# Patient Record
Sex: FEMALE | Race: WHITE | NOT HISPANIC OR LATINO | Employment: FULL TIME | URBAN - METROPOLITAN AREA
[De-identification: names, ages, dates, MRNs, and addresses within clinical notes are randomized per-mention and may not be internally consistent; named-entity substitution may affect disease eponyms.]

---

## 2018-01-18 NOTE — MISCELLANEOUS
Message  Return to work or school:   Edgard Chavez is under my professional care  She was seen in my office on 9/14/2016   She is able to return to work on  9/16/2016      Excuse work absence due to illness          Signatures   Electronically signed by : Edison Rodríguez MD; Sep 14 2016  8:34PM EST                       (Author)

## 2018-09-01 ENCOUNTER — OFFICE VISIT (OUTPATIENT)
Dept: URGENT CARE | Age: 33
End: 2018-09-01
Payer: COMMERCIAL

## 2018-09-01 VITALS
OXYGEN SATURATION: 98 % | SYSTOLIC BLOOD PRESSURE: 121 MMHG | HEART RATE: 87 BPM | BODY MASS INDEX: 46.26 KG/M2 | DIASTOLIC BLOOD PRESSURE: 71 MMHG | WEIGHT: 271 LBS | TEMPERATURE: 98.1 F | HEIGHT: 64 IN | RESPIRATION RATE: 20 BRPM

## 2018-09-01 DIAGNOSIS — R21 RASH: Primary | ICD-10-CM

## 2018-09-01 PROCEDURE — 99283 EMERGENCY DEPT VISIT LOW MDM: CPT | Performed by: FAMILY MEDICINE

## 2018-09-01 PROCEDURE — G0382 LEV 3 HOSP TYPE B ED VISIT: HCPCS | Performed by: FAMILY MEDICINE

## 2018-09-01 RX ORDER — METHYLPREDNISOLONE 4 MG/1
TABLET ORAL
Qty: 21 TABLET | Refills: 0 | Status: SHIPPED | OUTPATIENT
Start: 2018-09-01 | End: 2019-02-27

## 2018-09-01 RX ORDER — VALACYCLOVIR HYDROCHLORIDE 1 G/1
1000 TABLET, FILM COATED ORAL 3 TIMES DAILY
Qty: 21 TABLET | Refills: 0 | Status: SHIPPED | OUTPATIENT
Start: 2018-09-01 | End: 2019-02-27

## 2018-09-01 RX ORDER — LEVOTHYROXINE SODIUM 112 UG/1
TABLET ORAL
COMMUNITY
Start: 2018-08-01

## 2018-09-01 NOTE — PATIENT INSTRUCTIONS
Will start treatment for shingles due to presentation of rash  Start steroid taper to see if this will help with itching  Avoid scratching the area  You can continue benadryl and anti itch cream   If no improvement go to ER      Will treat for shingles as rash follows a dermatome and stops midline anteriorly and posteriorly

## 2018-09-01 NOTE — PROGRESS NOTES
[facility]        NAME: Mairxa Samaniego is a 26 y o  female  : 1985    MRN: [id number]  DATE: 2018  TIME: 4:03 PM    Assessment and Plan   Herpes zoster without complication [W09 4]  1  Herpes zoster without complication  methylprednisolone (MEDROL) 4 mg tablet    valACYclovir (VALTREX) 1,000 mg tablet         Patient Instructions     Patient Instructions   Will start treatment for shingles due to presentation of rash  Start steroid taper to see if this will help with itching  Avoid scratching the area  You can continue benadryl and anti itch cream   If no improvement go to ER      Chief Complaint     Chief Complaint   Patient presents with    Rash     rash under her breast ans rading to her back left side since wednesday  History of Present Illness   Marixa Samaniego presents to the clinic c/o    This is a 26year old female here today with rash under breast which radiates around to her back  She states she noticed rash and itchiness 3 days ago  She denies any pain or open areas  No pain or tingling before onset of rash  History of chicken pox as a child  She has used benadryl and other OTC antiitch creams with no relief  Review of Systems   Review of Systems   Constitutional: Negative  HENT: Negative  Respiratory: Negative  Cardiovascular: Negative  Skin: Positive for rash  Psychiatric/Behavioral: Negative            Current Medications     Long-Term Prescriptions   Medication Sig Dispense Refill    valACYclovir (VALTREX) 1,000 mg tablet Take 1 tablet (1,000 mg total) by mouth 3 (three) times a day for 7 days 21 tablet 0       Current Allergies     Allergies as of 2018 - Reviewed 2018   Allergen Reaction Noted    Diphenhydramine Shortness Of Breath 12/15/2015    Erythromycin Shortness Of Breath and Hives 2015            The following portions of the patient's history were reviewed and updated as appropriate: allergies, current medications, past family history, past medical history, past social history, past surgical history and problem list     Objective   /71   Pulse 87   Temp 98 1 °F (36 7 °C) (Temporal)   Resp 20   Ht 5' 4" (1 626 m)   Wt 123 kg (271 lb)   SpO2 98%   BMI 46 52 kg/m²        Physical Exam     Physical Exam   Constitutional: She appears well-developed and well-nourished  Neck: Normal range of motion  Neck supple  Cardiovascular: Normal rate, regular rhythm and normal heart sounds  Pulmonary/Chest: Effort normal and breath sounds normal    Skin:   erythemic excoriation extending under left breast and wraps around to back starting and stopping and midline and no extending beyond midline, following a dermatome  No vesicular lesions   Nursing note and vitals reviewed

## 2019-02-27 ENCOUNTER — APPOINTMENT (EMERGENCY)
Dept: CT IMAGING | Facility: HOSPITAL | Age: 34
End: 2019-02-27
Payer: COMMERCIAL

## 2019-02-27 ENCOUNTER — APPOINTMENT (EMERGENCY)
Dept: RADIOLOGY | Facility: HOSPITAL | Age: 34
End: 2019-02-27
Payer: COMMERCIAL

## 2019-02-27 ENCOUNTER — HOSPITAL ENCOUNTER (EMERGENCY)
Facility: HOSPITAL | Age: 34
Discharge: HOME | End: 2019-02-27
Attending: EMERGENCY MEDICINE | Admitting: EMERGENCY MEDICINE
Payer: COMMERCIAL

## 2019-02-27 VITALS
TEMPERATURE: 98.9 F | HEART RATE: 91 BPM | SYSTOLIC BLOOD PRESSURE: 161 MMHG | DIASTOLIC BLOOD PRESSURE: 70 MMHG | OXYGEN SATURATION: 97 % | RESPIRATION RATE: 18 BRPM | BODY MASS INDEX: 47.2 KG/M2 | WEIGHT: 275 LBS

## 2019-02-27 DIAGNOSIS — J02.9 VIRAL PHARYNGITIS: Primary | ICD-10-CM

## 2019-02-27 DIAGNOSIS — J02.9 SORE THROAT: ICD-10-CM

## 2019-02-27 LAB — S PYO AG THROAT QL: NEGATIVE

## 2019-02-27 PROCEDURE — 87430 STREP A AG IA: CPT | Performed by: EMERGENCY MEDICINE

## 2019-02-27 PROCEDURE — 70490 CT SOFT TISSUE NECK W/O DYE: CPT

## 2019-02-27 PROCEDURE — 99284 EMERGENCY DEPT VISIT MOD MDM: CPT

## 2019-02-27 PROCEDURE — 70360 X-RAY EXAM OF NECK: CPT

## 2019-02-27 RX ORDER — BUPRENORPHINE HYDROCHLORIDE AND NALOXONE HYDROCHLORIDE DIHYDRATE 8; 2 MG/1; MG/1
1 TABLET SUBLINGUAL DAILY
COMMUNITY

## 2019-02-28 NOTE — ED PROVIDER NOTES
History  Chief Complaint   Patient presents with    Sore Throat     Pt states she was seen by urgent care today for a sore throat  They informed her that she had ulcers in her throat  Pt was started on augmentin  Pt was told that if she had a fever she should come to the ER  Tmax 8      53-year-old female presents today complaining of sore throat which started yesterday  States she was seen evaluated by urgent care and was given Augmentin and Carafate  States she is unable to tolerate the liquid Augmentin and the Carafate does not help  Has not taken any anti-inflammatories for her symptoms  Denies fever  Presents to the emergency room tonight because she was out to the dinner and her throat her while she was eating, she became afraid that it was closing  She is in no respiratory distress at this time  She has not having any difficulty swallowing, she is handling her own secretions  History provided by:  Patient  Sore Throat   Location:  Generalized  Quality:  Sore  Severity:  Moderate  Onset quality:  Gradual  Duration:  2 days  Timing:  Constant  Progression:  Unchanged  Chronicity:  New  Relieved by:  Nothing  Worsened by:  Drinking, eating and swallowing  Ineffective treatments: Started Augmentin and Carafate today  Associated symptoms: postnasal drip    Associated symptoms: no abdominal pain, no chills, no drooling, no epistaxis, no fever, no headaches, no rash, no shortness of breath, no sinus congestion, no trouble swallowing and no voice change    Risk factors: no sick contacts        Prior to Admission Medications   Prescriptions Last Dose Informant Patient Reported? Taking?    buprenorphine-naloxone (SUBOXONE) 8-2 mg per SL tablet   Yes Yes   Sig: Place 1 tablet under the tongue daily   levothyroxine 112 mcg tablet   Yes Yes   Sig: TAKE 2 TABLETS BY MOUTH DAILY      Facility-Administered Medications: None       Past Medical History:   Diagnosis Date    Thyroid cancer ([facility] [address] ) 1999    with thyroidectomy and RTX       Past Surgical History:   Procedure Laterality Date    GANGLION CYST EXCISION Left     THYROIDECTOMY         History reviewed  No pertinent family history  I have reviewed and agree with the history as documented  Social History     Tobacco Use    Smoking status: Current Every Day Smoker     Packs/day: 0 25     Types: Cigarettes    Smokeless tobacco: Never Used   Substance Use Topics    Alcohol use: No    Drug use: No     Comment: 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        Review of Systems   Constitutional: Negative for chills and fever  HENT: Positive for postnasal drip and sore throat  Negative for congestion, drooling, nosebleeds, trouble swallowing and voice change  Respiratory: Negative for chest tightness and shortness of breath  Gastrointestinal: Negative for abdominal pain  Skin: Negative for rash  Allergic/Immunologic: Negative for immunocompromised state  Neurological: Negative for headaches  Physical Exam  Physical Exam   Constitutional: She is oriented to person, place, and time  She appears well-developed and well-nourished  HENT:   Head: Normocephalic and atraumatic  Mouth/Throat: Oral lesions ( vesicles present on the soft palate) present  Uvula swelling (Mild) present  Posterior oropharyngeal erythema ( mild) present  No oropharyngeal exudate or tonsillar abscesses  No tonsillar exudate  Eyes: Pupils are equal, round, and reactive to light  EOM are normal    Neck: Normal range of motion  Cardiovascular: Normal rate and regular rhythm  Pulmonary/Chest: Effort normal and breath sounds normal    Abdominal: Soft  Lymphadenopathy:     She has no cervical adenopathy  Neurological: She is alert and oriented to person, place, and time  Skin: Skin is warm and dry  Capillary refill takes less than 2 seconds  No rash noted  Psychiatric: She has a normal mood and affect   Her behavior is normal    Nursing note and vitals reviewed  Vital Signs  ED Triage Vitals [02/27/19 2128]   Temperature Pulse Respirations Blood Pressure SpO2   98 9 °F (37 2 °C) 91 18 161/70 97 %      Temp Source Heart Rate Source Patient Position - Orthostatic VS BP Location FiO2 (%)   Oral Monitor Sitting Right arm --      Pain Score       Worst Possible Pain           Vitals:    02/27/19 2128   BP: 161/70   Pulse: 91   Patient Position - Orthostatic VS: Sitting       Visual Acuity      ED Medications  Medications - No data to display    Diagnostic Studies  Results Reviewed     Procedure Component Value Units Date/Time    Rapid Strep A Screen Only, Adults [[id number]]  (Normal) Collected:  02/27/19 2224    Lab Status:  Final result Specimen:  Throat Updated:  02/27/19 2237     Rapid Strep A Screen Negative                 CT soft tissue neck wo contrast   Final Result by Alex Aguiar MD (02/27 2324)         1  No evidence of foreign body in the airway  2   Mild lingual tonsillar enlargement, likely reactive  No evidence of significant pharyngitis or abscess  Workstation performed: [id number]         XR neck soft tissue   Final Result by Ralph Fernandez MD (02/27 2247)      Surgical clips are noted in the cervical region at approximately the level of C6 and C7  Consider CT for further evaluation  I personally discussed this study with Marsha Harrison on 2/27/2019 at 10:46 PM                Workstation performed: [id number]                    Procedures  Procedures       Phone Contacts  ED Phone Contact    ED Course                               MDM  Number of Diagnoses or Management Options  Sore throat: new and requires workup  Viral pharyngitis: new and requires workup  Diagnosis management comments: 11:29 PM  CT soft tissue neck negative for acute pathology  Likely viral pharyngitis  Will recommend anti-inflammatories and lidocaine p r n  Sneha Yates Patient is stable for discharge  Return to ED instructions reviewed         Amount and/or Complexity of Data Reviewed  Clinical lab tests: ordered and reviewed  Tests in the radiology section of CPT®: ordered and reviewed  Independent visualization of images, tracings, or specimens: yes    Risk of Complications, Morbidity, and/or Mortality  Presenting problems: low  Diagnostic procedures: low  Management options: low    Patient Progress  Patient progress: stable      Disposition  Final diagnoses:   Viral pharyngitis   Sore throat     Time reflects when diagnosis was documented in both MDM as applicable and the Disposition within this note     Time User Action Codes Description Comment    2/27/2019 11:30 PM Lisa Villarreal Add [J02 9] Viral pharyngitis     2/27/2019 11:30 PM Lisa Villarreal Add [J02 9] Sore throat       ED Disposition     ED Disposition Condition Date/Time Comment    Discharge Stable Wed Feb 27, 2019 11:30 PM Marcos Guerrero Benjamin discharge to home/self care  Follow-up Information     Follow up With Specialties Details Why Contact Info Additional Information    Elvin Campbell MD Internal Medicine Schedule an appointment as soon as possible for a visit   [address] [place] [address]        [place] [address] Emergency Department Emergency Medicine  If symptoms worsen [address] [phone]  ED,  [address] [place] [address], [phone]          Patient's Medications   Discharge Prescriptions    LIDOCAINE VISCOUS (XYLOCAINE) 2 % MUCOSAL SOLUTION    Swish and spit 10 mL 4 (four) times a day as needed for mild pain       Start Date: 2/27/2019 End Date: --       Order Dose: 10 mL       Quantity: 100 mL    Refills: 0     No discharge procedures on file      ED Provider  Electronically Signed by           Gregorio Bingham DO  02/27/19 0442

## 2019-09-08 ENCOUNTER — APPOINTMENT (EMERGENCY)
Dept: RADIOLOGY | Facility: HOSPITAL | Age: 34
End: 2019-09-08
Payer: COMMERCIAL

## 2019-09-08 ENCOUNTER — HOSPITAL ENCOUNTER (EMERGENCY)
Facility: HOSPITAL | Age: 34
Discharge: HOME | End: 2019-09-08
Attending: EMERGENCY MEDICINE
Payer: COMMERCIAL

## 2019-09-08 VITALS
TEMPERATURE: 98.9 F | WEIGHT: 270.5 LBS | BODY MASS INDEX: 45.07 KG/M2 | RESPIRATION RATE: 20 BRPM | SYSTOLIC BLOOD PRESSURE: 113 MMHG | OXYGEN SATURATION: 98 % | HEIGHT: 65 IN | HEART RATE: 92 BPM | DIASTOLIC BLOOD PRESSURE: 56 MMHG

## 2019-09-08 DIAGNOSIS — B34.9 VIRAL ILLNESS: ICD-10-CM

## 2019-09-08 DIAGNOSIS — R05.9 COUGH: Primary | ICD-10-CM

## 2019-09-08 LAB
ATRIAL RATE: 91 BPM
P AXIS: 67 DEGREES
PR INTERVAL: 134 MS
QRS AXIS: 11 DEGREES
QRSD INTERVAL: 90 MS
QT INTERVAL: 328 MS
QTC INTERVAL: 403 MS
T WAVE AXIS: 44 DEGREES
VENTRICULAR RATE: 91 BPM

## 2019-09-08 PROCEDURE — 99285 EMERGENCY DEPT VISIT HI MDM: CPT

## 2019-09-08 PROCEDURE — 93010 ELECTROCARDIOGRAM REPORT: CPT | Performed by: INTERNAL MEDICINE

## 2019-09-08 PROCEDURE — 71046 X-RAY EXAM CHEST 2 VIEWS: CPT

## 2019-09-08 PROCEDURE — 93005 ELECTROCARDIOGRAM TRACING: CPT

## 2019-09-08 PROCEDURE — 99284 EMERGENCY DEPT VISIT MOD MDM: CPT | Performed by: EMERGENCY MEDICINE

## 2019-09-08 PROCEDURE — 94640 AIRWAY INHALATION TREATMENT: CPT

## 2019-09-08 RX ORDER — PREDNISONE 20 MG/1
60 TABLET ORAL ONCE
Status: COMPLETED | OUTPATIENT
Start: 2019-09-08 | End: 2019-09-08

## 2019-09-08 RX ORDER — GUAIFENESIN 600 MG
1200 TABLET, EXTENDED RELEASE 12 HR ORAL ONCE
Status: COMPLETED | OUTPATIENT
Start: 2019-09-08 | End: 2019-09-08

## 2019-09-08 RX ORDER — PREDNISONE 20 MG/1
40 TABLET ORAL DAILY
Qty: 8 TABLET | Refills: 0 | Status: SHIPPED | OUTPATIENT
Start: 2019-09-08 | End: 2019-09-12

## 2019-09-08 RX ADMIN — PREDNISONE 60 MG: 20 TABLET ORAL at 16:51

## 2019-09-08 RX ADMIN — IPRATROPIUM BROMIDE 0.5 MG: 0.5 SOLUTION RESPIRATORY (INHALATION) at 16:51

## 2019-09-08 RX ADMIN — ALBUTEROL SULFATE 5 MG: 2.5 SOLUTION RESPIRATORY (INHALATION) at 16:51

## 2019-09-08 RX ADMIN — GUAIFENESIN 1200 MG: 600 TABLET, EXTENDED RELEASE ORAL at 17:37

## 2019-09-08 NOTE — ED PROVIDER NOTES
History  Chief Complaint   Patient presents with    Chest Pain     Pt  presents to the ED with complaints of chest pain as well as coughing, congestion, weakness, fever, and vomiting  History provided by:  Patient   used: No    Chest Pain   Associated symptoms: cough, fever and shortness of breath    Associated symptoms: no abdominal pain, no back pain, no dizziness, no nausea, not vomiting and no weakness     61-year-old female presented for evaluation of few days of generalized malaise, painful cough, sore throat, mild dyspnea  States that her chest only hurts when she coughs  Having some subjective fevers and sweats  Daughter was sick before her and has since improved spontaneously  She was seen in urgent care 1 or 2 days ago, prescribed Augmentin and also given Phenergan/codeine for bronchitis  Patient has a history of prior heroin abuse, has been clean for about 5 years  Currently on Suboxone  Advised not to continue using the cough syrup  She reports having some improvement after using her daughter's inhaler  On exam here vitals are normal   Breathing comfortably on room air  Breath sounds are normal   Heart sounds normal   Oropharynx mildly erythematous  Plan EKG, chest x-ray, neb treatment, prednisone +/- inhaler for home  Prior to Admission Medications   Prescriptions Last Dose Informant Patient Reported? Taking? buprenorphine-naloxone (SUBOXONE) 8-2 mg per SL tablet   Yes No   Sig: Place 1 tablet under the tongue daily   levothyroxine 112 mcg tablet   Yes No   Sig: TAKE 2 TABLETS BY MOUTH DAILY      Facility-Administered Medications: None       Past Medical History:   Diagnosis Date    [facility]     clean since 2015    [facility] 1999    with thyroidectomy and RTX       Past Surgical History:   Procedure Laterality Date    GANGLION CYST EXCISION Left     THYROIDECTOMY         History reviewed  No pertinent family history    I have reviewed and agree with the history as documented  Social History     Tobacco Use    Smoking status: Current Every Day Smoker     Packs/day: 0 25     Types: Cigarettes    Smokeless tobacco: Never Used   Substance Use Topics    Alcohol use: No    Drug use: No     Comment: 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        Review of Systems   Constitutional: Positive for fever  Negative for activity change and appetite change  Respiratory: Positive for cough and shortness of breath  Negative for wheezing  Cardiovascular: Positive for chest pain  Gastrointestinal: Negative for abdominal pain, nausea and vomiting  Musculoskeletal: Negative for back pain and neck pain  Skin: Negative for color change and rash  Neurological: Negative for dizziness and weakness  All other systems reviewed and are negative  Physical Exam  Physical Exam   Constitutional: She is oriented to person, place, and time  She appears well-developed and well-nourished  HENT:   Head: Normocephalic  Mild posterior pharyngeal erythema  No tonsillar edema or exudates  Neck: Normal range of motion  Cardiovascular: Normal rate and regular rhythm  No murmur heard  Pulmonary/Chest: Effort normal and breath sounds normal    Musculoskeletal:        Right lower leg: She exhibits no edema  Left lower leg: She exhibits no edema  Neurological: She is alert and oriented to person, place, and time  Psychiatric: She has a normal mood and affect  Her behavior is normal    Nursing note and vitals reviewed        Vital Signs  ED Triage Vitals [09/08/19 1622]   Temperature Pulse Respirations Blood Pressure SpO2   98 9 °F (37 2 °C) 93 20 113/56 98 %      Temp Source Heart Rate Source Patient Position - Orthostatic VS BP Location FiO2 (%)   Oral Monitor Lying Right arm --      Pain Score       No Pain           Vitals:    09/08/19 1622 09/08/19 1630   BP: 113/56 113/56   Pulse: 93 92   Patient Position - Orthostatic VS: Lying Visual Acuity      ED Medications  Medications   predniSONE tablet 60 mg (60 mg Oral Given 9/8/19 1651)   albuterol inhalation solution 5 mg (5 mg Nebulization Given 9/8/19 1651)   ipratropium (ATROVENT) 0 02 % inhalation solution 0 5 mg (0 5 mg Nebulization Given 9/8/19 1651)   guaiFENesin (MUCINEX) 12 hr tablet 1,200 mg (1,200 mg Oral Given 9/8/19 2644)       Diagnostic Studies  Results Reviewed     None                 XR chest 2 views   ED Interpretation by Kya Caro MD (09/08 1651)   Normal                  Procedures  ECG 12 Lead Documentation Only  Date/Time: 9/8/2019 4:37 PM  Performed by: Kya Caro MD  Authorized by: Kya Caro MD     Indications / Diagnosis:  Chest pain  Previous ECG:     Previous ECG:  Compared to current    Comparison ECG info:  6/1/16    Similarity:  No change  Rate:     ECG rate:  91  Rhythm:     Rhythm: sinus rhythm    Ectopy:     Ectopy: none    QRS:     QRS axis:  Normal  Conduction:     Conduction: normal    ST segments:     ST segments:  Normal  T waves:     T waves: normal             ED Course                               MDM  Number of Diagnoses or Management Options  Cough: new and requires workup  Viral illness: new and requires workup  Diagnosis management comments: 70-year-old female presented for evaluation of a few days of painful cough, some shortness of breath, sore throat and generalized malaise  EKG, chest x-ray unremarkable  No improvement after neb treatment  Previously diagnosed with bronchitis, started on Augmentin and given cough suppressant  Advised oral steroid, Mucinex and discontinue cough suppressant +/-antibiotic  Patient's vitals normal   Stable for discharge home         Amount and/or Complexity of Data Reviewed  Tests in the radiology section of CPT®: ordered and reviewed  Independent visualization of images, tracings, or specimens: yes    Patient Progress  Patient progress: stable      Disposition  Final diagnoses: Cough   Viral illness     Time reflects when diagnosis was documented in both MDM as applicable and the Disposition within this note     Time User Action Codes Description Comment    9/8/2019  5:24 PM Richardsonjacinta Urrutialy Add [R05] Cough     9/8/2019  5:24 PM Richardson Cline Add [B34 9] Viral illness       ED Disposition     ED Disposition Condition Date/Time Comment    Discharge Stable Sun Sep 8, 2019  5:24  [address] discharge to home/self care  Follow-up Information     Follow up With Specialties Details Why Contact Info Additional Information    Kermit Gonzalez MD Internal Medicine   [address] [address]  [phone]       [facility] Emergency [facility]  If symptoms worsen [address] [facility] [address] [address] [phone] AN ED, [address], [place], [place], [address]          Patient's Medications   Discharge Prescriptions    PREDNISONE 20 MG TABLET    Take 2 tablets (40 mg total) by mouth daily       Start Date: 9/8/2019  End Date: --       Order Dose: 40 mg       Quantity: 8 tablet    Refills: 0     No discharge procedures on file      ED Provider  Electronically Signed by           Carmel Art MD  09/08/19 4510

## 2019-09-12 ENCOUNTER — HOSPITAL ENCOUNTER (EMERGENCY)
Facility: HOSPITAL | Age: 34
Discharge: HOME | End: 2019-09-12
Attending: EMERGENCY MEDICINE | Admitting: EMERGENCY MEDICINE
Payer: COMMERCIAL

## 2019-09-12 ENCOUNTER — APPOINTMENT (EMERGENCY)
Dept: CT IMAGING | Facility: HOSPITAL | Age: 34
End: 2019-09-12
Payer: COMMERCIAL

## 2019-09-12 VITALS
WEIGHT: 272.27 LBS | BODY MASS INDEX: 45.31 KG/M2 | OXYGEN SATURATION: 99 % | TEMPERATURE: 98.9 F | SYSTOLIC BLOOD PRESSURE: 130 MMHG | DIASTOLIC BLOOD PRESSURE: 70 MMHG | HEART RATE: 80 BPM | RESPIRATION RATE: 18 BRPM

## 2019-09-12 DIAGNOSIS — S09.90XA HEAD INJURY: ICD-10-CM

## 2019-09-12 DIAGNOSIS — S16.1XXA CERVICAL STRAIN: Primary | ICD-10-CM

## 2019-09-12 PROCEDURE — 99284 EMERGENCY DEPT VISIT MOD MDM: CPT

## 2019-09-12 PROCEDURE — 70450 CT HEAD/BRAIN W/O DYE: CPT

## 2019-09-12 PROCEDURE — 72125 CT NECK SPINE W/O DYE: CPT

## 2019-09-12 PROCEDURE — 99284 EMERGENCY DEPT VISIT MOD MDM: CPT | Performed by: PHYSICIAN ASSISTANT

## 2019-09-12 RX ORDER — IBUPROFEN 400 MG/1
400 TABLET ORAL ONCE
Status: COMPLETED | OUTPATIENT
Start: 2019-09-12 | End: 2019-09-12

## 2019-09-12 RX ADMIN — IBUPROFEN 400 MG: 400 TABLET ORAL at 21:00
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
## 2020-01-16 ENCOUNTER — TRANSCRIBE ORDERS (OUTPATIENT)
Dept: ADMINISTRATIVE | Facility: HOSPITAL | Age: 35
End: 2020-01-16

## 2020-01-16 DIAGNOSIS — N63.0 LUMP OR MASS IN BREAST: Primary | ICD-10-CM

## 2020-01-22 ENCOUNTER — HOSPITAL ENCOUNTER (OUTPATIENT)
Dept: ULTRASOUND IMAGING | Facility: CLINIC | Age: 35
Discharge: HOME | End: 2020-01-22
Payer: COMMERCIAL

## 2020-01-22 ENCOUNTER — HOSPITAL ENCOUNTER (OUTPATIENT)
Dept: MAMMOGRAPHY | Facility: CLINIC | Age: 35
Discharge: HOME | End: 2020-01-22
Payer: COMMERCIAL

## 2020-01-22 VITALS — HEIGHT: 65 IN | BODY MASS INDEX: 45.32 KG/M2 | WEIGHT: 272 LBS

## 2020-01-22 DIAGNOSIS — N63.0 LUMP OR MASS IN BREAST: ICD-10-CM

## 2020-01-22 PROCEDURE — G0279 TOMOSYNTHESIS, MAMMO: HCPCS

## 2020-01-22 PROCEDURE — 77066 DX MAMMO INCL CAD BI: CPT

## 2020-01-22 PROCEDURE — 76642 ULTRASOUND BREAST LIMITED: CPT

## 2020-08-19 ENCOUNTER — HOSPITAL ENCOUNTER (OUTPATIENT)
Facility: HOSPITAL | Age: 35
Setting detail: OBSERVATION
Discharge: HOME | End: 2020-08-20
Attending: EMERGENCY MEDICINE | Admitting: INTERNAL MEDICINE
Payer: COMMERCIAL

## 2020-08-19 ENCOUNTER — APPOINTMENT (EMERGENCY)
Dept: RADIOLOGY | Facility: HOSPITAL | Age: 35
End: 2020-08-19
Payer: COMMERCIAL

## 2020-08-19 DIAGNOSIS — K27.9 PEPTIC ULCER DISEASE: ICD-10-CM

## 2020-08-19 DIAGNOSIS — R07.9 CHEST PAIN: Primary | ICD-10-CM

## 2020-08-19 DIAGNOSIS — K29.70 GASTRITIS: ICD-10-CM

## 2020-08-19 PROCEDURE — 99285 EMERGENCY DEPT VISIT HI MDM: CPT | Performed by: EMERGENCY MEDICINE

## 2020-08-19 PROCEDURE — 71045 X-RAY EXAM CHEST 1 VIEW: CPT

## 2020-08-19 PROCEDURE — 93005 ELECTROCARDIOGRAM TRACING: CPT

## 2020-08-19 PROCEDURE — 99285 EMERGENCY DEPT VISIT HI MDM: CPT

## 2020-08-19 RX ORDER — ASPIRIN 81 MG/1
162 TABLET, CHEWABLE ORAL ONCE
Status: COMPLETED | OUTPATIENT
Start: 2020-08-19 | End: 2020-08-19

## 2020-08-19 RX ADMIN — ASPIRIN 81 MG 162 MG: 81 TABLET ORAL at 22:55

## 2020-08-20 ENCOUNTER — APPOINTMENT (OUTPATIENT)
Dept: ULTRASOUND IMAGING | Facility: HOSPITAL | Age: 35
End: 2020-08-20
Payer: COMMERCIAL

## 2020-08-20 VITALS
TEMPERATURE: 97.8 F | BODY MASS INDEX: 43.89 KG/M2 | SYSTOLIC BLOOD PRESSURE: 116 MMHG | HEART RATE: 58 BPM | WEIGHT: 263.4 LBS | OXYGEN SATURATION: 98 % | RESPIRATION RATE: 18 BRPM | HEIGHT: 65 IN | DIASTOLIC BLOOD PRESSURE: 58 MMHG

## 2020-08-20 PROBLEM — R07.9 CHEST PAIN: Status: ACTIVE | Noted: 2020-08-20

## 2020-08-20 PROBLEM — R79.89 ELEVATED D-DIMER: Status: ACTIVE | Noted: 2020-08-20

## 2020-08-20 PROBLEM — K29.70 GASTRITIS: Status: ACTIVE | Noted: 2020-08-20

## 2020-08-20 LAB
ALBUMIN SERPL BCP-MCNC: 3.3 G/DL (ref 3.5–5)
ALP SERPL-CCNC: 84 U/L (ref 46–116)
ALT SERPL W P-5'-P-CCNC: 22 U/L (ref 12–78)
ANION GAP SERPL CALCULATED.3IONS-SCNC: 8 MMOL/L (ref 4–13)
APTT PPP: 34 SECONDS (ref 23–37)
AST SERPL W P-5'-P-CCNC: 19 U/L (ref 5–45)
ATRIAL RATE: 74 BPM
BASOPHILS # BLD AUTO: 0.04 THOUSANDS/ΜL (ref 0–0.1)
BASOPHILS NFR BLD AUTO: 0 % (ref 0–1)
BILIRUB SERPL-MCNC: 0.23 MG/DL (ref 0.2–1)
BUN SERPL-MCNC: 15 MG/DL (ref 5–25)
CALCIUM SERPL-MCNC: 6.6 MG/DL (ref 8.3–10.1)
CHLORIDE SERPL-SCNC: 105 MMOL/L (ref 100–108)
CK MB SERPL-MCNC: 0.3 NG/ML (ref 0–5)
CK MB SERPL-MCNC: <1 % (ref 0–2.5)
CK SERPL-CCNC: 153 U/L (ref 26–192)
CO2 SERPL-SCNC: 30 MMOL/L (ref 21–32)
CREAT SERPL-MCNC: 1.1 MG/DL (ref 0.6–1.3)
D DIMER PPP FEU-MCNC: 0.82 UG/ML FEU
EOSINOPHIL # BLD AUTO: 0.33 THOUSAND/ΜL (ref 0–0.61)
EOSINOPHIL NFR BLD AUTO: 3 % (ref 0–6)
ERYTHROCYTE [DISTWIDTH] IN BLOOD BY AUTOMATED COUNT: 14.3 % (ref 11.6–15.1)
GFR SERPL CREATININE-BSD FRML MDRD: 64 ML/MIN/1.73SQ M
GLUCOSE SERPL-MCNC: 105 MG/DL (ref 65–140)
HCT VFR BLD AUTO: 42.3 % (ref 34.8–46.1)
HGB BLD-MCNC: 12.9 G/DL (ref 11.5–15.4)
IMM GRANULOCYTES # BLD AUTO: 0.03 THOUSAND/UL (ref 0–0.2)
IMM GRANULOCYTES NFR BLD AUTO: 0 % (ref 0–2)
INR PPP: 1.01 (ref 0.84–1.19)
LIPASE SERPL-CCNC: 109 U/L (ref 73–393)
LYMPHOCYTES # BLD AUTO: 3.78 THOUSANDS/ΜL (ref 0.6–4.47)
LYMPHOCYTES NFR BLD AUTO: 35 % (ref 14–44)
MCH RBC QN AUTO: 25.2 PG (ref 26.8–34.3)
MCHC RBC AUTO-ENTMCNC: 30.5 G/DL (ref 31.4–37.4)
MCV RBC AUTO: 83 FL (ref 82–98)
MONOCYTES # BLD AUTO: 0.7 THOUSAND/ΜL (ref 0.17–1.22)
MONOCYTES NFR BLD AUTO: 6 % (ref 4–12)
NEUTROPHILS # BLD AUTO: 6.07 THOUSANDS/ΜL (ref 1.85–7.62)
NEUTS SEG NFR BLD AUTO: 56 % (ref 43–75)
NRBC BLD AUTO-RTO: 0 /100 WBCS
P AXIS: 56 DEGREES
PLATELET # BLD AUTO: 216 THOUSANDS/UL (ref 149–390)
PMV BLD AUTO: 11.9 FL (ref 8.9–12.7)
POTASSIUM SERPL-SCNC: 3.8 MMOL/L (ref 3.5–5.3)
PR INTERVAL: 148 MS
PROT SERPL-MCNC: 7.4 G/DL (ref 6.4–8.2)
PROTHROMBIN TIME: 13.4 SECONDS (ref 11.6–14.5)
QRS AXIS: 9 DEGREES
QRSD INTERVAL: 96 MS
QT INTERVAL: 438 MS
QTC INTERVAL: 486 MS
RBC # BLD AUTO: 5.11 MILLION/UL (ref 3.81–5.12)
SODIUM SERPL-SCNC: 143 MMOL/L (ref 136–145)
T WAVE AXIS: 67 DEGREES
TROPONIN I SERPL-MCNC: <0.02 NG/ML
TROPONIN I SERPL-MCNC: <0.02 NG/ML
VENTRICULAR RATE: 74 BPM
WBC # BLD AUTO: 10.95 THOUSAND/UL (ref 4.31–10.16)

## 2020-08-20 PROCEDURE — 93010 ELECTROCARDIOGRAM REPORT: CPT | Performed by: INTERNAL MEDICINE

## 2020-08-20 PROCEDURE — 85025 COMPLETE CBC W/AUTO DIFF WBC: CPT | Performed by: EMERGENCY MEDICINE

## 2020-08-20 PROCEDURE — 93970 EXTREMITY STUDY: CPT | Performed by: SURGERY

## 2020-08-20 PROCEDURE — 82553 CREATINE MB FRACTION: CPT | Performed by: EMERGENCY MEDICINE

## 2020-08-20 PROCEDURE — 83690 ASSAY OF LIPASE: CPT | Performed by: EMERGENCY MEDICINE

## 2020-08-20 PROCEDURE — 85730 THROMBOPLASTIN TIME PARTIAL: CPT | Performed by: EMERGENCY MEDICINE

## 2020-08-20 PROCEDURE — 99236 HOSP IP/OBS SAME DATE HI 85: CPT | Performed by: INTERNAL MEDICINE

## 2020-08-20 PROCEDURE — 80053 COMPREHEN METABOLIC PANEL: CPT | Performed by: EMERGENCY MEDICINE

## 2020-08-20 PROCEDURE — 93970 EXTREMITY STUDY: CPT

## 2020-08-20 PROCEDURE — 85610 PROTHROMBIN TIME: CPT | Performed by: EMERGENCY MEDICINE

## 2020-08-20 PROCEDURE — 85379 FIBRIN DEGRADATION QUANT: CPT | Performed by: EMERGENCY MEDICINE

## 2020-08-20 PROCEDURE — 84484 ASSAY OF TROPONIN QUANT: CPT | Performed by: EMERGENCY MEDICINE

## 2020-08-20 PROCEDURE — 82550 ASSAY OF CK (CPK): CPT | Performed by: EMERGENCY MEDICINE

## 2020-08-20 PROCEDURE — 96374 THER/PROPH/DIAG INJ IV PUSH: CPT

## 2020-08-20 PROCEDURE — 36415 COLL VENOUS BLD VENIPUNCTURE: CPT | Performed by: EMERGENCY MEDICINE

## 2020-08-20 PROCEDURE — 84484 ASSAY OF TROPONIN QUANT: CPT | Performed by: NURSE PRACTITIONER

## 2020-08-20 RX ORDER — LEVOTHYROXINE SODIUM 112 UG/1
224 TABLET ORAL
Status: DISCONTINUED | OUTPATIENT
Start: 2020-08-20 | End: 2020-08-20 | Stop reason: HOSPADM

## 2020-08-20 RX ORDER — SUCRALFATE ORAL 1 G/10ML
1000 SUSPENSION ORAL EVERY 6 HOURS SCHEDULED
Qty: 1200 ML | Refills: 0 | Status: SHIPPED | OUTPATIENT
Start: 2020-08-20 | End: 2020-09-19

## 2020-08-20 RX ORDER — PANTOPRAZOLE SODIUM 40 MG/1
40 TABLET, DELAYED RELEASE ORAL
Status: DISCONTINUED | OUTPATIENT
Start: 2020-08-20 | End: 2020-08-20 | Stop reason: HOSPADM

## 2020-08-20 RX ORDER — SUCRALFATE ORAL 1 G/10ML
1000 SUSPENSION ORAL EVERY 6 HOURS SCHEDULED
Status: DISCONTINUED | OUTPATIENT
Start: 2020-08-20 | End: 2020-08-20 | Stop reason: HOSPADM

## 2020-08-20 RX ORDER — ZOLPIDEM TARTRATE 5 MG/1
10 TABLET ORAL
Status: CANCELLED | OUTPATIENT
Start: 2020-08-20

## 2020-08-20 RX ORDER — ZOLPIDEM TARTRATE 10 MG/1
10 TABLET ORAL
COMMUNITY

## 2020-08-20 RX ORDER — NICOTINE 21 MG/24HR
1 PATCH, TRANSDERMAL 24 HOURS TRANSDERMAL DAILY
Status: DISCONTINUED | OUTPATIENT
Start: 2020-08-20 | End: 2020-08-20 | Stop reason: HOSPADM

## 2020-08-20 RX ORDER — BUPRENORPHINE AND NALOXONE 8; 2 MG/1; MG/1
1 FILM, SOLUBLE BUCCAL; SUBLINGUAL DAILY
Status: DISCONTINUED | OUTPATIENT
Start: 2020-08-20 | End: 2020-08-20 | Stop reason: HOSPADM

## 2020-08-20 RX ORDER — ACETAMINOPHEN 325 MG/1
650 TABLET ORAL EVERY 4 HOURS PRN
Status: DISCONTINUED | OUTPATIENT
Start: 2020-08-20 | End: 2020-08-20 | Stop reason: HOSPADM

## 2020-08-20 RX ORDER — PANTOPRAZOLE SODIUM 40 MG/1
40 TABLET, DELAYED RELEASE ORAL
Status: DISCONTINUED | OUTPATIENT
Start: 2020-08-20 | End: 2020-08-20

## 2020-08-20 RX ORDER — PANTOPRAZOLE SODIUM 40 MG/1
40 TABLET, DELAYED RELEASE ORAL
Qty: 30 TABLET | Refills: 0 | Status: SHIPPED | OUTPATIENT
Start: 2020-08-21 | End: 2020-09-20

## 2020-08-20 RX ADMIN — BUPRENORPHINE HYDROCHLORIDE, NALOXONE HYDROCHLORIDE 1 FILM: 8; 2 FILM, SOLUBLE BUCCAL; SUBLINGUAL at 09:28

## 2020-08-20 RX ADMIN — SUCRALFATE 1000 MG: 1 SUSPENSION ORAL at 13:23

## 2020-08-20 RX ADMIN — LEVOTHYROXINE SODIUM 224 MCG: 112 TABLET ORAL at 06:10

## 2020-08-20 RX ADMIN — SUCRALFATE 1000 MG: 1 SUSPENSION ORAL at 04:05

## 2020-08-20 RX ADMIN — PANTOPRAZOLE SODIUM 40 MG: 40 TABLET, DELAYED RELEASE ORAL at 04:02

## 2020-08-20 RX ADMIN — NITROGLYCERIN 0.5 INCH: 20 OINTMENT TOPICAL at 00:15

## 2020-08-20 RX ADMIN — FAMOTIDINE 20 MG: 10 INJECTION, SOLUTION INTRAVENOUS at 00:17

## 2020-08-20 RX ADMIN — NICOTINE 1 PATCH: 14 PATCH TRANSDERMAL at 09:28
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
## 2020-08-20 NOTE — ASSESSMENT & PLAN NOTE
 Patient Presentation: intermittent chest pain for the past couple of days  States pain takes her breath away  Worse with sitting   Likely etiology: unclear etiology  Low suspicion for cardiac  Possible PE with elevated d dimer, however patient allergic to contrast dye   She does have tenderness to palpation in the epigastric region and states that she eats a lot of pizza and red sauces  Her mother also has a hx of MALT gastric CA    Differential diagnosis includes gastritis versus peptic ulcer disease versus esophagitis versus GERD versus PE versus ACS versus pancreatitis versus back pain versus costochondritis   Try carafate and mylanta    AMAN: 0   Initial Troponin: neg   o Check one more torponin as very low suspicion on ACS    Initial EKG: non ischemic with RBBB   o Monitor on telemetry   Check fasting lipid panel and A1c   Admit under Observation Status

## 2020-08-20 NOTE — ASSESSMENT & PLAN NOTE
· Patient with elevated D-dimer at 0 82 present on admission  · She was unable to undergo CTA secondary to contrast allergy  · Lower extremity Dopplers to search for possible DVT  · Hold on anticoagulation for now as patient did have a neg PERC score and her symptoms give me a low suspicion for PE as she has reproducible pain to palpation of the epigastrium and pain is non pleuritic

## 2020-08-20 NOTE — PLAN OF CARE
Problem: PAIN - ADULT  Goal: Verbalizes/displays adequate comfort level or baseline comfort level  Description: Interventions:  - Encourage patient to monitor pain and request assistance  - Assess pain using appropriate pain scale  - Administer analgesics based on type and severity of pain and evaluate response  - Implement non-pharmacological measures as appropriate and evaluate response  - Consider cultural and social influences on pain and pain management  - Notify physician/advanced practitioner if interventions unsuccessful or patient reports new pain  Outcome: Completed     Problem: INFECTION - ADULT  Goal: Absence or prevention of progression during hospitalization  Description: INTERVENTIONS:  - Assess and monitor for signs and symptoms of infection  - Monitor lab/diagnostic results  - Monitor all insertion sites, i e  indwelling lines, tubes, and drains  - Monitor endotracheal if appropriate and nasal secretions for changes in amount and color  - [place] appropriate cooling/warming therapies per order  - Administer medications as ordered  - Instruct and encourage patient and family to use good hand hygiene technique  - Identify and instruct in appropriate isolation precautions for identified infection/condition  Outcome: Completed  Goal: Absence of fever/infection during neutropenic period  Description: INTERVENTIONS:  - Monitor WBC    Outcome: Completed     Problem: SAFETY ADULT  Goal: Patient will remain free of falls  Description: INTERVENTIONS:  - Assess patient frequently for physical needs  -  Identify cognitive and physical deficits and behaviors that affect risk of falls    -  [place] fall precautions as indicated by assessment   - Educate patient/family on patient safety including physical limitations  - Instruct patient to call for assistance with activity based on assessment  - Modify environment to reduce risk of injury  - Consider OT/PT consult to assist with strengthening/mobility  Outcome: Completed  Goal: Maintain or return to baseline ADL function  Description: INTERVENTIONS:  -  Assess patient's ability to carry out ADLs; assess patient's baseline for ADL function and identify physical deficits which impact ability to perform ADLs (bathing, care of mouth/teeth, toileting, grooming, dressing, etc )  - Assess/evaluate cause of self-care deficits   - Assess range of motion  - Assess patient's mobility; develop plan if impaired  - Assess patient's need for assistive devices and provide as appropriate  - Encourage maximum independence but intervene and supervise when necessary  - Involve family in performance of ADLs  - Assess for home care needs following discharge   - Consider OT consult to assist with ADL evaluation and planning for discharge  - Provide patient education as appropriate  Outcome: Completed  Goal: Maintain or return mobility status to optimal level  Description: INTERVENTIONS:  - Assess patient's baseline mobility status (ambulation, transfers, stairs, etc )    - Identify cognitive and physical deficits and behaviors that affect mobility  - Identify mobility aids required to assist with transfers and/or ambulation (gait belt, sit-to-stand, lift, walker, cane, etc )  - [place] fall precautions as indicated by assessment  - Record patient progress and toleration of activity level on Mobility SBAR; progress patient to next Phase/Stage  - Instruct patient to call for assistance with activity based on assessment  - Consider rehabilitation consult to assist with strengthening/weightbearing, etc   Outcome: Completed     Problem: DISCHARGE PLANNING  Goal: Discharge to home or other facility with appropriate resources  Description: INTERVENTIONS:  - Identify barriers to discharge w/patient and caregiver  - Arrange for needed discharge resources and transportation as appropriate  - Identify discharge learning needs (meds, wound care, etc )  - Arrange for interpretive services to assist at discharge as needed  - Refer to Case Management Department for coordinating discharge planning if the patient needs post-hospital services based on physician/advanced practitioner order or complex needs related to functional status, cognitive ability, or social support system  Outcome: Completed     Problem: Knowledge Deficit  Goal: Patient/family/caregiver demonstrates understanding of disease process, treatment plan, medications, and discharge instructions  Description: Complete learning assessment and assess knowledge base  Interventions:  - Provide teaching at level of understanding  - Provide teaching via preferred learning methods  Outcome: Completed     Problem: Nutrition/Hydration-ADULT  Goal: Nutrient/Hydration intake appropriate for improving, restoring or maintaining nutritional needs  Description: Monitor and assess patient's nutrition/hydration status for malnutrition  Collaborate with interdisciplinary team and initiate plan and interventions as ordered  Monitor patient's weight and dietary intake as ordered or per policy  Utilize nutrition screening tool and intervene as necessary  Determine patient's food preferences and provide high-protein, high-caloric foods as appropriate       INTERVENTIONS:  - Monitor oral intake, urinary output, labs, and treatment plans  - Assess nutrition and hydration status and recommend course of action  - Evaluate amount of meals eaten  - Assist patient with eating if necessary   - Allow adequate time for meals  - Recommend/ encourage appropriate diets, oral nutritional supplements, and vitamin/mineral supplements  - Order, calculate, and assess calorie counts as needed  - Recommend, monitor, and adjust tube feedings and TPN/PPN based on assessed needs  - Assess need for intravenous fluids  - Provide specific nutrition/hydration education as appropriate  - Include patient/family/caregiver in decisions related to nutrition  Outcome: Completed

## 2020-08-20 NOTE — ASSESSMENT & PLAN NOTE
 Patient Presentation: intermittent chest pain for the past couple of days  States pain takes her breath away  Worse with sitting   Likely etiology: unclear etiology  Low suspicion for cardiac  Possible PE with elevated d dimer, however patient allergic to contrast dye   She does have tenderness to palpation in the epigastric region and states that she eats a lot of pizza and red sauces  Her mother also has a hx of MALT gastric CA    Differential diagnosis includes gastritis versus peptic ulcer disease versus esophagitis versus GERD versus PE versus ACS versus pancreatitis versus back pain versus costochondritis   Try carafate and mylanta    AMAN: 0   Initial Troponin: neg   o Check one more torponin as very low suspicion on ACS    Initial EKG: non ischemic with RBBB   o Monitor on telemetry   Check fasting lipid panel and A1c      8/20: Patient without chest pain, sob or palpitations, D-dimer 0 86 with CXR unremarkable, no respiratory symptoms or tachycardia , no history of blood dyscrasias or  prolonged rest, VAS lower extremities neg for DVT or thrombophlebitis, patient with low probability of PE  currently pain particularly with deep palpation in epigastrium with positive therapy response to sulcralfate and protonix  Patient hemodinamically and respiratory stable from medicine standpoint for discharge      Plan  Continue sulcralfate and protonix  Follow up with Gastroenterology in outpatient setting

## 2020-08-20 NOTE — DISCHARGE SUMMARY
Discharge- Ileen Hamman 1985, 38 y o  female MRN: [id number]    Unit/Bed#: S -01 Encounter: [id number]    Primary Care Provider: Jeny Ramos MD   Date and time admitted to hospital: 8/19/2020 10:25 PM        * Chest pain  Assessment & Plan   Patient Presentation: intermittent chest pain for the past couple of days  States pain takes her breath away  Worse with sitting   Likely etiology: unclear etiology  Low suspicion for cardiac  Possible PE with elevated d dimer, however patient allergic to contrast dye   She does have tenderness to palpation in the epigastric region and states that she eats a lot of pizza and red sauces  Her mother also has a hx of MALT gastric CA    Differential diagnosis includes gastritis versus peptic ulcer disease versus esophagitis versus GERD versus PE versus ACS versus pancreatitis versus back pain versus costochondritis   Try carafate and mylanta    AMAN: 0   Initial Troponin: neg   o Check one more torponin as very low suspicion on ACS    Initial EKG: non ischemic with RBBB   o Monitor on telemetry   Check fasting lipid panel and A1c      8/20: Patient without chest pain, sob or palpitations, D-dimer 0 86 with CXR unremarkable, no respiratory symptoms or tachycardia , no history of blood dyscrasias or  prolonged rest, VAS lower extremities neg for DVT or thrombophlebitis, patient with low probability of PE  currently pain particularly with deep palpation in epigastrium with positive therapy response to sulcralfate and protonix  Patient hemodinamically and respiratory stable from medicine standpoint for discharge      Plan  Continue sulcralfate and protonix  Follow up with Gastroenterology in outpatient setting      Gastritis  Assessment & Plan  Pain in epigastrium reproducible with deep palpation and meals  Significant improvement in intensity and quality of the pain after initiation of sucralfate and Protonix    Plan  Continue follow up with PCP  Continue follow up with Gastroenterology in the outpatient setting  Hypothyroidism  Assessment & Plan  · Continue synthroid     History of heroin abuse [facility])  Assessment & Plan  · Patient with history of heroin abuse she has been clean for multiple years  · Currently on Suboxone  · PDMP checked will continue his Suboxone for now    Morbid obesity ([address] )  Assessment & Plan  · Diet and lifestyle modifications  · counseled on weight loss     Elevated d-dimer  Assessment & Plan  · Patient with elevated D-dimer at 0 82 present on admission  · She was unable to undergo CTA secondary to contrast allergy  · Lower extremity Dopplers to search for possible DVT  · Hold on anticoagulation for now as patient did have a neg PERC score and her symptoms give me a low suspicion for PE as she has reproducible pain to palpation of the epigastrium and pain is non pleuritic  Discharging Resident Physician: Ziggy Hernandez MD  Attending: Niya Yates MD  PCP: Rylee Aguayo MD  Admission Date: 8/19/2020  Discharge Date: 08/20/20    Disposition:     Home    Reason for Admission: Chest and abdominal pain    Consultations During Hospital Stay:  · None    Procedures Performed:     · None    Significant Findings / Test Results:     · None    Incidental Findings:   · None     Test Results Pending at Discharge (will require follow up): · None     Outpatient Tests Requested:  · Consider Endoscopy after evaluation by Gastroenterology    Complications:  None  Hospital Course:     Baron Merrill is a 38 y o  female patient who originally presented to the hospital on 8/19/2020 due to chest and abdominal pain  Patient's past medical history for heroin abuse for which he has been clean since 2015 and is currently on Suboxone, obesity  She states that yesterday she started developing substernal/epigastric chest pain  She would rate her pain at about a 6/10  She denies any worsening of this pain with food    She denies any radiation of her symptoms  She states that her pain is better with standing up and moving, and worse with sitting down  Patient states that she has been very stressed recently in her life given covert 23, and going back to work recently  She denies any recent NSAID use  She did have an elevated D-dimer in the emergency department, however was unable to receive a CTA secondary to contrast allergy  She denies any familial blood dyscrasias, any long periods of inactivity or sitting, or any vessel injury  Patient did have a negative perc score  She does state that she eats pizza frequently and has not been diagnosed formally with GERD  She does state that her mother has a history of malt lymphoma of the stomach  She denies any out right shortness of breath  She denies any calf pain or tenderness  Patient was initiated on sucralfate and Protonix with positive improvement in quality and intensity of the pain  Patient without chest pain, sob or palpitations, D-dimer 0 86 with cxr unremarkable, no respiratory symptoms or tachycardia, no history of blood dyscrasias or prolonged rest, VAS lower extremities neg for DVT or thrombophlebitis, patient with low probability of PE, stable from medicine standpoint for discharge  Condition at Discharge: good     Discharge Day Visit / Exam:     Subjective:  Patient says she feels significantly better, adequate eating pattern, no active pain at the moment of my encounter  Vitals: Blood Pressure: 116/58 (08/20/20 0700)  Pulse: 58 (08/20/20 0700)  Temperature: 97 8 °F (36 6 °C) (08/20/20 0700)  Temp Source: Oral (08/20/20 0700)  Respirations: 18 (08/20/20 0700)  Height: 5' 5" (165 1 cm) (08/20/20 0156)  Weight - Scale: 119 kg (263 lb 6 4 oz) (08/20/20 0156)  SpO2: 98 % (08/20/20 0700)  Exam:   Physical Exam  Vitals signs and nursing note reviewed  Constitutional:       General: She is not in acute distress  Appearance: Normal appearance  She is obese   She is not ill-appearing, toxic-appearing or diaphoretic  HENT:      Head: Normocephalic and atraumatic  Right Ear: Tympanic membrane normal       Left Ear: Tympanic membrane normal       Nose: Nose normal  No congestion or rhinorrhea  Mouth/Throat:      Mouth: Mucous membranes are dry  Pharynx: Oropharynx is clear  Neck:      Musculoskeletal: Neck supple  No neck rigidity or muscular tenderness  Cardiovascular:      Rate and Rhythm: Normal rate and regular rhythm  Pulses: Normal pulses  Heart sounds: Normal heart sounds  No murmur  Pulmonary:      Effort: Pulmonary effort is normal  No respiratory distress  Breath sounds: Normal breath sounds  Chest:      Chest wall: No tenderness  Abdominal:      Palpations: Abdomen is soft  Tenderness: There is abdominal tenderness (deep palpation in epigastrium , no signs of peritoneal irritation)  Musculoskeletal: Normal range of motion  General: No swelling or tenderness  Skin:     General: Skin is warm  Capillary Refill: Capillary refill takes 2 to 3 seconds  Neurological:      Mental Status: She is alert and oriented to person, place, and time  Psychiatric:         Mood and Affect: Mood normal          Behavior: Behavior normal          Thought Content: Thought content normal          Judgment: Judgment normal        Discussion with Family: Patients mother was bedside at the moment of our morning rounds, all questions were answered    Discharge instructions/Information to patient and family:   See after visit summary for information provided to patient and family  Provisions for Follow-Up Care:  See after visit summary for information related to follow-up care and any pertinent home health orders  Planned Readmission: no     Discharge Medications:  See after visit summary for reconciled discharge medications provided to patient and family        ** Please Note: This note has been constructed using a voice recognition system **

## 2020-08-20 NOTE — ED NOTES
2 attempts made for IV access  During procedure pt states "It hurts take it out " Pt requesting to wait for another attempt        Phyllis Medina RN  08/19/20 [id number]

## 2020-08-20 NOTE — ASSESSMENT & PLAN NOTE
· Patient with history of heroin abuse she has been clean for multiple years  · Currently on Suboxone    · PDMP checked will continue his Suboxone for now

## 2020-08-20 NOTE — DISCHARGE INSTR - AVS FIRST PAGE
Dear Ricardo Case,     It was our pleasure to care for you here at [facility]  It is our hope that we were always able to exceed the expected standards for your care during your stay  You were hospitalized due to chest and abdominal pain  You were cared for on the third floor by Mika Pablo MD under the service of Anupam Downs MD with the [facility] who covers for your primary care physician (PCP), Johan Munroe MD, while you were hospitalized  If you have any questions or concerns related to this hospitalization, you may contact us at [phone]  For follow up as well as any medication refills, we recommend that you follow up with your primary care physician  A registered nurse will reach out to you by phone within a few days after your discharge to answer any additional questions that you may have after going home  However, at this time we provide for you here, the most important instructions / recommendations at discharge:     · Notable Medication Adjustments -   · We added medications to help you to control gastric pain  · Testing Required after Discharge -   · None  · Important follow up information -   · As below  · Other Instructions -   · Please continue follow up with your primary care physician  · Please follow up with gastroenterology for further treatment and need of endoscopy  · Please continue adherence to current medications, remember to take your medications an hour after you take your thyroid meds  · Please review this entire after visit summary as additional general instructions including medication list, appointments, activity, diet, any pertinent wound care, and other additional recommendations from your care team that may be provided for you        Sincerely,     Mika Pablo MD

## 2020-08-20 NOTE — ASSESSMENT & PLAN NOTE
· Patient with elevated D-dimer at 0 82 present on admission  · She was unable to undergo CTA secondary to contrast allergy  · Will check V/Q scan tomorrow  · Lower extremity Dopplers to search for possible DVT  · Hold on anticoagulation for now as patient did have a neg PERC score and her symptoms give me a low suspicion for PE as she has reproducible pain to palpation of the epigastrium and pain is non pleuritic

## 2020-08-20 NOTE — ED NOTES
Pt ambulated independently and safely with a steady gait to the bathroom        Michell Frausto RN  08/20/20 [id number]

## 2020-08-20 NOTE — ASSESSMENT & PLAN NOTE
Pain in epigastrium reproducible with deep palpation and meals  Significant improvement in intensity and quality of the pain after initiation of sucralfate and Protonix    Plan  Continue follow up with PCP  Continue follow up with Gastroenterology in the outpatient setting

## 2020-08-20 NOTE — PLAN OF CARE
Problem: PAIN - ADULT  Goal: Verbalizes/displays adequate comfort level or baseline comfort level  Description: Interventions:  - Encourage patient to monitor pain and request assistance  - Assess pain using appropriate pain scale  - Administer analgesics based on type and severity of pain and evaluate response  - Implement non-pharmacological measures as appropriate and evaluate response  - Consider cultural and social influences on pain and pain management  - Notify physician/advanced practitioner if interventions unsuccessful or patient reports new pain  Outcome: Progressing     Problem: INFECTION - ADULT  Goal: Absence or prevention of progression during hospitalization  Description: INTERVENTIONS:  - Assess and monitor for signs and symptoms of infection  - Monitor lab/diagnostic results  - Monitor all insertion sites, i e  indwelling lines, tubes, and drains  - Monitor endotracheal if appropriate and nasal secretions for changes in amount and color  - [place] appropriate cooling/warming therapies per order  - Administer medications as ordered  - Instruct and encourage patient and family to use good hand hygiene technique  - Identify and instruct in appropriate isolation precautions for identified infection/condition  Outcome: Progressing  Goal: Absence of fever/infection during neutropenic period  Description: INTERVENTIONS:  - Monitor WBC    Outcome: Progressing     Problem: SAFETY ADULT  Goal: Patient will remain free of falls  Description: INTERVENTIONS:  - Assess patient frequently for physical needs  -  Identify cognitive and physical deficits and behaviors that affect risk of falls    -  [place] fall precautions as indicated by assessment   - Educate patient/family on patient safety including physical limitations  - Instruct patient to call for assistance with activity based on assessment  - Modify environment to reduce risk of injury  - Consider OT/PT consult to assist with strengthening/mobility  Outcome: Progressing  Goal: Maintain or return to baseline ADL function  Description: INTERVENTIONS:  -  Assess patient's ability to carry out ADLs; assess patient's baseline for ADL function and identify physical deficits which impact ability to perform ADLs (bathing, care of mouth/teeth, toileting, grooming, dressing, etc )  - Assess/evaluate cause of self-care deficits   - Assess range of motion  - Assess patient's mobility; develop plan if impaired  - Assess patient's need for assistive devices and provide as appropriate  - Encourage maximum independence but intervene and supervise when necessary  - Involve family in performance of ADLs  - Assess for home care needs following discharge   - Consider OT consult to assist with ADL evaluation and planning for discharge  - Provide patient education as appropriate  Outcome: Progressing  Goal: Maintain or return mobility status to optimal level  Description: INTERVENTIONS:  - Assess patient's baseline mobility status (ambulation, transfers, stairs, etc )    - Identify cognitive and physical deficits and behaviors that affect mobility  - Identify mobility aids required to assist with transfers and/or ambulation (gait belt, sit-to-stand, lift, walker, cane, etc )  - [place] fall precautions as indicated by assessment  - Record patient progress and toleration of activity level on Mobility SBAR; progress patient to next Phase/Stage  - Instruct patient to call for assistance with activity based on assessment  - Consider rehabilitation consult to assist with strengthening/weightbearing, etc   Outcome: Progressing     Problem: DISCHARGE PLANNING  Goal: Discharge to home or other facility with appropriate resources  Description: INTERVENTIONS:  - Identify barriers to discharge w/patient and caregiver  - Arrange for needed discharge resources and transportation as appropriate  - Identify discharge learning needs (meds, wound care, etc )  - Arrange for interpretive services to assist at discharge as needed  - Refer to Case Management Department for coordinating discharge planning if the patient needs post-hospital services based on physician/advanced practitioner order or complex needs related to functional status, cognitive ability, or social support system  Outcome: Progressing     Problem: Knowledge Deficit  Goal: Patient/family/caregiver demonstrates understanding of disease process, treatment plan, medications, and discharge instructions  Description: Complete learning assessment and assess knowledge base    Interventions:  - Provide teaching at level of understanding  - Provide teaching via preferred learning methods  Outcome: Progressing

## 2020-08-20 NOTE — H&P
H&P- Jules Silva 1985, 38 y o  female MRN: [id number]  Unit/Bed#: S -01 Encounter: [id number]  Primary Care Provider: Ayana Singh MD   Date and time admitted to hospital: 8/19/2020 10:25 PM    * Chest pain  Assessment & Plan   Patient Presentation: intermittent chest pain for the past couple of days  States pain takes her breath away  Worse with sitting   Likely etiology: unclear etiology  Low suspicion for cardiac  Possible PE with elevated d dimer, however patient allergic to contrast dye   She does have tenderness to palpation in the epigastric region and states that she eats a lot of pizza and red sauces  Her mother also has a hx of MALT gastric CA    Differential diagnosis includes gastritis versus peptic ulcer disease versus esophagitis versus GERD versus PE versus ACS versus pancreatitis versus back pain versus costochondritis   Try carafate and mylanta    AMAN: 0   Initial Troponin: neg   o Check one more torponin as very low suspicion on ACS    Initial EKG: non ischemic with RBBB   o Monitor on telemetry   Check fasting lipid panel and A1c   Admit under Observation Status  Elevated d-dimer  Assessment & Plan  · Patient with elevated D-dimer at 0 82 present on admission  · She was unable to undergo CTA secondary to contrast allergy  · Will check V/Q scan tomorrow  · Lower extremity Dopplers to search for possible DVT  · Hold on anticoagulation for now as patient did have a neg PERC score and her symptoms give me a low suspicion for PE as she has reproducible pain to palpation of the epigastrium and pain is non pleuritic  History of heroin abuse [facility])  Assessment & Plan  · Patient with history of heroin abuse she has been clean for multiple years  · Currently on Suboxone    · PDMP checked will continue his Suboxone for now    Hypothyroidism  Assessment & Plan  · Continue synthroid     Morbid obesity ([address] )  Assessment & Plan  · Diet and lifestyle modifications  · counseled on weight loss         VTE Prophylaxis: Low risk ambulate  / sequential compression device   Code Status:  Full code  POLST: There is no POLST form on file for this patient (pre-hospital)  Discussion with family:  Patient    Anticipated Length of Stay:  Patient will be admitted on an Observation basis with an anticipated length of stay of  < 2 midnights  Justification for Hospital Stay:  Chest pain    Total Time for Visit, including Counseling / Coordination of Care: 1 hour  Greater than 50% of this total time spent on direct patient counseling and coordination of care  Chief Complaint:   Chest pain    History of Present Illness:    Clarisa Pulido is a 38 y o  female who presents with chest pain  Patient's past medical history for heroin abuse for which he has been clean since 2015 and is currently on Suboxone, obesity  She presents the emergency department for chest pain which began yesterday  Patient states that she was moving multiple heavy tables and chairs on Monday and developed back pain  She states that yesterday she started developing substernal/epigastric chest pain  She would rate her pain at about a 6/10  She denies any worsening of this pain with food  She denies any radiation of her symptoms  She states that her pain is better with standing up and moving, and worse with sitting down  Patient states that she has been very stressed recently in her life given covert 23, and going back to work recently  She denies any recent NSAID use  She did have an elevated D-dimer in the emergency department, however was unable to receive a CTA secondary to contrast allergy  She denies any familial blood dyscrasias, any long periods of inactivity or sitting, or any vessel injury  Patient did have a negative perc score  She does state that she eats pizza frequently and has not been diagnosed formally with GERD    She does state that her mother has a history of malt lymphoma of the stomach  She denies any out right shortness of breath  She denies any calf pain or tenderness  Review of Systems:    Review of Systems   Constitutional: Negative for chills, fatigue, fever and unexpected weight change  Respiratory: Negative for cough, chest tightness and shortness of breath  Cardiovascular: Positive for chest pain  Negative for palpitations  Gastrointestinal: Positive for abdominal pain  Negative for diarrhea, nausea and vomiting  Genitourinary: Negative for decreased urine volume, dysuria, frequency and urgency  Musculoskeletal: Negative for arthralgias  Neurological: Negative for dizziness, syncope, light-headedness and headaches  All other systems reviewed and are negative  Past Medical and Surgical History:     Past Medical History:   Diagnosis Date    [facility]in [facility] ([facility] )     clean since 2015    [facility]roid cancer ([facility] ) 1999    with thyroidectomy and RTX       Past Surgical History:   Procedure Laterality Date    GANGLION CYST EXCISION Left     THYROIDECTOMY         Meds/Allergies:    Prior to Admission medications    Medication Sig Start Date End Date Taking? Authorizing Provider   buprenorphine-naloxone (SUBOXONE) 8-2 mg per SL tablet Place 1 tablet under the tongue daily   Yes Historical Provider, MD   levothyroxine 112 mcg tablet TAKE 2 TABLETS BY MOUTH DAILY 8/1/18  Yes Historical Provider, MD   zolpidem (Ambien) 10 mg tablet Take 10 mg by mouth daily at bedtime as needed for sleep   Yes Historical Provider, MD     I have reviewed home medications with patient personally  Allergies: Allergies   Allergen Reactions    Diphenhydramine Shortness Of Breath     Other reaction(s): DIPHENHYDRAMINE CITRATE (BENADRYL)    Erythromycin Shortness Of Breath and Hives     Category:  Allergy;     Iv Dye [Iodinated Diagnostic Agents]        Social History:     Marital Status: Single   Occupation: works for [employer]   Patient [address] [facility] Situation: lives at home   Patient Pre-hospital Level of Mobility: ambulates   Patient Pre-hospital Diet Restrictions: none   Substance Use History:   Social History     Substance and Sexual Activity   Alcohol Use No     Social History     Tobacco Use   Smoking Status Current Every Day Smoker    Packs/day: 0 25    Types: Cigarettes   Smokeless Tobacco Never Used     Social History     Substance and Sexual Activity   Drug Use No    Comment: 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       Family History:    Family History   Problem Relation Age of Onset    Stomach cancer Mother         maltoma lyphoma    Lung cancer Maternal Aunt     Breast cancer Maternal Aunt 46    Lymphoma Paternal Uncle         non hodgkins       Physical Exam:     Vitals:   Blood Pressure: 127/69 (08/20/20 0156)  Pulse: 75 (08/20/20 0156)  Temperature: 98 °F (36 7 °C) (08/20/20 0156)  Temp Source: Oral (08/20/20 0156)  Respirations: 18 (08/20/20 0156)  Height: 5' 5" (165 1 cm) (08/20/20 0156)  Weight - Scale: 119 kg (263 lb 6 4 oz) (08/20/20 0156)  SpO2: 93 % (08/20/20 0156)    Physical Exam  Constitutional:       General: She is not in acute distress  HENT:      Head: Normocephalic and atraumatic  Mouth/Throat:      Mouth: Mucous membranes are moist       Pharynx: Oropharynx is clear  No oropharyngeal exudate  Eyes:      General:         Right eye: No discharge  Left eye: No discharge  Conjunctiva/sclera: Conjunctivae normal       Pupils: Pupils are equal, round, and reactive to light  Neck:      Musculoskeletal: Neck supple  No muscular tenderness  Cardiovascular:      Rate and Rhythm: Normal rate and regular rhythm  Pulses: Normal pulses  Heart sounds: Normal heart sounds  No murmur  Pulmonary:      Effort: Pulmonary effort is normal  No respiratory distress  Breath sounds: Normal breath sounds  No wheezing or rales  Abdominal:      General: Abdomen is flat  There is no distension  Palpations: Abdomen is soft  Tenderness: There is abdominal tenderness  Comments: Reproducible pain to palpation of the epigastrium on deep palpation  Musculoskeletal: Normal range of motion  Right lower leg: No edema  Left lower leg: No edema  Comments: Patient has no lower extremity edema and Homans sign is negative bilaterally  Skin:     General: Skin is warm and dry  Capillary Refill: Capillary refill takes less than 2 seconds  Coloration: Skin is not jaundiced  Comments: No rashes or vesicular lesions concerning for shingles   Neurological:      General: No focal deficit present  Mental Status: She is alert and oriented to person, place, and time  Cranial Nerves: No cranial nerve deficit  Psychiatric:         Mood and Affect: Mood is anxious  Additional Data:     Lab Results: I have personally reviewed pertinent reports  Results from last 7 days   Lab Units 08/20/20  0005   WBC Thousand/uL 10 95*   HEMOGLOBIN g/dL 12 9   HEMATOCRIT % 42 3   PLATELETS Thousands/uL 216   NEUTROS PCT % 56   LYMPHS PCT % 35   MONOS PCT % 6   EOS PCT % 3     Results from last 7 days   Lab Units 08/20/20  0005   SODIUM mmol/L 143   POTASSIUM mmol/L 3 8   CHLORIDE mmol/L 105   CO2 mmol/L 30   BUN mg/dL 15   CREATININE mg/dL 1 10   ANION GAP mmol/L 8   CALCIUM mg/dL 6 6*   ALBUMIN g/dL 3 3*   TOTAL BILIRUBIN mg/dL 0 23   ALK PHOS U/L 84   ALT U/L 22   AST U/L 19   GLUCOSE RANDOM mg/dL 105     Results from last 7 days   Lab Units 08/20/20  0005   INR  1 01                   Imaging: I have personally reviewed pertinent reports  XR chest 1 view portable   ED Interpretation by Huyen Deluna MD (08/19 0315)   No acute disease read by me  Final Result by Siobhan Adams DO (08/19 3522)      No acute cardiopulmonary disease              Workstation performed: [id number]         VAS lower limb venous duplex study, complete bilateral    (Results Pending)       EKG, Pathology, and Other Studies Reviewed on Admission:   · EKG:  Nonischemic normal sinus rhythm  · Chest x-ray:  No acute cardiopulmonary disease on my read    Allscripts / Epic Records Reviewed: Yes     ** Please Note: This note has been constructed using a voice recognition system   **

## 2020-08-20 NOTE — UTILIZATION REVIEW
Initial Clinical Review    Admission: Date/Time/Statement:   Admission Orders (From admission, onward)     Ordered        08/20/20 0051  Place in Observation  Once                   Orders Placed This Encounter   Procedures    Place in Observation     Telemetry     Standing Status:   Standing     Number of Occurrences:   1     Order Specific Question:   Admitting Physician     Answer:   Maxwell Sierra, [address]     Order Specific Question:   Level of Care     Answer:   Med Surg [16]     Order Specific Question:   Bed request comments     Answer:   telemetry     ED Arrival Information     Expected Arrival [address] of Arrival Escorted By Service Admission Type    - 8/19/2020 22:12 Urgent Walk-In Self Hospitalist Urgent    Arrival Complaint    chest pain/SOB        Chief Complaint   Patient presents with    Chest Pain     Pt reports mid CP starting approx 3 days ago that takes her breath away  States it doesn't feel painful, more like a tightness  States the more she sits it gets worse, but when she moves around it improves  -SOB -fevers -cough      Assessment/Plan: 38year old female, presented to the ED @ [facility], from home via walk in  Admitted as Observation due to chest pain  C/o chest pain which began yesterday  Was moving heavy tables and chairs and started with back pain  + for chest pain and abd pain  AMAN 0  Initial trop negative, trends trops  Initial ECG:  Nonischemic with RBBB, monitor on telemetry  Elevated D-Dimer:  V/Q scan; Pending  Lower extremety dopplers: pending  Hold on anticoagulation for now as patient did have a neg PERC score and her symptoms give me a low suspicion for PE as she has reproducible pain to palpation of the epigastrium and pain is non pleuritic        ED Triage Vitals [08/19/20 2224]   Temperature Pulse Respirations Blood Pressure SpO2   98 6 °F (37 °C) 65 18 154/71 97 %      Temp Source Heart Rate Source Patient Position - Orthostatic VS BP Location FiO2 (%) Oral Monitor Sitting Left arm --      Pain Score       7          Wt Readings from Last 1 Encounters:   20 119 kg (263 lb 6 4 oz)     Additional Vital Signs:  Date/Time   Temp   Pulse   Resp   BP   SpO2   O2 Device   Patient Position - Orthostatic VS    20 0700   97 8 °F (36 6 °C)   58   18   116/58   98 %   None (Room air)   Lying    20 0156   98 °F (36 7 °C)   75   18   127/69   93 %   None (Room air)   Sitting    20 0109      88   18   125/74   97 %   None (Room air)   Standing    20 0014      77   20   118/56   95 %   None (Room air)   Lying      Date and Time  Eye Opening  Best Verbal Response  Best Motor Response  Jewell Coma Scale Score    20 0145  4  5  6  15    20 2256  4  5  6  15      2020 @ 2347  Chest X:  No acute cardiopulmonary disease       2020 @ 2234  EC, NSR, incomplete RBBB, Poor R wave progression    Pertinent Labs/Diagnostic Test Results:     Results from last 7 days   Lab Units 20  0005   WBC Thousand/uL 10 95*   HEMOGLOBIN g/dL 12 9   HEMATOCRIT % 42 3   PLATELETS Thousands/uL 216   NEUTROS ABS Thousands/µL 6 07     Results from last 7 days   Lab Units 20  0005   SODIUM mmol/L 143   POTASSIUM mmol/L 3 8   CHLORIDE mmol/L 105   CO2 mmol/L 30   ANION GAP mmol/L 8   BUN mg/dL 15   CREATININE mg/dL 1 10   EGFR ml/min/1 73sq m 64   CALCIUM mg/dL 6 6*     Results from last 7 days   Lab Units 20  0005   AST U/L 19   ALT U/L 22   ALK PHOS U/L 84   TOTAL PROTEIN g/dL 7 4   ALBUMIN g/dL 3 3*   TOTAL BILIRUBIN mg/dL 0 23     Results from last 7 days   Lab Units 20  0005   GLUCOSE RANDOM mg/dL 105     Results from last 7 days   Lab Units 20  0005   CK TOTAL U/L 153   CK MB INDEX % <1 0   CK MB ng/mL 0 3     Results from last 7 days   Lab Units 20  0411 20  0005   TROPONIN I ng/mL <0 02 <0 02     Results from last 7 days   Lab Units 20  0005   D-DIMER QUANTITATIVE ug/ml FEU 0 82*     Results from last 7 days   Lab Units 08/20/20  0005   PROTIME seconds 13 4   INR  1 01   PTT seconds 34     Results from last 7 days   Lab Units 08/20/20  0005   LIPASE u/L 109     ED Treatment:   Medication Administration from 08/19/2020 2212 to 08/20/2020 0135       Date/Time Order Dose Route Action     08/20/2020 0015 nitroglycerin (NITRO-BID) 2 % TD ointment 0 5 inch 0 5 inch Topical Given     08/19/2020 2255 aspirin chewable tablet 162 mg 162 mg Oral Given     08/20/2020 0017 famotidine (PEPCID) injection 20 mg 20 mg Intravenous Given        Past Medical History:   Diagnosis Date    Heroin abuse ([facility] )     clean since 2015    Thyroid cancer ([facility] ) 1999    with thyroidectomy and RTX     Present on Admission:   Hypothyroidism   Morbid obesity ([facility] )   History of heroin abuse ([place] [address] )    Admitting Diagnosis: Chest pain [R07 9]  Age/Sex: 38 y o  female  Admission Orders:  Scheduled Medications:  buprenorphine-naloxone, 1 Film, Sublingual, Daily  levothyroxine, 224 mcg, Oral, Early Morning  nicotine, 1 patch, Transdermal, Daily  pantoprazole, 40 mg, Oral, Early Morning  sucralfate, 1,000 mg, Oral, Q6H [facility]    Continuous IV Infusions:     PRN Meds:  acetaminophen, 650 mg, Oral, Q4H PRN    Telemetry  [facility] SCDs        Network Utilization Review Department  [email or web address]  ATTENTION: Please call with any questions or concerns to [phone] and carefully listen to the prompts so that you are directed to the right person  All voicemails are confidential   Mila Myers all requests for admission clinical reviews, approved or denied determinations and any other requests to dedicated fax number below belonging to the campus where the patient is receiving treatment   List of dedicated fax numbers for the Facilities:  FACILITY NAME UR FAX NUMBER   ADMISSION DENIALS (Administrative/Medical Necessity) [phone]   [address] (Maternity/NICU/Pediatrics) [place] [address] [phone]   Aleksander Rain [phone]   [facility] [place] [address] [facility] [phone]   [facility]  [phone]   [address] [facility]  [address] [phone]

## 2020-08-20 NOTE — DISCHARGE INSTRUCTIONS
Gastritis   WHAT YOU NEED TO KNOW:   Gastritis is inflammation or irritation of the lining of your stomach  DISCHARGE INSTRUCTIONS:   Call 911 for any of the following:   · You develop chest pain or shortness of breath  Seek care immediately if:   · You vomit blood  · You have black or bloody bowel movements  · You have severe stomach or back pain  Contact your healthcare provider if:   · You have a fever  · You have new or worsening symptoms, even after treatment  · You have questions or concerns about your condition or care  Medicines:   · Medicines  may be given to help treat a bacterial infection or decrease stomach acid  · Take your medicine as directed  Contact your healthcare provider if you think your medicine is not helping or if you have side effects  Tell him or her if you are allergic to any medicine  Keep a list of the medicines, vitamins, and herbs you take  Include the amounts, and when and why you take them  Bring the list or the pill bottles to follow-up visits  Carry your medicine list with you in case of an emergency  Manage or prevent gastritis:   · Do not smoke  Nicotine and other chemicals in cigarettes and cigars can make your symptoms worse and cause lung damage  Ask your healthcare provider for information if you currently smoke and need help to quit  E-cigarettes or smokeless tobacco still contain nicotine  Talk to your healthcare provider before you use these products  · Do not drink alcohol  Alcohol can prevent healing and make your gastritis worse  Talk to your healthcare provider if you need help to stop drinking  · Do not take NSAIDs or aspirin unless directed  These and similar medicines can cause irritation  If your healthcare provider says it is okay to take NSAIDs, take them with food  · Do not eat foods that cause irritation  Foods such as oranges and salsa can cause burning or pain  Eat a variety of healthy foods   Examples include fruits (not citrus), vegetables, low-fat dairy products, beans, whole-grain breads, and lean meats and fish  Try to eat small meals, and drink water with your meals  Do not eat for at least 3 hours before you go to bed  · Find ways to relax and decrease stress  Stress can increase stomach acid and make gastritis worse  Activities such as yoga, meditation, or listening to music can help you relax  Spend time with friends, or do things you enjoy  Follow up with your healthcare provider as directed: You may need ongoing tests or treatment, or referral to a gastroenterologist  Write down your questions so you remember to ask them during your visits  © 2017 [address] Information is for End User's use only and may not be sold, redistributed or otherwise used for commercial purposes  All illustrations and images included in CareNotes® are the copyrighted property of [employer]  or Ernesto Patel  The above information is an  only  It is not intended as medical advice for individual conditions or treatments  Talk to your doctor, nurse or pharmacist before following any medical regimen to see if it is safe and effective for you

## 2023-11-14 ENCOUNTER — TELEPHONE (OUTPATIENT)
Dept: SURGERY | Facility: CLINIC | Age: 38
End: 2023-11-14

## 2024-12-29 ENCOUNTER — APPOINTMENT (EMERGENCY)
Dept: CT IMAGING | Facility: HOSPITAL | Age: 39
DRG: 392 | End: 2024-12-29
Payer: COMMERCIAL

## 2024-12-29 ENCOUNTER — APPOINTMENT (EMERGENCY)
Dept: RADIOLOGY | Facility: HOSPITAL | Age: 39
DRG: 392 | End: 2024-12-29
Payer: COMMERCIAL

## 2024-12-29 ENCOUNTER — HOSPITAL ENCOUNTER (INPATIENT)
Facility: HOSPITAL | Age: 39
LOS: 2 days | Discharge: HOME | DRG: 392 | End: 2024-12-31
Attending: EMERGENCY MEDICINE | Admitting: INTERNAL MEDICINE
Payer: COMMERCIAL

## 2024-12-29 ENCOUNTER — APPOINTMENT (EMERGENCY)
Dept: ULTRASOUND IMAGING | Facility: HOSPITAL | Age: 39
DRG: 392 | End: 2024-12-29
Payer: COMMERCIAL

## 2024-12-29 DIAGNOSIS — R94.31 PROLONGED Q-T INTERVAL ON ECG: Primary | ICD-10-CM

## 2024-12-29 DIAGNOSIS — K31.89 GASTRIC MASS: ICD-10-CM

## 2024-12-29 DIAGNOSIS — F41.9 ANXIETY: ICD-10-CM

## 2024-12-29 DIAGNOSIS — E83.51 HYPOCALCEMIA: ICD-10-CM

## 2024-12-29 DIAGNOSIS — R10.9 ABDOMINAL PAIN: ICD-10-CM

## 2024-12-29 DIAGNOSIS — E66.01 MORBID OBESITY (HCC): ICD-10-CM

## 2024-12-29 DIAGNOSIS — N83.8 OVARIAN MASS: ICD-10-CM

## 2024-12-29 LAB
ALBUMIN SERPL BCG-MCNC: 4.2 G/DL (ref 3.5–5)
ALP SERPL-CCNC: 65 U/L (ref 34–104)
ALT SERPL W P-5'-P-CCNC: 10 U/L (ref 7–52)
ANION GAP SERPL CALCULATED.3IONS-SCNC: 7 MMOL/L (ref 4–13)
AST SERPL W P-5'-P-CCNC: 14 U/L (ref 13–39)
BASOPHILS # BLD AUTO: 0.03 THOUSANDS/ΜL (ref 0–0.1)
BASOPHILS NFR BLD AUTO: 0 % (ref 0–1)
BILIRUB SERPL-MCNC: 0.29 MG/DL (ref 0.2–1)
BUN SERPL-MCNC: 17 MG/DL (ref 5–25)
CALCIUM SERPL-MCNC: 6.7 MG/DL (ref 8.4–10.2)
CARDIAC TROPONIN I PNL SERPL HS: <2 NG/L (ref ?–50)
CARDIAC TROPONIN I PNL SERPL HS: <2 NG/L (ref ?–50)
CHLORIDE SERPL-SCNC: 104 MMOL/L (ref 96–108)
CO2 SERPL-SCNC: 31 MMOL/L (ref 21–32)
CREAT SERPL-MCNC: 1.13 MG/DL (ref 0.6–1.3)
EOSINOPHIL # BLD AUTO: 0.27 THOUSAND/ΜL (ref 0–0.61)
EOSINOPHIL NFR BLD AUTO: 3 % (ref 0–6)
ERYTHROCYTE [DISTWIDTH] IN BLOOD BY AUTOMATED COUNT: 14.1 % (ref 11.6–15.1)
GFR SERPL CREATININE-BSD FRML MDRD: 60 ML/MIN/1.73SQ M
GLUCOSE SERPL-MCNC: 98 MG/DL (ref 65–140)
HCT VFR BLD AUTO: 43.1 % (ref 34.8–46.1)
HGB BLD-MCNC: 13.5 G/DL (ref 11.5–15.4)
IMM GRANULOCYTES # BLD AUTO: 0.03 THOUSAND/UL (ref 0–0.2)
IMM GRANULOCYTES NFR BLD AUTO: 0 % (ref 0–2)
LYMPHOCYTES # BLD AUTO: 2.85 THOUSANDS/ΜL (ref 0.6–4.47)
LYMPHOCYTES NFR BLD AUTO: 31 % (ref 14–44)
MCH RBC QN AUTO: 26 PG (ref 26.8–34.3)
MCHC RBC AUTO-ENTMCNC: 31.3 G/DL (ref 31.4–37.4)
MCV RBC AUTO: 83 FL (ref 82–98)
MONOCYTES # BLD AUTO: 0.45 THOUSAND/ΜL (ref 0.17–1.22)
MONOCYTES NFR BLD AUTO: 5 % (ref 4–12)
NEUTROPHILS # BLD AUTO: 5.57 THOUSANDS/ΜL (ref 1.85–7.62)
NEUTS SEG NFR BLD AUTO: 61 % (ref 43–75)
NRBC BLD AUTO-RTO: 0 /100 WBCS
PLATELET # BLD AUTO: 232 THOUSANDS/UL (ref 149–390)
PMV BLD AUTO: 11.3 FL (ref 8.9–12.7)
POTASSIUM SERPL-SCNC: 3.9 MMOL/L (ref 3.5–5.3)
PROT SERPL-MCNC: 7.2 G/DL (ref 6.4–8.4)
RBC # BLD AUTO: 5.19 MILLION/UL (ref 3.81–5.12)
SODIUM SERPL-SCNC: 142 MMOL/L (ref 135–147)
WBC # BLD AUTO: 9.2 THOUSAND/UL (ref 4.31–10.16)

## 2024-12-29 PROCEDURE — 99223 1ST HOSP IP/OBS HIGH 75: CPT | Performed by: HOSPITALIST

## 2024-12-29 PROCEDURE — 93005 ELECTROCARDIOGRAM TRACING: CPT

## 2024-12-29 PROCEDURE — 76856 US EXAM PELVIC COMPLETE: CPT

## 2024-12-29 PROCEDURE — 85025 COMPLETE CBC W/AUTO DIFF WBC: CPT | Performed by: EMERGENCY MEDICINE

## 2024-12-29 PROCEDURE — 36415 COLL VENOUS BLD VENIPUNCTURE: CPT

## 2024-12-29 PROCEDURE — 80053 COMPREHEN METABOLIC PANEL: CPT | Performed by: EMERGENCY MEDICINE

## 2024-12-29 PROCEDURE — 84484 ASSAY OF TROPONIN QUANT: CPT | Performed by: EMERGENCY MEDICINE

## 2024-12-29 PROCEDURE — 99285 EMERGENCY DEPT VISIT HI MDM: CPT | Performed by: EMERGENCY MEDICINE

## 2024-12-29 PROCEDURE — 76830 TRANSVAGINAL US NON-OB: CPT

## 2024-12-29 PROCEDURE — 71045 X-RAY EXAM CHEST 1 VIEW: CPT

## 2024-12-29 PROCEDURE — 74176 CT ABD & PELVIS W/O CONTRAST: CPT

## 2024-12-29 PROCEDURE — 99285 EMERGENCY DEPT VISIT HI MDM: CPT

## 2024-12-29 RX ORDER — OXYCODONE HYDROCHLORIDE 5 MG/1
5 TABLET ORAL EVERY 6 HOURS PRN
Refills: 0 | Status: DISCONTINUED | OUTPATIENT
Start: 2024-12-29 | End: 2024-12-31 | Stop reason: HOSPADM

## 2024-12-29 RX ORDER — BUPRENORPHINE AND NALOXONE 8; 2 MG/1; MG/1
8 FILM, SOLUBLE BUCCAL; SUBLINGUAL DAILY
Status: DISCONTINUED | OUTPATIENT
Start: 2024-12-30 | End: 2024-12-29

## 2024-12-29 RX ORDER — CALCIUM GLUCONATE 20 MG/ML
1 INJECTION, SOLUTION INTRAVENOUS ONCE
Status: COMPLETED | OUTPATIENT
Start: 2024-12-29 | End: 2024-12-29

## 2024-12-29 RX ORDER — FAMOTIDINE 20 MG/1
20 TABLET, FILM COATED ORAL ONCE
Status: COMPLETED | OUTPATIENT
Start: 2024-12-29 | End: 2024-12-29

## 2024-12-29 RX ORDER — SUCRALFATE ORAL 1 G/10ML
1000 SUSPENSION ORAL EVERY 6 HOURS SCHEDULED
Status: DISCONTINUED | OUTPATIENT
Start: 2024-12-29 | End: 2024-12-29

## 2024-12-29 RX ORDER — LORAZEPAM 0.5 MG/1
0.5 TABLET ORAL EVERY 8 HOURS PRN
Status: DISCONTINUED | OUTPATIENT
Start: 2024-12-29 | End: 2024-12-31 | Stop reason: HOSPADM

## 2024-12-29 RX ORDER — POLYETHYLENE GLYCOL 3350 17 G/17G
17 POWDER, FOR SOLUTION ORAL DAILY
Status: DISCONTINUED | OUTPATIENT
Start: 2024-12-29 | End: 2024-12-31 | Stop reason: HOSPADM

## 2024-12-29 RX ORDER — PANTOPRAZOLE SODIUM 40 MG/1
40 TABLET, DELAYED RELEASE ORAL
Status: DISCONTINUED | OUTPATIENT
Start: 2024-12-30 | End: 2024-12-30

## 2024-12-29 RX ORDER — SUCRALFATE 1 G/1
1 TABLET ORAL EVERY 6 HOURS
Status: DISCONTINUED | OUTPATIENT
Start: 2024-12-29 | End: 2024-12-31 | Stop reason: HOSPADM

## 2024-12-29 RX ORDER — LEVOTHYROXINE SODIUM 112 UG/1
224 TABLET ORAL DAILY
Status: DISCONTINUED | OUTPATIENT
Start: 2024-12-30 | End: 2024-12-31 | Stop reason: HOSPADM

## 2024-12-29 RX ORDER — HYDROXYZINE HYDROCHLORIDE 25 MG/1
25 TABLET, FILM COATED ORAL EVERY 6 HOURS PRN
Status: DISCONTINUED | OUTPATIENT
Start: 2024-12-29 | End: 2024-12-29

## 2024-12-29 RX ORDER — BUPRENORPHINE AND NALOXONE 2; .5 MG/1; MG/1
3 FILM, SOLUBLE BUCCAL; SUBLINGUAL DAILY
Status: DISCONTINUED | OUTPATIENT
Start: 2024-12-30 | End: 2024-12-30

## 2024-12-29 RX ADMIN — CALCIUM GLUCONATE 1 G: 20 INJECTION, SOLUTION INTRAVENOUS at 20:25

## 2024-12-29 RX ADMIN — SUCRALFATE 1 G: 1 TABLET ORAL at 20:27

## 2024-12-29 RX ADMIN — IOHEXOL 50 ML: 240 INJECTION, SOLUTION INTRATHECAL; INTRAVASCULAR; INTRAVENOUS; ORAL at 15:00

## 2024-12-29 RX ADMIN — LORAZEPAM 0.5 MG: 0.5 TABLET ORAL at 20:27

## 2024-12-29 RX ADMIN — FAMOTIDINE 20 MG: 20 TABLET, FILM COATED ORAL at 22:25

## 2024-12-29 NOTE — ED PROVIDER NOTES
Time reflects when diagnosis was documented in both MDM as applicable and the Disposition within this note       Time User Action Codes Description Comment    12/29/2024  3:57 PM Lenora Scanlon [R94.31] Prolonged Q-T interval on ECG     12/29/2024  6:43 PM Lenora Scanlon [N83.8] Ovarian mass     12/29/2024  7:35 PM Lenora Scanlon [K31.89] Gastric mass           ED Disposition       ED Disposition   Admit    Condition   Stable    Date/Time   Sun Dec 29, 2024  7:34 PM    Comment   Case was discussed with [facility] and the patient's admission status was agreed to be Admission Status: inpatient status to the service of Dr. Carrion.               Assessment & Plan       Medical Decision Making  Please see ED course below for additional details of the Medical Decision Making.       Amount and/or Complexity of Data Reviewed  Labs: ordered.  Radiology: ordered and independent interpretation performed.  ECG/medicine tests: ordered and independent interpretation performed. Decision-making details documented in ED Course.    Risk  Prescription drug management.  Decision regarding hospitalization.        ED Course as of 12/29/24 1936   [place] Dec 29, 2024   1354 Chest x-ray with no acute cardiopulmonary abnormalities   1356 I personally interpreted the patient's EKG which reveals normal rate, normal sinus rhythm, prolonged QTc to 537 ms, no ST segment deviation, T wave inversion in the inferior leads and in leads V3 through V6 new from 2020.  Repeat EKG 1 hour after initial EKG on arrival is unchanged with the exception of improvement in QTc interval to 507 ms   1501 Patient's pain worsens after eating, is not pleuritic, takes her breath away when the pain is at its most severe but she denies shortness of breath at baseline.  I am not concerned for pulmonary embolism as the underlying cause of her symptoms.  I am concerned that the potential exophytic mass seen on noncontrasted CT of her abdomen 5 days ago could be the  underlying cause.  Will scan with oral contrast as patient has a history of hives and difficulty breathing with IV contrast and also reports a history of hives with administration of Benadryl.   1548 Chest x-ray with no acute cardiopulmonary abnormalities   1555 Repeat troponin is undetectable.  Doubt ACS or cardiogenic cause of her symptoms but will refer to cardiology given nonspecific T wave changes on EKG with prolonged QTc interval.  Prolonged QTc possibly in the setting of Suboxone use with Zofran use prior to arrival to the emergency department.  Will avoid further QT prolonging agents.  Patient denies any lightheadedness or dizziness.   1703 Patient is hypocalcemic to 6.7, at baseline on review of prior records.    1830 Patient CT scan shows a lobulated mass within the posterior gastric wall of the proximal stomach, possibly concerning for GIST.  She also has pelvic masses potentially concerning for ovarian masses versus hemorrhagic cyst for which transvaginal ultrasound will be ordered.  Patient has no pelvic pain to suggest ovarian torsion.  Message sent to Dr. Ordoñez, on-call physician with gastroenterology to discuss disposition.   1840 Case discussed with Dr. Ordoñez with gastroenterology. Recommends admission for likely EGD tomorrow and possible EUS. Consult order placed and patient will be admitted NPO after midnight. Order placed for TVUS, study and results pending, SLIM aware.        Medications   iohexol (OMNIPAQUE) 240 MG/ML solution 50 mL (50 mL Oral Given 12/29/24 1500)       ED Risk Strat Scores                                              History of Present Illness       39-year-old female with remote history of substance abuse, clean for 10 years and maintained on Suboxone, history of thyroid cancer status post thyroidectomy.  Patient presents for evaluation of epigastric abdominal discomfort that is described as severe, last occurred this morning after eating.  She was evaluated at [facility]  [facility] for similar symptoms on Lianna Allison.  At that time she had a CT scan of her abdomen without contrast due to a contrast allergy that showed a possible 4.4 x 3.5 cm exophytic mass involving the posterior gastric cardia/fundus wall which could be related to gastric diverticulum, however gastric wall mass such as GIST tumor could not be excluded.  The patient states that she initially felt better after that emergency department visit and was started on Protonix and Carafate.  The pain recurred again this morning, however, prompting her to come in for evaluation.  She tried to make an appointment to be seen by gastroenterology but was unable to get in until February.    Patient reports nausea earlier in the day without vomiting.  No blood in her stool or black tarry stool.  No dysuria or frequency.  She denies fevers or chills.    Patient is further concerned because her mother has a history of MALT gastric cancer.    Despite this being listed as her chief complaint, patient's pain does not radiate up into the chest and is present in the epigastrium.  She states that when the pain is at its most severe \"it takes my breath away\" but denies shortness of breath at baseline.  Pain is not pleuritic.  She has no history of DVT or PE.  Denies calf swelling or tenderness or recent travel.  She does not take any estrogen medications.        Chief Complaint   Patient presents with    Chest Pain     Pt reports on going CP since 12/25. Dx with tumor with CT, unable to see surgeon since recent dx. Reports pain is \"unbearable\" and \"can't breathe\" pt states \"I think this might just be my anxiety\"       Past Medical History:   Diagnosis Date    Heroin abuse (HCC)     clean since 2015    Thyroid cancer (HCC) 1999    with thyroidectomy and RTX      Past Surgical History:   Procedure Laterality Date    GANGLION CYST EXCISION Left     THYROIDECTOMY        Family History   Problem Relation Age of Onset    Stomach cancer " Mother         maltoma lyphoma    Lung cancer Maternal Aunt     Breast cancer Maternal Aunt 52    Lymphoma Paternal Uncle         non hodgkins      Social History     Tobacco Use    Smoking status: Every Day     Current packs/day: 0.25     Types: Cigarettes    Smokeless tobacco: Never   Substance Use Topics    Alcohol use: No    Drug use: No     Comment: 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      E-Cigarette/Vaping      E-Cigarette/Vaping Substances      I have reviewed and agree with the history as documented.     HPI    Review of Systems   Constitutional:  Negative for chills and fever.   HENT:  Negative for congestion.    Eyes:  Negative for visual disturbance.   Respiratory:  Negative for cough and shortness of breath.    Cardiovascular:  Negative for chest pain (epigastric pain), palpitations and leg swelling.   Gastrointestinal:  Positive for abdominal pain and nausea (not present currently). Negative for diarrhea and vomiting.   Genitourinary:  Negative for dysuria and frequency.   Musculoskeletal:  Negative for arthralgias, back pain, neck pain and neck stiffness.   Skin:  Negative for rash.   Neurological:  Negative for dizziness, weakness, light-headedness, numbness and headaches.   Psychiatric/Behavioral:  Negative for agitation, behavioral problems and confusion.    All other systems reviewed and are negative.          Objective       ED Triage Vitals   Temperature Pulse Blood Pressure Respirations SpO2 Patient Position - Orthostatic VS   12/29/24 1151 12/29/24 1151 12/29/24 1151 12/29/24 1252 12/29/24 1151 12/29/24 1151   97.8 °F (36.6 °C) 64 (!) 196/87 18 98 % Sitting      Temp Source Heart Rate Source BP Location FiO2 (%) Pain Score    12/29/24 1151 12/29/24 1151 12/29/24 1151 -- 12/29/24 1252    Oral Monitor Right arm  8      Vitals      Date and Time Temp Pulse SpO2 Resp BP Pain Score FACES Pain Rating User   12/29/24 1252 98 °F (36.7 °C) 62 96 % 18 141/70 8 -- BS   12/29/24 1151 97.8 °F (36.6 °C)  64 98 % -- 196/87 -- -- TP            Physical Exam  Vitals and nursing note reviewed.   Constitutional:       General: She is not in acute distress.     Appearance: She is well-developed. She is not ill-appearing, toxic-appearing or diaphoretic.   HENT:      Head: Normocephalic and atraumatic.      Right Ear: External ear normal.      Left Ear: External ear normal.      Nose: Nose normal.   Eyes:      Conjunctiva/sclera: Conjunctivae normal.   Cardiovascular:      Rate and Rhythm: Normal rate and regular rhythm.      Pulses: Normal pulses.      Heart sounds: Normal heart sounds. No murmur heard.     No friction rub. No gallop.   Pulmonary:      Effort: Pulmonary effort is normal. No respiratory distress.      Breath sounds: Normal breath sounds. No wheezing or rales.   Abdominal:      General: Bowel sounds are normal. There is no distension.      Palpations: Abdomen is soft.      Tenderness: There is abdominal tenderness (mild tenderness to deep palpation in the epigastrium only). There is no guarding.   Musculoskeletal:         General: No deformity. Normal range of motion.      Cervical back: Normal range of motion and neck supple.      Right lower leg: No edema.      Left lower leg: No edema.      Comments: No calf swelling or tenderness   Skin:     General: Skin is warm and dry.   Neurological:      Mental Status: She is alert and oriented to person, place, and time.      Motor: No abnormal muscle tone.   Psychiatric:         Mood and Affect: Mood normal.         Results Reviewed       Procedure Component Value Units Date/Time    HS Troponin I 2hr [[id number]] Collected: 12/29/24 1515    Lab Status: Final result Specimen: Blood from Arm, Right Updated: 12/29/24 1550     hs TnI 2hr <2 ng/L      Delta 2hr hsTnI --    HS Troponin 0hr (reflex protocol) [[id number]]  (Normal) Collected: 12/29/24 1306    Lab Status: Final result Specimen: Blood from Arm, Right Updated: 12/29/24 1336     hs TnI 0hr <2 ng/L      Comprehensive metabolic panel [[id number]]  (Abnormal) Collected: 12/29/24 1306    Lab Status: Final result Specimen: Blood from Arm, Right Updated: 12/29/24 1330     Sodium 142 mmol/L      Potassium 3.9 mmol/L      Chloride 104 mmol/L      CO2 31 mmol/L      ANION GAP 7 mmol/L      BUN 17 mg/dL      Creatinine 1.13 mg/dL      Glucose 98 mg/dL      Calcium 6.7 mg/dL      AST 14 U/L      ALT 10 U/L      Alkaline Phosphatase 65 U/L      Total Protein 7.2 g/dL      Albumin 4.2 g/dL      Total Bilirubin 0.29 mg/dL      eGFR 60 ml/min/1.73sq m     Narrative:      National Kidney Disease Foundation guidelines for Chronic Kidney Disease (CKD):     Stage 1 with normal or high GFR (GFR > 90 mL/min/1.73 square meters)    Stage 2 Mild CKD (GFR = 60-89 mL/min/1.73 square meters)    Stage 3A Moderate CKD (GFR = 45-59 mL/min/1.73 square meters)    Stage 3B Moderate CKD (GFR = 30-44 mL/min/1.73 square meters)    Stage 4 Severe CKD (GFR = 15-29 mL/min/1.73 square meters)    Stage 5 End Stage CKD (GFR <15 mL/min/1.73 square meters)  Note: GFR calculation is accurate only with a steady state creatinine    CBC and differential [[id number]]  (Abnormal) Collected: 12/29/24 1306    Lab Status: Final result Specimen: Blood from Arm, Right Updated: 12/29/24 1318     WBC 9.20 Thousand/uL      RBC 5.19 Million/uL      Hemoglobin 13.5 g/dL      Hematocrit 43.1 %      MCV 83 fL      MCH 26.0 pg      MCHC 31.3 g/dL      RDW 14.1 %      MPV 11.3 fL      Platelets 232 Thousands/uL      nRBC 0 /100 WBCs      Segmented % 61 %      Immature Grans % 0 %      Lymphocytes % 31 %      Monocytes % 5 %      Eosinophils Relative 3 %      Basophils Relative 0 %      Absolute Neutrophils 5.57 Thousands/µL      Absolute Immature Grans 0.03 Thousand/uL      Absolute Lymphocytes 2.85 Thousands/µL      Absolute Monocytes 0.45 Thousand/µL      Eosinophils Absolute 0.27 Thousand/µL      Basophils Absolute 0.03 Thousands/µL             CT abdomen pelvis wo contrast    Final Interpretation by Cortez Mims MD (12/29 1813)      Lobulated mass centered within the posterior gastric wall of the proximal stomach. A tumor such as GIST must be excluded.      Pelvic masses suspicious for ovarian masses or cysts as described above. These can be further evaluated with pelvic ultrasound.            Workstation performed: [id number]         XR chest 1 view portable   ED Interpretation by Lenora Scanlon MD (12/29 4608)   No acute cardiopulmonary abnormalities      Final Interpretation by Eric Ann MD (12/29 2727)      No acute cardiopulmonary disease.            Workstation performed: [id number]         US pelvis complete w transvaginal    (Results Pending)       Procedures    ED Medication and Procedure Management   Prior to Admission Medications   Prescriptions Last Dose Informant Patient Reported? Taking?   buprenorphine-naloxone (SUBOXONE) 8-2 mg per SL tablet   Yes Yes   Sig: Place 1 tablet under the tongue daily   levothyroxine 112 mcg tablet 12/29/2024  Yes Yes   Sig: TAKE 2 TABLETS BY MOUTH DAILY   pantoprazole (PROTONIX) 40 mg tablet   No Yes   Sig: Take 1 tablet (40 mg total) by mouth daily in the early morning   sucralfate (CARAFATE) 1 g/10 mL suspension   No Yes   Sig: Take 10 mL (1,000 mg total) by mouth every 6 (six) hours   zolpidem (Ambien) 10 mg tablet   Yes No   Sig: Take 10 mg by mouth daily at bedtime as needed for sleep      Facility-Administered Medications: None     Patient's Medications   Discharge Prescriptions    No medications on file       ED SEPSIS DOCUMENTATION   Time reflects when diagnosis was documented in both MDM as applicable and the Disposition within this note       Time User Action Codes Description Comment    12/29/2024  3:57 PM Lenora Scanlon [R94.31] Prolonged Q-T interval on ECG     12/29/2024  6:43 PM Lenora Scanlon [N83.8] Ovarian mass     12/29/2024  7:35 PM Lenora Scanlon [K31.89] Gastric mass                   Lenora Scanlon MD  12/29/24 1936

## 2024-12-29 NOTE — ED NOTES
Unable to obtain lab work at this time. Pt requesting to wait until in room due to vasovagal response to lab work.      Beverly Torres RN  12/29/24 7936

## 2024-12-30 ENCOUNTER — PREP FOR PROCEDURE (OUTPATIENT)
Dept: GASTROENTEROLOGY | Facility: CLINIC | Age: 39
End: 2024-12-30

## 2024-12-30 ENCOUNTER — ANESTHESIA (INPATIENT)
Dept: GASTROENTEROLOGY | Facility: HOSPITAL | Age: 39
DRG: 392 | End: 2024-12-30
Payer: COMMERCIAL

## 2024-12-30 ENCOUNTER — TELEPHONE (OUTPATIENT)
Dept: GYNECOLOGIC ONCOLOGY | Facility: CLINIC | Age: 39
End: 2024-12-30

## 2024-12-30 ENCOUNTER — APPOINTMENT (INPATIENT)
Dept: GASTROENTEROLOGY | Facility: HOSPITAL | Age: 39
DRG: 392 | End: 2024-12-30
Payer: COMMERCIAL

## 2024-12-30 ENCOUNTER — ANESTHESIA EVENT (INPATIENT)
Dept: GASTROENTEROLOGY | Facility: HOSPITAL | Age: 39
DRG: 392 | End: 2024-12-30
Payer: COMMERCIAL

## 2024-12-30 DIAGNOSIS — D49.0: Primary | ICD-10-CM

## 2024-12-30 PROBLEM — R19.00 PELVIC MASS: Status: ACTIVE | Noted: 2024-12-30

## 2024-12-30 PROBLEM — N83.8 OVARIAN MASS: Status: ACTIVE | Noted: 2024-12-30

## 2024-12-30 PROBLEM — K31.9 GASTRIC LESION: Status: ACTIVE | Noted: 2024-12-30

## 2024-12-30 LAB
ANION GAP SERPL CALCULATED.3IONS-SCNC: 10 MMOL/L (ref 4–13)
ATRIAL RATE: 65 BPM
ATRIAL RATE: 69 BPM
BUN SERPL-MCNC: 13 MG/DL (ref 5–25)
CALCIUM SERPL-MCNC: 6.6 MG/DL (ref 8.4–10.2)
CHLORIDE SERPL-SCNC: 105 MMOL/L (ref 96–108)
CO2 SERPL-SCNC: 27 MMOL/L (ref 21–32)
CREAT SERPL-MCNC: 1.05 MG/DL (ref 0.6–1.3)
ERYTHROCYTE [DISTWIDTH] IN BLOOD BY AUTOMATED COUNT: 13.9 % (ref 11.6–15.1)
GFR SERPL CREATININE-BSD FRML MDRD: 66 ML/MIN/1.73SQ M
GLUCOSE SERPL-MCNC: 84 MG/DL (ref 65–140)
HCT VFR BLD AUTO: 42.9 % (ref 34.8–46.1)
HGB BLD-MCNC: 13.6 G/DL (ref 11.5–15.4)
MAGNESIUM SERPL-MCNC: 2 MG/DL (ref 1.9–2.7)
MCH RBC QN AUTO: 26.2 PG (ref 26.8–34.3)
MCHC RBC AUTO-ENTMCNC: 31.7 G/DL (ref 31.4–37.4)
MCV RBC AUTO: 83 FL (ref 82–98)
P AXIS: 55 DEGREES
P AXIS: 59 DEGREES
PLATELET # BLD AUTO: 205 THOUSANDS/UL (ref 149–390)
PMV BLD AUTO: 11.7 FL (ref 8.9–12.7)
POTASSIUM SERPL-SCNC: 3.6 MMOL/L (ref 3.5–5.3)
PR INTERVAL: 154 MS
PR INTERVAL: 156 MS
QRS AXIS: -6 DEGREES
QRS AXIS: -8 DEGREES
QRSD INTERVAL: 92 MS
QRSD INTERVAL: 96 MS
QT INTERVAL: 488 MS
QT INTERVAL: 502 MS
QTC INTERVAL: 507 MS
QTC INTERVAL: 537 MS
RBC # BLD AUTO: 5.2 MILLION/UL (ref 3.81–5.12)
SODIUM SERPL-SCNC: 142 MMOL/L (ref 135–147)
T WAVE AXIS: -25 DEGREES
T WAVE AXIS: 9 DEGREES
VENTRICULAR RATE: 65 BPM
VENTRICULAR RATE: 69 BPM
WBC # BLD AUTO: 9.44 THOUSAND/UL (ref 4.31–10.16)

## 2024-12-30 PROCEDURE — NC001 PR NO CHARGE: Performed by: OBSTETRICS & GYNECOLOGY

## 2024-12-30 PROCEDURE — 93010 ELECTROCARDIOGRAM REPORT: CPT | Performed by: INTERNAL MEDICINE

## 2024-12-30 PROCEDURE — 83735 ASSAY OF MAGNESIUM: CPT

## 2024-12-30 PROCEDURE — 99222 1ST HOSP IP/OBS MODERATE 55: CPT | Performed by: STUDENT IN AN ORGANIZED HEALTH CARE EDUCATION/TRAINING PROGRAM

## 2024-12-30 PROCEDURE — 43235 EGD DIAGNOSTIC BRUSH WASH: CPT | Performed by: STUDENT IN AN ORGANIZED HEALTH CARE EDUCATION/TRAINING PROGRAM

## 2024-12-30 PROCEDURE — 0DJ08ZZ INSPECTION OF UPPER INTESTINAL TRACT, VIA NATURAL OR ARTIFICIAL OPENING ENDOSCOPIC: ICD-10-PCS | Performed by: STUDENT IN AN ORGANIZED HEALTH CARE EDUCATION/TRAINING PROGRAM

## 2024-12-30 PROCEDURE — 99232 SBSQ HOSP IP/OBS MODERATE 35: CPT | Performed by: INTERNAL MEDICINE

## 2024-12-30 PROCEDURE — 80048 BASIC METABOLIC PNL TOTAL CA: CPT

## 2024-12-30 PROCEDURE — 85027 COMPLETE CBC AUTOMATED: CPT

## 2024-12-30 RX ORDER — POTASSIUM CHLORIDE 14.9 MG/ML
20 INJECTION INTRAVENOUS
Status: DISCONTINUED | OUTPATIENT
Start: 2024-12-30 | End: 2024-12-30

## 2024-12-30 RX ORDER — CALCIUM GLUCONATE 20 MG/ML
2 INJECTION, SOLUTION INTRAVENOUS ONCE
Status: COMPLETED | OUTPATIENT
Start: 2024-12-30 | End: 2024-12-30

## 2024-12-30 RX ORDER — SODIUM CHLORIDE, SODIUM LACTATE, POTASSIUM CHLORIDE, CALCIUM CHLORIDE 600; 310; 30; 20 MG/100ML; MG/100ML; MG/100ML; MG/100ML
125 INJECTION, SOLUTION INTRAVENOUS CONTINUOUS
Status: CANCELLED | OUTPATIENT
Start: 2024-12-30

## 2024-12-30 RX ORDER — BUPRENORPHINE AND NALOXONE 8; 2 MG/1; MG/1
8 FILM, SOLUBLE BUCCAL; SUBLINGUAL DAILY
Status: DISCONTINUED | OUTPATIENT
Start: 2024-12-31 | End: 2024-12-31 | Stop reason: HOSPADM

## 2024-12-30 RX ORDER — ENOXAPARIN SODIUM 100 MG/ML
40 INJECTION SUBCUTANEOUS EVERY 12 HOURS SCHEDULED
Status: DISCONTINUED | OUTPATIENT
Start: 2024-12-30 | End: 2024-12-31 | Stop reason: HOSPADM

## 2024-12-30 RX ORDER — SODIUM CHLORIDE, SODIUM LACTATE, POTASSIUM CHLORIDE, CALCIUM CHLORIDE 600; 310; 30; 20 MG/100ML; MG/100ML; MG/100ML; MG/100ML
INJECTION, SOLUTION INTRAVENOUS CONTINUOUS PRN
Status: DISCONTINUED | OUTPATIENT
Start: 2024-12-30 | End: 2024-12-30

## 2024-12-30 RX ORDER — MIDAZOLAM HYDROCHLORIDE 2 MG/2ML
INJECTION, SOLUTION INTRAMUSCULAR; INTRAVENOUS AS NEEDED
Status: DISCONTINUED | OUTPATIENT
Start: 2024-12-30 | End: 2024-12-30

## 2024-12-30 RX ORDER — PANTOPRAZOLE SODIUM 40 MG/10ML
40 INJECTION, POWDER, LYOPHILIZED, FOR SOLUTION INTRAVENOUS
Status: DISCONTINUED | OUTPATIENT
Start: 2024-12-31 | End: 2024-12-31 | Stop reason: HOSPADM

## 2024-12-30 RX ORDER — LIDOCAINE HYDROCHLORIDE 10 MG/ML
INJECTION, SOLUTION EPIDURAL; INFILTRATION; INTRACAUDAL; PERINEURAL AS NEEDED
Status: DISCONTINUED | OUTPATIENT
Start: 2024-12-30 | End: 2024-12-30

## 2024-12-30 RX ORDER — PROPOFOL 10 MG/ML
INJECTION, EMULSION INTRAVENOUS AS NEEDED
Status: DISCONTINUED | OUTPATIENT
Start: 2024-12-30 | End: 2024-12-30

## 2024-12-30 RX ORDER — PROPOFOL 10 MG/ML
INJECTION, EMULSION INTRAVENOUS CONTINUOUS PRN
Status: DISCONTINUED | OUTPATIENT
Start: 2024-12-30 | End: 2024-12-30

## 2024-12-30 RX ADMIN — POTASSIUM CHLORIDE 20 MEQ: 14.9 INJECTION, SOLUTION INTRAVENOUS at 10:34

## 2024-12-30 RX ADMIN — PANTOPRAZOLE SODIUM 40 MG: 40 TABLET, DELAYED RELEASE ORAL at 05:04

## 2024-12-30 RX ADMIN — CALCIUM GLUCONATE 2 G: 20 INJECTION, SOLUTION INTRAVENOUS at 13:13

## 2024-12-30 RX ADMIN — SUCRALFATE 1 G: 1 TABLET ORAL at 15:08

## 2024-12-30 RX ADMIN — LORAZEPAM 0.5 MG: 0.5 TABLET ORAL at 20:32

## 2024-12-30 RX ADMIN — SODIUM CHLORIDE, SODIUM LACTATE, POTASSIUM CHLORIDE, AND CALCIUM CHLORIDE: .6; .31; .03; .02 INJECTION, SOLUTION INTRAVENOUS at 13:22

## 2024-12-30 RX ADMIN — LIDOCAINE HYDROCHLORIDE 50 MG: 10 INJECTION, SOLUTION EPIDURAL; INFILTRATION; INTRACAUDAL at 13:27

## 2024-12-30 RX ADMIN — MIDAZOLAM 2 MG: 1 INJECTION INTRAMUSCULAR; INTRAVENOUS at 13:22

## 2024-12-30 RX ADMIN — BUPRENORPHINE AND NALOXONE 8 MG: 8; 2 FILM BUCCAL; SUBLINGUAL at 11:18

## 2024-12-30 RX ADMIN — PROPOFOL 150 MG: 10 INJECTION, EMULSION INTRAVENOUS at 13:27

## 2024-12-30 RX ADMIN — SUCRALFATE 1 G: 1 TABLET ORAL at 20:32

## 2024-12-30 RX ADMIN — SUCRALFATE 1 G: 1 TABLET ORAL at 01:44

## 2024-12-30 RX ADMIN — PROPOFOL 100 MCG/KG/MIN: 10 INJECTION, EMULSION INTRAVENOUS at 13:27

## 2024-12-30 RX ADMIN — SIMETHICONE 30 MG: 20 SUSPENSION/ DROPS ORAL at 13:29

## 2024-12-30 RX ADMIN — CALCIUM GLUCONATE 2 G: 20 INJECTION, SOLUTION INTRAVENOUS at 09:20

## 2024-12-30 RX ADMIN — LEVOTHYROXINE SODIUM 224 MCG: 0.11 TABLET ORAL at 09:07

## 2024-12-30 RX ADMIN — SUCRALFATE 1 G: 1 TABLET ORAL at 09:08

## 2024-12-30 RX ADMIN — LORAZEPAM 0.5 MG: 0.5 TABLET ORAL at 04:46

## 2024-12-30 NOTE — ASSESSMENT & PLAN NOTE
A 7.4 cm left ovarian bilocular cystic lesion with solid papillary projections without internal vascularity   A 6.4 cm right ovarian unilocular cystic lesion with irregular septations without internal vascularity  GYN- Onc recommendation appreciated

## 2024-12-30 NOTE — DISCHARGE INSTR - AVS FIRST PAGE
Dear Tanya Rosenthal,     It was our pleasure to care for you here at [facility].  It is our hope that we were always able to exceed the expected standards for your care during your stay.  You were hospitalized due to Abdominal pain.  You were cared for on the first floor by Savannah Nieto MD under the service of Adelina Milligan MD with the [facility] who covers for your primary care physician (PCP), Feliciano Ledezma MD, while you were hospitalized.  If you have any questions or concerns related to this hospitalization, you may contact us at .  For follow up as well as any medication refills, we recommend that you follow up with your primary care physician.  A registered nurse will reach out to you by phone within a few days after your discharge to answer any additional questions that you may have after going home.  However, at this time we provide for you here, the most important instructions / recommendations at discharge:     Notable Medication Adjustments -   Calcium carbonate 600 mg tablets has been added.  Starting tomorrow, please take 1 tablet by mouth daily.  Hydroxyzine 10 mg tablet has been added.  Please take 1 tablet by mouth every 6 hours as needed for anxiety  Carafate 1 g tablet has been added.  Please take 1 tablet by mouth every 6 hours as needed.  Protonix 40 mg tablet has been added.  Please continue to take Protonix 40 mg daily.  Please continue to take all medication as prescribed  Testing Required after Discharge -   EUS with FNA  ** Please contact your PCP to request testing orders for any of the testing recommended here **  Important follow up information -   Please follow-up with your primary doctor in 1 week  Please follow-up with gastroenterology outpatient.  Ambulatory referral attached  Ambulatory referral for sleep study has been attached.  Please call to make an appointment  Ambulatory referral for endocrinology  has been attached please call to make an appointment  Ambulatory referral for Gyn oncology attached.Please call to make an appointment  Other Instructions -   Please return to the emergency department if you are having worsening abdominal pain, blood in stool, black or tarry stool, nausea and vomiting.  Please review this entire after visit summary as additional general instructions including medication list, appointments, activity, diet, any pertinent wound care, and other additional recommendations from your care team that may be provided for you.      Sincerely,     Savannah Nieto MD

## 2024-12-30 NOTE — ASSESSMENT & PLAN NOTE
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  She reports previously taking calcium however it made her constipated in the past.  Has been noncompliant      Plan:  Calicum carbonate 600 mg daily  Follow-up with the endocrinology  Follow-up with PCP

## 2024-12-30 NOTE — PLAN OF CARE
Problem: GASTROINTESTINAL - ADULT  Goal: Minimal or absence of nausea and/or vomiting  Description: INTERVENTIONS:  - Administer IV fluids if ordered to ensure adequate hydration  - Maintain NPO status until nausea and vomiting are resolved  - Nasogastric tube if ordered  - Administer ordered antiemetic medications as needed  - Provide nonpharmacologic comfort measures as appropriate  - Advance diet as tolerated, if ordered  - Consider nutrition services referral to assist patient with adequate nutrition and appropriate food choices  Outcome: Progressing  Goal: Maintains adequate nutritional intake  Description: INTERVENTIONS:  - Monitor percentage of each meal consumed  - Identify factors contributing to decreased intake, treat as appropriate  - Assist with meals as needed  - Monitor I&O, weight, and lab values if indicated  - Obtain nutrition services referral as needed  Outcome: Progressing     Problem: PAIN - ADULT  Goal: Verbalizes/displays adequate comfort level or baseline comfort level  Description: Interventions:  - Encourage patient to monitor pain and request assistance  - Assess pain using appropriate pain scale  - Administer analgesics based on type and severity of pain and evaluate response  - Implement non-pharmacological measures as appropriate and evaluate response  - Consider cultural and social influences on pain and pain management  - Notify physician/advanced practitioner if interventions unsuccessful or patient reports new pain  Outcome: Progressing     Problem: INFECTION - ADULT  Goal: Absence or prevention of progression during hospitalization  Description: INTERVENTIONS:  - Assess and monitor for signs and symptoms of infection  - Monitor lab/diagnostic results  - Monitor all insertion sites, i.e. indwelling lines, tubes, and drains  - Monitor endotracheal if appropriate and nasal secretions for changes in amount and color  - [place] appropriate cooling/warming therapies per order  -  Administer medications as ordered  - Instruct and encourage patient and family to use good hand hygiene technique  - Identify and instruct in appropriate isolation precautions for identified infection/condition  Outcome: Progressing     Problem: SAFETY ADULT  Goal: Patient will remain free of falls  Description: INTERVENTIONS:  - Educate patient/family on patient safety including physical limitations  - Instruct patient to call for assistance with activity   - Consult OT/PT to assist with strengthening/mobility   - Keep Call bell within reach  - Keep bed low and locked with side rails adjusted as appropriate  - Keep care items and personal belongings within reach  - Initiate and maintain comfort rounds  - Make Fall Risk Sign visible to staff  - Offer Toileting every  Hours, in advance of need  - Initiate/Maintain alarm  - Obtain necessary fall risk management equipment:   - Apply yellow socks and bracelet for high fall risk patients  - Consider moving patient to room near nurses station  Outcome: Progressing  Goal: Maintain or return to baseline ADL function  Description: INTERVENTIONS:  -  Assess patient's ability to carry out ADLs; assess patient's baseline for ADL function and identify physical deficits which impact ability to perform ADLs (bathing, care of mouth/teeth, toileting, grooming, dressing, etc.)  - Assess/evaluate cause of self-care deficits   - Assess range of motion  - Assess patient's mobility; develop plan if impaired  - Assess patient's need for assistive devices and provide as appropriate  - Encourage maximum independence but intervene and supervise when necessary  - Involve family in performance of ADLs  - Assess for home care needs following discharge   - Consider OT consult to assist with ADL evaluation and planning for discharge  - Provide patient education as appropriate  Outcome: Progressing  Goal: Maintains/Returns to pre admission functional level  Description: INTERVENTIONS:  - Perform  AM-PAC 6 Click Basic Mobility/ Daily Activity assessment daily.  - Set and communicate daily mobility goal to care team and patient/family/caregiver.   - Collaborate with rehabilitation services on mobility goals if consulted  - Perform Range of Motion  times a day.  - Reposition patient every hours.  - Dangle patient  times a day  - Stand patient  times a day  - Ambulate patient  times a day  - Out of bed to chair  times a day   - Out of bed for meals times a day  - Out of bed for toileting  - Record patient progress and toleration of activity level   Outcome: Progressing     Problem: DISCHARGE PLANNING  Goal: Discharge to home or other facility with appropriate resources  Description: INTERVENTIONS:  - Identify barriers to discharge w/patient and caregiver  - Arrange for needed discharge resources and transportation as appropriate  - Identify discharge learning needs (meds, wound care, etc.)  - Arrange for interpretive services to assist at discharge as needed  - Refer to Case Management Department for coordinating discharge planning if the patient needs post-hospital services based on physician/advanced practitioner order or complex needs related to functional status, cognitive ability, or social support system  Outcome: Progressing     Problem: Knowledge Deficit  Goal: Patient/family/caregiver demonstrates understanding of disease process, treatment plan, medications, and discharge instructions  Description: Complete learning assessment and assess knowledge base.  Interventions:  - Provide teaching at level of understanding  - Provide teaching via preferred learning methods  Outcome: Progressing

## 2024-12-30 NOTE — ASSESSMENT & PLAN NOTE
x10 years since last use, has been maintained on Suboxone.    Plan:  Continue home buprenorphine-naloxone 8-2 mg film  Will need to reach out to [place] [phone] to confirm suboxone in the morning

## 2024-12-30 NOTE — ASSESSMENT & PLAN NOTE
Epigastric, likely secondary to 6cm posterior gastric mass  EGD planned today  Pain control per primary team

## 2024-12-30 NOTE — ASSESSMENT & PLAN NOTE
"Incidentally identified on CT scan and further characterized on pelvic US as follows: \"A 7.4 cm left ovarian bilocular cystic lesion with solid papillary projections without internal vascularity. O-RADS 4 = Intermediate risk...A 6.4 cm right ovarian unilocular cystic lesion with irregular septations without internal vascularity. O-RADS 3 = Low risk\"  Statistically and based on imaging, most likely benign but differential also includes ovarian borderline tumor or primary ovarian tumor.   Will workup with tumor markers for epithelial, germ cell, and granulosa cell tumors including CA-125, LDH, AFP, inhibins as well as CA 19-9 and CEA for possible non-gyn primary with metastatic deposit.   Of note, CA-125 is non-specific and can be elevated in absence of primary gyn malignancy.   Would NOT biopsy ovarian mass due to possibility of peritoneal seeding.   If a non-gyn primary malignancy is identified, this can be followed up at a later time for possible surgical intervention.     Will follow up on tumor markers and continue to follow peripherally. Recommend outpatient follow up with Gyn Oncology upon discharge, message sent to staff to set up appointment.  "

## 2024-12-30 NOTE — PLAN OF CARE
Problem: GASTROINTESTINAL - ADULT  Goal: Minimal or absence of nausea and/or vomiting  Description: INTERVENTIONS:  - Administer IV fluids if ordered to ensure adequate hydration  - Maintain NPO status until nausea and vomiting are resolved  - Nasogastric tube if ordered  - Administer ordered antiemetic medications as needed  - Provide nonpharmacologic comfort measures as appropriate  - Advance diet as tolerated, if ordered  - Consider nutrition services referral to assist patient with adequate nutrition and appropriate food choices  Outcome: Progressing  Goal: Maintains adequate nutritional intake  Description: INTERVENTIONS:  - Monitor percentage of each meal consumed  - Identify factors contributing to decreased intake, treat as appropriate  - Assist with meals as needed  - Monitor I&O, weight, and lab values if indicated  - Obtain nutrition services referral as needed  Outcome: Progressing     Problem: PAIN - ADULT  Goal: Verbalizes/displays adequate comfort level or baseline comfort level  Description: Interventions:  - Encourage patient to monitor pain and request assistance  - Assess pain using appropriate pain scale  - Administer analgesics based on type and severity of pain and evaluate response  - Implement non-pharmacological measures as appropriate and evaluate response  - Consider cultural and social influences on pain and pain management  - Notify physician/advanced practitioner if interventions unsuccessful or patient reports new pain  Outcome: Progressing     Problem: INFECTION - ADULT  Goal: Absence or prevention of progression during hospitalization  Description: INTERVENTIONS:  - Assess and monitor for signs and symptoms of infection  - Monitor lab/diagnostic results  - Monitor all insertion sites, i.e. indwelling lines, tubes, and drains  - Monitor endotracheal if appropriate and nasal secretions for changes in amount and color  - [place] appropriate cooling/warming therapies per order  -  Administer medications as ordered  - Instruct and encourage patient and family to use good hand hygiene technique  - Identify and instruct in appropriate isolation precautions for identified infection/condition  Outcome: Progressing     Problem: SAFETY ADULT  Goal: Patient will remain free of falls  Description: INTERVENTIONS:  - Educate patient/family on patient safety including physical limitations  - Instruct patient to call for assistance with activity   - Consult OT/PT to assist with strengthening/mobility   - Keep Call bell within reach  - Keep bed low and locked with side rails adjusted as appropriate  - Keep care items and personal belongings within reach  - Initiate and maintain comfort rounds  - Make Fall Risk Sign visible to staff  - Offer Toileting every  Hours, in advance of need  - Initiate/Maintain alarm  - Obtain necessary fall risk management equipment:   - Apply yellow socks and bracelet for high fall risk patients  - Consider moving patient to room near nurses station  Outcome: Progressing  Goal: Maintain or return to baseline ADL function  Description: INTERVENTIONS:  -  Assess patient's ability to carry out ADLs; assess patient's baseline for ADL function and identify physical deficits which impact ability to perform ADLs (bathing, care of mouth/teeth, toileting, grooming, dressing, etc.)  - Assess/evaluate cause of self-care deficits   - Assess range of motion  - Assess patient's mobility; develop plan if impaired  - Assess patient's need for assistive devices and provide as appropriate  - Encourage maximum independence but intervene and supervise when necessary  - Involve family in performance of ADLs  - Assess for home care needs following discharge   - Consider OT consult to assist with ADL evaluation and planning for discharge  - Provide patient education as appropriate  Outcome: Progressing  Goal: Maintains/Returns to pre admission functional level  Description: INTERVENTIONS:  - Perform  AM-PAC 6 Click Basic Mobility/ Daily Activity assessment daily.  - Set and communicate daily mobility goal to care team and patient/family/caregiver.   - Collaborate with rehabilitation services on mobility goals if consulted  - Perform Range of Motion  times a day.  - Reposition patient every  hours.  - Dangle patient  times a day  - Stand patient  times a day  - Ambulate patient  times a day  - Out of bed to chair  times a day   - Out of bed for meals times a day  - Out of bed for toileting  - Record patient progress and toleration of activity level   Outcome: Progressing     Problem: DISCHARGE PLANNING  Goal: Discharge to home or other facility with appropriate resources  Description: INTERVENTIONS:  - Identify barriers to discharge w/patient and caregiver  - Arrange for needed discharge resources and transportation as appropriate  - Identify discharge learning needs (meds, wound care, etc.)  - Arrange for interpretive services to assist at discharge as needed  - Refer to Case Management Department for coordinating discharge planning if the patient needs post-hospital services based on physician/advanced practitioner order or complex needs related to functional status, cognitive ability, or social support system  Outcome: Progressing     Problem: Knowledge Deficit  Goal: Patient/family/caregiver demonstrates understanding of disease process, treatment plan, medications, and discharge instructions  Description: Complete learning assessment and assess knowledge base.  Interventions:  - Provide teaching at level of understanding  - Provide teaching via preferred learning methods  Outcome: Progressing

## 2024-12-30 NOTE — TELEPHONE ENCOUNTER
I left a message for the patient to call the office to schedule a consult with one of the Gynecologic Oncology providers for her ovarian mass.

## 2024-12-30 NOTE — ASSESSMENT & PLAN NOTE
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  She reports previously taking calcium however it made her constipated in the past.  Has been noncompliant      Plan:  IV calcium gluconate  Recheck BMP 5 PM

## 2024-12-30 NOTE — CONSULTS
"Consultation - Gyn Oncology  Tanya Rosenthal 39 y.o. female MRN: [id number]  Unit/Bed#: -01 Encounter: [id number]    Inpatient consult to Gynecologic Oncology  Consult performed by: Kianna Burch MD  Consult ordered by: Moses Hallman MD        Assessment & Plan     Tanya is a 38 yo, PMHx of thyroid cancer s/p thyroidectomy and hx of IV drug use, initially presenting with chest pain, found to have an incidentally noted bilateral ovarian cysts.     Pelvic mass  Assessment & Plan  Incidentally identified on CT scan and further characterized on pelvic US as follows: \"A 7.4 cm left ovarian bilocular cystic lesion with solid papillary projections without internal vascularity. O-RADS 4 = Intermediate risk...A 6.4 cm right ovarian unilocular cystic lesion with irregular septations without internal vascularity. O-RADS 3 = Low risk\"  Statistically and based on imaging, most likely benign but differential also includes ovarian borderline tumor or primary ovarian tumor.   Will workup with tumor markers for epithelial, germ cell, and granulosa cell tumors including CA-125, LDH, AFP, inhibins as well as CA 19-9 and CEA for possible non-gyn primary with metastatic deposit.   Of note, CA-125 is non-specific and can be elevated in absence of primary gyn malignancy.   Would NOT biopsy ovarian mass due to possibility of peritoneal seeding.   If a non-gyn primary malignancy is identified, this can be followed up at a later time for possible surgical intervention.     Will follow up on tumor markers and continue to follow peripherally. Recommend outpatient follow up with Gyn Oncology upon discharge, message sent to staff to set up appointment.    Gastric lesion  Assessment & Plan  Epigastric pain for several weeks  Seen at [facility] on 12/25 and had a CT A/P without contrast which showed a 4.4 cm exophytic mass involving the posterior gastric cardia/fundus  Repeat CT on 12/29 which again showed a lobulated mass measuring " 6.6 cm in the posterior gastric wall with partial extension to the lumen as well as outside the stomach c/f GI stromal tumor  EGD planned today +/- biopsies    * Abdominal pain  Assessment & Plan  Epigastric, likely secondary to 6cm posterior gastric mass  EGD planned today  Pain control per primary team      History of Present Illness   HPI:  Tanya Rosenthal is a 39 y.o. that initially presented with chest pain found to have a stomach mass and incidentally noted b/l ovarian masses. She denies nausea, vomiting, bloating, and changes in weight. She states that about 1 month ago she was having epigastric pain which resolved after being seen at [facility], She then experienced epigastric pain on 24.     Medical history significant for hx of IV drug use and thyroid cancer s/p thyroidectomy  Surgical history significant for thyroidectomy  OB/Gyn history significant for 1 prior . She last saw a gyn about 6 years ago at the time she had her pap smear.   Denies family history of cancer.     Last saw a Gyn and had pap screening 6 yrs ago     Oncology History    No history exists.       Review of Systems   Constitutional:  Negative for chills and fever.   HENT:  Negative for ear pain and sore throat.    Eyes:  Negative for pain and visual disturbance.   Respiratory:  Negative for cough and shortness of breath.    Cardiovascular:  Negative for chest pain and palpitations.   Gastrointestinal:  Negative for abdominal pain, nausea and vomiting.   Genitourinary:  Negative for dysuria and hematuria.   Musculoskeletal:  Negative for arthralgias and back pain.   Skin:  Negative for color change and rash.   Neurological:  Negative for seizures and syncope.   All other systems reviewed and are negative.      Historical Information   Past Medical History:   Diagnosis Date    Heroin abuse (HCC)     clean since     Thyroid cancer (HCC)     with thyroidectomy and RTX     Past Surgical History:   Procedure Laterality Date     "GANGLION CYST EXCISION Left     THYROIDECTOMY       OB History    Para Term  AB Living   1 1 1      SAB IAB Ectopic Multiple Live Births             # Outcome Date GA Lbr Osmin/2nd Weight Sex Type Anes PTL Lv   1 Term              Family History   Problem Relation Age of Onset    Stomach cancer Mother         maltoma lyphoma    Lung cancer Maternal Aunt     Breast cancer Maternal Aunt 52    Lymphoma Paternal Uncle         non hodgkins     Social History   Social History     Substance and Sexual Activity   Alcohol Use Not Currently     Social History     Substance and Sexual Activity   Drug Use No    Comment: 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     Social History     Tobacco Use   Smoking Status Every Day    Current packs/day: 0.25    Average packs/day: 0.3 packs/day for 26.0 years (6.5 ttl pk-yrs)    Types: Cigarettes    Start date: 1999   Smokeless Tobacco Never       Meds/Allergies     Medications Prior to Admission:     buprenorphine-naloxone (SUBOXONE) 8-2 mg per SL tablet    levothyroxine 112 mcg tablet    pantoprazole (PROTONIX) 40 mg tablet    sucralfate (CARAFATE) 1 g/10 mL suspension    zolpidem (Ambien) 10 mg tablet  Allergies   Allergen Reactions    Diphenhydramine Shortness Of Breath     Other reaction(s): DIPHENHYDRAMINE CITRATE (BENADRYL)    Erythromycin Shortness Of Breath and Hives     Category: Allergy;     Iv Dye [Iodinated Contrast Media]        Objective   /73 (BP Location: Left arm)   Pulse 60   Temp 98 °F (36.7 °C) (Oral)   Resp 16   Ht 5' 5\" (1.651 m)   Wt 117 kg (257 lb 8 oz)   LMP  (LMP Unknown)   SpO2 93%   BMI 42.85 kg/m²     No intake/output data recorded.  No intake/output data recorded.    Lab Results   Component Value Date    WBC 9.44 2024    HGB 13.6 2024    HCT 42.9 2024    MCV 83 2024     2024       Lab Results   Component Value Date    GLUCOSE 102 2015    CALCIUM 6.6 (L) 2024     2015    " K 3.6 12/30/2024    CO2 27 12/30/2024     12/30/2024    BUN 13 12/30/2024    CREATININE 1.05 12/30/2024       Physical Exam  Vitals reviewed.   Constitutional:       Appearance: Normal appearance.   HENT:      Head: Normocephalic and atraumatic.   Eyes:      Extraocular Movements: Extraocular movements intact.   Cardiovascular:      Rate and Rhythm: Normal rate and regular rhythm.      Pulses: Normal pulses.      Heart sounds: Normal heart sounds. No murmur heard.  Pulmonary:      Effort: Pulmonary effort is normal. No respiratory distress.      Breath sounds: Normal breath sounds.   Abdominal:      Palpations: Abdomen is soft.      Tenderness: There is no abdominal tenderness.   Musculoskeletal:         General: Normal range of motion.      Cervical back: Normal range of motion.   Skin:     General: Skin is warm and dry.   Neurological:      Mental Status: She is alert.   Psychiatric:         Mood and Affect: Mood normal.         Behavior: Behavior normal.         Imaging: Results Review Statement: I personally reviewed the following image studies in PACS and associated radiology reports: CT abdomen/pelvis and pelvic ultrasound.     CT IMPRESSION 12/29/24:     Lobulated mass centered within the posterior gastric wall of the proximal stomach. A tumor such as GIST must be excluded.     Pelvic masses suspicious for ovarian masses or cysts as described above. These can be further evaluated with pelvic ultrasound.    US IMPRESSION 12/29/24:     A 7.4 cm left ovarian bilocular cystic lesion with solid papillary projections without internal vascularity. O-RADS 4 = Intermediate risk.  Imaging options include evaluation by ultrsaound specialist, MRI, or per GYN-oncologist protocol. Clinical   management by gynecologist with GYN-oncologist consultation or soley by GYN-oncologist.     A 6.4 cm right ovarian unilocular cystic lesion with irregular septations without internal vascularity. O-RADS 3 = Low risk  If not  surgically excised, consider follow-up US within 6 months.    EKG, Pathology, and Other Studies: Results Review Statement: No pertinent imaging studies reviewed.    Code Status: Level 1 - Full Code    Kianna Burch MD  OBGYN PGY-4  12/30/24 9:44 AM

## 2024-12-30 NOTE — ASSESSMENT & PLAN NOTE
Patient with recurrent abdominal pain first evaluated on 12/24 at [facility].   [facility]-CT without contrast showed 4.4 x 3.5 cm exophytic mass involving the posterior gastric cardia/fundus. GIST could not be rule out.   She will discharge from  [facility] ED with Protonix and Carafate.-Compliant with medication  Pain was 10 out of 10 without hematochezia, melena  In the ED:  CT AP: lobulated mass centered within posterior gastric wall of proximal stomach. GIST must be excluded. 6.0x4.4x4.5 cm lobulated mass. Also pelvic masses suspicious of ovarian masses or cysts.  GI recommended EUS with FNA after EGD showed Single large subepithelial lesion in the cardia.   Abdominal symptoms resolved prior to discharge       Plan:   Planned EUS with FNA   Continue carafate and Protonix   Ambulatory referral for gastroenterology  Follow-up with primary doctor in 1 week

## 2024-12-30 NOTE — UTILIZATION REVIEW
"Initial Clinical Review    Admission: Date/Time/Statement:   Admission Orders (From admission, onward)       Ordered        12/29/24 1935  INPATIENT ADMISSION  Once                          Orders Placed This Encounter   Procedures    INPATIENT ADMISSION     Standing Status:   Standing     Number of Occurrences:   1     Level of Care:   Med Surg [16]     Estimated length of stay:   More than 2 Midnights     Certification:   I certify that inpatient services are medically necessary for this patient for a duration of greater than two midnights. See H&P and MD Progress Notes for additional information about the patient's course of treatment.     ED Arrival Information       Expected   -    Arrival   12/29/2024 11:48    Acuity   Urgent              Means of arrival   Walk-In    Escorted by   Family Member    Service   Hospitalist    Admission type   Emergency              Arrival complaint   Severe chest pain             Chief Complaint   Patient presents with    Chest Pain     Pt reports on going CP since 12/25. Dx with tumor with CT, unable to see surgeon since recent dx. Reports pain is \"unbearable\" and \"can't breathe\" pt states \"I think this might just be my anxiety\"       Initial Presentation: 39 y.o. female with a PMH of thyroid cancer s/p thyroidectomy, prior h/o IV drug use presented to the ED from home w/ epigastric abdominal pain described as burning sensation ongoing for the past 1 week.   Pt was recently seen at [facility] on 12/25/2024 for similar pain.  She had a CT abdomen pelvis which showed a mass in the gastric wall.  Discharged on Protonix and Carafate and reports the pain had improved until today when she began to have burning sensation again.  Also reports back pain today.    In the ED, /87. repeat CT abdomen pelvis which showed a lobulated mass in the posterior gastric wall, rule out GIST.  Calcium 6.7.   On exam, abdominal tenderness present.  Given po Ativan, IV Calcium gluc, po " sucralfate.    Admit as Inpatient for evaluation and treatment of abdominal pain, gastric mass, hypocalcemia.  Plan: Continue carafate and protonix. GI consult. Pelvic ultrasound. IV calcium gluconate. Recheck BMP in AM      Anticipated Length of Stay/Certification Statement: Patient will be admitted on an inpatient basis with an anticipated length of stay of greater than 2 midnights secondary to gastric wall mass.     Date: 12/30   Day 2:   GI Consult: Gastric lesion, abdominal pain:  plan for EGD today to further assess, pending results, may need to consider EUS. NPO. PPI daily. further recs based on EGD findings    IM Notes: Pt was given Ativan last night d/t anxiety. This am, reports abd pain 6/10. Abdominal tenderness present on exam.   Plan EGD this afternoon. Can resume diet after. Continue carafate and Protonix. Oxycodone 5m PRN. Recheck BMP 5 PM. Ativan prn. GYN-Onc consulted for ovarian mass.     DATE OF SERVICE: 12/30/24  PROCEDURE: EGD   ANESTHESIA INFORMATION:  ASA: III  Anesthesia Type: IV Sedation with Anesthesia  FINDINGS:  The esophagus was normal.   Regular Z-line 38 cm from the incisors  There was a large mass on the posterior wall of the cardia just below the GEJ. The mass appeared to be submucosal. Biopsy forceps were used to attempt a pillow sign but went through the lesion and caused bleeding. Biopsies were not performed given concern for bleeding.  The duodenum was normal.    IMPRESSION:  The esophagus, duodenal bulb and 2nd part of the duodenum appeared normal.  Single large subepithelial lesion in the cardia. Probing with forceps resulted in penetration into the lesion and bleeding without further interrogation to avoid further hemorrhage     RECOMMENDATION:  Recommend EUS with FNA either inpatient or outpatient for tissue sampling       ED Treatment-Medication Administration from 12/29/2024 1148 to 12/29/2024 2133         Date/Time Order Dose Route Action     12/29/2024 1500 iohexol  (OMNIPAQUE) 240 MG/ML solution 50 mL 50 mL Oral Given     12/29/2024 2027 LORazepam (ATIVAN) tablet 0.5 mg 0.5 mg Oral Given     12/29/2024 2025 calcium gluconate 1 g in sodium chloride 0.9% 50 mL (premix) 1 g Intravenous New Bag     12/29/2024 2027 sucralfate (CARAFATE) tablet 1 g 1 g Oral Given            Scheduled Medications:  [START ON 12/31/2024] buprenorphine-naloxone, 8 mg, Sublingual, Daily  calcium gluconate 2 g in sodium chloride 0.9% 100 mL (premix) IV 12/30 x 2     enoxaparin, 40 mg, Subcutaneous, Q12H CRUZITO  levothyroxine, 224 mcg, Oral, Daily  [START ON 12/31/2024] pantoprazole, 40 mg, Intravenous, Q24H CRUZITO  polyethylene glycol, 17 g, Oral, Daily  sucralfate, 1 g, Oral, Q6H      Continuous IV Infusions: none     PRN Meds:  LORazepam, 0.5 mg, Oral, Q8H PRN 12/30 x 1  oxyCODONE, 5 mg, Oral, Q6H PRN      ED Triage Vitals   Temperature Pulse Respirations Blood Pressure SpO2 Pain Score   12/29/24 1151 12/29/24 1151 12/29/24 1252 12/29/24 1151 12/29/24 1151 12/29/24 1252   97.8 °F (36.6 °C) 64 18 (!) 196/87 98 % 8     Weight (last 2 days)       Date/Time Weight    12/29/24 2149 117 (257.5)            Vital Signs (last 3 days)       Date/Time Temp Pulse Resp BP MAP (mmHg) SpO2 O2 Device Patient Position - Orthostatic VS Pain    12/30/24 1505 98.2 °F (36.8 °C) 70 18 194/80 -- 97 % -- Lying --    12/30/24 1458 98.2 °F (36.8 °C) 55 18 140/70 -- 95 % None (Room air) Lying --    12/30/24 1430 97.2 °F (36.2 °C) 52 18 126/68 -- 94 % None (Room air) -- No Pain    12/30/24 1415 97.2 °F (36.2 °C) 49 18 139/76 -- 96 % None (Room air) -- No Pain    12/30/24 1400 97.2 °F (36.2 °C) 53 18 133/74 -- 95 % None (Room air) -- --    12/30/24 1345 97 °F (36.1 °C) 68 18 115/62 -- 93 % None (Room air) -- No Pain    12/30/24 1310 97.6 °F (36.4 °C) 71 18 195/79 -- 98 % None (Room air) -- No Pain    12/30/24 0900 -- -- -- -- -- -- -- -- No Pain    12/30/24 0717 98 °F (36.7 °C) 60 16 135/73 -- 93 % None (Room air) Lying --    12/29/24  2325 97.8 °F (36.6 °C) 50 16 122/59 85 93 % None (Room air) Lying --    12/29/24 2149 -- -- -- -- -- -- -- -- 6    12/29/24 2133 98.1 °F (36.7 °C) 53 18 121/58 83 96 % None (Room air) Lying --    12/29/24 2045 -- 54 18 152/68 -- 98 % None (Room air) Lying --    12/29/24 2016 -- 42 -- -- -- -- -- -- --    12/29/24 1252 98 °F (36.7 °C) 62 18 141/70 100 96 % None (Room air) Lying 8    12/29/24 1151 97.8 °F (36.6 °C) 64 -- 196/87 125 98 % None (Room air) Sitting --              Pertinent Labs/Diagnostic Test Results:   Radiology:  US pelvis complete w transvaginal   Final Interpretation by Demetrice Hancock MD (12/29 2109)      A 7.4 cm left ovarian bilocular cystic lesion with solid papillary projections without internal vascularity. O-RADS 4 = Intermediate risk.  Imaging options include evaluation by ultrsaound specialist, MRI, or per GYN-oncologist protocol. Clinical    management by gynecologist with GYN-oncologist consultation or soley by GYN-oncologist.      A 6.4 cm right ovarian unilocular cystic lesion with irregular septations without internal vascularity. O-RADS 3 = Low risk  If not surgically excised, consider follow-up US within 6 months.      The study was marked in EPIC for immediate notification.      Workstation performed: [id number]         CT abdomen pelvis wo contrast   Final Interpretation by Cortez Mims MD (12/29 1813)      Lobulated mass centered within the posterior gastric wall of the proximal stomach. A tumor such as GIST must be excluded.      Pelvic masses suspicious for ovarian masses or cysts as described above. These can be further evaluated with pelvic ultrasound.            Workstation performed: [id number]         XR chest 1 view portable   ED Interpretation by Lenora Scanlon MD (12/29 4087)   No acute cardiopulmonary abnormalities      Final Interpretation by Eric Ann MD (12/29 4441)      No acute cardiopulmonary disease.            Workstation performed:  "[id number]           Cardiology:  ECG 12 lead    by Interface, Ris Results In (12/29 1253)        GI:  EGD   Final Result by Katerina Garcia MD (12/30 1401)   The esophagus, duodenal bulb and 2nd part of the duodenum appeared normal.   Single large subepithelial lesion in the cardia. Probing with forceps    resulted in penetration into the lesion and bleeding without further    interrogation to avoid further hemorrhage           RECOMMENDATION:   Recommend EUS with FNA either inpatient or outpatient for tissue sampling                      Katerina Garcia MD               Results from last 7 days   Lab Units 12/30/24  0430 12/29/24  1306   WBC Thousand/uL 9.44 9.20   HEMOGLOBIN g/dL 13.6 13.5   HEMATOCRIT % 42.9 43.1   PLATELETS Thousands/uL 205 232   TOTAL NEUT ABS Thousands/µL  --  5.57         Results from last 7 days   Lab Units 12/30/24  0430 12/29/24  1306 12/25/24  1910   SODIUM mmol/L 142 142 142   POTASSIUM mmol/L 3.6 3.9 4.3   CHLORIDE mmol/L 105 104 101   CO2 mmol/L 27 31 29   ANION GAP mmol/L 10 7 12*   BUN mg/dL 13 17 19   CREATININE mg/dL 1.05 1.13 1.28*   EGFR ml/min/1.73sq m 66 60 54*   CALCIUM mg/dL 6.6* 6.7* 6.6*   MAGNESIUM mg/dL 2.0  --   --      Results from last 7 days   Lab Units 12/29/24  1306 12/25/24  1910   AST U/L 14 17   ALT U/L 10 10   ALK PHOS U/L 65 65   TOTAL PROTEIN g/dL 7.2 7.2   ALBUMIN g/dL 4.2 4.1   TOTAL BILIRUBIN mg/dL 0.29 0.4         Results from last 7 days   Lab Units 12/30/24  0430 12/29/24  1306 12/25/24  1910   GLUCOSE RANDOM mg/dL 84 98 84             No results found for: \"BETA-HYDROXYBUTYRATE\"                   Results from last 7 days   Lab Units 12/29/24  1515 12/29/24  1306   HS TNI 0HR ng/L  --  <2   HS TNI 2HR ng/L <2  --                                                                                                                        Past Medical History:   Diagnosis Date    Heroin abuse (HCC)     clean since 2015    Thyroid cancer (HCC) 1999    with " thyroidectomy and RTX     Present on Admission:   Hypothyroidism   History of heroin abuse (HCC)      Admitting Diagnosis: Chest pain [R07.9]  Prolonged Q-T interval on ECG [R94.31]  Ovarian mass [N83.8]  Gastric mass [K31.89]  Age/Sex: 39 y.o. female    Network Utilization Review Department  ATTENTION: Please call with any questions or concerns to [phone] and carefully listen to the prompts so that you are directed to the right person. All voicemails are confidential.   For Discharge needs, contact Care Management DC Support Team at [phone] opt. 2  Send all requests for admission clinical reviews, approved or denied determinations and any other requests to dedicated fax number below belonging to the campus where the patient is receiving treatment. List of dedicated fax numbers for the Facilities:  FACILITY NAME UR FAX NUMBER   ADMISSION DENIALS (Administrative/Medical Necessity) [phone]   DISCHARGE SUPPORT TEAM (NETWORK) [phone]   PARENT CHILD HEALTH (Maternity/NICU/Pediatrics) [phone]   [facility] [phone]   [facility] [phone]   [facility] [phone]   [facility] [phone]   [facility] [phone]   [facility] [phone]   [facility] [phone]   [facility] [phone]   [facility] [phone]   [facility] [phone]   [facility] [phone]   [facility] [phone]

## 2024-12-30 NOTE — INCIDENTAL FINDINGS
The following findings require follow up:  Radiographic finding   Finding: US pelvis complete w transvaginal: A 7.4 cm left ovarian bilocular cystic lesion with solid papillary projections without internal vascularity.A 6.4 cm right ovarian unilocular cystic lesion with irregular septations without internal vascularity    Follow up required: US within 6 months.    Follow up should be done within 6 month(s)    Please notify the following clinician to assist with the follow up:   Dr. Feliciano Ledezma MD      Incidental finding results were discussed with the Patient by Savannah Nieto MD on 12/31/24.   They expressed understanding and all questions answered.

## 2024-12-30 NOTE — ASSESSMENT & PLAN NOTE
Patient with recurrent abdominal pain first evaluated on 12/24 at [facility].   12/29 CT AP: lobulated mass centered within posterior gastric wall of proximal stomach. GIST must be excluded. 6.0x4.4x4.5 cm lobulated mass. Also pelvic masses suspicious of ovarian masses or cysts.    Plan:   Continue carafate and protonix  GI consult for evaluation of stomach mass need to r/o GIST  Pelvic ultrasound for ovarian masses vs cysts

## 2024-12-30 NOTE — ASSESSMENT & PLAN NOTE
Patient has history of hypocalcemia.  She reports previously taking calcium however it made her constipated in the past.    Plan:  IV calcium gluconate  Recheck BMP in AM

## 2024-12-30 NOTE — ASSESSMENT & PLAN NOTE
A 7.4 cm left ovarian bilocular cystic lesion with solid papillary projections without internal vascularity   A 6.4 cm right ovarian unilocular cystic lesion with irregular septations without internal vascularity  Please follow-up with PCP and GYN- oncology

## 2024-12-30 NOTE — PROGRESS NOTES
Progress Note - Hospitalist   Name: Tanya Rosenthal 39 y.o. female I MRN: [id number]  Unit/Bed#: -01 I Date of Admission: 12/29/2024   Date of Service: 12/30/2024 I Hospital Day: 1    Assessment & Plan  Abdominal pain    Patient with recurrent abdominal pain first evaluated on 12/24 at [facility].   [facility]-CT without contrast showed 4.4 x 3.5 cm exophytic mass involving the posterior gastric cardia/fundus. GIST could not be rule out.   She will discharge from  [facility] ED with Protonix and Carafate.-Compliant with medication  Pain was 10 out of 10 without hematochezia, melena  In the ED:  CT AP: lobulated mass centered within posterior gastric wall of proximal stomach. GIST must be excluded. 6.0x4.4x4.5 cm lobulated mass. Also pelvic masses suspicious of ovarian masses or cysts.  GI consulted and recommended EGD       Plan:   Plan EGD this after noon. Can resume diet after   Continue carafate and Protonix   Oxycodone 5m PRN   Hypothyroidism  History of thyroidectomy.    Plan  Continue home levothyroxine 112 mcg  History of heroin abuse (HCC)  x10 years since last use, has been maintained on Suboxone.    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  She reports previously taking calcium however it made her constipated in the past.  Has been noncompliant      Plan:  IV calcium gluconate  Recheck BMP 5 PM  Anxiety  Patient is very anxious being in the hospital    Plan:  Reports adverse reaction to benadryl in the past, will hold off on atarax  Given 0.5 mg ativan PRN  Ovarian mass  A 7.4 cm left ovarian bilocular cystic lesion with solid papillary projections without internal vascularity   A 6.4 cm right ovarian  unilocular cystic lesion with irregular septations without internal vascularity  GYN- Onc recommendation appreciated   Gastric lesion    Pelvic mass      VTE Pharmacologic Prophylaxis: VTE Score: 3 Moderate Risk (Score 3-4) - Pharmacological DVT Prophylaxis Contraindicated. Sequential Compression Devices Ordered.    Mobility:   Basic Mobility Inpatient Raw Score: 24  JH-HLM Goal: 8: Walk 250 feet or more  JH-HLM Achieved: 7: Walk 25 feet or more  JH-HLM Goal achieved. Continue to encourage appropriate mobility.    Patient Centered Rounds: I performed bedside rounds with nursing staff today.   Discussions with Specialists or Other Care Team Provider: Gastroenterology,  Gynecology oncology    Education and Discussions with Family / Patient: Updated  (daughter) at bedside.    Current Length of Stay: 1 day(s)  Current Patient Status: Inpatient   Certification Statement: The patient will continue to require additional inpatient hospital stay due to EGD  Discharge Plan: Anticipate discharge in 24-48 hrs to home.    Code Status: Level 1 - Full Code    Subjective   Night, patient was anxious and received Ativan.  This morning, patient states that her abdominal pain was 6 out of 10.  She denied fever, chill, nausea, vomiting, black/tarry/bloody stool.    Objective :  Temp:  [97.8 °F (36.6 °C)-98.1 °F (36.7 °C)] 98 °F (36.7 °C)  HR:  [42-64] 60  BP: (121-196)/(58-87) 135/73  Resp:  [16-18] 16  SpO2:  [93 %-98 %] 93 %  O2 Device: None (Room air)    Body mass index is 42.85 kg/m².     Input and Output Summary (last 24 hours):   No intake or output data in the 24 hours ending 12/30/24 1110    Physical Exam  Vitals and nursing note reviewed.   Constitutional:       General: She is not in acute distress.     Appearance: She is well-developed.   HENT:      Head: Normocephalic and atraumatic.      Mouth/Throat:      Mouth: Mucous membranes are moist.   Eyes:      Conjunctiva/sclera: Conjunctivae normal.    Cardiovascular:      Rate and Rhythm: Normal rate and regular rhythm.      Heart sounds: No murmur heard.  Pulmonary:      Effort: Pulmonary effort is normal. No respiratory distress.      Breath sounds: Normal breath sounds.   Abdominal:      General: Abdomen is flat. Bowel sounds are normal.      Palpations: Abdomen is soft.      Tenderness: There is abdominal tenderness.   Musculoskeletal:         General: No swelling.      Cervical back: Neck supple.      Right lower leg: No edema.      Left lower leg: No edema.   Skin:     General: Skin is warm and dry.      Capillary Refill: Capillary refill takes less than 2 seconds.   Neurological:      Mental Status: She is alert and oriented to person, place, and time.   Psychiatric:         Mood and Affect: Mood normal.           Lines/Drains:              Lab Results: I have reviewed the following results:   Results from last 7 days   Lab Units 12/30/24  0430 12/29/24  1306   WBC Thousand/uL 9.44 9.20   HEMOGLOBIN g/dL 13.6 13.5   HEMATOCRIT % 42.9 43.1   PLATELETS Thousands/uL 205 232   SEGS PCT %  --  61   LYMPHO PCT %  --  31   MONO PCT %  --  5   EOS PCT %  --  3     Results from last 7 days   Lab Units 12/30/24  0430 12/29/24  1306   SODIUM mmol/L 142 142   POTASSIUM mmol/L 3.6 3.9   CHLORIDE mmol/L 105 104   CO2 mmol/L 27 31   BUN mg/dL 13 17   CREATININE mg/dL 1.05 1.13   ANION GAP mmol/L 10 7   CALCIUM mg/dL 6.6* 6.7*   ALBUMIN g/dL  --  4.2   TOTAL BILIRUBIN mg/dL  --  0.29   ALK PHOS U/L  --  65   ALT U/L  --  10   AST U/L  --  14   GLUCOSE RANDOM mg/dL 84 98                       Recent Cultures (last 7 days):         Imaging Results Review: I reviewed radiology reports from this admission including: CT abdomen/pelvis.  Other Study Results Review: EKG was reviewed.     Last 24 Hours Medication List:     Current Facility-Administered Medications:     buprenorphine-naloxone (Suboxone) film 12 mg, Daily    calcium gluconate 2 g in sodium chloride 0.9% 100 mL  (premix), Once    levothyroxine tablet 224 mcg, Daily    LORazepam (ATIVAN) tablet 0.5 mg, Q8H PRN    oxyCODONE (ROXICODONE) IR tablet 5 mg, Q6H PRN    [START ON 12/31/2024] pantoprazole (PROTONIX) injection 40 mg, Q24H CRUZITO    polyethylene glycol (MIRALAX) packet 17 g, Daily    potassium chloride 20 mEq IVPB (premix), Q2H, Last Rate: 20 mEq (12/30/24 1034)    sucralfate (CARAFATE) tablet 1 g, Q6H    Administrative Statements   Today, Patient Was Seen By: Savannah Nieto MD      **Please Note: This note may have been constructed using a voice recognition system.**

## 2024-12-30 NOTE — DISCHARGE SUMMARY
Discharge Summary - Hospitalist   Name: Tanya Rosenthal 39 y.o. female I MRN: [id number]  Unit/Bed#: W -01 I Date of Admission: 12/29/2024   Date of Service: 12/31/2024 I Hospital Day: 2     Assessment & Plan  Abdominal pain    Patient with recurrent abdominal pain first evaluated on 12/24 at [facility].   [facility]-CT without contrast showed 4.4 x 3.5 cm exophytic mass involving the posterior gastric cardia/fundus. GIST could not be rule out.   She will discharge from  [facility] ED with Protonix and Carafate.-Compliant with medication  Pain was 10 out of 10 without hematochezia, melena  In the ED:  CT AP: lobulated mass centered within posterior gastric wall of proximal stomach. GIST must be excluded. 6.0x4.4x4.5 cm lobulated mass. Also pelvic masses suspicious of ovarian masses or cysts.  GI recommended EUS with FNA after EGD showed Single large subepithelial lesion in the cardia.   Abdominal symptoms resolved prior to discharge       Plan:   Planned EUS with FNA   Continue carafate and Protonix   Ambulatory referral for gastroenterology  Follow-up with primary doctor in 1 week    Hypothyroidism  History of thyroidectomy.    Plan  Continue home levothyroxine 112 mcg  History of heroin abuse (HCC)  x10 years since last use, has been maintained on Suboxone.    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  She reports previously taking calcium however it made her constipated in the past.  Has been noncompliant      Plan:  Calicum carbonate 600 mg daily  Follow-up with the endocrinology  Follow-up with PCP  Anxiety  Ativan during hospitalization  Discharge with Atarax 10 mg PRN     Ovarian mass  A 7.4 cm left ovarian bilocular cystic lesion with solid papillary projections without internal vascularity   A 6.4 cm right ovarian unilocular cystic lesion with irregular septations without internal  vascularity  Please follow-up with PCP and GYN- oncology     Gastric lesion  GI recommended EUS with FNA after EGD showed Single large subepithelial lesion in the cardia.     Medical Problems       Resolved Problems  Date Reviewed: 12/29/2024   None       Discharging Physician / Practitioner: Savannah Nieto MD  PCP: Feliciano Ledezma MD  Admission Date:   Admission Orders (From admission, onward)       Ordered        12/29/24 1935  INPATIENT ADMISSION  Once                          Discharge Date: 12/31/24    Consultations During Hospital Stay:  GYN onc, GI    Procedures Performed:   EGD, pelvic ultrasound    Significant Findings / Test Results:   A 7.4 cm left ovarian bilocular cystic lesion with solid papillary projections without internal vascularity   A 6.4 cm right ovarian unilocular cystic lesion with irregular septations without internal vascularity     Incidental Findings:    A 7.4 cm left ovarian bilocular cystic lesion with solid papillary projections without internal vascularity.   A 6.4 cm right ovarian unilocular cystic lesion with irregular septations without internal vascularity.   I reviewed the above mentioned incidental findings with the patient and/or family and they expressed understanding.    Test Results Pending at Discharge (will require follow up):   LMP, blood clot, inhibin B, inhibin A, CEA, , AFP P tumor marker, hCG tumor marker, cancer antigen 19 9, PTH intact     Outpatient Tests Requested:  EUS    Complications:  None    Reason for Admission: None    Hospital Course:   Tanya Rosenthal is a 39 y.o. female patient with past medical history of papillary thyroid carcinoma status post thyroidectomy, and remote history of IV drug use currently on Suboxone who originally presented to the hospital on 12/29/2024 due to epigastric abdominal pain.  CT imaging noted for gastric mass in the posterior wall of the abdomen.  Pelvic ultrasound was also done due to finding on CT of ovarian cyst.   "Pelvic ultrasound concerning for 2 ovarian cysts on the right and left.  Gynecology consulted and recommended workup with tumor markers for epithelial germ cell and granulosa cell tumors including , LDH, AFP, CMP and as well as CA 19 9 and CEA for possible none, primary with mets deposit.Gastroenterology consulted and recommended EGD.  EGD notable for a mass, EUS was recommended.  Gastroenterology reached out to [place] for possibly scheduling EUS for Monday.  Patient symptoms improved and she was able to tolerate diet prior to being discharged.  She will need to follow-up with her primary doctor in 1 week and continue follow-up with gastroenterology outpatient.  Ambulatory referral for [facility] has also been attached.        Please see above list of diagnoses and related plan for additional information.     Condition at Discharge: good    Discharge Day Visit / Exam:   Subjective:  No complaint overnight. Patient was able to eat and slept well.Reports that she still gets anxious but feel relieve with ativan.  She denied abdominal pain, nausea, fever and chill.   Vitals: Blood Pressure: 141/76 (12/31/24 0710)  Pulse: 61 (12/31/24 0710)  Temperature: 97.8 °F (36.6 °C) (12/31/24 0710)  Temp Source: Oral (12/30/24 1505)  Respirations: 18 (12/31/24 0710)  Height: 5' 5\" (165.1 cm) (12/29/24 2149)  Weight - Scale: 117 kg (257 lb 8 oz) (12/29/24 2149)  SpO2: 94 % (12/31/24 0710)  Physical Exam  Vitals and nursing note reviewed.   Constitutional:       General: She is not in acute distress.     Appearance: She is well-developed.   HENT:      Head: Normocephalic and atraumatic.   Eyes:      Conjunctiva/sclera: Conjunctivae normal.   Cardiovascular:      Rate and Rhythm: Normal rate and regular rhythm.      Heart sounds: No murmur heard.  Pulmonary:      Effort: Pulmonary effort is normal. No respiratory distress.      Breath sounds: Normal breath sounds.   Abdominal:      General: Abdomen is flat. Bowel sounds " are normal.      Palpations: Abdomen is soft.      Tenderness: There is no abdominal tenderness.   Musculoskeletal:         General: No swelling.      Cervical back: Neck supple.      Right lower leg: No edema.      Left lower leg: No edema.   Skin:     General: Skin is warm and dry.      Capillary Refill: Capillary refill takes less than 2 seconds.   Neurological:      Mental Status: She is alert and oriented to person, place, and time.   Psychiatric:         Mood and Affect: Mood normal.          Discussion with Family: Updated  (daughter) at bedside.    Discharge instructions/Information to patient and family:   See after visit summary for information provided to patient and family.      Provisions for Follow-Up Care:  See after visit summary for information related to follow-up care and any pertinent home health orders.      Mobility at time of Discharge:   Basic Mobility Inpatient Raw Score: 24  JH-HLM Goal: 8: Walk 250 feet or more  JH-HLM Achieved: 7: Walk 25 feet or more  HLM Goal achieved. Continue to encourage appropriate mobility.     Disposition:   Home    Planned Readmission: None    Discharge Medications:  See after visit summary for reconciled discharge medications provided to patient and/or family.      Administrative Statements   Discharge Statement:    **Please Note: This note may have been constructed using a voice recognition system**

## 2024-12-30 NOTE — H&P
H&P - Hospitalist   Name: Tanya Rosenthal 39 y.o. female I MRN: [id number]  Unit/Bed#: ED-09 I Date of Admission: 12/29/2024   Date of Service: 12/29/2024 I Hospital Day: 0     Assessment & Plan  Abdominal pain  Patient with recurrent abdominal pain first evaluated on 12/24 at [facility].   12/29 CT AP: lobulated mass centered within posterior gastric wall of proximal stomach. GIST must be excluded. 6.0x4.4x4.5 cm lobulated mass. Also pelvic masses suspicious of ovarian masses or cysts.    Plan:   Continue carafate and protonix  GI consult for evaluation of stomach mass need to r/o GIST  Pelvic ultrasound for ovarian masses vs cysts  Hypothyroidism  History of thyroidectomy.    Plan  Continue home levothyroxine 112 mcg  History of heroin abuse (HCC)  x10 years since last use, has been maintained on Suboxone.    Plan:  Continue home buprenorphine-naloxone 8-2 mg film  Will need to reach out to [facility] [phone] to confirm suboxone in the morning  Hypocalcemia  Patient has history of hypocalcemia.  She reports previously taking calcium however it made her constipated in the past.    Plan:  IV calcium gluconate  Recheck BMP in AM  Anxiety  Patient is very anxious being in the hospital    Plan:  Reports adverse reaction to benadryl in the past, will hold off on atarax  Given 0.5 mg ativan PRN      VTE Pharmacologic Prophylaxis: VTE Score: 3 Moderate Risk (Score 3-4) - Pharmacological DVT Prophylaxis Contraindicated. Sequential Compression Devices Ordered.  Code Status: Level 1  Discussion with family: Updated  (daughter, mother, and sister) at bedside.    Anticipated Length of Stay: Patient will be admitted on an inpatient basis with an anticipated length of stay of greater than 2 midnights secondary to gastric wall mass.    History of Present Illness   Chief Complaint: Epigastric pain    Tanya Rosenthal is a 39 y.o. female with a PMH of thyroid cancer status post thyroidectomy, prior history of IV  drug use currently on Suboxone 8 mg, who presents with epigastric abdominal pain described as burning sensation ongoing for the past 1 week.  Patient was recently seen at [facility] on 12/25/2024 for similar pain.  She had a CT abdomen pelvis which showed a mass in the gastric wall.  She was discharged on Protonix and Carafate and reports the pain had improved until today when she began to have burning sensation again.  She reports back pain today as well.  Otherwise she denies any nausea, vomiting, chest pain, difficulty breathing, lightheadedness, dizziness, constipation, diarrhea, pelvic pain, or urinary symptoms.      In the ED patient had a repeat CT abdomen pelvis which showed a lobulated mass in the posterior gastric wall, rule out GIST.  EGD discussed with gastroenterology who recommended admission for endoscopy, n.p.o. at midnight.     Review of Systems   Constitutional:  Negative for chills and fever.   HENT:  Negative for congestion, ear pain, sneezing and sore throat.    Eyes:  Negative for discharge, itching and visual disturbance.   Respiratory:  Negative for cough and shortness of breath.    Cardiovascular:  Negative for chest pain.   Gastrointestinal:  Positive for abdominal pain. Negative for constipation, diarrhea, nausea and vomiting.   Genitourinary:  Negative for flank pain and pelvic pain.   Musculoskeletal:  Negative for arthralgias.   Neurological:  Negative for dizziness, syncope, weakness, light-headedness and headaches.   Psychiatric/Behavioral:  The patient is nervous/anxious.        Historical Information   Past Medical History:   Diagnosis Date    Heroin abuse (HCC)     clean since 2015    Thyroid cancer (HCC) 1999    with thyroidectomy and RTX     Past Surgical History:   Procedure Laterality Date    GANGLION CYST EXCISION Left     THYROIDECTOMY       Social History     Tobacco Use    Smoking status: Every Day     Current packs/day: 0.25     Types: Cigarettes    Smokeless tobacco:  Never   Substance and Sexual Activity    Alcohol use: No    Drug use: No     Comment: 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    Sexual activity: Not on file     E-Cigarette/Vaping     E-Cigarette/Vaping Substances     Family History   Problem Relation Age of Onset    Stomach cancer Mother         maltoma lyphoma    Lung cancer Maternal Aunt     Breast cancer Maternal Aunt 52    Lymphoma Paternal Uncle         non hodgkins     Social History:  Marital Status: Single   Patient Pre-hospital Living Situation: Home  Patient Pre-hospital Level of Mobility: walks  Patient Pre-hospital Diet Restrictions: None    Meds/Allergies   I have reviewed home medications with patient personally.  Prior to Admission medications    Medication Sig Start Date End Date Taking? Authorizing Provider   buprenorphine-naloxone (SUBOXONE) 8-2 mg per SL tablet Place 1 tablet under the tongue daily   Yes Historical Provider, MD   levothyroxine 112 mcg tablet TAKE 2 TABLETS BY MOUTH DAILY 8/1/18  Yes Historical Provider, MD   pantoprazole (PROTONIX) 40 mg tablet Take 1 tablet (40 mg total) by mouth daily in the early morning 8/21/20 12/29/24 Yes Tenisha Gray MD   sucralfate (CARAFATE) 1 g/10 mL suspension Take 10 mL (1,000 mg total) by mouth every 6 (six) hours 8/20/20 12/29/24 Yes Tenisha Gray MD   zolpidem (Ambien) 10 mg tablet Take 10 mg by mouth daily at bedtime as needed for sleep    Historical Provider, MD     Allergies   Allergen Reactions    Diphenhydramine Shortness Of Breath     Other reaction(s): DIPHENHYDRAMINE CITRATE (BENADRYL)    Erythromycin Shortness Of Breath and Hives     Category: Allergy;     Iv Dye [Iodinated Contrast Media]        Objective :  Temp:  [97.8 °F (36.6 °C)-98 °F (36.7 °C)] 98 °F (36.7 °C)  HR:  [62-64] 62  BP: (141-196)/(70-87) 141/70  Resp:  [18] 18  SpO2:  [96 %-98 %] 96 %  O2 Device: None (Room air)    Physical Exam  Vitals and nursing note reviewed.   Constitutional:       General: She is not in acute  distress.     Appearance: She is well-developed.   HENT:      Head: Normocephalic and atraumatic.      Mouth/Throat:      Mouth: Mucous membranes are moist.   Eyes:      Conjunctiva/sclera: Conjunctivae normal.   Cardiovascular:      Rate and Rhythm: Normal rate and regular rhythm.      Heart sounds: No murmur heard.     No friction rub. No gallop.   Pulmonary:      Effort: Pulmonary effort is normal. No respiratory distress.      Breath sounds: Normal breath sounds. No wheezing or rhonchi.   Abdominal:      General: Bowel sounds are normal.      Palpations: Abdomen is soft.      Tenderness: There is no abdominal tenderness.   Musculoskeletal:         General: No swelling.      Cervical back: Neck supple.   Skin:     General: Skin is warm and dry.      Capillary Refill: Capillary refill takes less than 2 seconds.   Neurological:      Mental Status: She is alert.      Motor: No weakness.   Psychiatric:         Mood and Affect: Mood normal.          Lines/Drains:            Lab Results: I have reviewed the following results:  Results from last 7 days   Lab Units 12/29/24  1306   WBC Thousand/uL 9.20   HEMOGLOBIN g/dL 13.5   HEMATOCRIT % 43.1   PLATELETS Thousands/uL 232   SEGS PCT % 61   LYMPHO PCT % 31   MONO PCT % 5   EOS PCT % 3     Results from last 7 days   Lab Units 12/29/24  1306   SODIUM mmol/L 142   POTASSIUM mmol/L 3.9   CHLORIDE mmol/L 104   CO2 mmol/L 31   BUN mg/dL 17   CREATININE mg/dL 1.13   ANION GAP mmol/L 7   CALCIUM mg/dL 6.7*   ALBUMIN g/dL 4.2   TOTAL BILIRUBIN mg/dL 0.29   ALK PHOS U/L 65   ALT U/L 10   AST U/L 14   GLUCOSE RANDOM mg/dL 98             Lab Results   Component Value Date    HGBA1C 6.2 (H) 10/21/2019           Imaging Results Review: I reviewed radiology reports from this admission including: CT abdomen/pelvis.  Other Study Results Review: EKG was reviewed.     Administrative Statements   I have spent a total time of 30 minutes in caring for this patient on the day of the  visit/encounter including Impressions, Counseling / Coordination of care, Documenting in the medical record, Reviewing / ordering tests, medicine, procedures  , Obtaining or reviewing history  , and Communicating with other healthcare professionals .    ** Please Note: This note has been constructed using a voice recognition system. **

## 2024-12-30 NOTE — ASSESSMENT & PLAN NOTE
-noted on CT scan. Had CT without oral contrast at [facility] on 12/25 with 4.4 x 3.5 exophytic mass involving posterior gastric cardia/fundus which could be a diverticulum or a gastric wall mass. CT here again without oral contrast with a 6.0 x 4.4 x 4.5 cm lobulated mass centered within the posterior gastric wall which partially extends into the lumen of the stomach as well as outside of the stomach, must rule out GIST tumor. Patient's mother has hx MALT lymphoma of stomach. Main symptom is pain, worse after eating. Denies NSAIDs. No weight loss. Has been having these symptoms off and on for last 1.5 months.   -plan for EGD today to further assess, pending results, may need to consider EUS  -NPO  -PPI daily  -further recs based on EGD findings

## 2024-12-30 NOTE — ASSESSMENT & PLAN NOTE
Patient with recurrent abdominal pain first evaluated on 12/24 at [facility].   [facility]-CT without contrast showed 4.4 x 3.5 cm exophytic mass involving the posterior gastric cardia/fundus. GIST could not be rule out.   She will discharge from  [facility] ED with Protonix and Carafate.-Compliant with medication  Pain was 10 out of 10 without hematochezia, melena  In the ED:  CT AP: lobulated mass centered within posterior gastric wall of proximal stomach. GIST must be excluded. 6.0x4.4x4.5 cm lobulated mass. Also pelvic masses suspicious of ovarian masses or cysts.  GI consulted and recommended EGD       Plan:   Plan EGD this after noon. Can resume diet after   Continue carafate and Protonix   Oxycodone 5m PRN

## 2024-12-30 NOTE — ASSESSMENT & PLAN NOTE
Epigastric pain for several weeks  Seen at [facility] on 12/25 and had a CT A/P without contrast which showed a 4.4 cm exophytic mass involving the posterior gastric cardia/fundus  Repeat CT on 12/29 which again showed a lobulated mass measuring 6.6 cm in the posterior gastric wall with partial extension to the lumen as well as outside the stomach c/f GI stromal tumor  EGD planned today +/- biopsies

## 2024-12-30 NOTE — ASSESSMENT & PLAN NOTE
x10 years since last use, has been maintained on Suboxone.    Plan:  Continue home buprenorphine-naloxone 8-2 mg film  Attempted to call [facility] [phone] to confirm Suboxone; no answer   PDMP states that patient takes 12 mg daily however, patient states that she only takes it in the morning

## 2024-12-30 NOTE — ANESTHESIA POSTPROCEDURE EVALUATION
Post-Op Assessment Note    CV Status:  Stable    Pain management: adequate       Mental Status:  Alert and awake   Hydration Status:  Euvolemic   PONV Controlled:  Controlled   Airway Patency:  Patent     Post Op Vitals Reviewed: Yes    No anethesia notable event occurred.    Staff: Anesthesiologist           Last Filed PACU Vitals:  Vitals Value Taken Time   Temp 97.2 °F (36.2 °C) 12/30/24 1430   Pulse 52 12/30/24 1430   /68 12/30/24 1430   Resp 18 12/30/24 1430   SpO2 94 % 12/30/24 1430       Modified Luis Carlos:  Activity: 2 (12/30/2024  2:15 PM)  Respiration: 2 (12/30/2024  2:15 PM)  Circulation: 2 (12/30/2024  2:15 PM)  Consciousness: 2 (12/30/2024  2:15 PM)  Oxygen Saturation: 2 (12/30/2024  2:15 PM)  Modified Luis Carlos Score: 10 (12/30/2024  2:15 PM)

## 2024-12-30 NOTE — ANESTHESIA POSTPROCEDURE EVALUATION
Post-Op Assessment Note    CV Status:  Stable    Pain management: adequate       Mental Status:  Sleepy   Hydration Status:  Euvolemic   PONV Controlled:  Controlled   Airway Patency:  Patent     Post Op Vitals Reviewed: Yes    No anethesia notable event occurred.    Staff: CRNA       Last Filed PACU Vitals:  Vitals Value Taken Time   Temp     Pulse 59 62   /64    Resp     SpO2 97        Modified Luis Carlos:  Activity: 2 (12/30/2024  1:09 PM)  Respiration: 2 (12/30/2024  1:09 PM)  Circulation: 2 (12/30/2024  1:09 PM)  Consciousness: 2 (12/30/2024  1:09 PM)  Oxygen Saturation: 2 (12/30/2024  1:09 PM)  Modified Luis Carlos Score: 10 (12/30/2024  1:09 PM)

## 2024-12-30 NOTE — ANESTHESIA PREPROCEDURE EVALUATION
Procedure:  EGD    Relevant Problems   CARDIO   (+) Chest pain      ENDO   (+) Hypothyroidism      MUSCULOSKELETAL   (+) Cervical strain      NEURO/PSYCH   (+) Anxiety        Physical Exam    Airway    Mallampati score: III  TM Distance: >3 FB  Neck ROM: full     Dental   No notable dental hx     Cardiovascular  Rhythm: regular, Rate: normal    Pulmonary   Breath sounds clear to auscultation    Other Findings  post-pubertal.      Anesthesia Plan  ASA Score- 3     Anesthesia Type- IV sedation with anesthesia with ASA Monitors.         Additional Monitors:     Airway Plan:            Plan Factors-Exercise tolerance (METS): >4 METS.    Chart reviewed.   Existing labs reviewed. Patient summary reviewed.    Patient is not a current smoker.              Induction- intravenous.    Postoperative Plan-     Perioperative Resuscitation Plan - Level 1 - Full Code.       Informed Consent- Anesthetic plan and risks discussed with patient.  I personally reviewed this patient with the CRNA. Discussed and agreed on the Anesthesia Plan with the CRNA..

## 2024-12-30 NOTE — ASSESSMENT & PLAN NOTE
Patient is very anxious being in the hospital    Plan:  Reports adverse reaction to benadryl in the past, will hold off on atarax  Given 0.5 mg ativan PRN

## 2024-12-30 NOTE — ASSESSMENT & PLAN NOTE
x10 years since last use, has been maintained on Suboxone.    Plan:  Continue home buprenorphine-naloxone 8-2 mg film

## 2024-12-30 NOTE — CONSULTS
Consultation - Gastroenterology   Name: Tanya Rosenthal 39 y.o. female I MRN: [id number]  Unit/Bed#: -01 I Date of Admission: 12/29/2024   Date of Service: 12/30/2024 I Hospital Day: 1   Inpatient consult to gastroenterology  Consult performed by: Kayla Jung PA-C  Consult ordered by: Lenora Scanlon MD        Physician Requesting Evaluation: Bronwyn Castillo MD   Reason for Evaluation / Principal Problem: abdominal pain, gastric mass     Assessment & Plan  Gastric lesion  -noted on CT scan. Had CT without oral contrast at [facility] on 12/25 with 4.4 x 3.5 exophytic mass involving posterior gastric cardia/fundus which could be a diverticulum or a gastric wall mass. CT here again without oral contrast with a 6.0 x 4.4 x 4.5 cm lobulated mass centered within the posterior gastric wall which partially extends into the lumen of the stomach as well as outside of the stomach, must rule out GIST tumor. Patient's mother has hx MALT lymphoma of stomach. Main symptom is pain, worse after eating. Denies NSAIDs. No weight loss. Has been having these symptoms off and on for last 1.5 months.   -plan for EGD today to further assess, pending results, may need to consider EUS  -NPO  -PPI daily  -further recs based on EGD findings  Abdominal pain  -plan as above  -pain control as needed per SLIM       History of Present Illness   HPI:  Tanya Rosenthal is a 39 y.o. female with a history of thyroid cancer status post thyroidectomy, history of IV drug use on Suboxon who presented to the emergency room due to epigastric abdominal pain.  Patient reports that the symptoms been present for the last several weeks and was seen at [facility] on Christmas for the same symptoms.  She reports that they improved slightly after being seen in the emergency room but then she had worsening pain yesterday prompting her to come back to the emergency room.  Patient is afraid to eat due to the pain as the symptoms typically worsen after eating and  has been barely eating anything for the last week.  Denies any unexplained weight loss.  Denies any nausea or vomiting.  She does get acid reflux off and on.  Denies any changes in bowel habits, melena, blood in stool.  She has a family history of MALT lymphoma in her mother.  She has never had an endoscopy or colonoscopy in the past.  She reports smoking tobacco about a pack every 1-1/2 weeks.    Review of Systems   Constitutional:  Negative for activity change, appetite change, fever and unexpected weight change.   HENT:  Negative for drooling, mouth sores, sore throat, trouble swallowing and voice change.    Eyes:  Negative for pain, discharge and visual disturbance.   Respiratory:  Negative for cough, choking, chest tightness and shortness of breath.    Cardiovascular:  Negative for chest pain, palpitations and leg swelling.   Gastrointestinal:  Positive for abdominal pain. Negative for abdominal distention, anal bleeding, blood in stool, constipation, diarrhea, nausea, rectal pain and vomiting.   Genitourinary:  Negative for decreased urine volume, difficulty urinating, dysuria, flank pain and urgency.   Musculoskeletal:  Negative for arthralgias, back pain, gait problem, myalgias and neck stiffness.   Skin:  Negative for color change, pallor and rash.   Neurological:  Negative for dizziness, syncope and light-headedness.   Hematological:  Negative for adenopathy.   Psychiatric/Behavioral:  Negative for confusion. The patient is nervous/anxious.      I have reviewed the patient's PMH, PSH, Social History, Family History, Meds, and Allergies    Objective :  Temp:  [97.8 °F (36.6 °C)-98.1 °F (36.7 °C)] 98 °F (36.7 °C)  HR:  [42-64] 60  BP: (121-196)/(58-87) 135/73  Resp:  [16-18] 16  SpO2:  [93 %-98 %] 93 %  O2 Device: None (Room air)    Physical Exam  Constitutional:       General: She is not in acute distress.     Appearance: Normal appearance. She is well-developed.   HENT:      Head: Normocephalic and  atraumatic.      Mouth/Throat:      Mouth: Mucous membranes are moist.   Eyes:      General: No scleral icterus.  Neck:      Trachea: No tracheal deviation.   Cardiovascular:      Rate and Rhythm: Normal rate and regular rhythm.      Heart sounds: Normal heart sounds. No murmur heard.  Pulmonary:      Effort: Pulmonary effort is normal. No respiratory distress.      Breath sounds: Normal breath sounds. No wheezing or rales.   Abdominal:      General: Bowel sounds are normal. There is no distension.      Palpations: Abdomen is soft. There is no mass.      Tenderness: There is abdominal tenderness in the epigastric area. There is no guarding or rebound.   Musculoskeletal:         General: Normal range of motion.      Cervical back: Normal range of motion and neck supple.   Skin:     General: Skin is warm and dry.      Coloration: Skin is not pale.      Findings: No rash.   Neurological:      Mental Status: She is alert and oriented to person, place, and time. Mental status is at baseline.   Psychiatric:         Mood and Affect: Mood normal. Affect is tearful.         Behavior: Behavior normal.           Lab Results: I have reviewed the following results:

## 2024-12-30 NOTE — ASSESSMENT & PLAN NOTE
Patient is very anxious being in the hospital    Plan:  Reports adverse reaction to benadryl in the past, will hold off on atarax  Given 0.5 mg ativan PRN   DKA

## 2024-12-31 ENCOUNTER — DOCUMENTATION (OUTPATIENT)
Dept: HEMATOLOGY ONCOLOGY | Facility: CLINIC | Age: 39
End: 2024-12-31

## 2024-12-31 VITALS
TEMPERATURE: 97.4 F | DIASTOLIC BLOOD PRESSURE: 63 MMHG | WEIGHT: 257.5 LBS | SYSTOLIC BLOOD PRESSURE: 156 MMHG | HEIGHT: 65 IN | HEART RATE: 66 BPM | RESPIRATION RATE: 18 BRPM | BODY MASS INDEX: 42.9 KG/M2 | OXYGEN SATURATION: 99 %

## 2024-12-31 LAB
AFP-TM SERPL-MCNC: 1.15 NG/ML (ref 0–9)
ANION GAP SERPL CALCULATED.3IONS-SCNC: 8 MMOL/L (ref 4–13)
BASOPHILS # BLD AUTO: 0.03 THOUSANDS/ΜL (ref 0–0.1)
BASOPHILS NFR BLD AUTO: 0 % (ref 0–1)
BUN SERPL-MCNC: 15 MG/DL (ref 5–25)
CALCIUM SERPL-MCNC: 7.9 MG/DL (ref 8.4–10.2)
CANCER AG125 SERPL-ACNC: 16.6 U/ML (ref 0–35)
CEA SERPL-MCNC: 1.3 NG/ML (ref 0–3)
CHLORIDE SERPL-SCNC: 103 MMOL/L (ref 96–108)
CO2 SERPL-SCNC: 29 MMOL/L (ref 21–32)
CREAT SERPL-MCNC: 1.12 MG/DL (ref 0.6–1.3)
EOSINOPHIL # BLD AUTO: 0.28 THOUSAND/ΜL (ref 0–0.61)
EOSINOPHIL NFR BLD AUTO: 3 % (ref 0–6)
ERYTHROCYTE [DISTWIDTH] IN BLOOD BY AUTOMATED COUNT: 14 % (ref 11.6–15.1)
GFR SERPL CREATININE-BSD FRML MDRD: 61 ML/MIN/1.73SQ M
GLUCOSE SERPL-MCNC: 91 MG/DL (ref 65–140)
HCG-TM SERPL-SCNC: 1 MLU/ML
HCT VFR BLD AUTO: 43.8 % (ref 34.8–46.1)
HGB BLD-MCNC: 13.9 G/DL (ref 11.5–15.4)
IMM GRANULOCYTES # BLD AUTO: 0.02 THOUSAND/UL (ref 0–0.2)
IMM GRANULOCYTES NFR BLD AUTO: 0 % (ref 0–2)
LDH SERPL-CCNC: 216 U/L (ref 140–271)
LYMPHOCYTES # BLD AUTO: 3.14 THOUSANDS/ΜL (ref 0.6–4.47)
LYMPHOCYTES NFR BLD AUTO: 34 % (ref 14–44)
MCH RBC QN AUTO: 26.3 PG (ref 26.8–34.3)
MCHC RBC AUTO-ENTMCNC: 31.7 G/DL (ref 31.4–37.4)
MCV RBC AUTO: 83 FL (ref 82–98)
MONOCYTES # BLD AUTO: 0.49 THOUSAND/ΜL (ref 0.17–1.22)
MONOCYTES NFR BLD AUTO: 5 % (ref 4–12)
NEUTROPHILS # BLD AUTO: 5.38 THOUSANDS/ΜL (ref 1.85–7.62)
NEUTS SEG NFR BLD AUTO: 58 % (ref 43–75)
NRBC BLD AUTO-RTO: 0 /100 WBCS
PLATELET # BLD AUTO: 222 THOUSANDS/UL (ref 149–390)
PMV BLD AUTO: 11.8 FL (ref 8.9–12.7)
POTASSIUM SERPL-SCNC: 4 MMOL/L (ref 3.5–5.3)
PTH-INTACT SERPL-MCNC: 14.2 PG/ML (ref 12–88)
RBC # BLD AUTO: 5.29 MILLION/UL (ref 3.81–5.12)
SODIUM SERPL-SCNC: 140 MMOL/L (ref 135–147)
WBC # BLD AUTO: 9.34 THOUSAND/UL (ref 4.31–10.16)

## 2024-12-31 PROCEDURE — 83520 IMMUNOASSAY QUANT NOS NONAB: CPT

## 2024-12-31 PROCEDURE — 83970 ASSAY OF PARATHORMONE: CPT

## 2024-12-31 PROCEDURE — 82378 CARCINOEMBRYONIC ANTIGEN: CPT

## 2024-12-31 PROCEDURE — 86304 IMMUNOASSAY TUMOR CA 125: CPT

## 2024-12-31 PROCEDURE — 82105 ALPHA-FETOPROTEIN SERUM: CPT

## 2024-12-31 PROCEDURE — 99239 HOSP IP/OBS DSCHRG MGMT >30: CPT | Performed by: INTERNAL MEDICINE

## 2024-12-31 PROCEDURE — 86301 IMMUNOASSAY TUMOR CA 19-9: CPT

## 2024-12-31 PROCEDURE — 85025 COMPLETE CBC W/AUTO DIFF WBC: CPT

## 2024-12-31 PROCEDURE — 84702 CHORIONIC GONADOTROPIN TEST: CPT

## 2024-12-31 PROCEDURE — 86336 INHIBIN A: CPT

## 2024-12-31 PROCEDURE — 83615 LACTATE (LD) (LDH) ENZYME: CPT

## 2024-12-31 PROCEDURE — 80048 BASIC METABOLIC PNL TOTAL CA: CPT

## 2024-12-31 RX ORDER — SUCRALFATE 1 G/1
1 TABLET ORAL 4 TIMES DAILY
Qty: 120 TABLET | Refills: 0 | Status: SHIPPED | OUTPATIENT
Start: 2024-12-31

## 2024-12-31 RX ORDER — HYDROXYZINE HYDROCHLORIDE 10 MG/1
10 TABLET, FILM COATED ORAL EVERY 6 HOURS PRN
Qty: 30 TABLET | Refills: 0 | Status: SHIPPED | OUTPATIENT
Start: 2024-12-31

## 2024-12-31 RX ORDER — SUCRALFATE 1 G/1
1 TABLET ORAL EVERY 6 HOURS
Qty: 120 TABLET | Refills: 0 | Status: SHIPPED | OUTPATIENT
Start: 2024-12-31

## 2024-12-31 RX ORDER — CALCIUM GLUCONATE 20 MG/ML
2 INJECTION, SOLUTION INTRAVENOUS ONCE
Status: COMPLETED | OUTPATIENT
Start: 2024-12-31 | End: 2024-12-31

## 2024-12-31 RX ORDER — PANTOPRAZOLE SODIUM 40 MG/1
40 TABLET, DELAYED RELEASE ORAL DAILY
Qty: 90 TABLET | Refills: 0 | Status: SHIPPED | OUTPATIENT
Start: 2024-12-31 | End: 2025-03-31

## 2024-12-31 RX ORDER — PHENOL 1.4 %
600 AEROSOL, SPRAY (ML) MUCOUS MEMBRANE DAILY
Qty: 30 TABLET | Refills: 0 | Status: SHIPPED | OUTPATIENT
Start: 2024-12-31

## 2024-12-31 RX ADMIN — SUCRALFATE 1 G: 1 TABLET ORAL at 02:05

## 2024-12-31 RX ADMIN — BUPRENORPHINE AND NALOXONE 8 MG: 8; 2 FILM BUCCAL; SUBLINGUAL at 09:48

## 2024-12-31 RX ADMIN — ENOXAPARIN SODIUM 40 MG: 40 INJECTION SUBCUTANEOUS at 09:48

## 2024-12-31 RX ADMIN — PANTOPRAZOLE SODIUM 40 MG: 40 INJECTION, POWDER, FOR SOLUTION INTRAVENOUS at 09:56

## 2024-12-31 RX ADMIN — LEVOTHYROXINE SODIUM 224 MCG: 0.11 TABLET ORAL at 09:48

## 2024-12-31 RX ADMIN — CALCIUM GLUCONATE 2 G: 20 INJECTION, SOLUTION INTRAVENOUS at 10:07

## 2024-12-31 RX ADMIN — SUCRALFATE 1 G: 1 TABLET ORAL at 09:48

## 2024-12-31 NOTE — PLAN OF CARE
Problem: GASTROINTESTINAL - ADULT  Goal: Minimal or absence of nausea and/or vomiting  Description: INTERVENTIONS:  - Administer IV fluids if ordered to ensure adequate hydration  - Maintain NPO status until nausea and vomiting are resolved  - Nasogastric tube if ordered  - Administer ordered antiemetic medications as needed  - Provide nonpharmacologic comfort measures as appropriate  - Advance diet as tolerated, if ordered  - Consider nutrition services referral to assist patient with adequate nutrition and appropriate food choices  Outcome: Progressing  Goal: Maintains adequate nutritional intake  Description: INTERVENTIONS:  - Monitor percentage of each meal consumed  - Identify factors contributing to decreased intake, treat as appropriate  - Assist with meals as needed  - Monitor I&O, weight, and lab values if indicated  - Obtain nutrition services referral as needed  Outcome: Progressing     Problem: PAIN - ADULT  Goal: Verbalizes/displays adequate comfort level or baseline comfort level  Description: Interventions:  - Encourage patient to monitor pain and request assistance  - Assess pain using appropriate pain scale  - Administer analgesics based on type and severity of pain and evaluate response  - Implement non-pharmacological measures as appropriate and evaluate response  - Consider cultural and social influences on pain and pain management  - Notify physician/advanced practitioner if interventions unsuccessful or patient reports new pain  Outcome: Progressing     Problem: INFECTION - ADULT  Goal: Absence or prevention of progression during hospitalization  Description: INTERVENTIONS:  - Assess and monitor for signs and symptoms of infection  - Monitor lab/diagnostic results  - Monitor all insertion sites, i.e. indwelling lines, tubes, and drains  - Monitor endotracheal if appropriate and nasal secretions for changes in amount and color  - [place] appropriate cooling/warming therapies per order  -  Administer medications as ordered  - Instruct and encourage patient and family to use good hand hygiene technique  - Identify and instruct in appropriate isolation precautions for identified infection/condition  Outcome: Progressing     Problem: SAFETY ADULT  Goal: Patient will remain free of falls  Description: INTERVENTIONS:  - Educate patient/family on patient safety including physical limitations  - Instruct patient to call for assistance with activity   - Consult OT/PT to assist with strengthening/mobility   - Keep Call bell within reach  - Keep bed low and locked with side rails adjusted as appropriate  - Keep care items and personal belongings within reach  - Initiate and maintain comfort rounds  - Make Fall Risk Sign visible to staff  - Apply yellow socks and bracelet for high fall risk patients  - Consider moving patient to room near nurses station  Outcome: Progressing  Goal: Maintain or return to baseline ADL function  Description: INTERVENTIONS:  -  Assess patient's ability to carry out ADLs; assess patient's baseline for ADL function and identify physical deficits which impact ability to perform ADLs (bathing, care of mouth/teeth, toileting, grooming, dressing, etc.)  - Assess/evaluate cause of self-care deficits   - Assess range of motion  - Assess patient's mobility; develop plan if impaired  - Assess patient's need for assistive devices and provide as appropriate  - Encourage maximum independence but intervene and supervise when necessary  - Involve family in performance of ADLs  - Assess for home care needs following discharge   - Consider OT consult to assist with ADL evaluation and planning for discharge  - Provide patient education as appropriate  Outcome: Progressing  Goal: Maintains/Returns to pre admission functional level  Description: INTERVENTIONS:  - Perform AM-PAC 6 Click Basic Mobility/ Daily Activity assessment daily.  - Set and communicate daily mobility goal to care team and  patient/family/caregiver.   - Collaborate with rehabilitation services on mobility goals if consulted  - Out of bed for toileting  - Record patient progress and toleration of activity level   Outcome: Progressing     Problem: DISCHARGE PLANNING  Goal: Discharge to home or other facility with appropriate resources  Description: INTERVENTIONS:  - Identify barriers to discharge w/patient and caregiver  - Arrange for needed discharge resources and transportation as appropriate  - Identify discharge learning needs (meds, wound care, etc.)  - Arrange for interpretive services to assist at discharge as needed  - Refer to Case Management Department for coordinating discharge planning if the patient needs post-hospital services based on physician/advanced practitioner order or complex needs related to functional status, cognitive ability, or social support system  Outcome: Progressing     Problem: Knowledge Deficit  Goal: Patient/family/caregiver demonstrates understanding of disease process, treatment plan, medications, and discharge instructions  Description: Complete learning assessment and assess knowledge base.  Interventions:  - Provide teaching at level of understanding  - Provide teaching via preferred learning methods  Outcome: Progressing

## 2024-12-31 NOTE — PROGRESS NOTES
Chart rvw'd on 2024      Referred by:  SELF REFERRAL    Diagnosis:  Ovarian mass    Imagin2024 US pelvis complete w transvaginal-  A 7.4 cm left ovarian bilocular cystic lesion with solid papillary projections without internal vascularity. O-RADS 4 = Intermediate risk.  Imaging options include evaluation by ultrsaound specialist, MRI, or per GYN-oncologist protocol. Clinical   management by gynecologist with GYN-oncologist consultation or soley by GYN-oncologist.     A 6.4 cm right ovarian unilocular cystic lesion with irregular septations without internal vascularity. O-RADS 3 = Low risk  If not surgically excised, consider follow-up US within 6 months.    2024 CT AP wo contrast-  Lobulated mass centered within the posterior gastric wall of the proximal stomach. A tumor such as GIST must be excluded.     Pelvic masses suspicious for ovarian masses or cysts as described above. These can be further evaluated with pelvic ultrasound.    Pathology:  N/A      Scheduled appointments:  N/A    Surgery:  N/A      Post surgical pathology:  N/A

## 2024-12-31 NOTE — PLAN OF CARE
Problem: SAFETY ADULT  Goal: Patient will remain free of falls  Description: INTERVENTIONS:  - Educate patient/family on patient safety including physical limitations  - Instruct patient to call for assistance with activity   - Consult OT/PT to assist with strengthening/mobility   - Keep Call bell within reach  - Keep bed low and locked with side rails adjusted as appropriate  - Keep care items and personal belongings within reach  - Initiate and maintain comfort rounds  - Make Fall Risk Sign visible to staff  -- Apply yellow socks and bracelet for high fall risk patients  - Consider moving patient to room near nurses station  12/31/2024 1250 by Becky Silver LPN  Outcome: Progressing  12/31/2024 1140 by Becky Silver LPN  Outcome: Progressing  Goal: Maintain or return to baseline ADL function  Description: INTERVENTIONS:  -  Assess patient's ability to carry out ADLs; assess patient's baseline for ADL function and identify physical deficits which impact ability to perform ADLs (bathing, care of mouth/teeth, toileting, grooming, dressing, etc.)  - Assess/evaluate cause of self-care deficits   - Assess range of motion  - Assess patient's mobility; develop plan if impaired  - Assess patient's need for assistive devices and provide as appropriate  - Encourage maximum independence but intervene and supervise when necessary  - Involve family in performance of ADLs  - Assess for home care needs following discharge   - Consider OT consult to assist with ADL evaluation and planning for discharge  - Provide patient education as appropriate  12/31/2024 1250 by Becky Silver LPN  Outcome: Progressing  12/31/2024 1140 by Becky Silver LPN  Outcome: Progressing  Goal: Maintains/Returns to pre admission functional level  Description: INTERVENTIONS:  - Perform AM-PAC 6 Click Basic Mobility/ Daily Activity assessment daily.  - Set and communicate daily mobility goal to care team and  patient/family/caregiver.   - Collaborate with rehabilitation services on mobility goals if consulted  -- Out of bed for toileting  - Record patient progress and toleration of activity level   12/31/2024 1250 by Becky Silver LPN  Outcome: Progressing  12/31/2024 1140 by Becky Silver LPN  Outcome: Progressing     Problem: DISCHARGE PLANNING  Goal: Discharge to home or other facility with appropriate resources  Description: INTERVENTIONS:  - Identify barriers to discharge w/patient and caregiver  - Arrange for needed discharge resources and transportation as appropriate  - Identify discharge learning needs (meds, wound care, etc.)  - Arrange for interpretive services to assist at discharge as needed  - Refer to Case Management Department for coordinating discharge planning if the patient needs post-hospital services based on physician/advanced practitioner order or complex needs related to functional status, cognitive ability, or social support system  12/31/2024 1250 by Becky Silver LPN  Outcome: Progressing  12/31/2024 1140 by Becky Silver LPN  Outcome: Progressing     Problem: Knowledge Deficit  Goal: Patient/family/caregiver demonstrates understanding of disease process, treatment plan, medications, and discharge instructions  Description: Complete learning assessment and assess knowledge base.  Interventions:  - Provide teaching at level of understanding  - Provide teaching via preferred learning methods  12/31/2024 1250 by Becky Silver LPN  Outcome: Progressing  12/31/2024 1140 by Becky Silver LPN  Outcome: Progressing

## 2024-12-31 NOTE — PLAN OF CARE
Problem: GASTROINTESTINAL - ADULT  Goal: Minimal or absence of nausea and/or vomiting  Description: INTERVENTIONS:  - Administer IV fluids if ordered to ensure adequate hydration  - Maintain NPO status until nausea and vomiting are resolved  - Nasogastric tube if ordered  - Administer ordered antiemetic medications as needed  - Provide nonpharmacologic comfort measures as appropriate  - Advance diet as tolerated, if ordered  - Consider nutrition services referral to assist patient with adequate nutrition and appropriate food choices  12/30/2024 2045 by Rain Aldana LPN  Outcome: Progressing  12/30/2024 2045 by Rain Aldana LPN  Outcome: Progressing     Problem: PAIN - ADULT  Goal: Verbalizes/displays adequate comfort level or baseline comfort level  Description: Interventions:  - Encourage patient to monitor pain and request assistance  - Assess pain using appropriate pain scale  - Administer analgesics based on type and severity of pain and evaluate response  - Implement non-pharmacological measures as appropriate and evaluate response  - Consider cultural and social influences on pain and pain management  - Notify physician/advanced practitioner if interventions unsuccessful or patient reports new pain  12/30/2024 2045 by Rain Aldana LPN  Outcome: Progressing  12/30/2024 2045 by Rain Aldana LPN  Outcome: Progressing     Problem: INFECTION - ADULT  Goal: Absence or prevention of progression during hospitalization  Description: INTERVENTIONS:  - Assess and monitor for signs and symptoms of infection  - Monitor lab/diagnostic results  - Monitor all insertion sites, i.e. indwelling lines, tubes, and drains  - Monitor endotracheal if appropriate and nasal secretions for changes in amount and color  - [place] appropriate cooling/warming therapies per order  - Administer medications as ordered  - Instruct and encourage patient and family to use good hand hygiene technique  - Identify and instruct in  appropriate isolation precautions for identified infection/condition  12/30/2024 2045 by Rain Aldana LPN  Outcome: Progressing  12/30/2024 2045 by Rain Aldana LPN  Outcome: Progressing

## 2025-01-02 ENCOUNTER — TELEPHONE (OUTPATIENT)
Dept: SLEEP CENTER | Facility: CLINIC | Age: 40
End: 2025-01-02

## 2025-01-02 NOTE — TELEPHONE ENCOUNTER
Referral placed for a home sleep study. Note does not reflect discussion of symptoms. Symptoms must include more than just snoring. Patient will need to be evaluated with a face to face consultation.      Please place new referral for a sleep study after evaluation.      Ordering and co-signing providers notified.

## 2025-01-02 NOTE — PROGRESS NOTES
Procedure: EUS  Scheduled date of procedure (as of today): 1/6/25  Physician performing procedure: Dr. Doan   Location of procedure: [place]   Instructions reviewed with patient by:  Radha lizarraga   Clearances:  n/a

## 2025-01-02 NOTE — UTILIZATION REVIEW
NOTIFICATION OF ADMISSION DISCHARGE   This is a Notification of Discharge from [facility]. Please be advised that this patient has been discharge from our facility. Below you will find the admission and discharge date and time including the patient’s disposition.   UTILIZATION REVIEW CONTACT:  Lenora Last  Utilization   Network Utilization Review Department  Phone: [phone] x carefully listen to the prompts. All voicemails are confidential.  Email: [email or web address]     ADMISSION INFORMATION  PRESENTATION DATE: 12/29/2024 12:42 PM  OBERVATION ADMISSION DATE: N/A  INPATIENT ADMISSION DATE: 12/29/24  7:35 PM   DISCHARGE DATE: 12/31/2024 12:58 PM   DISPOSITION:Home/Self Care    Network Utilization Review Department  ATTENTION: Please call with any questions or concerns to [phone] and carefully listen to the prompts so that you are directed to the right person. All voicemails are confidential.   For Discharge needs, contact Care Management DC Support Team at [phone] opt. 2  Send all requests for admission clinical reviews, approved or denied determinations and any other requests to dedicated fax number below belonging to the campus where the patient is receiving treatment. List of dedicated fax numbers for the Facilities:  FACILITY NAME UR FAX NUMBER   ADMISSION DENIALS (Administrative/Medical Necessity) [phone]   DISCHARGE SUPPORT TEAM ([facility]) [phone]   PARENT CHILD HEALTH (Maternity/NICU/Pediatrics) [phone]   [facility] [phone]   [facility] [phone]   [facility] [phone]   [facility] [phone]   [facility] [phone]   [facility] [phone]   [facility] [phone]   [facility] [phone]    [facility] [phone]   [facility] [phone]   [facility] [phone]   [facility] [phone]

## 2025-01-03 LAB
CANCER AG19-9 SERPL-ACNC: 10 U/ML (ref 0–35)
INHIBIN A SERPL-MCNC: 1.2 PG/ML
INHIBIN B SERPL-MCNC: <7 PG/ML

## 2025-01-06 ENCOUNTER — TELEPHONE (OUTPATIENT)
Age: 40
End: 2025-01-06

## 2025-01-06 NOTE — TELEPHONE ENCOUNTER
Patients GI provider:  Dr. Doan    Number to return call: [phone]    Reason for call: Pt calling requesting to reschedule this Ultrasound Upper Endoscopic for tomorrow because of snow. Pt stated not to cancel it until they will call her to reschedule as she will try to make it because of her situations please contact pt and advise.     Scheduled procedure/appointment date if applicable: procedure 1/6/25

## 2025-01-07 ENCOUNTER — HOSPITAL ENCOUNTER (OUTPATIENT)
Dept: GASTROENTEROLOGY | Facility: HOSPITAL | Age: 40
Setting detail: OUTPATIENT SURGERY
Discharge: HOME | End: 2025-01-07
Attending: INTERNAL MEDICINE
Payer: COMMERCIAL

## 2025-01-07 ENCOUNTER — ANESTHESIA EVENT (OUTPATIENT)
Dept: GASTROENTEROLOGY | Facility: HOSPITAL | Age: 40
End: 2025-01-07
Payer: COMMERCIAL

## 2025-01-07 ENCOUNTER — ANESTHESIA (OUTPATIENT)
Dept: GASTROENTEROLOGY | Facility: HOSPITAL | Age: 40
End: 2025-01-07
Payer: COMMERCIAL

## 2025-01-07 VITALS
TEMPERATURE: 96.8 F | HEIGHT: 65 IN | SYSTOLIC BLOOD PRESSURE: 170 MMHG | HEART RATE: 57 BPM | RESPIRATION RATE: 16 BRPM | OXYGEN SATURATION: 97 % | DIASTOLIC BLOOD PRESSURE: 88 MMHG | BODY MASS INDEX: 42.82 KG/M2 | WEIGHT: 257 LBS

## 2025-01-07 DIAGNOSIS — D49.0: ICD-10-CM

## 2025-01-07 PROCEDURE — 88305 TISSUE EXAM BY PATHOLOGIST: CPT | Performed by: PATHOLOGY

## 2025-01-07 PROCEDURE — 88342 IMHCHEM/IMCYTCHM 1ST ANTB: CPT | Performed by: PATHOLOGY

## 2025-01-07 PROCEDURE — 88360 TUMOR IMMUNOHISTOCHEM/MANUAL: CPT | Performed by: PATHOLOGY

## 2025-01-07 PROCEDURE — 88341 IMHCHEM/IMCYTCHM EA ADD ANTB: CPT | Performed by: PATHOLOGY

## 2025-01-07 PROCEDURE — C1889 IMPLANT/INSERT DEVICE, NOC: HCPCS

## 2025-01-07 PROCEDURE — 43242 EGD US FINE NEEDLE BX/ASPIR: CPT | Performed by: INTERNAL MEDICINE

## 2025-01-07 RX ORDER — PROPOFOL 10 MG/ML
INJECTION, EMULSION INTRAVENOUS AS NEEDED
Status: DISCONTINUED | OUTPATIENT
Start: 2025-01-07 | End: 2025-01-07

## 2025-01-07 RX ORDER — FENTANYL CITRATE 50 UG/ML
100 INJECTION, SOLUTION INTRAMUSCULAR; INTRAVENOUS ONCE
Status: COMPLETED | OUTPATIENT
Start: 2025-01-07 | End: 2025-01-07

## 2025-01-07 RX ORDER — PROPOFOL 10 MG/ML
INJECTION, EMULSION INTRAVENOUS CONTINUOUS PRN
Status: DISCONTINUED | OUTPATIENT
Start: 2025-01-07 | End: 2025-01-07

## 2025-01-07 RX ORDER — SODIUM CHLORIDE 9 MG/ML
INJECTION, SOLUTION INTRAVENOUS CONTINUOUS PRN
Status: DISCONTINUED | OUTPATIENT
Start: 2025-01-07 | End: 2025-01-07

## 2025-01-07 RX ORDER — LIDOCAINE HYDROCHLORIDE 20 MG/ML
INJECTION, SOLUTION EPIDURAL; INFILTRATION; INTRACAUDAL; PERINEURAL AS NEEDED
Status: DISCONTINUED | OUTPATIENT
Start: 2025-01-07 | End: 2025-01-07

## 2025-01-07 RX ORDER — SODIUM CHLORIDE, SODIUM LACTATE, POTASSIUM CHLORIDE, CALCIUM CHLORIDE 600; 310; 30; 20 MG/100ML; MG/100ML; MG/100ML; MG/100ML
125 INJECTION, SOLUTION INTRAVENOUS CONTINUOUS
Status: DISCONTINUED | OUTPATIENT
Start: 2025-01-07 | End: 2025-01-11 | Stop reason: HOSPADM

## 2025-01-07 RX ADMIN — PROPOFOL 140 MG: 10 INJECTION, EMULSION INTRAVENOUS at 10:25

## 2025-01-07 RX ADMIN — PROPOFOL 30 MG: 10 INJECTION, EMULSION INTRAVENOUS at 10:37

## 2025-01-07 RX ADMIN — LIDOCAINE HYDROCHLORIDE 100 MG: 20 INJECTION, SOLUTION EPIDURAL; INFILTRATION; INTRACAUDAL; PERINEURAL at 10:25

## 2025-01-07 RX ADMIN — PROPOFOL 140 MCG/KG/MIN: 10 INJECTION, EMULSION INTRAVENOUS at 10:26

## 2025-01-07 RX ADMIN — FENTANYL CITRATE 100 MCG: 50 INJECTION INTRAMUSCULAR; INTRAVENOUS at 11:23

## 2025-01-07 RX ADMIN — SODIUM CHLORIDE: 0.9 INJECTION, SOLUTION INTRAVENOUS at 10:14

## 2025-01-07 NOTE — ANESTHESIA POSTPROCEDURE EVALUATION
Post-Op Assessment Note    CV Status:  Stable  Pain Score: 0    Pain management: adequate       Mental Status:  Alert and awake   Hydration Status:  Euvolemic   PONV Controlled:  Controlled   Airway Patency:  Patent     Post Op Vitals Reviewed: Yes    No anethesia notable event occurred.    Staff: CRNA           Last Filed PACU Vitals:  Vitals Value Taken Time   Temp 96.8 °F (36 °C) 01/07/25 1052   Pulse 72 01/07/25 1052   /75 01/07/25 1052   Resp 16 01/07/25 1052   SpO2 97 % 01/07/25 1052       Modified Luis Carlos:     Vitals Value Taken Time   Activity 2 01/07/25 1053   Respiration 2 01/07/25 1053   Circulation 2 01/07/25 1053   Consciousness 1 01/07/25 1053   Oxygen Saturation 1 01/07/25 1053     Modified Luis Carlos Score: 8

## 2025-01-07 NOTE — ANESTHESIA PREPROCEDURE EVALUATION
Procedure:  ENDOSCOPIC ULTRASOUND (UPPER)    Relevant Problems   CARDIO   (+) Chest pain      ENDO   (+) Hypothyroidism      MUSCULOSKELETAL   (+) Cervical strain      NEURO/PSYCH   (+) Anxiety        Physical Exam    Airway    Mallampati score: III  TM Distance: >3 FB  Neck ROM: full     Dental   No notable dental hx     Cardiovascular  Rhythm: regular, Rate: normal    Pulmonary   Breath sounds clear to auscultation    Other Findings  post-pubertal.      Anesthesia Plan  ASA Score- 3     Anesthesia Type- IV sedation with anesthesia with ASA Monitors.         Additional Monitors:     Airway Plan:            Plan Factors-Exercise tolerance (METS): >4 METS.    Chart reviewed.   Existing labs reviewed. Patient summary reviewed.    Patient is not a current smoker.              Induction- intravenous.    Postoperative Plan-     Perioperative Resuscitation Plan - Level 1 - Full Code.       Informed Consent- Anesthetic plan and risks discussed with patient.  I personally reviewed this patient with the CRNA. Discussed and agreed on the Anesthesia Plan with the CRNA..

## 2025-01-07 NOTE — H&P
"History and Physical -  [facility]  Tanya Rosenthal 39 y.o. female MRN: [id number]                  HPI: Tanya Rosenthal is a 39 y.o. year old female who presents for EUS       REVIEW OF SYSTEMS: Per the HPI, and otherwise unremarkable.    Historical Information   Past Medical History:   Diagnosis Date    Heroin abuse (HCC)     clean since 2015    Thyroid cancer (HCC) 1999    with thyroidectomy and RTX     Past Surgical History:   Procedure Laterality Date    GANGLION CYST EXCISION Left     THYROIDECTOMY       Social History   Social History     Substance and Sexual Activity   Alcohol Use Not Currently     Social History     Substance and Sexual Activity   Drug Use No    Comment: 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     Social History     Tobacco Use   Smoking Status Every Day    Current packs/day: 0.25    Average packs/day: 0.3 packs/day for 26.0 years (6.5 ttl pk-yrs)    Types: Cigarettes    Start date: 1/1/1999   Smokeless Tobacco Never     Family History   Problem Relation Age of Onset    Stomach cancer Mother         maltoma lyphoma    Lung cancer Maternal Aunt     Breast cancer Maternal Aunt 52    Lymphoma Paternal Uncle         non hodgkins       Meds/Allergies       Current Outpatient Medications:     buprenorphine-naloxone (SUBOXONE) 8-2 mg per SL tablet    calcium carbonate (OS-ALL) 600 MG tablet    levothyroxine 112 mcg tablet    pantoprazole (PROTONIX) 40 mg tablet    sucralfate (CARAFATE) 1 g tablet    hydrOXYzine HCL (ATARAX) 10 mg tablet    sucralfate (CARAFATE) 1 g tablet    zolpidem (Ambien) 10 mg tablet    Allergies   Allergen Reactions    Diphenhydramine Shortness Of Breath     Other reaction(s): DIPHENHYDRAMINE CITRATE (BENADRYL)    Erythromycin Shortness Of Breath and Hives     Category: Allergy;     Iv Dye [Iodinated Contrast Media]        Objective     /88   Pulse 68   Temp (!) 96.5 °F (35.8 °C) (Tympanic)   Resp 16   Ht 5' 5\" (1.651 m)   Wt 117 kg (257 lb)  "  LMP  (LMP Unknown)   SpO2 96%   BMI 42.77 kg/m²       PHYSICAL EXAM    Gen: NAD  Head: NCAT  CV: RRR  CHEST: Clear  ABD: soft, NT/ND  EXT: no edema      ASSESSMENT/PLAN:  This is a 39 y.o. year old female here for EUS, and she is stable and optimized for her procedure.

## 2025-01-10 ENCOUNTER — DOCUMENTATION (OUTPATIENT)
Dept: HEMATOLOGY ONCOLOGY | Facility: CLINIC | Age: 40
End: 2025-01-10

## 2025-01-10 ENCOUNTER — TELEPHONE (OUTPATIENT)
Age: 40
End: 2025-01-10

## 2025-01-10 ENCOUNTER — RESULTS FOLLOW-UP (OUTPATIENT)
Dept: GASTROENTEROLOGY | Facility: CLINIC | Age: 40
End: 2025-01-10

## 2025-01-10 DIAGNOSIS — C49.A2 MALIGNANT GASTROINTESTINAL STROMAL TUMOR (GIST) OF STOMACH (HCC): ICD-10-CM

## 2025-01-10 DIAGNOSIS — C49.A2 MALIGNANT GASTROINTESTINAL STROMAL TUMOR (GIST) OF STOMACH (HCC): Primary | ICD-10-CM

## 2025-01-10 DIAGNOSIS — D49.0: Primary | ICD-10-CM

## 2025-01-10 NOTE — TELEPHONE ENCOUNTER
Pt. Calling for EUS results, pt. Asking if someone can review and call her with results before the weekend, pt. Very anxious about biopsy results , advised message would be sent to provider for review

## 2025-01-10 NOTE — RESULT ENCOUNTER NOTE
Called patient and notified her of biopsies revealing gastrointestinal stromal tumor.  She would be likely benefit from Gleevec to shrink the tumor and subsequent surgical resection.  Will place consults to medical oncology and surgical oncology.  Attaching oncology nurse navigators to this message so they can help coordinate appointments ASAP.

## 2025-01-10 NOTE — TELEPHONE ENCOUNTER
Called and discussed with patient.  Referrals placed oncology/surgical oncology.  Oncology nurse navigation was notified

## 2025-01-10 NOTE — PROGRESS NOTES
All records needed are in patients chart. No records retrieval needed at this time.     Pt referred to surgonc and should be scheduled within 7 days.  Please notify me if not scheduled within time frame.    Referral received/ Chart reviewed for work up completed     Imaging completed:    [] PET/CT   [] MRI   [] CT   [] US   [] Mammo   [] Bone scan   [x] N/A    Pathology completed:    Date:   Location:   [x]N/A    Additional records needed:    [] Genomic report   [] Genetic testing results   [] Office Note   [] Procedure/ Operative note   [] Lab results   [x] N/A      [] Radiation Oncology records retrieval needed (PN to route to rad/onc clerical pool once scheduled)  Date:  Location:      Chart rvw'd on 1/10/25    Referring Provider: Luciana Doan MD    Diagnosis: GIST    EGD/EUS: 24, 25    Impression:  24 EGD  The esophagus, duodenal bulb and 2nd part of the duodenum appeared normal.  Single large subepithelial lesion in the cardia. Probing with forceps resulted in penetration into the lesion and bleeding without further interrogation to avoid further hemorrhage    25  EGD  The esophagus appeared normal.  Submucosal mass in the gastric cardia. Overlying mucosa appeared normal.  The duodenal bulb and 2nd part of the duodenum appeared normal.  EUS  Homogeneous, hypoechoic, lobulated and oval mass measuring 45 mm x 47 mm with well-defined and smooth margins was visualized in the cardia and fundus of the stomach, contained within the muscularis propria; 3 successful fine needle biopsy passes were taken with a 22 gauge [employer] needle using a transgastric approach guided by Doppler. An adequate sample was obtained. Likely consistent with gastrointestinal stromal tumor.    Pathology: 25  A. Stomach, Gastric Submucosal, FNA:  Neoplastic.  Gastrointestinal stromal tumor (GIST), mixed spindle cell and epithelioid type.  > 5 mitoses per 50 HPF    Imagin24 CT abdomen pelvis wo  contrast  Lobulated mass centered within the posterior gastric wall of the proximal stomach. A tumor such as GIST must be excluded.     Pelvic masses suspicious for ovarian masses or cysts as described above. These can be further evaluated with pelvic ultrasound.    12/29/24 US pelvis complete w transvaginal  A 7.4 cm left ovarian bilocular cystic lesion with solid papillary projections without internal vascularity. O-RADS 4 = Intermediate risk.  Imaging options include evaluation by ultrsaound specialist, MRI, or per GYN-oncologist protocol. Clinical   management by gynecologist with GYN-oncologist consultation or soley by GYN-oncologist.     A 6.4 cm right ovarian unilocular cystic lesion with irregular septations without internal vascularity. O-RADS 3 = Low risk  If not surgically excised, consider follow-up US within 6 months.    Labs: N/A      Scheduled appointments:  1/15/25 Gynonc Dr Jung    Surgery: N/A      Post Surgical Pathology: N/A    Chest CT ordered

## 2025-01-13 ENCOUNTER — HOSPITAL ENCOUNTER (OUTPATIENT)
Dept: RADIOLOGY | Age: 40
Discharge: HOME | End: 2025-01-13
Payer: COMMERCIAL

## 2025-01-13 ENCOUNTER — PATIENT OUTREACH (OUTPATIENT)
Dept: HEMATOLOGY ONCOLOGY | Facility: CLINIC | Age: 40
End: 2025-01-13

## 2025-01-13 DIAGNOSIS — C49.A2 MALIGNANT GASTROINTESTINAL STROMAL TUMOR (GIST) OF STOMACH (HCC): Primary | ICD-10-CM

## 2025-01-13 DIAGNOSIS — C49.A2 MALIGNANT GASTROINTESTINAL STROMAL TUMOR (GIST) OF STOMACH (HCC): ICD-10-CM

## 2025-01-13 PROBLEM — C49.A0 GIST (GASTROINTESTINAL STROMAL TUMOR), MALIGNANT (HCC): Status: ACTIVE | Noted: 2025-01-13

## 2025-01-13 PROCEDURE — 71250 CT THORAX DX C-: CPT

## 2025-01-13 NOTE — PROGRESS NOTES
Initial outreach. Spoke to Tanya Rosenthal. Introduced myself and explained the role of an Oncology Nurse Navigator to assist with coordination of cancer care, preparation for upcoming appointment, be a point of contact prior to oncology consult, provide support and connect her with available resources. Discussed surgical oncology referral placed by Dr Doan. Explained I will be their main contact until they are established with their team and a treatment plan is established.     Assessed for barriers of care. Lives with daughter and parents, daughter and/or mother to go to apt with patient.  General assessment completed.  DT complete.  Referrals placed for genetics, social work and smoking cessation. Pt was interested in getting genetic testing done as well as connecting with social work.     NVD/constipation: Yes, some nausea yesterday-took zofran that helped.  Weight Loss: No.  Appetite change: Yes, decreased d/t pain after eating. For example, coffee and pizza hurts, yogurt and oatmeal were okay.  Fatigue: No, if doing too much yes.  Pain: Yes, here and there but not to the point where it was. 0/10 currently, goes to a 5/10 after eating and in the morning is when pain is more noticeable.    PCP: Feliciano Ledezma MD    Reviewed upcoming appointments including date, time and location.  Future Appointments   Date Time Provider Department [place]   1/13/2025  2:30 PM BE CT SLN 1 BE SLN CT [facility]   1/14/2025  8:30 AM Tomás South MD SURG ONC [facility]   1/15/2025  2:30 PM Varghese Jung MD GYN ONC [facility]   1/21/2025  3:00 PM Juan A Rivera MD [facility]   3/12/2025  2:40 PM Syl Dunn MD [facility]     Introduced Michell, and explained she will be her patient navigator, who will reach out after the first consult to introduce themselves. The PN will provide support, make sure she has a good understanding of the plan and schedule and to connect with additional  resources if/when needed.     My direct contact information provided. Patient is aware that she can call me should she need any further assistance. Patient was appreciative of assistance.

## 2025-01-14 ENCOUNTER — PATIENT OUTREACH (OUTPATIENT)
Dept: HEMATOLOGY ONCOLOGY | Facility: CLINIC | Age: 40
End: 2025-01-14

## 2025-01-14 ENCOUNTER — CONSULT (OUTPATIENT)
Dept: SURGICAL ONCOLOGY | Facility: CLINIC | Age: 40
End: 2025-01-14
Payer: COMMERCIAL

## 2025-01-14 ENCOUNTER — PATIENT OUTREACH (OUTPATIENT)
Dept: CASE MANAGEMENT | Facility: OTHER | Age: 40
End: 2025-01-14

## 2025-01-14 VITALS
BODY MASS INDEX: 42.32 KG/M2 | SYSTOLIC BLOOD PRESSURE: 136 MMHG | WEIGHT: 254 LBS | HEIGHT: 65 IN | DIASTOLIC BLOOD PRESSURE: 88 MMHG | TEMPERATURE: 98.1 F | RESPIRATION RATE: 16 BRPM

## 2025-01-14 DIAGNOSIS — C49.A2 MALIGNANT GASTROINTESTINAL STROMAL TUMOR (GIST) OF STOMACH (HCC): Primary | ICD-10-CM

## 2025-01-14 DIAGNOSIS — D49.0: ICD-10-CM

## 2025-01-14 PROCEDURE — 99205 OFFICE O/P NEW HI 60 MIN: CPT | Performed by: SURGERY

## 2025-01-14 NOTE — H&P (VIEW-ONLY)
Surgical Oncology Consult       [facility] SURG[facility]COLOGY [place]  [address]  [place] [address]    Tanya Rosenthal  1985  [id number]  [facility]COL[facility] [place]  [address]  [place] [address]    1. 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  This is involving the cardia and fundus of the stomach posteriorly.  I discussed that she will need a combination of surgery and chemotherapy.  Based on the size and location near the GE junction, I would recommend that she undergo surgical intervention in an open fashion.  I do not think neoadjuvant therapy would necessarily shrink this tumor where the resection would be much more limited.  Consequently I recommended open partial gastric resection.  The risks were explained including bleeding, infection, recurrence, need for further surgery, wound complications, adjacent organ injury, leak, sepsis, MI, DVT, stroke, pulmonary embolism, and death.  Informed consent was obtained.  We will schedule this at our earliest mutual convenience.  She already has a referral to genetics given her personal and family history of thyroid cancer as well as a mother with lymphoma and leukemia.  She is agreeable to this plan.  All her questions were answered.  Orders:  -     Ambulatory Referral to Surgical Oncology  -     Case request operating room: PARTIAL GASTRECTOMY; Standing  -     Type and screen; Future  -     Protime-INR; Future  -     APTT; Future  -     HEMOGLOBIN A1C W/ EAG ESTIMATION; Future  -     Comprehensive metabolic panel; Future  -     CBC and differential; Future  -     Case request operating room: PARTIAL GASTRECTOMY  2. Submucosal neoplasm of gastrointestinal tract  -     Ambulatory Referral to Surgical Oncology  -     Case request operating room:  PARTIAL GASTRECTOMY; Standing  -     Case request operating room: PARTIAL GASTRECTOMY      Chief Complaint   Patient presents with    Consult       No follow-ups on file.    Oncology History   GIST (gastrointestinal stromal tumor), malignant (HCC)   1/7/2025 Biopsy    Endoscopic Ultrasound    Stomach, Gastric Submucosal, FNA:  Neoplastic.  Gastrointestinal stromal tumor (GIST), mixed spindle cell and epithelioid type.  > 5 mitoses per 50 HPF     1/13/2025 Initial Diagnosis    GIST (gastrointestinal stromal tumor), malignant (HCC)         History of Present Illness: 39-year-old female who recently had abdominal pain.  CT from December 25, 2024 revealed an exophytic mass in the posterior stomach.  This was done at [facility].  Follow-up CT on December 29, 2024 revealed a 6 x 4.4 x 4.5 cm mass on the posterior gastric wall in the proximal stomach, just beyond the GE junction.  This extended into the lumen of the stomach.  There was a large hemorrhagic cyst.  I personally reviewed the films.  Patient underwent EGD as well as EUS.  Biopsy was not possible during the EGD, but the EUS showed a 4.5 x 4 point centimeter mass in the cardia and fundus of the stomach within the muscularis propria.  Biopsy revealed gastrointestinal stromal tumor.  She comes in now to discuss further therapy.  She denies any nausea or vomiting.  No unintentional weight loss.    Review of Systems  Complete ROS Surg Onc:   Constitutional: The patient denies new or recent history of general fatigue, no recent weight loss, no change in appetite.   Eyes: No complaints of visual problems, no scleral icterus.   ENT: no complaints of ear pain, no hoarseness, no difficulty swallowing,  no tinnitus and no new masses in head, oral cavity, or neck.   Cardiovascular: No complaints of chest pain, no palpitations, no ankle edema.   Respiratory: No complaints of shortness of breath, no cough.   Gastrointestinal: No complaints of jaundice, no bloody stools, no  pale stools.   Genitourinary: No complaints of dysuria, no hematuria, no nocturia, no frequent urination, no urethral discharge.   Musculoskeletal: No complaints of weakness, paralysis, joint stiffness or arthralgias.  Integumentary: No complaints of rash, no new lesions.   Neurological: No complaints of convulsions, no seizures, no dizziness.   Hematologic/Lymphatic: No complaints of easy bruising.   Endocrine:  No hot or cold intolerance.  No polydipsia, polyphagia, or polyuria.  Allergy/immunology:  No environmental allergies.  No food allergies.  Not immunocompromised.  Skin:  No pallor or rash.  No wound.          Patient Active Problem List   Diagnosis    Hypothyroidism    Fatigue    Medically noncompliant    History of heroin abuse (HCC)    Environmental allergies    Morbid obesity (HCC)    Bradycardia    Cervical strain    Head injury    Chest pain    Elevated d-dimer    Gastritis    Abdominal pain    Hypocalcemia    Anxiety    Gastric lesion    Pelvic mass    Ovarian mass    GIST (gastrointestinal stromal tumor), malignant (HCC)     Past Medical History:   Diagnosis Date    Heroin abuse (HCC)     clean since 2015    Thyroid cancer (HCC) 1999    with thyroidectomy and RTX     Past Surgical History:   Procedure Laterality Date    GANGLION CYST EXCISION Left     THYROIDECTOMY       Family History   Problem Relation Age of Onset    Stomach cancer Mother         maltoma lyphoma    Lung cancer Maternal Aunt     Breast cancer Maternal Aunt 52    Lymphoma Paternal Uncle         non hodgkins     Social History     Socioeconomic History    Marital status: Single     Spouse name: Not on file    Number of children: Not on file    Years of education: Not on file    Highest education level: Not on file   Occupational History    Not on file   Tobacco Use    Smoking status: Every Day     Current packs/day: 0.25     Average packs/day: 0.3 packs/day for 26.0 years (6.5 ttl pk-yrs)     Types: Cigarettes     Start date:  1999    Smokeless tobacco: Never   Substance and Sexual Activity    Alcohol use: Not Currently    Drug use: No     Comment: 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    Sexual activity: Not on file   Other Topics Concern    Not on file   Social History Narrative    Not on file     Social Drivers of Health     Financial Resource Strain: Not on file   Food Insecurity: No Food Insecurity (2024)    Nursing - Inadequate Food Risk Classification     Worried About Running Out of Food in the Last Year: Not on file     Ran Out of Food in the Last Year: Not on file     Ran Out of Food in the Last Year: Never true   Transportation Needs: No Transportation Needs (2024)    Nursing - Transportation Risk Classification     Lack of Transportation: Not on file     Lack of Transportation: No   Physical Activity: Not on file   Stress: Not on file   Social Connections: Not on file   Intimate Partner Violence: Unknown (2024)    Nursing IPS     Feels Physically and Emotionally Safe: Not on file     Physically Hurt by Someone: Not on file     Humiliated or Emotionally Abused by Someone: Not on file     Physically Hurt by Someone: No     Hurt or Threatened by Someone: No   Housing Stability: Unknown (2024)    Nursing: Inadequate Housing Risk Classification     Has Housing: Not on file     Worried About Losing Housing: Not on file     Unable to Get Utilities: Not on file     Unable to Pay for Housing in the Last Year: No     Has Housin       Current Outpatient Medications:     buprenorphine-naloxone (SUBOXONE) 8-2 mg per SL tablet, Place 1 tablet under the tongue daily, Disp: , Rfl:     calcium carbonate (OS-ALL) 600 MG tablet, Take 1 tablet (600 mg total) by mouth daily, Disp: 30 tablet, Rfl: 0    levothyroxine 112 mcg tablet, TAKE 2 TABLETS BY MOUTH DAILY, Disp: , Rfl:     pantoprazole (PROTONIX) 40 mg tablet, Take 1 tablet (40 mg total) by mouth daily, Disp: 90 tablet, Rfl: 0    sucralfate (CARAFATE) 1 g  tablet, Take 1 tablet (1 g total) by mouth every 6 (six) hours, Disp: 120 tablet, Rfl: 0    hydrOXYzine HCL (ATARAX) 10 mg tablet, Take 1 tablet (10 mg total) by mouth every 6 (six) hours as needed for anxiety (Patient not taking: Reported on 1/14/2025), Disp: 30 tablet, Rfl: 0    sucralfate (CARAFATE) 1 g tablet, Take 1 tablet (1 g total) by mouth 4 (four) times a day, Disp: 120 tablet, Rfl: 0    zolpidem (Ambien) 10 mg tablet, Take 10 mg by mouth daily at bedtime as needed for sleep (Patient not taking: Reported on 1/7/2025), Disp: , Rfl:   Allergies   Allergen Reactions    Diphenhydramine Shortness Of Breath     Other reaction(s): DIPHENHYDRAMINE CITRATE (BENADRYL)    Erythromycin Shortness Of Breath and Hives     Category: Allergy;     Iv Dye [Iodinated Contrast Media]      Vitals:    01/14/25 0830   BP: 136/88   Resp: 16   Temp: 98.1 °F (36.7 °C)       Physical Exam   Constitutional: General appearance: The Patient is well-developed and well-nourished who appears the stated age in no acute distress. Patient is pleasant and talkative.     HEENT:  Normocephalic.  Sclerae are anicteric. Mucous membranes are moist. Neck is supple without adenopathy. No JVD.     Chest: The lungs are clear to auscultation.     Cardiac: Heart is regular rate.     Abdomen: Abdomen is soft, non-tender, non-distended and without masses.     Extremities: There is no clubbing or cyanosis. There is no edema.  Symmetric.  Neuro: Grossly nonfocal. Gait is normal.     Lymphatic: No evidence of cervical adenopathy bilaterally.   No evidence of axillary adenopathy bilaterally.   Skin: Warm, anicteric.    Psych:  Patient is pleasant and talkative.  Breasts:      Pathology:  Final Diagnosis   A. Stomach, Gastric Submucosal, FNA:  Neoplastic.  Gastrointestinal stromal tumor (GIST), mixed spindle cell and epithelioid type.  > 5 mitoses per 50 HPF     Satisfactory for evaluation.     Comment: Prognosis depends upon tumor size, mitotic rate and site  of origin. Clinical correlation is needed.   Electronically signed by Sintia Vega MD on 1/10/2025 at 1111 EST       Labs:      Imaging  CT chest wo contrast  Result Date: 1/13/2025  Narrative: CT CHEST WITHOUT IV CONTRAST INDICATION: C49.A2: Gastrointestinal stromal tumor of stomach. COMPARISON: None. TECHNIQUE: CT examination of the chest was performed without intravenous contrast. Multiplanar 2D reformatted images were created from the source data. This examination, like all CT scans performed in the [facility], was performed utilizing techniques to minimize radiation dose exposure, including the use of iterative reconstruction and automated exposure control. Radiation dose length product (DLP) for this visit: 1064 mGy-cm FINDINGS: LUNGS: Scattered tiny 1 to 2 mm solid nodules throughout both lungs. There is no tracheal or endobronchial lesion. PLEURA: Unremarkable. HEART/GREAT VESSELS: Heart is unremarkable for patient's age. No thoracic aortic aneurysm. MEDIASTINUM AND CHAZ: Mildly enlarged lymph node anterior to the left main pulmonary artery measuring 1.1 cm, present 2010 measuring 0.8 cm. CHEST WALL AND LOWER NECK: Unremarkable. VISUALIZED STRUCTURES IN THE UPPER ABDOMEN: Again seen is a posterior gastric fundal mass, better evaluated on prior abdominal CT. OSSEOUS STRUCTURES: No acute fracture or destructive osseous lesion.     Impression: Scattered tiny 1 to 2 mm solid nodules throughout both lungs, unlikely to represent metastatic disease. However, follow-up chest CT in 3 months is recommended to ensure stability. Mildly enlarged lymph node anterior to the left main pulmonary artery, slightly increased in size since 2010, likely reactive. Attention on above recommended CT. The study was marked in EPIC for significant notification. Workstation performed: [id number]     Endoscopic ultrasonography, GI (Upper) Linear with FNA  Result Date: 1/7/2025  Narrative: Table formatting from the original  result was not included. [facility] [address] [place] [address] [phone] DATE OF SERVICE: 1/07/25 PHYSICIAN(S): Attending: Luciana Doan MD Fellow: No Staff Documented INDICATION: Submucosal neoplasm of gastrointestinal tract POST-OP DIAGNOSIS: See the impression below. PREPROCEDURE: Informed consent was obtained for the procedure, including sedation.  Risks of perforation, hemorrhage, adverse drug reaction and aspiration were discussed. The patient was placed in the left lateral decubitus position. Patient was explained about the risks and benefits of the procedure. Risks including but not limited to bleeding, infection, and perforation were explained in detail. Also explained about less than 100% sensitivity with the exam and other alternatives. PROCEDURE: EUS UPPER DETAILS OF PROCEDURE: Patient was taken to the procedure room where a time out was performed to confirm correct patient and correct procedure. The patient underwent monitored anesthesia care, which was administered by an anesthesia professional. The patient's blood pressure, heart rate, oxygen, respirations, level of consciousness, ECG and ETCO2 were monitored throughout the procedure. The endoscope and linear scope were introduced through the mouth. The patient experienced no blood loss. The procedure was not difficult. The patient tolerated the procedure well. There were no apparent adverse events. ANESTHESIA INFORMATION: ASA: III Anesthesia Type: IV Sedation with Anesthesia MEDICATIONS: No administrations occurring from 1020 to 1045 on 01/07/25 FINDINGS: EGD The esophagus appeared normal. Submucosal mass in the gastric cardia. Overlying mucosa appeared normal. The duodenal bulb and 2nd part of the duodenum appeared normal. EUS Homogeneous, hypoechoic, lobulated and oval mass measuring 45 mm x 47 mm with well-defined and smooth margins was visualized in the cardia and fundus of the stomach, contained within the  muscularis propria; 3 successful fine needle biopsy passes were taken with a 22 gauge [employer] needle using a transgastric approach guided by Doppler. An adequate sample was obtained. Likely consistent with gastrointestinal stromal tumor. SPECIMENS: ID Type Source Tests Collected by Time Destination 1 : gastric submucosal lesion - concerning for GIST Tissue Stomach TISSUE EXAM Luciana Doan MD 1/7/2025 10:35 AM       Impression: EGD The esophagus appeared normal. Submucosal mass in the gastric cardia. Overlying mucosa appeared normal. The duodenal bulb and 2nd part of the duodenum appeared normal. EUS Homogeneous, hypoechoic, lobulated and oval mass measuring 45 mm x 47 mm with well-defined and smooth margins was visualized in the cardia and fundus of the stomach, contained within the muscularis propria; 3 fine needle biopsy passes were taken. An adequate sample was obtained RECOMMENDATION: Follow up biopsy results      EGD  Result Date: 12/30/2024  Narrative: Table formatting from the original result was not included. Pending sale to [facility] [address] [facility] [address] [phone] DATE OF SERVICE: 12/30/24 PHYSICIAN(S): Attending: Katerina Garcia MD Fellow: Moses Guo MD INDICATION: Gastric mass POST-OP DIAGNOSIS: See the impression below. PREPROCEDURE: Informed consent was obtained for the procedure, including sedation.  Risks of perforation, hemorrhage, adverse drug reaction and aspiration were discussed. The patient was placed in the left lateral decubitus position. Patient was explained about the risks and benefits of the procedure. Risks including but not limited to bleeding, infection, and perforation were explained in detail. Also explained about less than 100% sensitivity with the exam and other alternatives. PROCEDURE: EGD DETAILS OF PROCEDURE: Patient was taken to the procedure room where a time out was performed to confirm correct patient and correct procedure.  The patient underwent monitored anesthesia care, which was administered by an anesthesia professional. The patient's blood pressure, heart rate, level of consciousness, respirations, oxygen, ECG and ETCO2 were monitored throughout the procedure. The scope was introduced through the mouth and advanced to the second part of the duodenum. Retroflexion was performed in the fundus. Prior to the procedure, the patient's H. Pylori status was unknown. The patient experienced no blood loss. The procedure was not difficult. The patient tolerated the procedure well. There were no apparent adverse events. ANESTHESIA INFORMATION: ASA: III Anesthesia Type: IV Sedation with Anesthesia MEDICATIONS: simethicone (MYLICON) 30 mg in sterile water 60 mL 30 mg (Totals for administrations occurring from 1316 to 1338 on 12/30/24) FINDINGS: The esophagus was normal. Regular Z-line 38 cm from the incisors There was a large mass on the posterior wall of the cardia just below the GEJ. The mass appeared to be submucosal. Biopsy forceps were used to attempt a pillow sign but went through the lesion and caused bleeding. Biopsies were not performed given concern for bleeding. The duodenum was normal. SPECIMENS: * No specimens in log *     Impression: The esophagus, duodenal bulb and 2nd part of the duodenum appeared normal. Single large subepithelial lesion in the cardia. Probing with forceps resulted in penetration into the lesion and bleeding without further interrogation to avoid further hemorrhage  RECOMMENDATION: Recommend EUS with FNA either inpatient or outpatient for tissue sampling    Katerina Garcia MD     US pelvis complete w transvaginal  Result Date: 12/29/2024  Narrative: PELVIC ULTRASOUND, COMPLETE INDICATION: The patient is 39 years old. ovarian cysts vs. masses seen on earlier CT today, require further characterization. Last menstrual period unknown. COMPARISON: CT abdomen pelvis 12/29/2024. TECHNIQUE: Transabdominal pelvic  ultrasound was performed in sagittal and transverse planes with a curvilinear transducer. Additional transvaginal imaging was performed to better evaluate the endometrium and ovaries. Imaging included volumetric sweeps as  well as traditional still imaging technique. FINDINGS: UTERUS: The uterus is anteverted in position, measuring 7.9 x 3.6 x 4.7 cm. The uterus has a normal contour and echotexture. The cervix appears within normal limits. ENDOMETRIUM: The endometrial echo complex has an AP caliber of 6.0 mm. Appearance within normal limits. OVARIES/ADNEXA: Right ovary: 7.0 x 6.8 x 5.1 cm. 127.2 mL. Ovarian Doppler flow is within normal limits. Focal pelvic lesion as follows: Lesion: 1 - Situs: Right. - Relation to ovary: Intraovarian. - Size: 6.3 x 4.5 x 6.4 cm. - Category/Lesion Type: Non-classic lesion as detailed below: *  Morphology: Unilocular cystic lesion. *  Inner wall: Irregular. *  Internal septations: Irregular. *  Solid component: None. *  Outer contour: Smooth. *  Color score: Color score 1 = No flow *  Ascites: Absent. *  Peritoneal nodules: Absent. - O-RADS Assessment Category: O-RADS 3 = Low risk - Management recommendation: If not surgically excised, consider follow-up US within 6 months. REFERENCE: O-RADS US v2022 Left ovary: 7.6 x 4.0 x 5.0 cm. 79.8 mL. Ovarian Doppler flow is within normal limits. Focal pelvic lesion as follows: Lesion: 2 - Situs: Left. - Relation to ovary: Intraovarian. - Size: 7.4 x 4.1 x 4.3 cm. - Category/Lesion Type: Non-classic lesion as detailed below: *  Morphology: Bilocular cystic lesion. *  Inner wall: Irregular. *  Internal septations: Irregular. *  Solid component: 1-3 papillary projections. *  Outer contour: Smooth. *  Color score: Color score 1 = No flow *  Ascites: Absent. *  Peritoneal nodules: Absent. - O-RADS Assessment Category: O-RADS 4 = Intermediate risk - Management recommendation: Imaging options include evaluation by ultrsaound specialist, MRI, or per  GYN-oncologist protocol. Clinical management by gynecologist with GYN-oncologist consultation or soley by GYN-oncologist. REFERENCE: O-RADS US v2022 OTHER: No free fluid or loculated fluid collections.     Impression: A 7.4 cm left ovarian bilocular cystic lesion with solid papillary projections without internal vascularity. O-RADS 4 = Intermediate risk.  Imaging options include evaluation by ultrsaound specialist, MRI, or per GYN-oncologist protocol. Clinical management by gynecologist with GYN-oncologist consultation or soley by GYN-oncologist. A 6.4 cm right ovarian unilocular cystic lesion with irregular septations without internal vascularity. O-RADS 3 = Low risk  If not surgically excised, consider follow-up US within 6 months. The study was marked in EPIC for immediate notification. Workstation performed: [id number]     CT abdomen pelvis wo contrast  Result Date: 12/29/2024  Narrative: CT ABDOMEN AND PELVIS WITHOUT IV CONTRAST INDICATION: epigatric pain, concern for mass on recent non-contrasted CT with recommendation to repeat with contrast. Patient with IV contrast allergy. COMPARISON: None. TECHNIQUE: CT examination of the abdomen and pelvis was performed without intravenous contrast. Multiplanar 2D reformatted images were created from the source data. This examination, like all CT scans performed in the [facility], was performed utilizing techniques to minimize radiation dose exposure, including the use of iterative reconstruction and automated exposure control. Radiation dose length product (DLP) for this visit: 1774.57 mGy-cm Enteric Contrast: Not administered. FINDINGS: ABDOMEN LOWER CHEST: No clinically significant abnormality in the visualized lower chest. LIVER/BILIARY TREE: Unremarkable. GALLBLADDER: No calcified gallstones. No pericholecystic inflammatory change. SPLEEN: Unremarkable. PANCREAS: Mildly atrophic. ADRENAL GLANDS: Unremarkable. KIDNEYS/URETERS: Unremarkable. No  hydronephrosis. STOMACH AND BOWEL: There is a 6.0 x 4.4 x 4.5 cm lobulated mass centered within the posterior gastric wall of the proximal stomach just beyond the GE junction. This partially extends into the lumen of the stomach as well as outside of the stomach thereby  localizing this mass likely within the wall of the stomach. An underlying tumor such as GIST must be excluded. There is no bowel obstruction or focal inflammatory process. APPENDIX: Normal. ABDOMINOPELVIC CAVITY: No ascites. No pneumoperitoneum. No lymphadenopathy. VESSELS: Scattered abdominal aortic calcifications. PELVIS REPRODUCTIVE ORGANS: There is a 7.3 x 6.9 cm hyperdense mass in the right adnexal region suspicious for a large hemorrhagic ovarian cyst but is not definitive. This can be further evaluated with pelvic ultrasound. Immediately anteriorly into the left is a septated mass measuring 5.9 x 4.3 cm possibly the left ovarian septated cystic lesion. These can be further evaluated with pelvic ultrasound. URINARY BLADDER: Unremarkable. ABDOMINAL WALL/INGUINAL REGIONS: Unremarkable. BONES: No acute fracture or suspicious osseous lesion.     Impression: Lobulated mass centered within the posterior gastric wall of the proximal stomach. A tumor such as GIST must be excluded. Pelvic masses suspicious for ovarian masses or cysts as described above. These can be further evaluated with pelvic ultrasound. Workstation performed: [id number]     XR chest 1 view portable  Result Date: 12/29/2024  Narrative: XR CHEST PORTABLE INDICATION: chest pain. COMPARISON: Chest x-ray dated August 19, 2020 FINDINGS: Clear lungs. No pneumothorax or pleural effusion. Normal cardiomediastinal silhouette. Bones are unremarkable for age. Normal upper abdomen.     Impression: No acute cardiopulmonary disease. Workstation performed: [id number]     CT renal stone study abdomen pelvis wo contrast  Result Date: 12/25/2024  Narrative: History: Epigastric pain Exam: Nonenhanced  CT of the abdomen and pelvis.   Technique: Using helical technique, axial images were obtained through the abdomen and pelvis. Coronal and sagittal reformations were performed.   Comparison: None     Findings: Bones: Osseous structures are intact. Abdominal wall: Normal. Lung bases: Normal. Bowel/mesentery: No evidence for free air or free fluid. No GI obstruction. Appendix is normal. Somewhat exophytic mass involving the posterior wall the gastric cardia measuring 4.4 x 3.5 cm. Lack of IV and oral contrast limits evaluation. Vessels: Abdominal aorta is normal caliber with mild atherosclerotic disease. Lymph nodes: No retroperitoneal lymphadenopathy. Spleen: Normal. Pancreas: Normal. Adrenal glands: Normal. Liver: Normal. Gallbladder: Normal. Kidneys/ureters: No evidence for hydronephrosis or nephrolithiasis. Bladder/pelvis: Bladder is grossly unremarkable. Lobulated heterogeneous uterus, likely secondary to underlying fibroids.    Impression: IMPRESSION: Possible 4.4 x 3.5 cm exophytic mass involving the posterior gastric cardia/fundus wall. Lack of IV and oral contrast limits evaluation. This could be related to a gastric diverticulum, however, gastric wall mass such as gist tumor cannot be excluded. Consider repeat evaluation with oral and IV contrast for further characterization. No GI obstruction. No hydronephrosis or nephrolithiasis. Lobulated heterogeneous uterus likely secondary to underlying fibroids. Nonemergent outpatient pelvic ultrasound can be performed for better evaluation. Workstation:[id number]    XR chest portable  Result Date: 12/25/2024  Narrative: History: Mid upper abdomen and epigastric pain Technique: Single semierect portable AP frontal view of the chest Comparison: August 15, 2024 Findings: Normal heart size. Normal pulmonary vascularity. Central airways are patent. The lungs are well inflated. No acute geographic interstitial process or focal airspace disease. No pneumothorax or pleural  effusion. No acute osseous abnormality.     Impression: Impression: No acute radiographic cardiopulmonary process. Workstation:[id number]    I personally reviewed and interpreted the above laboratory and imaging data.

## 2025-01-14 NOTE — LETTER
January 14, 2025     Feliciano Ledezma MD  [address]  [address]  [facility] [address]    Patient: Tanya Rosenthal   YOB: 1985   Date of Visit: 1/14/2025       Dear Dr. Ledezma:    Thank you for referring Tanya Rosenthal to me for evaluation. Below are my notes for this consultation.    If you have questions, please do not hesitate to call me. I look forward to following your patient along with you.         Sincerely,        Tomás South MD        CC: MD Tomás Miner MD  1/14/2025  9:13 AM  Sign when Signing Visit               Surgical Oncology Consult       [facility] [place]  [address]  [place] [address]    Tanya Rosenthal  1985  [id number]  [facility] [place]  [address]  [place] [address]    1. 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  This is involving the cardia and fundus of the stomach posteriorly.  I discussed that she will need a combination of surgery and chemotherapy.  Based on the size and location near the GE junction, I would recommend that she undergo surgical intervention in an open fashion.  I do not think neoadjuvant therapy would necessarily shrink this tumor where the resection would be much more limited.  Consequently I recommended open partial gastric resection.  The risks were explained including bleeding, infection, recurrence, need for further surgery, wound complications, adjacent organ injury, leak, sepsis, MI, DVT, stroke, pulmonary embolism, and death.  Informed consent was obtained.  We will schedule this at our earliest mutual convenience.  She already has a referral to genetics given her personal and family history of thyroid cancer as well as a mother with lymphoma and leukemia.  She is agreeable to this  plan.  All her questions were answered.  Orders:  -     Ambulatory Referral to Surgical Oncology  -     Case request operating room: PARTIAL GASTRECTOMY; Standing  -     Type and screen; Future  -     Protime-INR; Future  -     APTT; Future  -     HEMOGLOBIN A1C W/ EAG ESTIMATION; Future  -     Comprehensive metabolic panel; Future  -     CBC and differential; Future  -     Case request operating room: PARTIAL GASTRECTOMY  2. Submucosal neoplasm of gastrointestinal tract  -     Ambulatory Referral to Surgical Oncology  -     Case request operating room: PARTIAL GASTRECTOMY; Standing  -     Case request operating room: PARTIAL GASTRECTOMY      Chief Complaint   Patient presents with   • Consult       No follow-ups on file.    Oncology History   GIST (gastrointestinal stromal tumor), malignant (HCC)   1/7/2025 Biopsy    Endoscopic Ultrasound    Stomach, Gastric Submucosal, FNA:  Neoplastic.  Gastrointestinal stromal tumor (GIST), mixed spindle cell and epithelioid type.  > 5 mitoses per 50 HPF     1/13/2025 Initial Diagnosis    GIST (gastrointestinal stromal tumor), malignant (HCC)         History of Present Illness: 39-year-old female who recently had abdominal pain.  CT from December 25, 2024 revealed an exophytic mass in the posterior stomach.  This was done at [facility].  Follow-up CT on December 29, 2024 revealed a 6 x 4.4 x 4.5 cm mass on the posterior gastric wall in the proximal stomach, just beyond the GE junction.  This extended into the lumen of the stomach.  There was a large hemorrhagic cyst.  I personally reviewed the films.  Patient underwent EGD as well as EUS.  Biopsy was not possible during the EGD, but the EUS showed a 4.5 x 4 point centimeter mass in the cardia and fundus of the stomach within the muscularis propria.  Biopsy revealed gastrointestinal stromal tumor.  She comes in now to discuss further therapy.  She denies any nausea or vomiting.  No unintentional weight loss.    Review of  Systems  Complete ROS Surg Onc:   Constitutional: The patient denies new or recent history of general fatigue, no recent weight loss, no change in appetite.   Eyes: No complaints of visual problems, no scleral icterus.   ENT: no complaints of ear pain, no hoarseness, no difficulty swallowing,  no tinnitus and no new masses in head, oral cavity, or neck.   Cardiovascular: No complaints of chest pain, no palpitations, no ankle edema.   Respiratory: No complaints of shortness of breath, no cough.   Gastrointestinal: No complaints of jaundice, no bloody stools, no pale stools.   Genitourinary: No complaints of dysuria, no hematuria, no nocturia, no frequent urination, no urethral discharge.   Musculoskeletal: No complaints of weakness, paralysis, joint stiffness or arthralgias.  Integumentary: No complaints of rash, no new lesions.   Neurological: No complaints of convulsions, no seizures, no dizziness.   Hematologic/Lymphatic: No complaints of easy bruising.   Endocrine:  No hot or cold intolerance.  No polydipsia, polyphagia, or polyuria.  Allergy/immunology:  No environmental allergies.  No food allergies.  Not immunocompromised.  Skin:  No pallor or rash.  No wound.          Patient Active Problem List   Diagnosis   • Hypothyroidism   • Fatigue   • Medically noncompliant   • History of heroin abuse (HCC)   • Environmental allergies   • Morbid obesity (HCC)   • Bradycardia   • Cervical strain   • Head injury   • Chest pain   • Elevated d-dimer   • Gastritis   • Abdominal pain   • Hypocalcemia   • Anxiety   • Gastric lesion   • Pelvic mass   • Ovarian mass   • GIST (gastrointestinal stromal tumor), malignant (HCC)     Past Medical History:   Diagnosis Date   • Heroin abuse (HCC)     clean since 2015   • Thyroid cancer (HCC) 1999    with thyroidectomy and RTX     Past Surgical History:   Procedure Laterality Date   • GANGLION CYST EXCISION Left    • THYROIDECTOMY       Family History   Problem Relation Age of Onset   •  Stomach cancer Mother         maltoma lyphoma   • Lung cancer Maternal Aunt    • Breast cancer Maternal Aunt 52   • Lymphoma Paternal Uncle         non hodgkins     Social History     Socioeconomic History   • Marital status: Single     Spouse name: Not on file   • Number of children: Not on file   • Years of education: Not on file   • Highest education level: Not on file   Occupational History   • Not on file   Tobacco Use   • Smoking status: Every Day     Current packs/day: 0.25     Average packs/day: 0.3 packs/day for 26.0 years (6.5 ttl pk-yrs)     Types: Cigarettes     Start date: 1/1/1999   • Smokeless tobacco: Never   Substance and Sexual Activity   • Alcohol use: Not Currently   • Drug use: No     Comment: 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   • Sexual activity: Not on file   Other Topics Concern   • Not on file   Social History Narrative   • Not on file     Social Drivers of Health     Financial Resource Strain: Not on file   Food Insecurity: No Food Insecurity (12/29/2024)    Nursing - Inadequate Food Risk Classification    • Worried About Running Out of Food in the Last Year: Not on file    • Ran Out of Food in the Last Year: Not on file    • Ran Out of Food in the Last Year: Never true   Transportation Needs: No Transportation Needs (12/29/2024)    Nursing - Transportation Risk Classification    • Lack of Transportation: Not on file    • Lack of Transportation: No   Physical Activity: Not on file   Stress: Not on file   Social Connections: Not on file   Intimate Partner Violence: Unknown (12/29/2024)    Nursing IPS    • Feels Physically and Emotionally Safe: Not on file    • Physically Hurt by Someone: Not on file    • Humiliated or Emotionally Abused by Someone: Not on file    • Physically Hurt by Someone: No    • Hurt or Threatened by Someone: No   Housing Stability: Unknown (12/29/2024)    Nursing: Inadequate Housing Risk Classification    • Has Housing: Not on file    • Worried About Losing  Housing: Not on file    • Unable to Get Utilities: Not on file    • Unable to Pay for Housing in the Last Year: No    • Has Housin       Current Outpatient Medications:   •  buprenorphine-naloxone (SUBOXONE) 8-2 mg per SL tablet, Place 1 tablet under the tongue daily, Disp: , Rfl:   •  calcium carbonate (OS-ALL) 600 MG tablet, Take 1 tablet (600 mg total) by mouth daily, Disp: 30 tablet, Rfl: 0  •  levothyroxine 112 mcg tablet, TAKE 2 TABLETS BY MOUTH DAILY, Disp: , Rfl:   •  pantoprazole (PROTONIX) 40 mg tablet, Take 1 tablet (40 mg total) by mouth daily, Disp: 90 tablet, Rfl: 0  •  sucralfate (CARAFATE) 1 g tablet, Take 1 tablet (1 g total) by mouth every 6 (six) hours, Disp: 120 tablet, Rfl: 0  •  hydrOXYzine HCL (ATARAX) 10 mg tablet, Take 1 tablet (10 mg total) by mouth every 6 (six) hours as needed for anxiety (Patient not taking: Reported on 2025), Disp: 30 tablet, Rfl: 0  •  sucralfate (CARAFATE) 1 g tablet, Take 1 tablet (1 g total) by mouth 4 (four) times a day, Disp: 120 tablet, Rfl: 0  •  zolpidem (Ambien) 10 mg tablet, Take 10 mg by mouth daily at bedtime as needed for sleep (Patient not taking: Reported on 2025), Disp: , Rfl:   Allergies   Allergen Reactions   • Diphenhydramine Shortness Of Breath     Other reaction(s): DIPHENHYDRAMINE CITRATE (BENADRYL)   • Erythromycin Shortness Of Breath and Hives     Category: Allergy;    • Iv Dye [Iodinated Contrast Media]      Vitals:    25 0830   BP: 136/88   Resp: 16   Temp: 98.1 °F (36.7 °C)       Physical Exam   Constitutional: General appearance: The Patient is well-developed and well-nourished who appears the stated age in no acute distress. Patient is pleasant and talkative.     HEENT:  Normocephalic.  Sclerae are anicteric. Mucous membranes are moist. Neck is supple without adenopathy. No JVD.     Chest: The lungs are clear to auscultation.     Cardiac: Heart is regular rate.     Abdomen: Abdomen is soft, non-tender, non-distended and  without masses.     Extremities: There is no clubbing or cyanosis. There is no edema.  Symmetric.  Neuro: Grossly nonfocal. Gait is normal.     Lymphatic: No evidence of cervical adenopathy bilaterally.   No evidence of axillary adenopathy bilaterally.   Skin: Warm, anicteric.    Psych:  Patient is pleasant and talkative.  Breasts:      Pathology:  Final Diagnosis   A. Stomach, Gastric Submucosal, FNA:  Neoplastic.  Gastrointestinal stromal tumor (GIST), mixed spindle cell and epithelioid type.  > 5 mitoses per 50 HPF     Satisfactory for evaluation.     Comment: Prognosis depends upon tumor size, mitotic rate and site of origin. Clinical correlation is needed.   Electronically signed by Sintia Vega MD on 1/10/2025 at 1111 EST       Labs:      Imaging  CT chest wo contrast  Result Date: 1/13/2025  Narrative: CT CHEST WITHOUT IV CONTRAST INDICATION: C49.A2: Gastrointestinal stromal tumor of stomach. COMPARISON: None. TECHNIQUE: CT examination of the chest was performed without intravenous contrast. Multiplanar 2D reformatted images were created from the source data. This examination, like all CT scans performed in the [facility], was performed utilizing techniques to minimize radiation dose exposure, including the use of iterative reconstruction and automated exposure control. Radiation dose length product (DLP) for this visit: 1064 mGy-cm FINDINGS: LUNGS: Scattered tiny 1 to 2 mm solid nodules throughout both lungs. There is no tracheal or endobronchial lesion. PLEURA: Unremarkable. HEART/GREAT VESSELS: Heart is unremarkable for patient's age. No thoracic aortic aneurysm. MEDIASTINUM AND CHAZ: Mildly enlarged lymph node anterior to the left main pulmonary artery measuring 1.1 cm, present 2010 measuring 0.8 cm. CHEST WALL AND LOWER NECK: Unremarkable. VISUALIZED STRUCTURES IN THE UPPER ABDOMEN: Again seen is a posterior gastric fundal mass, better evaluated on prior abdominal CT. OSSEOUS STRUCTURES: No  acute fracture or destructive osseous lesion.     Impression: Scattered tiny 1 to 2 mm solid nodules throughout both lungs, unlikely to represent metastatic disease. However, follow-up chest CT in 3 months is recommended to ensure stability. Mildly enlarged lymph node anterior to the left main pulmonary artery, slightly increased in size since 2010, likely reactive. Attention on above recommended CT. The study was marked in EPIC for significant notification. Workstation performed: [id number]     Endoscopic ultrasonography, GI (Upper) Linear with FNA  Result Date: 1/7/2025  Narrative: Table formatting from the original result was not included. [facility] [address] [facility] [address] [phone] DATE OF SERVICE: 1/07/25 PHYSICIAN(S): Attending: Luciana Doan MD Fellow: No Staff Documented INDICATION: Submucosal neoplasm of gastrointestinal tract POST-OP DIAGNOSIS: See the impression below. PREPROCEDURE: Informed consent was obtained for the procedure, including sedation.  Risks of perforation, hemorrhage, adverse drug reaction and aspiration were discussed. The patient was placed in the left lateral decubitus position. Patient was explained about the risks and benefits of the procedure. Risks including but not limited to bleeding, infection, and perforation were explained in detail. Also explained about less than 100% sensitivity with the exam and other alternatives. PROCEDURE: EUS UPPER DETAILS OF PROCEDURE: Patient was taken to the procedure room where a time out was performed to confirm correct patient and correct procedure. The patient underwent monitored anesthesia care, which was administered by an anesthesia professional. The patient's blood pressure, heart rate, oxygen, respirations, level of consciousness, ECG and ETCO2 were monitored throughout the procedure. The endoscope and linear scope were introduced through the mouth. The patient experienced no blood loss. The  procedure was not difficult. The patient tolerated the procedure well. There were no apparent adverse events. ANESTHESIA INFORMATION: ASA: III Anesthesia Type: IV Sedation with Anesthesia MEDICATIONS: No administrations occurring from 1020 to 1045 on 01/07/25 FINDINGS: EGD The esophagus appeared normal. Submucosal mass in the gastric cardia. Overlying mucosa appeared normal. The duodenal bulb and 2nd part of the duodenum appeared normal. EUS Homogeneous, hypoechoic, lobulated and oval mass measuring 45 mm x 47 mm with well-defined and smooth margins was visualized in the cardia and fundus of the stomach, contained within the muscularis propria; 3 successful fine needle biopsy passes were taken with a 22 gauge [employer] needle using a transgastric approach guided by Doppler. An adequate sample was obtained. Likely consistent with gastrointestinal stromal tumor. SPECIMENS: ID Type Source Tests Collected by Time Destination 1 : gastric submucosal lesion - concerning for GIST Tissue Stomach TISSUE EXAM Luciana Doan MD 1/7/2025 10:35 AM       Impression: EGD The esophagus appeared normal. Submucosal mass in the gastric cardia. Overlying mucosa appeared normal. The duodenal bulb and 2nd part of the duodenum appeared normal. EUS Homogeneous, hypoechoic, lobulated and oval mass measuring 45 mm x 47 mm with well-defined and smooth margins was visualized in the cardia and fundus of the stomach, contained within the muscularis propria; 3 fine needle biopsy passes were taken. An adequate sample was obtained RECOMMENDATION: Follow up biopsy results      EGD  Result Date: 12/30/2024  Narrative: Table formatting from the original result was not included. [facility] [address] [facility] [address] [phone] DATE OF SERVICE: 12/30/24 PHYSICIAN(S): Attending: Katerina Garcia MD Fellow: Moses Guo MD INDICATION: Gastric mass POST-OP DIAGNOSIS: See the impression below.  PREPROCEDURE: Informed consent was obtained for the procedure, including sedation.  Risks of perforation, hemorrhage, adverse drug reaction and aspiration were discussed. The patient was placed in the left lateral decubitus position. Patient was explained about the risks and benefits of the procedure. Risks including but not limited to bleeding, infection, and perforation were explained in detail. Also explained about less than 100% sensitivity with the exam and other alternatives. PROCEDURE: EGD DETAILS OF PROCEDURE: Patient was taken to the procedure room where a time out was performed to confirm correct patient and correct procedure. The patient underwent monitored anesthesia care, which was administered by an anesthesia professional. The patient's blood pressure, heart rate, level of consciousness, respirations, oxygen, ECG and ETCO2 were monitored throughout the procedure. The scope was introduced through the mouth and advanced to the second part of the duodenum. Retroflexion was performed in the fundus. Prior to the procedure, the patient's H. Pylori status was unknown. The patient experienced no blood loss. The procedure was not difficult. The patient tolerated the procedure well. There were no apparent adverse events. ANESTHESIA INFORMATION: ASA: III Anesthesia Type: IV Sedation with Anesthesia MEDICATIONS: simethicone (MYLICON) 30 mg in sterile water 60 mL 30 mg (Totals for administrations occurring from 1316 to 1338 on 12/30/24) FINDINGS: The esophagus was normal. Regular Z-line 38 cm from the incisors There was a large mass on the posterior wall of the cardia just below the GEJ. The mass appeared to be submucosal. Biopsy forceps were used to attempt a pillow sign but went through the lesion and caused bleeding. Biopsies were not performed given concern for bleeding. The duodenum was normal. SPECIMENS: * No specimens in log *     Impression: The esophagus, duodenal bulb and 2nd part of the duodenum  appeared normal. Single large subepithelial lesion in the cardia. Probing with forceps resulted in penetration into the lesion and bleeding without further interrogation to avoid further hemorrhage  RECOMMENDATION: Recommend EUS with FNA either inpatient or outpatient for tissue sampling    Katerina Garcia MD     US pelvis complete w transvaginal  Result Date: 12/29/2024  Narrative: PELVIC ULTRASOUND, COMPLETE INDICATION: The patient is 39 years old. ovarian cysts vs. masses seen on earlier CT today, require further characterization. Last menstrual period unknown. COMPARISON: CT abdomen pelvis 12/29/2024. TECHNIQUE: Transabdominal pelvic ultrasound was performed in sagittal and transverse planes with a curvilinear transducer. Additional transvaginal imaging was performed to better evaluate the endometrium and ovaries. Imaging included volumetric sweeps as  well as traditional still imaging technique. FINDINGS: UTERUS: The uterus is anteverted in position, measuring 7.9 x 3.6 x 4.7 cm. The uterus has a normal contour and echotexture. The cervix appears within normal limits. ENDOMETRIUM: The endometrial echo complex has an AP caliber of 6.0 mm. Appearance within normal limits. OVARIES/ADNEXA: Right ovary: 7.0 x 6.8 x 5.1 cm. 127.2 mL. Ovarian Doppler flow is within normal limits. Focal pelvic lesion as follows: Lesion: 1 - Situs: Right. - Relation to ovary: Intraovarian. - Size: 6.3 x 4.5 x 6.4 cm. - Category/Lesion Type: Non-classic lesion as detailed below: *  Morphology: Unilocular cystic lesion. *  Inner wall: Irregular. *  Internal septations: Irregular. *  Solid component: None. *  Outer contour: Smooth. *  Color score: Color score 1 = No flow *  Ascites: Absent. *  Peritoneal nodules: Absent. - O-RADS Assessment Category: O-RADS 3 = Low risk - Management recommendation: If not surgically excised, consider follow-up US within 6 months. REFERENCE: O-RADS US v2022 Left ovary: 7.6 x 4.0 x 5.0 cm. 79.8 mL. Ovarian  Doppler flow is within normal limits. Focal pelvic lesion as follows: Lesion: 2 - Situs: Left. - Relation to ovary: Intraovarian. - Size: 7.4 x 4.1 x 4.3 cm. - Category/Lesion Type: Non-classic lesion as detailed below: *  Morphology: Bilocular cystic lesion. *  Inner wall: Irregular. *  Internal septations: Irregular. *  Solid component: 1-3 papillary projections. *  Outer contour: Smooth. *  Color score: Color score 1 = No flow *  Ascites: Absent. *  Peritoneal nodules: Absent. - O-RADS Assessment Category: O-RADS 4 = Intermediate risk - Management recommendation: Imaging options include evaluation by ultrsaound specialist, MRI, or per GYN-oncologist protocol. Clinical management by gynecologist with GYN-oncologist consultation or soley by GYN-oncologist. REFERENCE: O-RADS US v2022 OTHER: No free fluid or loculated fluid collections.     Impression: A 7.4 cm left ovarian bilocular cystic lesion with solid papillary projections without internal vascularity. O-RADS 4 = Intermediate risk.  Imaging options include evaluation by ultrsaound specialist, MRI, or per GYN-oncologist protocol. Clinical management by gynecologist with GYN-oncologist consultation or soley by GYN-oncologist. A 6.4 cm right ovarian unilocular cystic lesion with irregular septations without internal vascularity. O-RADS 3 = Low risk  If not surgically excised, consider follow-up US within 6 months. The study was marked in EPIC for immediate notification. Workstation performed: [id number]     CT abdomen pelvis wo contrast  Result Date: 12/29/2024  Narrative: CT ABDOMEN AND PELVIS WITHOUT IV CONTRAST INDICATION: epigatric pain, concern for mass on recent non-contrasted CT with recommendation to repeat with contrast. Patient with IV contrast allergy. COMPARISON: None. TECHNIQUE: CT examination of the abdomen and pelvis was performed without intravenous contrast. Multiplanar 2D reformatted images were created from the source data. This examination,  like all CT scans performed in the [facility], was performed utilizing techniques to minimize radiation dose exposure, including the use of iterative reconstruction and automated exposure control. Radiation dose length product (DLP) for this visit: 1774.57 mGy-cm Enteric Contrast: Not administered. FINDINGS: ABDOMEN LOWER CHEST: No clinically significant abnormality in the visualized lower chest. LIVER/BILIARY TREE: Unremarkable. GALLBLADDER: No calcified gallstones. No pericholecystic inflammatory change. SPLEEN: Unremarkable. PANCREAS: Mildly atrophic. ADRENAL GLANDS: Unremarkable. KIDNEYS/URETERS: Unremarkable. No hydronephrosis. STOMACH AND BOWEL: There is a 6.0 x 4.4 x 4.5 cm lobulated mass centered within the posterior gastric wall of the proximal stomach just beyond the GE junction. This partially extends into the lumen of the stomach as well as outside of the stomach thereby  localizing this mass likely within the wall of the stomach. An underlying tumor such as GIST must be excluded. There is no bowel obstruction or focal inflammatory process. APPENDIX: Normal. ABDOMINOPELVIC CAVITY: No ascites. No pneumoperitoneum. No lymphadenopathy. VESSELS: Scattered abdominal aortic calcifications. PELVIS REPRODUCTIVE ORGANS: There is a 7.3 x 6.9 cm hyperdense mass in the right adnexal region suspicious for a large hemorrhagic ovarian cyst but is not definitive. This can be further evaluated with pelvic ultrasound. Immediately anteriorly into the left is a septated mass measuring 5.9 x 4.3 cm possibly the left ovarian septated cystic lesion. These can be further evaluated with pelvic ultrasound. URINARY BLADDER: Unremarkable. ABDOMINAL WALL/INGUINAL REGIONS: Unremarkable. BONES: No acute fracture or suspicious osseous lesion.     Impression: Lobulated mass centered within the posterior gastric wall of the proximal stomach. A tumor such as GIST must be excluded. Pelvic masses suspicious for ovarian  masses or cysts as described above. These can be further evaluated with pelvic ultrasound. Workstation performed: [id number]     XR chest 1 view portable  Result Date: 12/29/2024  Narrative: XR CHEST PORTABLE INDICATION: chest pain. COMPARISON: Chest x-ray dated August 19, 2020 FINDINGS: Clear lungs. No pneumothorax or pleural effusion. Normal cardiomediastinal silhouette. Bones are unremarkable for age. Normal upper abdomen.     Impression: No acute cardiopulmonary disease. Workstation performed: [id number]     CT renal stone study abdomen pelvis wo contrast  Result Date: 12/25/2024  Narrative: History: Epigastric pain Exam: Nonenhanced CT of the abdomen and pelvis.   Technique: Using helical technique, axial images were obtained through the abdomen and pelvis. Coronal and sagittal reformations were performed.   Comparison: None     Findings: Bones: Osseous structures are intact. Abdominal wall: Normal. Lung bases: Normal. Bowel/mesentery: No evidence for free air or free fluid. No GI obstruction. Appendix is normal. Somewhat exophytic mass involving the posterior wall the gastric cardia measuring 4.4 x 3.5 cm. Lack of IV and oral contrast limits evaluation. Vessels: Abdominal aorta is normal caliber with mild atherosclerotic disease. Lymph nodes: No retroperitoneal lymphadenopathy. Spleen: Normal. Pancreas: Normal. Adrenal glands: Normal. Liver: Normal. Gallbladder: Normal. Kidneys/ureters: No evidence for hydronephrosis or nephrolithiasis. Bladder/pelvis: Bladder is grossly unremarkable. Lobulated heterogeneous uterus, likely secondary to underlying fibroids.    Impression: IMPRESSION: Possible 4.4 x 3.5 cm exophytic mass involving the posterior gastric cardia/fundus wall. Lack of IV and oral contrast limits evaluation. This could be related to a gastric diverticulum, however, gastric wall mass such as gist tumor cannot be excluded. Consider repeat evaluation with oral and IV contrast for further characterization.  No GI obstruction. No hydronephrosis or nephrolithiasis. Lobulated heterogeneous uterus likely secondary to underlying fibroids. Nonemergent outpatient pelvic ultrasound can be performed for better evaluation. Workstation:[id number]    XR chest portable  Result Date: 12/25/2024  Narrative: History: Mid upper abdomen and epigastric pain Technique: Single semierect portable AP frontal view of the chest Comparison: August 15, 2024 Findings: Normal heart size. Normal pulmonary vascularity. Central airways are patent. The lungs are well inflated. No acute geographic interstitial process or focal airspace disease. No pneumothorax or pleural effusion. No acute osseous abnormality.     Impression: Impression: No acute radiographic cardiopulmonary process. Workstation:[id number]    I personally reviewed and interpreted the above laboratory and imaging data.

## 2025-01-14 NOTE — ASSESSMENT & PLAN NOTE
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  This is involving the cardia and fundus of the stomach posteriorly.  I discussed that she will need a combination of surgery and chemotherapy.  Based on the size and location near the GE junction, I would recommend that she undergo surgical intervention in an open fashion.  I do not think neoadjuvant therapy would necessarily shrink this tumor where the resection would be much more limited.  Consequently I recommended open partial gastric resection.  The risks were explained including bleeding, infection, recurrence, need for further surgery, wound complications, adjacent organ injury, leak, sepsis, MI, DVT, stroke, pulmonary embolism, and death.  Informed consent was obtained.  We will schedule this at our earliest mutual convenience.  She already has a referral to genetics given her personal and family history of thyroid cancer as well as a mother with lymphoma and leukemia.  She is agreeable to this plan.  All her questions were answered.

## 2025-01-14 NOTE — PROGRESS NOTES
Surgical Oncology Consult       [facility] [place]  [address]  [place] [address]    Tanya Rosenthal  1985  [id number]  [facility] [place]  [address]  [place] [address]    1. 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  This is involving the cardia and fundus of the stomach posteriorly.  I discussed that she will need a combination of surgery and chemotherapy.  Based on the size and location near the GE junction, I would recommend that she undergo surgical intervention in an open fashion.  I do not think neoadjuvant therapy would necessarily shrink this tumor where the resection would be much more limited.  Consequently I recommended open partial gastric resection.  The risks were explained including bleeding, infection, recurrence, need for further surgery, wound complications, adjacent organ injury, leak, sepsis, MI, DVT, stroke, pulmonary embolism, and death.  Informed consent was obtained.  We will schedule this at our earliest mutual convenience.  She already has a referral to genetics given her personal and family history of thyroid cancer as well as a mother with lymphoma and leukemia.  She is agreeable to this plan.  All her questions were answered.  Orders:  -     Ambulatory Referral to Surgical Oncology  -     Case request operating room: PARTIAL GASTRECTOMY; Standing  -     Type and screen; Future  -     Protime-INR; Future  -     APTT; Future  -     HEMOGLOBIN A1C W/ EAG ESTIMATION; Future  -     Comprehensive metabolic panel; Future  -     CBC and differential; Future  -     Case request operating room: PARTIAL GASTRECTOMY  2. Submucosal neoplasm of gastrointestinal tract  -     Ambulatory Referral to Surgical Oncology  -     Case request operating room:  PARTIAL GASTRECTOMY; Standing  -     Case request operating room: PARTIAL GASTRECTOMY      Chief Complaint   Patient presents with    Consult       No follow-ups on file.    Oncology History   GIST (gastrointestinal stromal tumor), malignant (HCC)   1/7/2025 Biopsy    Endoscopic Ultrasound    Stomach, Gastric Submucosal, FNA:  Neoplastic.  Gastrointestinal stromal tumor (GIST), mixed spindle cell and epithelioid type.  > 5 mitoses per 50 HPF     1/13/2025 Initial Diagnosis    GIST (gastrointestinal stromal tumor), malignant (HCC)         History of Present Illness: 39-year-old female who recently had abdominal pain.  CT from December 25, 2024 revealed an exophytic mass in the posterior stomach.  This was done at [facility].  Follow-up CT on December 29, 2024 revealed a 6 x 4.4 x 4.5 cm mass on the posterior gastric wall in the proximal stomach, just beyond the GE junction.  This extended into the lumen of the stomach.  There was a large hemorrhagic cyst.  I personally reviewed the films.  Patient underwent EGD as well as EUS.  Biopsy was not possible during the EGD, but the EUS showed a 4.5 x 4 point centimeter mass in the cardia and fundus of the stomach within the muscularis propria.  Biopsy revealed gastrointestinal stromal tumor.  She comes in now to discuss further therapy.  She denies any nausea or vomiting.  No unintentional weight loss.    Review of Systems  Complete ROS Surg Onc:   Constitutional: The patient denies new or recent history of general fatigue, no recent weight loss, no change in appetite.   Eyes: No complaints of visual problems, no scleral icterus.   ENT: no complaints of ear pain, no hoarseness, no difficulty swallowing,  no tinnitus and no new masses in head, oral cavity, or neck.   Cardiovascular: No complaints of chest pain, no palpitations, no ankle edema.   Respiratory: No complaints of shortness of breath, no cough.   Gastrointestinal: No complaints of jaundice, no bloody stools, no  pale stools.   Genitourinary: No complaints of dysuria, no hematuria, no nocturia, no frequent urination, no urethral discharge.   Musculoskeletal: No complaints of weakness, paralysis, joint stiffness or arthralgias.  Integumentary: No complaints of rash, no new lesions.   Neurological: No complaints of convulsions, no seizures, no dizziness.   Hematologic/Lymphatic: No complaints of easy bruising.   Endocrine:  No hot or cold intolerance.  No polydipsia, polyphagia, or polyuria.  Allergy/immunology:  No environmental allergies.  No food allergies.  Not immunocompromised.  Skin:  No pallor or rash.  No wound.          Patient Active Problem List   Diagnosis    Hypothyroidism    Fatigue    Medically noncompliant    History of heroin abuse (HCC)    Environmental allergies    Morbid obesity (HCC)    Bradycardia    Cervical strain    Head injury    Chest pain    Elevated d-dimer    Gastritis    Abdominal pain    Hypocalcemia    Anxiety    Gastric lesion    Pelvic mass    Ovarian mass    GIST (gastrointestinal stromal tumor), malignant (HCC)     Past Medical History:   Diagnosis Date    Heroin abuse (HCC)     clean since 2015    Thyroid cancer (HCC) 1999    with thyroidectomy and RTX     Past Surgical History:   Procedure Laterality Date    GANGLION CYST EXCISION Left     THYROIDECTOMY       Family History   Problem Relation Age of Onset    Stomach cancer Mother         maltoma lyphoma    Lung cancer Maternal Aunt     Breast cancer Maternal Aunt 52    Lymphoma Paternal Uncle         non hodgkins     Social History     Socioeconomic History    Marital status: Single     Spouse name: Not on file    Number of children: Not on file    Years of education: Not on file    Highest education level: Not on file   Occupational History    Not on file   Tobacco Use    Smoking status: Every Day     Current packs/day: 0.25     Average packs/day: 0.3 packs/day for 26.0 years (6.5 ttl pk-yrs)     Types: Cigarettes     Start date:  1999    Smokeless tobacco: Never   Substance and Sexual Activity    Alcohol use: Not Currently    Drug use: No     Comment: 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    Sexual activity: Not on file   Other Topics Concern    Not on file   Social History Narrative    Not on file     Social Drivers of Health     Financial Resource Strain: Not on file   Food Insecurity: No Food Insecurity (2024)    Nursing - Inadequate Food Risk Classification     Worried About Running Out of Food in the Last Year: Not on file     Ran Out of Food in the Last Year: Not on file     Ran Out of Food in the Last Year: Never true   Transportation Needs: No Transportation Needs (2024)    Nursing - Transportation Risk Classification     Lack of Transportation: Not on file     Lack of Transportation: No   Physical Activity: Not on file   Stress: Not on file   Social Connections: Not on file   Intimate Partner Violence: Unknown (2024)    Nursing IPS     Feels Physically and Emotionally Safe: Not on file     Physically Hurt by Someone: Not on file     Humiliated or Emotionally Abused by Someone: Not on file     Physically Hurt by Someone: No     Hurt or Threatened by Someone: No   Housing Stability: Unknown (2024)    Nursing: Inadequate Housing Risk Classification     Has Housing: Not on file     Worried About Losing Housing: Not on file     Unable to Get Utilities: Not on file     Unable to Pay for Housing in the Last Year: No     Has Housin       Current Outpatient Medications:     buprenorphine-naloxone (SUBOXONE) 8-2 mg per SL tablet, Place 1 tablet under the tongue daily, Disp: , Rfl:     calcium carbonate (OS-ALL) 600 MG tablet, Take 1 tablet (600 mg total) by mouth daily, Disp: 30 tablet, Rfl: 0    levothyroxine 112 mcg tablet, TAKE 2 TABLETS BY MOUTH DAILY, Disp: , Rfl:     pantoprazole (PROTONIX) 40 mg tablet, Take 1 tablet (40 mg total) by mouth daily, Disp: 90 tablet, Rfl: 0    sucralfate (CARAFATE) 1 g  tablet, Take 1 tablet (1 g total) by mouth every 6 (six) hours, Disp: 120 tablet, Rfl: 0    hydrOXYzine HCL (ATARAX) 10 mg tablet, Take 1 tablet (10 mg total) by mouth every 6 (six) hours as needed for anxiety (Patient not taking: Reported on 1/14/2025), Disp: 30 tablet, Rfl: 0    sucralfate (CARAFATE) 1 g tablet, Take 1 tablet (1 g total) by mouth 4 (four) times a day, Disp: 120 tablet, Rfl: 0    zolpidem (Ambien) 10 mg tablet, Take 10 mg by mouth daily at bedtime as needed for sleep (Patient not taking: Reported on 1/7/2025), Disp: , Rfl:   Allergies   Allergen Reactions    Diphenhydramine Shortness Of Breath     Other reaction(s): DIPHENHYDRAMINE CITRATE (BENADRYL)    Erythromycin Shortness Of Breath and Hives     Category: Allergy;     Iv Dye [Iodinated Contrast Media]      Vitals:    01/14/25 0830   BP: 136/88   Resp: 16   Temp: 98.1 °F (36.7 °C)       Physical Exam   Constitutional: General appearance: The Patient is well-developed and well-nourished who appears the stated age in no acute distress. Patient is pleasant and talkative.     HEENT:  Normocephalic.  Sclerae are anicteric. Mucous membranes are moist. Neck is supple without adenopathy. No JVD.     Chest: The lungs are clear to auscultation.     Cardiac: Heart is regular rate.     Abdomen: Abdomen is soft, non-tender, non-distended and without masses.     Extremities: There is no clubbing or cyanosis. There is no edema.  Symmetric.  Neuro: Grossly nonfocal. Gait is normal.     Lymphatic: No evidence of cervical adenopathy bilaterally.   No evidence of axillary adenopathy bilaterally.   Skin: Warm, anicteric.    Psych:  Patient is pleasant and talkative.  Breasts:      Pathology:  Final Diagnosis   A. Stomach, Gastric Submucosal, FNA:  Neoplastic.  Gastrointestinal stromal tumor (GIST), mixed spindle cell and epithelioid type.  > 5 mitoses per 50 HPF     Satisfactory for evaluation.     Comment: Prognosis depends upon tumor size, mitotic rate and site  of origin. Clinical correlation is needed.   Electronically signed by Sintia Vega MD on 1/10/2025 at 1111 EST       Labs:      Imaging  CT chest wo contrast  Result Date: 1/13/2025  Narrative: CT CHEST WITHOUT IV CONTRAST INDICATION: C49.A2: Gastrointestinal stromal tumor of stomach. COMPARISON: None. TECHNIQUE: CT examination of the chest was performed without intravenous contrast. Multiplanar 2D reformatted images were created from the source data. This examination, like all CT scans performed in the [facility], was performed utilizing techniques to minimize radiation dose exposure, including the use of iterative reconstruction and automated exposure control. Radiation dose length product (DLP) for this visit: 1064 mGy-cm FINDINGS: LUNGS: Scattered tiny 1 to 2 mm solid nodules throughout both lungs. There is no tracheal or endobronchial lesion. PLEURA: Unremarkable. HEART/GREAT VESSELS: Heart is unremarkable for patient's age. No thoracic aortic aneurysm. MEDIASTINUM AND CHAZ: Mildly enlarged lymph node anterior to the left main pulmonary artery measuring 1.1 cm, present 2010 measuring 0.8 cm. CHEST WALL AND LOWER NECK: Unremarkable. VISUALIZED STRUCTURES IN THE UPPER ABDOMEN: Again seen is a posterior gastric fundal mass, better evaluated on prior abdominal CT. OSSEOUS STRUCTURES: No acute fracture or destructive osseous lesion.     Impression: Scattered tiny 1 to 2 mm solid nodules throughout both lungs, unlikely to represent metastatic disease. However, follow-up chest CT in 3 months is recommended to ensure stability. Mildly enlarged lymph node anterior to the left main pulmonary artery, slightly increased in size since 2010, likely reactive. Attention on above recommended CT. The study was marked in EPIC for significant notification. Workstation performed: [id number]     Endoscopic ultrasonography, GI (Upper) Linear with FNA  Result Date: 1/7/2025  Narrative: Table formatting from the original  result was not included. [facility] [address] [place] [address] [phone] DATE OF SERVICE: 1/07/25 PHYSICIAN(S): Attending: Luciana Doan MD Fellow: No Staff Documented INDICATION: Submucosal neoplasm of gastrointestinal tract POST-OP DIAGNOSIS: See the impression below. PREPROCEDURE: Informed consent was obtained for the procedure, including sedation.  Risks of perforation, hemorrhage, adverse drug reaction and aspiration were discussed. The patient was placed in the left lateral decubitus position. Patient was explained about the risks and benefits of the procedure. Risks including but not limited to bleeding, infection, and perforation were explained in detail. Also explained about less than 100% sensitivity with the exam and other alternatives. PROCEDURE: EUS UPPER DETAILS OF PROCEDURE: Patient was taken to the procedure room where a time out was performed to confirm correct patient and correct procedure. The patient underwent monitored anesthesia care, which was administered by an anesthesia professional. The patient's blood pressure, heart rate, oxygen, respirations, level of consciousness, ECG and ETCO2 were monitored throughout the procedure. The endoscope and linear scope were introduced through the mouth. The patient experienced no blood loss. The procedure was not difficult. The patient tolerated the procedure well. There were no apparent adverse events. ANESTHESIA INFORMATION: ASA: III Anesthesia Type: IV Sedation with Anesthesia MEDICATIONS: No administrations occurring from 1020 to 1045 on 01/07/25 FINDINGS: EGD The esophagus appeared normal. Submucosal mass in the gastric cardia. Overlying mucosa appeared normal. The duodenal bulb and 2nd part of the duodenum appeared normal. EUS Homogeneous, hypoechoic, lobulated and oval mass measuring 45 mm x 47 mm with well-defined and smooth margins was visualized in the cardia and fundus of the stomach, contained within the  muscularis propria; 3 successful fine needle biopsy passes were taken with a 22 gauge [employer] needle using a transgastric approach guided by Doppler. An adequate sample was obtained. Likely consistent with gastrointestinal stromal tumor. SPECIMENS: ID Type Source Tests Collected by Time Destination 1 : gastric submucosal lesion - concerning for GIST Tissue Stomach TISSUE EXAM Luciana Doan MD 1/7/2025 10:35 AM       Impression: EGD The esophagus appeared normal. Submucosal mass in the gastric cardia. Overlying mucosa appeared normal. The duodenal bulb and 2nd part of the duodenum appeared normal. EUS Homogeneous, hypoechoic, lobulated and oval mass measuring 45 mm x 47 mm with well-defined and smooth margins was visualized in the cardia and fundus of the stomach, contained within the muscularis propria; 3 fine needle biopsy passes were taken. An adequate sample was obtained RECOMMENDATION: Follow up biopsy results      EGD  Result Date: 12/30/2024  Narrative: Table formatting from the original result was not included. [facility] [address] [address] [phone] DATE OF SERVICE: 12/30/24 PHYSICIAN(S): Attending: Katerina Garcia MD Fellow: Moses Guo MD INDICATION: Gastric mass POST-OP DIAGNOSIS: See the impression below. PREPROCEDURE: Informed consent was obtained for the procedure, including sedation.  Risks of perforation, hemorrhage, adverse drug reaction and aspiration were discussed. The patient was placed in the left lateral decubitus position. Patient was explained about the risks and benefits of the procedure. Risks including but not limited to bleeding, infection, and perforation were explained in detail. Also explained about less than 100% sensitivity with the exam and other alternatives. PROCEDURE: EGD DETAILS OF PROCEDURE: Patient was taken to the procedure room where a time out was performed to confirm correct patient and correct procedure.  The patient underwent monitored anesthesia care, which was administered by an anesthesia professional. The patient's blood pressure, heart rate, level of consciousness, respirations, oxygen, ECG and ETCO2 were monitored throughout the procedure. The scope was introduced through the mouth and advanced to the second part of the duodenum. Retroflexion was performed in the fundus. Prior to the procedure, the patient's H. Pylori status was unknown. The patient experienced no blood loss. The procedure was not difficult. The patient tolerated the procedure well. There were no apparent adverse events. ANESTHESIA INFORMATION: ASA: III Anesthesia Type: IV Sedation with Anesthesia MEDICATIONS: simethicone (MYLICON) 30 mg in sterile water 60 mL 30 mg (Totals for administrations occurring from 1316 to 1338 on 12/30/24) FINDINGS: The esophagus was normal. Regular Z-line 38 cm from the incisors There was a large mass on the posterior wall of the cardia just below the GEJ. The mass appeared to be submucosal. Biopsy forceps were used to attempt a pillow sign but went through the lesion and caused bleeding. Biopsies were not performed given concern for bleeding. The duodenum was normal. SPECIMENS: * No specimens in log *     Impression: The esophagus, duodenal bulb and 2nd part of the duodenum appeared normal. Single large subepithelial lesion in the cardia. Probing with forceps resulted in penetration into the lesion and bleeding without further interrogation to avoid further hemorrhage  RECOMMENDATION: Recommend EUS with FNA either inpatient or outpatient for tissue sampling    Katerina Garcia MD     US pelvis complete w transvaginal  Result Date: 12/29/2024  Narrative: PELVIC ULTRASOUND, COMPLETE INDICATION: The patient is 39 years old. ovarian cysts vs. masses seen on earlier CT today, require further characterization. Last menstrual period unknown. COMPARISON: CT abdomen pelvis 12/29/2024. TECHNIQUE: Transabdominal pelvic  ultrasound was performed in sagittal and transverse planes with a curvilinear transducer. Additional transvaginal imaging was performed to better evaluate the endometrium and ovaries. Imaging included volumetric sweeps as  well as traditional still imaging technique. FINDINGS: UTERUS: The uterus is anteverted in position, measuring 7.9 x 3.6 x 4.7 cm. The uterus has a normal contour and echotexture. The cervix appears within normal limits. ENDOMETRIUM: The endometrial echo complex has an AP caliber of 6.0 mm. Appearance within normal limits. OVARIES/ADNEXA: Right ovary: 7.0 x 6.8 x 5.1 cm. 127.2 mL. Ovarian Doppler flow is within normal limits. Focal pelvic lesion as follows: Lesion: 1 - Situs: Right. - Relation to ovary: Intraovarian. - Size: 6.3 x 4.5 x 6.4 cm. - Category/Lesion Type: Non-classic lesion as detailed below: *  Morphology: Unilocular cystic lesion. *  Inner wall: Irregular. *  Internal septations: Irregular. *  Solid component: None. *  Outer contour: Smooth. *  Color score: Color score 1 = No flow *  Ascites: Absent. *  Peritoneal nodules: Absent. - O-RADS Assessment Category: O-RADS 3 = Low risk - Management recommendation: If not surgically excised, consider follow-up US within 6 months. REFERENCE: O-RADS US v2022 Left ovary: 7.6 x 4.0 x 5.0 cm. 79.8 mL. Ovarian Doppler flow is within normal limits. Focal pelvic lesion as follows: Lesion: 2 - Situs: Left. - Relation to ovary: Intraovarian. - Size: 7.4 x 4.1 x 4.3 cm. - Category/Lesion Type: Non-classic lesion as detailed below: *  Morphology: Bilocular cystic lesion. *  Inner wall: Irregular. *  Internal septations: Irregular. *  Solid component: 1-3 papillary projections. *  Outer contour: Smooth. *  Color score: Color score 1 = No flow *  Ascites: Absent. *  Peritoneal nodules: Absent. - O-RADS Assessment Category: O-RADS 4 = Intermediate risk - Management recommendation: Imaging options include evaluation by ultrsaound specialist, MRI, or per  GYN-oncologist protocol. Clinical management by gynecologist with GYN-oncologist consultation or soley by GYN-oncologist. REFERENCE: O-RADS US v2022 OTHER: No free fluid or loculated fluid collections.     Impression: A 7.4 cm left ovarian bilocular cystic lesion with solid papillary projections without internal vascularity. O-RADS 4 = Intermediate risk.  Imaging options include evaluation by ultrsaound specialist, MRI, or per GYN-oncologist protocol. Clinical management by gynecologist with GYN-oncologist consultation or soley by GYN-oncologist. A 6.4 cm right ovarian unilocular cystic lesion with irregular septations without internal vascularity. O-RADS 3 = Low risk  If not surgically excised, consider follow-up US within 6 months. The study was marked in EPIC for immediate notification. Workstation performed: [id number]     CT abdomen pelvis wo contrast  Result Date: 12/29/2024  Narrative: CT ABDOMEN AND PELVIS WITHOUT IV CONTRAST INDICATION: epigatric pain, concern for mass on recent non-contrasted CT with recommendation to repeat with contrast. Patient with IV contrast allergy. COMPARISON: None. TECHNIQUE: CT examination of the abdomen and pelvis was performed without intravenous contrast. Multiplanar 2D reformatted images were created from the source data. This examination, like all CT scans performed in the [facility], was performed utilizing techniques to minimize radiation dose exposure, including the use of iterative reconstruction and automated exposure control. Radiation dose length product (DLP) for this visit: 1774.57 mGy-cm Enteric Contrast: Not administered. FINDINGS: ABDOMEN LOWER CHEST: No clinically significant abnormality in the visualized lower chest. LIVER/BILIARY TREE: Unremarkable. GALLBLADDER: No calcified gallstones. No pericholecystic inflammatory change. SPLEEN: Unremarkable. PANCREAS: Mildly atrophic. ADRENAL GLANDS: Unremarkable. KIDNEYS/URETERS: Unremarkable. No  hydronephrosis. STOMACH AND BOWEL: There is a 6.0 x 4.4 x 4.5 cm lobulated mass centered within the posterior gastric wall of the proximal stomach just beyond the GE junction. This partially extends into the lumen of the stomach as well as outside of the stomach thereby  localizing this mass likely within the wall of the stomach. An underlying tumor such as GIST must be excluded. There is no bowel obstruction or focal inflammatory process. APPENDIX: Normal. ABDOMINOPELVIC CAVITY: No ascites. No pneumoperitoneum. No lymphadenopathy. VESSELS: Scattered abdominal aortic calcifications. PELVIS REPRODUCTIVE ORGANS: There is a 7.3 x 6.9 cm hyperdense mass in the right adnexal region suspicious for a large hemorrhagic ovarian cyst but is not definitive. This can be further evaluated with pelvic ultrasound. Immediately anteriorly into the left is a septated mass measuring 5.9 x 4.3 cm possibly the left ovarian septated cystic lesion. These can be further evaluated with pelvic ultrasound. URINARY BLADDER: Unremarkable. ABDOMINAL WALL/INGUINAL REGIONS: Unremarkable. BONES: No acute fracture or suspicious osseous lesion.     Impression: Lobulated mass centered within the posterior gastric wall of the proximal stomach. A tumor such as GIST must be excluded. Pelvic masses suspicious for ovarian masses or cysts as described above. These can be further evaluated with pelvic ultrasound. Workstation performed: [id number]     XR chest 1 view portable  Result Date: 12/29/2024  Narrative: XR CHEST PORTABLE INDICATION: chest pain. COMPARISON: Chest x-ray dated August 19, 2020 FINDINGS: Clear lungs. No pneumothorax or pleural effusion. Normal cardiomediastinal silhouette. Bones are unremarkable for age. Normal upper abdomen.     Impression: No acute cardiopulmonary disease. Workstation performed: [id number]     CT renal stone study abdomen pelvis wo contrast  Result Date: 12/25/2024  Narrative: History: Epigastric pain Exam: Nonenhanced  CT of the abdomen and pelvis.   Technique: Using helical technique, axial images were obtained through the abdomen and pelvis. Coronal and sagittal reformations were performed.   Comparison: None     Findings: Bones: Osseous structures are intact. Abdominal wall: Normal. Lung bases: Normal. Bowel/mesentery: No evidence for free air or free fluid. No GI obstruction. Appendix is normal. Somewhat exophytic mass involving the posterior wall the gastric cardia measuring 4.4 x 3.5 cm. Lack of IV and oral contrast limits evaluation. Vessels: Abdominal aorta is normal caliber with mild atherosclerotic disease. Lymph nodes: No retroperitoneal lymphadenopathy. Spleen: Normal. Pancreas: Normal. Adrenal glands: Normal. Liver: Normal. Gallbladder: Normal. Kidneys/ureters: No evidence for hydronephrosis or nephrolithiasis. Bladder/pelvis: Bladder is grossly unremarkable. Lobulated heterogeneous uterus, likely secondary to underlying fibroids.    Impression: IMPRESSION: Possible 4.4 x 3.5 cm exophytic mass involving the posterior gastric cardia/fundus wall. Lack of IV and oral contrast limits evaluation. This could be related to a gastric diverticulum, however, gastric wall mass such as gist tumor cannot be excluded. Consider repeat evaluation with oral and IV contrast for further characterization. No GI obstruction. No hydronephrosis or nephrolithiasis. Lobulated heterogeneous uterus likely secondary to underlying fibroids. Nonemergent outpatient pelvic ultrasound can be performed for better evaluation. Workstation:[id number]    XR chest portable  Result Date: 12/25/2024  Narrative: History: Mid upper abdomen and epigastric pain Technique: Single semierect portable AP frontal view of the chest Comparison: August 15, 2024 Findings: Normal heart size. Normal pulmonary vascularity. Central airways are patent. The lungs are well inflated. No acute geographic interstitial process or focal airspace disease. No pneumothorax or pleural  effusion. No acute osseous abnormality.     Impression: Impression: No acute radiographic cardiopulmonary process. Workstation:[id number]    I personally reviewed and interpreted the above laboratory and imaging data.

## 2025-01-14 NOTE — PROGRESS NOTES
Met with patient in exam room for surgical oncology consult. Patient was accompanied by her mother. NN introduced self to patient and mother. NN provided support throughout consult appointment. All question were answered. Patient was appreciative of my support and guidance with consult today. Patient has my contact information if anything additional is needed.

## 2025-01-15 ENCOUNTER — TELEPHONE (OUTPATIENT)
Dept: ANESTHESIOLOGY | Facility: CLINIC | Age: 40
End: 2025-01-15

## 2025-01-16 ENCOUNTER — DOCUMENTATION (OUTPATIENT)
Dept: HEMATOLOGY ONCOLOGY | Facility: CLINIC | Age: 40
End: 2025-01-16

## 2025-01-16 ENCOUNTER — PATIENT OUTREACH (OUTPATIENT)
Dept: CASE MANAGEMENT | Facility: OTHER | Age: 40
End: 2025-01-16

## 2025-01-16 NOTE — PROGRESS NOTES
Biopsychosocial and Barriers Assessment    Type of Cancer: Stomal Tumor  Treatment plan: Surgery on 2/5 and she states oral chemo  Noted barriers to care: none  Cultural/Mandaen concerns: none  Hair Loss/ Wig resources needed: none    DT completed: yes  DT score: 10/10  Issues noted: pain, tobacco use, changes in eating, worry/anxiety, sadness or depression, loss of interest or enjoyment, fear, loneliness, anger, feelings of worthlessness or being a burden, work and finances    Marital status/Lives with: Single/Lives with her parents and her daughter  Pt's support system: Strong family support  Mental Health history: worry/anxiety, sadness/depression r/t dx  Substance Abuse: Current smoker-only smokes 1-2 a day, trying to quit    Employment/income source: Employed-applied for FMLA  Concerns with bills (treatment vs household): yes in the future  Noted issues with home: none    Narrative note:   OSW placed outreach TC to pt this day. OSW introduced self and role. Pt is a very pleasant woman with a dx of a stomal tumor. She states that she has thyroid cancer when she was 14. She reports that she is not handling the diagnosis well mentally. OSW provided a great deal of support and validated her feelings. She states that she was so upset today that her boss allowed her to work from home. OSW reiterated that we are all here to support her. This writer asked if she has any interest in therapy. She states that she will be unable to afford this and is concerned about her insurance. This writer asked if she has spoken with anyone from financial advocacy yet and she stated no. OSW offered to send them a message and she was agreeable.  OSW educated on the [facility] and [facility] network. She was interested in both of these resources. OSW informed her that the cancer support community does have 6 free counseling sessions.   Pt states that she is well supported by her family, however she doesn't want to burden her parents very  much because her mother has leukemia.   Pt states that she has applied for FMLA and will receive 12 weeks. This writer asked if she has ST/LT disability offered there and she states no. She is trying to put money aside, but is worried about bills in the future. OSW educated her on compassion funds and that this writer can also look for additional resources if needed.   OSW offered to check in with her in a few weeks and she was appreciative. OSW encouraged her to reach out to this writer anytime she would like to talk.

## 2025-01-16 NOTE — PROGRESS NOTES
Oncology Finance Advocacy Intake and Intervention  Oncology Finance Counselor/Advocate placed call to patient. This writer informed patient that this writer is here to assist patient with billing questions, financial assistance, payment/payment plans, quotes, copayment assistance, insurance optimization, and insurance navigation.    This writer conducted a thorough benefit review of copayment, deductible, and out of pocket cost. This information is documented below and has been reviewed with patient.     Copayment:$45 PCP / $60 SP  Deductible: $2500 / $2500 remainig  Out of Pocket Cost:$5050 / $5050 Remaining  Insurance optimization (Limited benefit vs self-pay):n/a  Patient assistance status: FA q [facility]  Free Drug Applications: n/a  Oral Chemo Application: n/a  BIN#:  PCN#:  GRP#:  Copay:$  Interventions:  Call placed to patient as requested by OSW, spoke with Tanya introduced myself and advise what my role is. I informed call was pertaining to concerns with insurance, Tanya stated she actually did not have major concern, she has been getting bills. We discussed financial assistance and requested was placed to [facility] to have this mailed out today.   Provided contact information for further concerns/questions.            Information above was review thoroughly with patient and patient was advise of possible assistance programs/interventions. If any question arise patient can contact this writer at below information. This information was given to patient at time of contact.      Juli Ramires  Phone:[phone]  Email: [email or web address]

## 2025-01-17 ENCOUNTER — PATIENT OUTREACH (OUTPATIENT)
Dept: HEMATOLOGY ONCOLOGY | Facility: CLINIC | Age: 40
End: 2025-01-17

## 2025-01-17 NOTE — PROGRESS NOTES
I reached out and spoke with MONIQUE, now that consults have been completed with the oncology teams. I introduced myself and explained my role as their Patient Navigator. I reviewed for any barriers to care and offered supportive services as needed. I reviewed and updated the members assigned to the care team in [facility]. She knows the members of the care team as well as how and when to contact them with any needs.     Distress Thermometer was previously completed by the  OSW DEON    She is currently able to drive and denies any transportation needs.      She is currently experiencing these symptoms; pain that affects adl's especially in the morning. The pain has prevented her from going to work on more than 1 occasion. We discussed the role of Palliative Care. Referral placed. Patient aware they will receive a call to schedule. I provided the direct number as well if they would prefer to call.  Referral was placed at the time of her appointment on 1/14. Monique did state she is a recovering drug addict. She indicates she has a very low tolerance for pain and is fearful how this will be addressed.     She states that she is eating and drinking as per usual with no unintentional weight loss.       Patient does not smoke.     She states she is well supported by family and friends.  She lives with her parents and adult daughter.  Community support groups discussed including the Cancer Support Community of the [facility]. Patient declined information at this time.     She feels she has inadequate financial resources related to income as she does not have short term disability. Patient has been working with Oncology Social Worker and Oncology Financial Advocacy Team.     She verbalizes managing the schedules well.   Future Appointments   Date Time Provider Department Center   1/21/2025  3:00 PM Juan A Rivera MD [facility]   1/22/2025  1:00 PM BE [facility] ROOM 5 ANESTHESIA BE [facility]   1/23/2025   8:30 AM DO HERIBERTO Kim [facility]   1/27/2025  9:00 AM Varghese Jung MD GYN ONC [facility]Onc   2/26/2025 11:00 AM Tomás South MD SURG [facility]   3/12/2025  2:40 PM Syl Dunn MD [facility]        Based on individual needs I will follow up in about 3 weeks. I have provided my direct contact information and welcome them to contact me if needs as discussed above change. She was appreciative for the call.

## 2025-01-21 ENCOUNTER — TELEPHONE (OUTPATIENT)
Dept: SURGICAL ONCOLOGY | Facility: CLINIC | Age: 40
End: 2025-01-21

## 2025-01-21 ENCOUNTER — CONSULT (OUTPATIENT)
Dept: CARDIOLOGY CLINIC | Facility: CLINIC | Age: 40
End: 2025-01-21
Payer: COMMERCIAL

## 2025-01-21 ENCOUNTER — TELEPHONE (OUTPATIENT)
Age: 40
End: 2025-01-21

## 2025-01-21 VITALS
HEIGHT: 65 IN | SYSTOLIC BLOOD PRESSURE: 140 MMHG | HEART RATE: 69 BPM | WEIGHT: 262 LBS | OXYGEN SATURATION: 95 % | DIASTOLIC BLOOD PRESSURE: 92 MMHG | BODY MASS INDEX: 43.65 KG/M2

## 2025-01-21 DIAGNOSIS — C49.A2 MALIGNANT GASTROINTESTINAL STROMAL TUMOR (GIST) OF STOMACH (HCC): ICD-10-CM

## 2025-01-21 DIAGNOSIS — E66.01 MORBID OBESITY (HCC): ICD-10-CM

## 2025-01-21 DIAGNOSIS — R94.31 LONG QT INTERVAL: ICD-10-CM

## 2025-01-21 DIAGNOSIS — R94.31 PROLONGED Q-T INTERVAL ON ECG: Primary | ICD-10-CM

## 2025-01-21 PROCEDURE — 99204 OFFICE O/P NEW MOD 45 MIN: CPT | Performed by: INTERNAL MEDICINE

## 2025-01-21 RX ORDER — ONDANSETRON 4 MG/1
4 TABLET, ORALLY DISINTEGRATING ORAL EVERY 6 HOURS PRN
COMMUNITY

## 2025-01-21 NOTE — PROGRESS NOTES
[facility] Cardiology  Office Consultation  Tanya Rosenthal 39 y.o. female MRN: [id number]        Chief Complaint    Chief Complaint   Patient presents with    New Patient Visit     Pt is here to follow up on recent EKG done in the hospital. Pt has an upcomming procedure.        Referring Provider: Lenora Scanlon MD    Impression & Plan:    1. Prolonged Q-T interval on ECG (Primary)  -The patient was referred to cardiology given recent ECG findings of a prolonged QTc measuring 507-537.  However, after close review of her ECGs, her true QTc interval is actually within normal limits at around 437.  Suspect initial QT interval was misread given unusual T wave morphology.  Patient can continue her current medication regimen and no further workup is needed at this time.  -Of note, patient is on Suboxone at home which can prolong her QT and was also recently prescribed Zofran for nausea which she rarely uses.  It was explained to her that Zofran can also prolong her QT and so she should try to limit her Zofran use as able.  - Ambulatory Referral to Cardiology    2. 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 Preoperative risk evaluation  -The patient has a history of papillary thyroid cancer s/p thyroidectomy and was now recently discovered to have a gastrointestinal stromal tumor.  She is planning to undergo genetic testing and is also scheduled for a partial gastrectomy with surg-onc on 2/5/2025.  -RCRI score of 0 -> 3.9% 30-day risk of a major cardiac event.  -Patient reports good functional capacity at home and is able to climb >1 flight of stairs without symptoms (>4 METS).  -Will obtain echocardiogram to evaluate cardiac function and assess for any structural abnormalities.  If the echocardiogram is normal, the patient is at acceptable risk to undergo the partial gastrectomy.  - Echo complete w/ contrast if indicated; Future    3. Morbid obesity (HCC)  -The patient was counseled on  continuing with lifestyle modifications including proper diet, exercise and weight loss.      We will see Tanya Rosenthal back in 1 year for routine follow-up.    HPI: Tanya Rosenthal is a 39 y.o. year old female with a history of papillary thyroid carcinoma s/p thyroidectomy, remote history of IV drug use on Suboxone, obesity, anxiety and recently discovered malignant gastrointestinal stromal tumor who presented to the cardiology clinic as a new patient for prolonged QT noted on ECG.  She is currently following with oncology and surg-onc outpatient and is planned for a partial gastrectomy on 2/5/2025.  Recent ECG was obtained on 12/29/2024 showing normal sinus rhythm with sinus arrhythmia and a prolonged QTc of 507.  However, upon close review of ECG, the QTc was actually within normal limits.    Today the patient was seen and examined at bedside.  Overall she reports feeling well.  She has been under a lot of stress since her recent cancer diagnosis, but otherwise had no other specific complaints.  She denies any recent chest pain, shortness of breath, palpitations or syncopal episodes.      EKG reviewed personally:   12/29/2024: Sinus rhythm (65 bpm) with sinus arrhythmia, nonspecific T wave abnormalities, QTc 507 (QTc remeasured at around 437 ms).    Relevant Laboratory Studies:  (Laboratory studies personally reviewed)      Review of Systems   Constitutional:  Negative for activity change, appetite change, chills, diaphoresis, fatigue and fever.   HENT:  Negative for congestion, ear discharge, ear pain, hearing loss, sinus pressure, sinus pain and sore throat.    Eyes:  Negative for pain, discharge, redness and itching.   Respiratory:  Negative for cough, chest tightness, shortness of breath and wheezing.    Cardiovascular:  Negative for chest pain, palpitations and leg swelling.   Gastrointestinal:  Negative for abdominal distention, abdominal pain, blood in stool, constipation, diarrhea, nausea and  vomiting.   Genitourinary:  Negative for difficulty urinating, dysuria, flank pain and frequency.   Musculoskeletal:  Negative for arthralgias, back pain and gait problem.   Skin:  Negative for color change, pallor and rash.   Neurological:  Negative for dizziness, weakness, light-headedness, numbness and headaches.   Psychiatric/Behavioral:  Negative for agitation, behavioral problems, confusion and decreased concentration.          Past Medical History:   Diagnosis Date    Heroin abuse (HCC)     clean since 2015    Thyroid cancer (HCC) 1999    with thyroidectomy and RTX     Past Surgical History:   Procedure Laterality Date    GANGLION CYST EXCISION Left     THYROIDECTOMY       Social History     Substance and Sexual Activity   Alcohol Use Not Currently     Social History     Substance and Sexual Activity   Drug Use No    Comment: 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     Social History     Tobacco Use   Smoking Status Every Day    Current packs/day: 0.25    Average packs/day: 0.3 packs/day for 26.1 years (6.5 ttl pk-yrs)    Types: Cigarettes    Start date: 1/1/1999   Smokeless Tobacco Never     Family History   Problem Relation Age of Onset    Stomach cancer Mother         maltoma lyphoma    Lung cancer Maternal Aunt     Breast cancer Maternal Aunt 52    Lymphoma Paternal Uncle         non hodgkins       Allergies:  Allergies   Allergen Reactions    Diphenhydramine Shortness Of Breath     Other reaction(s): DIPHENHYDRAMINE CITRATE (BENADRYL)    Erythromycin Shortness Of Breath and Hives     Category: Allergy;     Iodine-131 - Food Allergy Other (See Comments)    Iv Dye [Iodinated Contrast Media]        Medications (as of START of this encounter):   Outpatient Medications Prior to Visit   Medication Sig Dispense Refill    buprenorphine-naloxone (SUBOXONE) 8-2 mg per SL tablet Place 1 tablet under the tongue daily      busPIRone (BUSPAR) 7.5 mg tablet Take 7.5 mg by mouth 2 (two) times a day      calcium  carbonate (OS-ALL) 600 MG tablet Take 1 tablet (600 mg total) by mouth daily 30 tablet 0    hydrOXYzine HCL (ATARAX) 10 mg tablet Take 1 tablet (10 mg total) by mouth every 6 (six) hours as needed for anxiety 30 tablet 0    levothyroxine 112 mcg tablet TAKE 2 TABLETS BY MOUTH DAILY      ondansetron (ZOFRAN-ODT) 4 mg disintegrating tablet Take 4 mg by mouth every 6 (six) hours as needed for nausea or vomiting      pantoprazole (PROTONIX) 40 mg tablet Take 1 tablet (40 mg total) by mouth daily 90 tablet 0    sucralfate (CARAFATE) 1 g tablet Take 1 tablet (1 g total) by mouth every 6 (six) hours 120 tablet 0    tobramycin-dexamethasone (TOBRADEX) ophthalmic suspension  (Patient not taking: Reported on 1/21/2025)       No facility-administered medications prior to visit.         Vitals:    01/21/25 1456   BP: 140/92   Pulse: 69   SpO2: 95%     Weight (last 2 days)       Date/Time Weight    01/21/25 1456 119 (262)            General: Tanya Rosenthal is a well appearing female, in no acute distress, sitting comfortably  HEENT: moist mucous membranes, EOMI  Neck:  No JVD, supple, trachea midline   Cardiovascular: unremarkable S1/S2, regular rate and rhythm, no murmurs, rubs or gallops   Pulmonary: normal respiratory effort, CTAB   Abdomen: soft and nondistended  Extremities: No lower extremity edema. Warm and well perfused extremities.   Neuro: no focal motor deficits, AAOx3 (person, place, time)  Psych: Normal mood and affect, cooperative        Time Spent:  Total time (face-to-face and non-face-to-face) spent on today's visit was 50 minutes. This includes preparation for the visits (i.e. reviewing test results), performance of a medically appropriate history and examination, and orders for medications, tests or other procedures. This time is exclusive of procedures performed and time spent teaching.    Juan A Rivera MD    This note was completed in part utilizing Whiteyboard recognition software. Grammatical  "errors, random word insertion, spelling mistakes, occasional wrong word or \"sound-alike\" substitutions and incomplete sentences may be an occasional consequence of the system secondary to software limitations, ambient noise and hardware issues. At the time of dictation, efforts were made to edit, clarify and /or correct errors.  Please read the chart carefully and recognize, using context, where substitutions have occurred.  If you have any questions or concerns about the context, text or information contained within the body of this dictation, please contact myself, the provider, for further clarification.   "

## 2025-01-21 NOTE — TELEPHONE ENCOUNTER
Called patient and spoke to Tanya. I did let her know that I received her FMLA forms and they are completed just waiting on Dr. South to sign them. I also stated that with the type of procedure she is having we would typically give 4 weeks off to recover. She was fine with that. I stated that I will most likely fax them in tomorrow when we see Dr. South. She was thankful for the help.

## 2025-01-21 NOTE — TELEPHONE ENCOUNTER
Patient called, asking if we have received her [facility] paperwork. We did receive it, it is in right fax. She also is asking if we can have it marked that her last day of work be for 2/4/25. Her work does want it received by 1/29/25 to process it in time. She mentioned that she would be out for about 3 months, but since she has the option to work from home, was advised she may be out for a month instead. She would like to know what dates would then be on her paperwork as well.

## 2025-01-22 ENCOUNTER — ANESTHESIA EVENT (OUTPATIENT)
Dept: PREADMISSION TESTING | Facility: HOSPITAL | Age: 40
End: 2025-01-22

## 2025-01-22 ENCOUNTER — ANESTHESIA (OUTPATIENT)
Dept: PREADMISSION TESTING | Facility: HOSPITAL | Age: 40
End: 2025-01-22

## 2025-01-22 NOTE — PROGRESS NOTES
Anesthesiology Brief Note    Tanya Rosenthal MRN: [id number]  Unit/Bed#:  Encounter: [id number]      Spoke with patient over the phone today for a request to speak with the anesthesia team.    Patient stated that her main concern is extreme anxiety over going through anesthesia and surgery.    I reassured the patient that anesthesia is safe.  I also advised that if her anxiety was so extreme that she could have a medication like xanax prescribed by her PCP for the morning of surgery with a sip of water.      Hermilo Villarreal MD  [facility]

## 2025-01-24 ENCOUNTER — HOSPITAL ENCOUNTER (OUTPATIENT)
Dept: NON INVASIVE DIAGNOSTICS | Facility: CLINIC | Age: 40
Discharge: HOME | End: 2025-01-24
Payer: COMMERCIAL

## 2025-01-24 VITALS
WEIGHT: 262.35 LBS | HEIGHT: 65 IN | BODY MASS INDEX: 43.71 KG/M2 | HEART RATE: 62 BPM | DIASTOLIC BLOOD PRESSURE: 92 MMHG | SYSTOLIC BLOOD PRESSURE: 140 MMHG

## 2025-01-24 DIAGNOSIS — R94.31 LONG QT INTERVAL: ICD-10-CM

## 2025-01-24 DIAGNOSIS — C49.A2 MALIGNANT GASTROINTESTINAL STROMAL TUMOR (GIST) OF STOMACH (HCC): ICD-10-CM

## 2025-01-24 LAB
AORTIC ROOT: 3.1 CM
ASCENDING AORTA: 3.2 CM
BSA FOR ECHO PROCEDURE: 2.22 M2
E WAVE DECELERATION TIME: 157 MS
E/A RATIO: 1.27
FRACTIONAL SHORTENING: 32 (ref 28–44)
INTERVENTRICULAR SEPTUM IN DIASTOLE (PARASTERNAL SHORT AXIS VIEW): 1.1 CM
INTERVENTRICULAR SEPTUM: 1.1 CM (ref 0.6–1.1)
LAAS-AP2: 24.1 CM2
LAAS-AP4: 20.3 CM2
LEFT ATRIUM AREA SYSTOLE SINGLE PLANE A4C: 19.4 CM2
LEFT ATRIUM SIZE: 4.7 CM
LEFT ATRIUM VOLUME (MOD BIPLANE): 70 ML
LEFT ATRIUM VOLUME INDEX (MOD BIPLANE): 31.5 ML/M2
LEFT INTERNAL DIMENSION IN SYSTOLE: 3.8 CM (ref 2.1–4)
LEFT VENTRICULAR INTERNAL DIMENSION IN DIASTOLE: 5.6 CM (ref 3.5–6)
LEFT VENTRICULAR POSTERIOR WALL IN END DIASTOLE: 1 CM
LEFT VENTRICULAR STROKE VOLUME: 92 ML
LV EF US.2D.A4C+ESTIMATED: 67 %
LVSV (TEICH): 92 ML
MV E'TISSUE VEL-SEP: 13 CM/S
MV PEAK A VEL: 0.67 M/S
MV PEAK E VEL: 85 CM/S
MV STENOSIS PRESSURE HALF TIME: 46 MS
MV VALVE AREA P 1/2 METHOD: 4.78
RIGHT ATRIUM AREA SYSTOLE A4C: 14.9 CM2
SINOTUBULAR JUNCTION: 2.1 CM
SL CV LEFT ATRIUM LENGTH A2C: 6.3 CM
SL CV LV EF: 60
SL CV PED ECHO LEFT VENTRICLE DIASTOLIC VOLUME (MOD BIPLANE) 2D: 153 ML
SL CV PED ECHO LEFT VENTRICLE SYSTOLIC VOLUME (MOD BIPLANE) 2D: 61 ML
SL CV SINUS OF VALSALVA 2D: 3.1 CM
STJ: 2.1 CM
TR MAX PG: 20 MMHG
TR PEAK VELOCITY: 2.2 M/S
TRICUSPID ANNULAR PLANE SYSTOLIC EXCURSION: 2.3 CM
TRICUSPID VALVE PEAK REGURGITATION VELOCITY: 2.22 M/S

## 2025-01-24 PROCEDURE — 93306 TTE W/DOPPLER COMPLETE: CPT

## 2025-01-24 PROCEDURE — 93306 TTE W/DOPPLER COMPLETE: CPT | Performed by: INTERNAL MEDICINE

## 2025-01-27 ENCOUNTER — CONSULT (OUTPATIENT)
Dept: GYNECOLOGIC ONCOLOGY | Facility: CLINIC | Age: 40
End: 2025-01-27

## 2025-01-27 ENCOUNTER — ANESTHESIA EVENT (OUTPATIENT)
Dept: PERIOP | Facility: HOSPITAL | Age: 40
DRG: 327 | End: 2025-01-27
Payer: COMMERCIAL

## 2025-01-27 ENCOUNTER — TELEPHONE (OUTPATIENT)
Dept: GYNECOLOGIC ONCOLOGY | Facility: CLINIC | Age: 40
End: 2025-01-27

## 2025-01-27 VITALS — HEART RATE: 88 BPM | WEIGHT: 261 LBS | BODY MASS INDEX: 43.43 KG/M2 | TEMPERATURE: 97.8 F

## 2025-01-27 DIAGNOSIS — E66.01 MORBID OBESITY (HCC): ICD-10-CM

## 2025-01-27 DIAGNOSIS — N83.8 OVARIAN MASS: ICD-10-CM

## 2025-01-27 DIAGNOSIS — C49.A2 MALIGNANT GASTROINTESTINAL STROMAL TUMOR (GIST) OF STOMACH (HCC): Primary | ICD-10-CM

## 2025-01-27 PROCEDURE — RECHECK: Performed by: OBSTETRICS & GYNECOLOGY

## 2025-01-27 RX ORDER — METRONIDAZOLE 500 MG/100ML
500 INJECTION, SOLUTION INTRAVENOUS ONCE
Status: CANCELLED | OUTPATIENT
Start: 2025-01-27 | End: 2025-01-27

## 2025-01-27 RX ORDER — SODIUM CHLORIDE, SODIUM LACTATE, POTASSIUM CHLORIDE, CALCIUM CHLORIDE 600; 310; 30; 20 MG/100ML; MG/100ML; MG/100ML; MG/100ML
125 INJECTION, SOLUTION INTRAVENOUS CONTINUOUS
Status: CANCELLED | OUTPATIENT
Start: 2025-01-27

## 2025-01-27 RX ORDER — ACETAMINOPHEN 325 MG/1
975 TABLET ORAL ONCE
Status: CANCELLED | OUTPATIENT
Start: 2025-01-27 | End: 2025-01-27

## 2025-01-27 RX ORDER — HEPARIN SODIUM 5000 [USP'U]/ML
5000 INJECTION, SOLUTION INTRAVENOUS; SUBCUTANEOUS
Status: CANCELLED | OUTPATIENT
Start: 2025-01-28 | End: 2025-01-29

## 2025-01-27 RX ORDER — CEFAZOLIN SODIUM 2 G/50ML
2000 SOLUTION INTRAVENOUS ONCE
Status: CANCELLED | OUTPATIENT
Start: 2025-01-27 | End: 2025-01-27

## 2025-01-27 NOTE — LETTER
2025     Feliciano Ledezma MD  [address]  [address]  [facility] [address]    Patient: Tanya Rosenthal   YOB: 1985   Date of Visit: 2025       Dear Dr. Ledezma:    Thank you for referring Tanya Rosenthal to me for evaluation. Below are my notes for this consultation.    If you have questions, please do not hesitate to call me. I look forward to following your patient along with you.         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 South       Morbid obesity (HCC)  Continue to monitor       Ovarian mass  39-year-old who does not desire future fertility with a known gastric GIST who was seen in the hospital as inpatient consult 2024.  She has bilateral adnexal lesions right measuring 7.3 x 6.9 cm, left measuring 5.9 x 4.3 cm.  By ultrasound the left ovarian mass is O rad category 4 and the right ovarian masses O rad category 3.  Tumor markers were negative including inhibin AMB, CEA, , AFP, hCG.  I also reviewed CBC, BMP.  I reviewed the CT abdomen pelvis images.  Her performance status is 0.  1.  Given an emergent family situation, the visit could not be completed.  I discussed performing hysterectomy with possible bilateral salpingo-oophorectomy.  She is amenable.  Will set up a virtual visit to consent.  2.  Will discuss appropriate route of surgery with Dr. South.  Would consider laparoscopic hysterectomy and oophorectomy to limit the length of the abdominal incision.  Orders:  •  Ambulatory Referral to Gynecologic  Oncology            History of Present Illness  Reason for Visit / CC: Bilateral ovarian masses, known gastric GIST   Tanya Rosenthal is a 39 y.o. female   Who presents as a consultation from Dr. South to discuss abnormal CT findings in the pelvis.  She had a CT of the abdomen pelvis on 12/29/24 that revealed a 6 x 4.5 cm gastric mass, 7.3 x 6.9 cm right adnexal mass and a 5.9 x 4.3 cm left adnexal mass.  There was no evidence of pelvic lymphadenopathy, ascites, peritoneal disease.  The gastric lesion has been biopsied and is a just.  She saw gynecologic oncology as an inpatient consultation on 12/31/2024.  Pelvic ultrasound was done on 12/29/2024 which revealed the uterus to measure 7.3 x 4.7 cm with a endometrial thickness of 6 mm.  The right ovary measured 7 x 6.8 cm and is already category 3.  The left ovary measured 7.6 x 5 cm and is O rad category 4.  Multiple tumor markers were obtained on 12/31/2024 including inhibin A, B, CEA, , AFP, hCG.  All were negative.  She does not have abnormal uterine bleeding or pelvic pain.  Her daughter just got in a car accident immediately prior to her appointment and is being transported to [place].      Pertinent Medical History  Morbid obesity with BMI 43 kg/m²   Oncology History  Cancer Staging   No matching staging information was found for the patient.  Oncology History   GIST (gastrointestinal stromal tumor), malignant (HCC)   1/7/2025 Biopsy    Endoscopic Ultrasound    Stomach, Gastric Submucosal, FNA:  Neoplastic.  Gastrointestinal stromal tumor (GIST), mixed spindle cell and epithelioid type.  > 5 mitoses per 50 HPF     1/13/2025 Initial Diagnosis    GIST (gastrointestinal stromal tumor), malignant (HCC)       Review of Systems   Constitutional:  Negative for activity change and unexpected weight change.   HENT: Negative.     Eyes: Negative.    Respiratory: Negative.     Cardiovascular: Negative.    Gastrointestinal:  Negative for abdominal distention and  abdominal pain.   Endocrine: Negative.    Genitourinary:  Negative for pelvic pain and vaginal bleeding.   Musculoskeletal: Negative.    Skin: Negative.    Allergic/Immunologic: Negative.    Neurological: Negative.    Hematological: Negative.    Psychiatric/Behavioral:  The patient is nervous/anxious.         Tearful    A complete review of systems is negative other than that noted above in the HPI.  Past Medical History  Past Medical History:   Diagnosis Date   • Heroin abuse (HCC)     clean since 2015   • Thyroid cancer (HCC) 1999    with thyroidectomy and RTX     Past Surgical History:   Procedure Laterality Date   • GANGLION CYST EXCISION Left    • THYROIDECTOMY       Family History   Problem Relation Age of Onset   • Stomach cancer Mother         maltoma lyphoma   • Lung cancer Maternal Aunt    • Breast cancer Maternal Aunt 52   • Lymphoma Paternal Uncle         non hodgkins      reports that she has been smoking cigarettes. She started smoking about 26 years ago. She has a 6.5 pack-year smoking history. She has never used smokeless tobacco. She reports that she does not currently use alcohol. She reports that she does not use drugs.  Current Outpatient Medications on File Prior to Visit   Medication Sig Dispense Refill   • buprenorphine-naloxone (SUBOXONE) 8-2 mg per SL tablet Place 1 tablet under the tongue daily     • busPIRone (BUSPAR) 7.5 mg tablet Take 7.5 mg by mouth 2 (two) times a day     • calcium carbonate (OS-ALL) 600 MG tablet Take 1 tablet (600 mg total) by mouth daily 30 tablet 0   • hydrOXYzine HCL (ATARAX) 10 mg tablet Take 1 tablet (10 mg total) by mouth every 6 (six) hours as needed for anxiety 30 tablet 0   • levothyroxine 112 mcg tablet TAKE 2 TABLETS BY MOUTH DAILY     • ondansetron (ZOFRAN-ODT) 4 mg disintegrating tablet Take 4 mg by mouth every 6 (six) hours as needed for nausea or vomiting     • pantoprazole (PROTONIX) 40 mg tablet Take 1 tablet (40 mg total) by mouth daily 90 tablet 0    • sucralfate (CARAFATE) 1 g tablet Take 1 tablet (1 g total) by mouth every 6 (six) hours 120 tablet 0   • tobramycin-dexamethasone (TOBRADEX) ophthalmic suspension  (Patient not taking: Reported on 1/27/2025)       No current facility-administered medications on file prior to visit.     Allergies   Allergen Reactions   • Diphenhydramine Shortness Of Breath     Other reaction(s): DIPHENHYDRAMINE CITRATE (BENADRYL)   • Erythromycin Shortness Of Breath and Hives     Category: Allergy;    • Iodine-131 - Food Allergy Other (See Comments)   • Iv Dye [Iodinated Contrast Media]          Objective  Pulse 88   Temp 97.8 °F (36.6 °C) (Temporal)   Wt 118 kg (261 lb)   LMP  (LMP Unknown)   BMI 43.43 kg/m²     Body mass index is 43.43 kg/m².  Pain Screening:  Pain Score: 0-No pain  ECOG   0  Physical Exam  Vitals reviewed.   Constitutional:       General: She is not in acute distress.     Appearance: Normal appearance. She is not ill-appearing.   HENT:      Head: Normocephalic and atraumatic.      Mouth/Throat:      Mouth: Mucous membranes are moist.   Eyes:      General: No scleral icterus.        Right eye: No discharge.         Left eye: No discharge.      Conjunctiva/sclera: Conjunctivae normal.   Pulmonary:      Effort: Pulmonary effort is normal.   Musculoskeletal:      Right lower leg: No edema.      Left lower leg: No edema.   Skin:     General: Skin is warm and dry.      Coloration: Skin is not jaundiced.      Findings: No rash.   Neurological:      General: No focal deficit present.      Mental Status: She is alert and oriented to person, place, and time.      Cranial Nerves: No cranial nerve deficit.      Motor: No weakness.      Gait: Gait normal.   Psychiatric:         Behavior: Behavior normal.         Thought Content: Thought content normal.         Judgment: Judgment normal.      Comments: Anxious and tearful          Labs: I have reviewed pertinent labs.   Lab Results   Component Value Date/Time      16.6 12/31/2024 09:43 AM     Lab Results   Component Value Date/Time    Potassium 4.0 12/31/2024 04:53 AM    Potassium 4.3 12/25/2024 07:10 PM    Chloride 103 12/31/2024 04:53 AM    Chloride 101 12/25/2024 07:10 PM    Carbon Dioxide 29 12/25/2024 07:10 PM    CO2 29 12/31/2024 04:53 AM    BUN 15 12/31/2024 04:53 AM    BUN 19 12/25/2024 07:10 PM    Creatinine 1.12 12/31/2024 04:53 AM    Creatinine 1.28 (H) 12/25/2024 07:10 PM    Calcium 7.9 (L) 12/31/2024 04:53 AM    Calcium 6.6 (L) 12/25/2024 07:10 PM    AST 14 12/29/2024 01:06 PM    AST 17 12/25/2024 07:10 PM    ALT 10 12/29/2024 01:06 PM    ALT 10 12/25/2024 07:10 PM    Alkaline Phosphatase 65 12/29/2024 01:06 PM    Alkaline Phosphatase 65 12/25/2024 07:10 PM    eGFRcr 54 (L) 12/25/2024 07:10 PM    eGFR 61 12/31/2024 04:53 AM     Lab Results   Component Value Date/Time    WBC 9.34 12/31/2024 04:53 AM    Hemoglobin 13.9 12/31/2024 04:53 AM    Hematocrit 43.8 12/31/2024 04:53 AM    MCV 83 12/31/2024 04:53 AM    Platelets 222 12/31/2024 04:53 AM     Lab Results   Component Value Date/Time    Absolute Neutrophils 5.38 12/31/2024 04:53 AM        Trend:  Lab Results   Component Value Date     16.6 12/31/2024       Radiology Results Review : I have reviewed images/report studies in PACS as described above (in the HPI).  Other Study Results Review : Pathology reports reviewed.

## 2025-01-27 NOTE — ASSESSMENT & PLAN NOTE
39-year-old who does not desire future fertility with a known gastric GIST who was seen in the hospital as inpatient consult 12/31/2024.  She has bilateral adnexal lesions right measuring 7.3 x 6.9 cm, left measuring 5.9 x 4.3 cm.  By ultrasound the left ovarian mass is O rad category 4 and the right ovarian masses O rad category 3.  Tumor markers were negative including inhibin AMB, CEA, , AFP, hCG.  I also reviewed CBC, BMP.  I reviewed the CT abdomen pelvis images.  Her performance status is 0.  1.  Given an emergent family situation, the visit could not be completed.  I discussed performing hysterectomy with possible bilateral salpingo-oophorectomy.  She is amenable.  Will set up a virtual visit to consent.  2.  Will discuss appropriate route of surgery with Dr. South.  Would consider laparoscopic hysterectomy and oophorectomy to limit the length of the abdominal incision.  Orders:    Ambulatory Referral to Gynecologic Oncology

## 2025-01-27 NOTE — PRE-PROCEDURE INSTRUCTIONS
Pre-Surgery Instructions:   Medication Instructions    buprenorphine-naloxone (SUBOXONE) 8-2 mg per SL tablet Take day of surgery.    calcium carbonate (OS-ALL) 600 MG tablet Stop taking 7 days prior to surgery.    levothyroxine 112 mcg tablet Take day of surgery.    ondansetron (ZOFRAN-ODT) 4 mg disintegrating tablet Uses PRN- OK to take day of surgery    pantoprazole (PROTONIX) 40 mg tablet Take day of surgery.    sucralfate (CARAFATE) 1 g tablet Hold day of surgery.   Medication instructions for day surgery reviewed. Please use only a sip of water to take your instructed medications. Avoid all over the counter vitamins, supplements and NSAIDS for one week prior to surgery per anesthesia guidelines. Tylenol is ok to take as needed.     You will receive a call one business day prior to surgery with an arrival time and hospital directions. If your surgery is scheduled on a Monday, the hospital will be calling you on the Friday prior to your surgery. If you have not heard from anyone by 8pm, please call the hospital supervisor through the hospital  at [phone]. ([place] [phone] or [place] [phone]).    Do not eat or drink anything after midnight the night before your surgery, including candy, mints, lifesavers, or chewing gum. Do not drink alcohol 24hrs before your surgery. Try not to smoke at least 24hrs before your surgery.       Follow the pre surgery showering instructions as listed in the “My Surgical Experience Booklet” or otherwise provided by your surgeon's office. Do not use a blade to shave the surgical area 1 week before surgery. It is okay to use a clean electric clippers up to 24 hours before surgery. Do not apply any lotions, creams, including makeup, cologne, deodorant, or perfumes after showering on the day of your surgery. Do not use dry shampoo, hair spray, hair gel, or any type of hair products.     No contact lenses, eye make-up, or artificial eyelashes. Remove nail polish,  including gel polish, and any artificial, gel, or acrylic nails if possible. Remove all jewelry including rings and body piercing jewelry.     Wear causal clothing that is easy to take on and off. Consider your type of surgery.    Keep any valuables, jewelry, piercings at home. Please bring any specially ordered equipment (sling, braces) if indicated.    Arrange for a responsible person to drive you to and from the hospital on the day of your surgery. Please confirm the visitor policy for the day of your procedure when you receive your phone call with an arrival time.     Call the surgeon's office with any new illnesses, exposures, or additional questions prior to surgery.    Please reference your “My Surgical Experience Booklet” for additional information to prepare for your upcoming surgery.     Advise Refer to Weight Management Program and Smoking Cessation Education not interested.

## 2025-01-27 NOTE — PROGRESS NOTES
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 South       Morbid obesity (HCC)  Continue to monitor       Ovarian mass  39-year-old who does not desire future fertility with a known gastric GIST who was seen in the hospital as inpatient consult 2024.  She has bilateral adnexal lesions right measuring 7.3 x 6.9 cm, left measuring 5.9 x 4.3 cm.  By ultrasound the left ovarian mass is O rad category 4 and the right ovarian masses O rad category 3.  Tumor markers were negative including inhibin AMB, CEA, , AFP, hCG.  I also reviewed CBC, BMP.  I reviewed the CT abdomen pelvis images.  Her performance status is 0.  1.  Given an emergent family situation, the visit could not be completed.  I discussed performing hysterectomy with possible bilateral salpingo-oophorectomy.  She is amenable.  Will set up a virtual visit to consent.  2.  Will discuss appropriate route of surgery with Dr. South.  Would consider laparoscopic hysterectomy and oophorectomy to limit the length of the abdominal incision.  Orders:    Ambulatory Referral to Gynecologic Oncology            History of Present Illness   Reason for Visit / CC: Bilateral ovarian masses, known gastric GIST   Tanya Rosenthal is a 39 y.o. female   Who presents as a consultation from Dr. South to discuss abnormal CT findings in the pelvis.  She had a CT of the abdomen pelvis on 24 that revealed a 6 x 4.5 cm gastric mass, 7.3 x 6.9 cm right adnexal mass and a 5.9 x 4.3 cm left adnexal mass.  There was no evidence of pelvic lymphadenopathy, ascites, peritoneal disease.  The gastric lesion has been biopsied and is a just.  She saw gynecologic oncology as an  inpatient consultation on 12/31/2024.  Pelvic ultrasound was done on 12/29/2024 which revealed the uterus to measure 7.3 x 4.7 cm with a endometrial thickness of 6 mm.  The right ovary measured 7 x 6.8 cm and is already category 3.  The left ovary measured 7.6 x 5 cm and is O rad category 4.  Multiple tumor markers were obtained on 12/31/2024 including inhibin A, B, CEA, , AFP, hCG.  All were negative.  She does not have abnormal uterine bleeding or pelvic pain.  Her daughter just got in a car accident immediately prior to her appointment and is being transported to [place].      Pertinent Medical History   Morbid obesity with BMI 43 kg/m²   Oncology History   Cancer Staging   No matching staging information was found for the patient.  Oncology History   GIST (gastrointestinal stromal tumor), malignant (HCC)   1/7/2025 Biopsy    Endoscopic Ultrasound    Stomach, Gastric Submucosal, FNA:  Neoplastic.  Gastrointestinal stromal tumor (GIST), mixed spindle cell and epithelioid type.  > 5 mitoses per 50 HPF     1/13/2025 Initial Diagnosis    GIST (gastrointestinal stromal tumor), malignant (HCC)        Review of Systems   Constitutional:  Negative for activity change and unexpected weight change.   HENT: Negative.     Eyes: Negative.    Respiratory: Negative.     Cardiovascular: Negative.    Gastrointestinal:  Negative for abdominal distention and abdominal pain.   Endocrine: Negative.    Genitourinary:  Negative for pelvic pain and vaginal bleeding.   Musculoskeletal: Negative.    Skin: Negative.    Allergic/Immunologic: Negative.    Neurological: Negative.    Hematological: Negative.    Psychiatric/Behavioral:  The patient is nervous/anxious.         Tearful    A complete review of systems is negative other than that noted above in the HPI.  Past Medical History   Past Medical History:   Diagnosis Date    Heroin abuse (HCC)     clean since 2015    Thyroid cancer (HCC) 1999    with thyroidectomy and RTX     Past  Surgical History:   Procedure Laterality Date    GANGLION CYST EXCISION Left     THYROIDECTOMY       Family History   Problem Relation Age of Onset    Stomach cancer Mother         maltoma lyphoma    Lung cancer Maternal Aunt     Breast cancer Maternal Aunt 52    Lymphoma Paternal Uncle         non hodgkins      reports that she has been smoking cigarettes. She started smoking about 26 years ago. She has a 6.5 pack-year smoking history. She has never used smokeless tobacco. She reports that she does not currently use alcohol. She reports that she does not use drugs.  Current Outpatient Medications on File Prior to Visit   Medication Sig Dispense Refill    buprenorphine-naloxone (SUBOXONE) 8-2 mg per SL tablet Place 1 tablet under the tongue daily      busPIRone (BUSPAR) 7.5 mg tablet Take 7.5 mg by mouth 2 (two) times a day      calcium carbonate (OS-ALL) 600 MG tablet Take 1 tablet (600 mg total) by mouth daily 30 tablet 0    hydrOXYzine HCL (ATARAX) 10 mg tablet Take 1 tablet (10 mg total) by mouth every 6 (six) hours as needed for anxiety 30 tablet 0    levothyroxine 112 mcg tablet TAKE 2 TABLETS BY MOUTH DAILY      ondansetron (ZOFRAN-ODT) 4 mg disintegrating tablet Take 4 mg by mouth every 6 (six) hours as needed for nausea or vomiting      pantoprazole (PROTONIX) 40 mg tablet Take 1 tablet (40 mg total) by mouth daily 90 tablet 0    sucralfate (CARAFATE) 1 g tablet Take 1 tablet (1 g total) by mouth every 6 (six) hours 120 tablet 0    tobramycin-dexamethasone (TOBRADEX) ophthalmic suspension  (Patient not taking: Reported on 1/27/2025)       No current facility-administered medications on file prior to visit.     Allergies   Allergen Reactions    Diphenhydramine Shortness Of Breath     Other reaction(s): DIPHENHYDRAMINE CITRATE (BENADRYL)    Erythromycin Shortness Of Breath and Hives     Category: Allergy;     Iodine-131 - Food Allergy Other (See Comments)    Iv Dye [Iodinated Contrast Media]           Objective   Pulse 88   Temp 97.8 °F (36.6 °C) (Temporal)   Wt 118 kg (261 lb)   LMP  (LMP Unknown)   BMI 43.43 kg/m²     Body mass index is 43.43 kg/m².  Pain Screening:  Pain Score: 0-No pain  ECOG   0  Physical Exam  Vitals reviewed.   Constitutional:       General: She is not in acute distress.     Appearance: Normal appearance. She is not ill-appearing.   HENT:      Head: Normocephalic and atraumatic.      Mouth/Throat:      Mouth: Mucous membranes are moist.   Eyes:      General: No scleral icterus.        Right eye: No discharge.         Left eye: No discharge.      Conjunctiva/sclera: Conjunctivae normal.   Pulmonary:      Effort: Pulmonary effort is normal.   Musculoskeletal:      Right lower leg: No edema.      Left lower leg: No edema.   Skin:     General: Skin is warm and dry.      Coloration: Skin is not jaundiced.      Findings: No rash.   Neurological:      General: No focal deficit present.      Mental Status: She is alert and oriented to person, place, and time.      Cranial Nerves: No cranial nerve deficit.      Motor: No weakness.      Gait: Gait normal.   Psychiatric:         Behavior: Behavior normal.         Thought Content: Thought content normal.         Judgment: Judgment normal.      Comments: Anxious and tearful          Labs: I have reviewed pertinent labs.   Lab Results   Component Value Date/Time     16.6 12/31/2024 09:43 AM     Lab Results   Component Value Date/Time    Potassium 4.0 12/31/2024 04:53 AM    Potassium 4.3 12/25/2024 07:10 PM    Chloride 103 12/31/2024 04:53 AM    Chloride 101 12/25/2024 07:10 PM    Carbon Dioxide 29 12/25/2024 07:10 PM    CO2 29 12/31/2024 04:53 AM    BUN 15 12/31/2024 04:53 AM    BUN 19 12/25/2024 07:10 PM    Creatinine 1.12 12/31/2024 04:53 AM    Creatinine 1.28 (H) 12/25/2024 07:10 PM    Calcium 7.9 (L) 12/31/2024 04:53 AM    Calcium 6.6 (L) 12/25/2024 07:10 PM    AST 14 12/29/2024 01:06 PM    AST 17 12/25/2024 07:10 PM    ALT 10 12/29/2024  01:06 PM    ALT 10 12/25/2024 07:10 PM    Alkaline Phosphatase 65 12/29/2024 01:06 PM    Alkaline Phosphatase 65 12/25/2024 07:10 PM    eGFRcr 54 (L) 12/25/2024 07:10 PM    eGFR 61 12/31/2024 04:53 AM     Lab Results   Component Value Date/Time    WBC 9.34 12/31/2024 04:53 AM    Hemoglobin 13.9 12/31/2024 04:53 AM    Hematocrit 43.8 12/31/2024 04:53 AM    MCV 83 12/31/2024 04:53 AM    Platelets 222 12/31/2024 04:53 AM     Lab Results   Component Value Date/Time    Absolute Neutrophils 5.38 12/31/2024 04:53 AM        Trend:  Lab Results   Component Value Date     16.6 12/31/2024       Radiology Results Review : I have reviewed images/report studies in PACS as described above (in the HPI).  Other Study Results Review : Pathology reports reviewed.

## 2025-01-27 NOTE — TELEPHONE ENCOUNTER
Called and spoke with patient about tomorrow's virtual/ phone visit.  Told patient that Dr. Jung will be in the OR all day and will call her when he has downtime.  Dr. Jung may call her from 8 to 4:30 and Patient gave her verbal understanding.  Patient stated that Dr. Jung can call her on her work phone at [phone] and second number is [phone].  Patient is grateful and will wait for his call tomorrow.

## 2025-01-28 ENCOUNTER — TELEMEDICINE (OUTPATIENT)
Dept: GYNECOLOGIC ONCOLOGY | Facility: CLINIC | Age: 40
End: 2025-01-28
Payer: COMMERCIAL

## 2025-01-28 DIAGNOSIS — N83.8 OVARIAN MASS: Primary | ICD-10-CM

## 2025-01-28 PROCEDURE — 98013 SYNCH AUDIO-ONLY EST LOW 20: CPT | Performed by: OBSTETRICS & GYNECOLOGY

## 2025-01-28 NOTE — PROGRESS NOTES
Virtual Brief Visit  Name: Tanya Rosenthal      : 1985      MRN: [id number]  Encounter Provider: Varghese Jung MD  Encounter Date: 2025   Encounter department: [facility] [place]    This Visit is being completed by telephone. The Patient is located at Home and in the following state in which I hold an active license [place]    The patient was identified by name and date of birth. Tanya Rosenthal was informed that this is a telemedicine visit and that the visit is being conducted through Telephone.  Other methods to assure confidentiality were taken-no one was nearby. No one else was in the room.  She acknowledged consent and understanding of privacy and security of the video platform. The patient has agreed to participate and understands they can discontinue the visit at any time.    Patient is aware this is a billable service.     :  Assessment & Plan  Ovarian mass  39-year-old who does not desire future fertility with morbid obesity, BMI 43 kg/m² with bilateral adnexal lesions.  The right measures 7.3 x 6.9 cm.  The left measures 5.9 x 4.3 cm.  By ultrasound, the left ovarian mass is orad category 4 and the right ovarian mass is O rad category 3.  Tumor markers were negative.  She has a planned partial gastrectomy for biopsy-proven just of the stomach.  Her performance status is 0.  1.  I discussed the risks and benefits of performing total laparoscopic hysterectomy with possible bilateral salpingo-oophorectomy, possible exploratory laparotomy, all other indicated procedures  Including staging if needed.  She understands the risks of the operation including the additional risks of wound healing problems  due to morbid obesity and she agrees to proceed as outlined.  Verbal consent was obtained by me over the phone.  She will sign consent day of surgery.  2.  We reviewed perioperative medication management and activity limitations.             History of Present  Illness   Who presents for virtual visit to discuss the risks and benefits of hysterectomy and bilateral salpingo-oophorectomy due to bilateral ovarian masses identified at the time of CT imaging for known gastric GIST.  She is not having abnormal uterine bleeding currently.  No history of abnormal Pap smears.  She had a visit in the office plan, however, due to a family emergency, the visit was unable to be completed.  She has no complaints.          Visit Time  Total Visit Duration: 15 minutes

## 2025-01-29 ENCOUNTER — DOCUMENTATION (OUTPATIENT)
Dept: HEMATOLOGY ONCOLOGY | Facility: CLINIC | Age: 40
End: 2025-01-29

## 2025-01-29 ENCOUNTER — APPOINTMENT (OUTPATIENT)
Dept: LAB | Age: 40
End: 2025-01-29
Payer: COMMERCIAL

## 2025-01-29 ENCOUNTER — LAB REQUISITION (OUTPATIENT)
Dept: LAB | Facility: HOSPITAL | Age: 40
End: 2025-01-29
Payer: COMMERCIAL

## 2025-01-29 DIAGNOSIS — C49.A2 GASTROINTESTINAL STROMAL TUMOR OF STOMACH (HCC): ICD-10-CM

## 2025-01-29 DIAGNOSIS — C49.A2 MALIGNANT GASTROINTESTINAL STROMAL TUMOR (GIST) OF STOMACH (HCC): ICD-10-CM

## 2025-01-29 LAB
ABO GROUP BLD: NORMAL
ALBUMIN SERPL BCG-MCNC: 4.1 G/DL (ref 3.5–5)
ALP SERPL-CCNC: 71 U/L (ref 34–104)
ALT SERPL W P-5'-P-CCNC: 13 U/L (ref 7–52)
ANION GAP SERPL CALCULATED.3IONS-SCNC: 14 MMOL/L (ref 4–13)
APTT PPP: 38 SECONDS (ref 23–34)
AST SERPL W P-5'-P-CCNC: 15 U/L (ref 13–39)
BASOPHILS # BLD AUTO: 0.05 THOUSANDS/ΜL (ref 0–0.1)
BASOPHILS NFR BLD AUTO: 1 % (ref 0–1)
BILIRUB SERPL-MCNC: 0.25 MG/DL (ref 0.2–1)
BLD GP AB SCN SERPL QL: NEGATIVE
BUN SERPL-MCNC: 18 MG/DL (ref 5–25)
CALCIUM SERPL-MCNC: 7.3 MG/DL (ref 8.4–10.2)
CHLORIDE SERPL-SCNC: 98 MMOL/L (ref 96–108)
CO2 SERPL-SCNC: 30 MMOL/L (ref 21–32)
CREAT SERPL-MCNC: 1.18 MG/DL (ref 0.6–1.3)
EOSINOPHIL # BLD AUTO: 0.25 THOUSAND/ΜL (ref 0–0.61)
EOSINOPHIL NFR BLD AUTO: 3 % (ref 0–6)
ERYTHROCYTE [DISTWIDTH] IN BLOOD BY AUTOMATED COUNT: 13.8 % (ref 11.6–15.1)
EST. AVERAGE GLUCOSE BLD GHB EST-MCNC: 131 MG/DL
GFR SERPL CREATININE-BSD FRML MDRD: 57 ML/MIN/1.73SQ M
GLUCOSE P FAST SERPL-MCNC: 77 MG/DL (ref 65–99)
HBA1C MFR BLD: 6.2 %
HCT VFR BLD AUTO: 46.7 % (ref 34.8–46.1)
HGB BLD-MCNC: 13.8 G/DL (ref 11.5–15.4)
IMM GRANULOCYTES # BLD AUTO: 0.04 THOUSAND/UL (ref 0–0.2)
IMM GRANULOCYTES NFR BLD AUTO: 0 % (ref 0–2)
INR PPP: 0.96 (ref 0.85–1.19)
LYMPHOCYTES # BLD AUTO: 2.48 THOUSANDS/ΜL (ref 0.6–4.47)
LYMPHOCYTES NFR BLD AUTO: 26 % (ref 14–44)
MCH RBC QN AUTO: 25.5 PG (ref 26.8–34.3)
MCHC RBC AUTO-ENTMCNC: 29.6 G/DL (ref 31.4–37.4)
MCV RBC AUTO: 86 FL (ref 82–98)
MONOCYTES # BLD AUTO: 0.43 THOUSAND/ΜL (ref 0.17–1.22)
MONOCYTES NFR BLD AUTO: 4 % (ref 4–12)
NEUTROPHILS # BLD AUTO: 6.48 THOUSANDS/ΜL (ref 1.85–7.62)
NEUTS SEG NFR BLD AUTO: 66 % (ref 43–75)
NRBC BLD AUTO-RTO: 0 /100 WBCS
PLATELET # BLD AUTO: 273 THOUSANDS/UL (ref 149–390)
PMV BLD AUTO: 11.6 FL (ref 8.9–12.7)
POTASSIUM SERPL-SCNC: 3.7 MMOL/L (ref 3.5–5.3)
PROT SERPL-MCNC: 7.5 G/DL (ref 6.4–8.4)
PROTHROMBIN TIME: 13.1 SECONDS (ref 12.3–15)
RBC # BLD AUTO: 5.42 MILLION/UL (ref 3.81–5.12)
RH BLD: POSITIVE
SODIUM SERPL-SCNC: 142 MMOL/L (ref 135–147)
SPECIMEN EXPIRATION DATE: NORMAL
WBC # BLD AUTO: 9.73 THOUSAND/UL (ref 4.31–10.16)

## 2025-01-29 PROCEDURE — 85730 THROMBOPLASTIN TIME PARTIAL: CPT

## 2025-01-29 PROCEDURE — 86900 BLOOD TYPING SEROLOGIC ABO: CPT | Performed by: SURGERY

## 2025-01-29 PROCEDURE — 85025 COMPLETE CBC W/AUTO DIFF WBC: CPT

## 2025-01-29 PROCEDURE — 85610 PROTHROMBIN TIME: CPT

## 2025-01-29 PROCEDURE — 86901 BLOOD TYPING SEROLOGIC RH(D): CPT | Performed by: SURGERY

## 2025-01-29 PROCEDURE — 36415 COLL VENOUS BLD VENIPUNCTURE: CPT

## 2025-01-29 PROCEDURE — 86850 RBC ANTIBODY SCREEN: CPT | Performed by: SURGERY

## 2025-01-29 PROCEDURE — 80053 COMPREHEN METABOLIC PANEL: CPT

## 2025-01-29 PROCEDURE — 83036 HEMOGLOBIN GLYCOSYLATED A1C: CPT

## 2025-01-29 NOTE — PROGRESS NOTES
Received request from [facility], patient is having financial difficulties with OOP expenses for upcoming surgeries.  Call placed, spoke with Tanya, per previous call made patient was going to complete FA Application.  FA Application was submitted however per notes on guarantor account additional information is required prior to completing process. Informed patient  of missing information. She will work on this and submit   [employer] full month of pay stubs   3 mo bank statements   Signed tax return

## 2025-01-30 ENCOUNTER — PATIENT OUTREACH (OUTPATIENT)
Dept: CASE MANAGEMENT | Facility: OTHER | Age: 40
End: 2025-01-30

## 2025-02-03 ENCOUNTER — CONSULT (OUTPATIENT)
Dept: PALLIATIVE MEDICINE | Facility: CLINIC | Age: 40
End: 2025-02-03
Payer: COMMERCIAL

## 2025-02-03 VITALS
WEIGHT: 263 LBS | OXYGEN SATURATION: 97 % | DIASTOLIC BLOOD PRESSURE: 100 MMHG | HEART RATE: 60 BPM | BODY MASS INDEX: 43.82 KG/M2 | HEIGHT: 65 IN | SYSTOLIC BLOOD PRESSURE: 152 MMHG | TEMPERATURE: 97.6 F

## 2025-02-03 DIAGNOSIS — D49.0: ICD-10-CM

## 2025-02-03 DIAGNOSIS — Z51.5 PALLIATIVE CARE ENCOUNTER: ICD-10-CM

## 2025-02-03 DIAGNOSIS — Z71.89 GOALS OF CARE, COUNSELING/DISCUSSION: ICD-10-CM

## 2025-02-03 DIAGNOSIS — F41.9 ANXIETY: Primary | ICD-10-CM

## 2025-02-03 DIAGNOSIS — C49.A2 MALIGNANT GASTROINTESTINAL STROMAL TUMOR (GIST) OF STOMACH (HCC): ICD-10-CM

## 2025-02-03 PROCEDURE — 99204 OFFICE O/P NEW MOD 45 MIN: CPT | Performed by: STUDENT IN AN ORGANIZED HEALTH CARE EDUCATION/TRAINING PROGRAM

## 2025-02-03 NOTE — PROGRESS NOTES
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 South - Surgical Oncology  Dr. Jung -gynecology oncology    Plan for surgery on 2025  Laparoscopic total hysterectomy    Appetite has been stable.  No issues with pain.  No nausea, vomiting.  Is anxious about her surgery and is hopeful that she has good outcomes.  She is fully focused on disease directed cares at this time.  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  She states she follows Dr. Overton's office (Office # [phone])  She was advised to continue taking her Suboxone daily up until the surgery.  Discussed that based on her outcome of the surgery and see how she is doing with her pain issues, we can tailor her regimen.  We will work with her substance use specialist to determine a plan once we have a better idea of her pain needs after surgery.  Patient will have her operation in [place] and therefore our team can certainly assist with cancer related pain management postsurgery and determination of treatment plan.    Patient voices understanding and no other concerns at this time.    Orders:    Ambulatory Referral to Palliative Care    Submucosal neoplasm of gastrointestinal tract    Orders:    Ambulatory Referral to Palliative Care    Palliative care encounter  Psychosocial   Supportive listening provided  Normalized experience of patient/family  Provided anxiety containment     Referrals Placed / Medical Equipment Ordered  -None    Follow-Up Recommendations  -Follow-up with PCP and current medical specialists  -Follow-up with palliative care: follow-up after planned surgery          Anxiety  Started on Buspar but did not pick-up prescription to try from her PCP       Goals of care, counseling/discussion  Goals-fully disease directed treatments           Decisional apparatus: Patient is competent on my exam today. If competence is lost, patient's substitute decision maker would default to adult children by [place] Act 169.   Advance Directive / Living Will / POLST: None on file     PDMP Review: I have reviewed the patient's controlled substance dispensing history in the Prescription Drug Monitoring Program in compliance with the [facility] regulations before prescribing any controlled substances.    History of Present Illness   Tanya Rosenthal is a 40 y.o. female who presents for consultation.    Palliative Diagnosis -GIST    Past medical history-papillary thyroid carcinoma status post thyroidectomy, heroin abuse history, on Suboxone, obesity, anxiety.    Patient seen examined with no family accompanying her.  No acute distress or discomfort.  Some anxiety on examination,, calm and with appropriate behavior and affect.    She also is currently tobacco user with cigarette smoking of 1 pack which would last about 2 weeks.  Recommendations for smoking cessation.    Patient supported by her daughter, Theresa.    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    Medical History Reviewed by provider this encounter:  Tobacco  Allergies  Meds  Problems  Med Hx  Surg Hx  Fam Hx     .  Past Medical History   Past Medical History:   Diagnosis Date    Anxiety     Depression     Heroin abuse (HCC)     clean since 2015    Thyroid cancer (HCC) 1999    with thyroidectomy and RTX     Past Surgical History:   Procedure Laterality Date    EGD      GANGLION CYST EXCISION Left     THYROIDECTOMY       Family History   Problem Relation Age of Onset    Diabetes Mother     Stomach cancer Mother         maltoma lyphoma    Hyperlipidemia Father     Hypertension Father     Lung cancer Maternal Aunt     Breast cancer  Maternal Aunt 52    Lymphoma Paternal Uncle         non hodgkins      reports that she has been smoking cigarettes. She started smoking about 26 years ago. She has a 6.5 pack-year smoking history. She has never used smokeless tobacco. She reports that she does not currently use alcohol. She reports current drug use. Drug: Marijuana.  Current Facility-Administered Medications on File Prior to Visit   Medication Dose Route Frequency Provider Last Rate Last Admin    [COMPLETED] acetaminophen (TYLENOL) tablet 975 mg  975 mg Oral Once Varghese Jung MD   975 mg at 02/05/25 0718    ceFAZolin (ANCEF) IVPB (premix in dextrose) 2,000 mg 50 mL  2,000 mg Intravenous Once Tomás South MD        And    metroNIDAZOLE (FLAGYL) IVPB (premix) 500 mg 100 mL  500 mg Intravenous Once Tomás South MD        heparin (porcine) subcutaneous injection 5,000 Units  5,000 Units Subcutaneous On Call To OR Varghese Jung MD        lactated ringers infusion  125 mL/hr Intravenous Continuous Varghese Jung MD        [DISCONTINUED] ceFAZolin (ANCEF) IVPB (premix in dextrose) 2,000 mg 50 mL  2,000 mg Intravenous Once Varghese Jung MD        [DISCONTINUED] metroNIDAZOLE (FLAGYL) IVPB (premix) 500 mg 100 mL  500 mg Intravenous Once Varghese Jung MD         Current Outpatient Medications on File Prior to Visit   Medication Sig Dispense Refill    buprenorphine-naloxone (SUBOXONE) 8-2 mg per SL tablet Place 1 tablet under the tongue daily      calcium carbonate (OS-ALL) 600 MG tablet Take 1 tablet (600 mg total) by mouth daily 30 tablet 0    hydrOXYzine HCL (ATARAX) 10 mg tablet Take 1 tablet (10 mg total) by mouth every 6 (six) hours as needed for anxiety 30 tablet 0    levothyroxine 112 mcg tablet Take 112 mcg by mouth daily      ondansetron (ZOFRAN-ODT) 4 mg disintegrating tablet Take 4 mg by mouth every 6 (six) hours as needed for nausea or vomiting      pantoprazole (PROTONIX) 40 mg tablet Take  1 tablet (40 mg total) by mouth daily 90 tablet 0    sucralfate (CARAFATE) 1 g tablet Take 1 tablet (1 g total) by mouth every 6 (six) hours 120 tablet 0    busPIRone (BUSPAR) 7.5 mg tablet Take 7.5 mg by mouth 2 (two) times a day (Patient not taking: Reported on 2/3/2025)       Allergies   Allergen Reactions    Diphenhydramine Shortness Of Breath     Other reaction(s): DIPHENHYDRAMINE CITRATE (BENADRYL)    Erythromycin Shortness Of Breath and Hives     Category: Allergy;     Iodine-131 - Food Allergy Other (See Comments)    Iv Dye [Iodinated Contrast Media]       Current Facility-Administered Medications on File Prior to Visit   Medication Dose Route Frequency Provider Last Rate Last Admin    [COMPLETED] acetaminophen (TYLENOL) tablet 975 mg  975 mg Oral Once Varghese Jung MD   975 mg at 02/05/25 0718    ceFAZolin (ANCEF) IVPB (premix in dextrose) 2,000 mg 50 mL  2,000 mg Intravenous Once Tomás South MD        And    metroNIDAZOLE (FLAGYL) IVPB (premix) 500 mg 100 mL  500 mg Intravenous Once Tomás South MD        heparin (porcine) subcutaneous injection 5,000 Units  5,000 Units Subcutaneous On Call To OR Varghese Jung MD        lactated ringers infusion  125 mL/hr Intravenous Continuous Varghese Jung MD        [DISCONTINUED] ceFAZolin (ANCEF) IVPB (premix in dextrose) 2,000 mg 50 mL  2,000 mg Intravenous Once Varghese Jung MD        [DISCONTINUED] metroNIDAZOLE (FLAGYL) IVPB (premix) 500 mg 100 mL  500 mg Intravenous Once Varghese Jung MD         Current Outpatient Medications on File Prior to Visit   Medication Sig Dispense Refill    buprenorphine-naloxone (SUBOXONE) 8-2 mg per SL tablet Place 1 tablet under the tongue daily      calcium carbonate (OS-ALL) 600 MG tablet Take 1 tablet (600 mg total) by mouth daily 30 tablet 0    hydrOXYzine HCL (ATARAX) 10 mg tablet Take 1 tablet (10 mg total) by mouth every 6 (six) hours as needed for anxiety 30 tablet 0  "   levothyroxine 112 mcg tablet Take 112 mcg by mouth daily      ondansetron (ZOFRAN-ODT) 4 mg disintegrating tablet Take 4 mg by mouth every 6 (six) hours as needed for nausea or vomiting      pantoprazole (PROTONIX) 40 mg tablet Take 1 tablet (40 mg total) by mouth daily 90 tablet 0    sucralfate (CARAFATE) 1 g tablet Take 1 tablet (1 g total) by mouth every 6 (six) hours 120 tablet 0    busPIRone (BUSPAR) 7.5 mg tablet Take 7.5 mg by mouth 2 (two) times a day (Patient not taking: Reported on 2/3/2025)        Social History     Tobacco Use    Smoking status: Some Days     Current packs/day: 0.25     Average packs/day: 0.3 packs/day for 26.1 years (6.5 ttl pk-yrs)     Types: Cigarettes     Start date: 1/1/1999    Smokeless tobacco: Never    Tobacco comments:     Trying quitting  ago   Vaping Use    Vaping status: Every Day    Substances: THC, CBD   Substance and Sexual Activity    Alcohol use: Not Currently    Drug use: Yes     Types: Marijuana     Comment: 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    Sexual activity: Not on file        Objective   Ht 5' 5\" (1.651 m)   Wt 119 kg (263 lb)   LMP  (LMP Unknown)   BMI 43.77 kg/m²     Physical Exam  Vitals reviewed.   Constitutional:       General: She is not in acute distress.     Appearance: She is not ill-appearing, toxic-appearing or diaphoretic.   HENT:      Head: Normocephalic and atraumatic.      Nose: Nose normal.      Mouth/Throat:      Mouth: Mucous membranes are moist.   Eyes:      General:         Right eye: No discharge.         Left eye: No discharge.   Cardiovascular:      Rate and Rhythm: Normal rate.   Pulmonary:      Effort: Pulmonary effort is normal. No respiratory distress.   Abdominal:      General: Abdomen is flat. There is no distension.   Musculoskeletal:         General: No swelling.   Skin:     General: Skin is warm and dry.      Coloration: Skin is not jaundiced or pale.   Neurological:      General: No focal deficit present.      Mental " Status: She is alert. Mental status is at baseline.   Psychiatric:         Mood and Affect: Mood normal.         Behavior: Behavior normal.         Thought Content: Thought content normal.         Judgment: Judgment normal.         Recent labs:  Lab Results   Component Value Date/Time    SODIUM 142 01/29/2025 09:13 AM    SODIUM 142 12/25/2024 07:10 PM    K 3.7 01/29/2025 09:13 AM    K 4.3 12/25/2024 07:10 PM    BUN 18 01/29/2025 09:13 AM    BUN 19 12/25/2024 07:10 PM    CREATININE 1.18 01/29/2025 09:13 AM    CREATININE 1.28 (H) 12/25/2024 07:10 PM    GLUC 91 12/31/2024 04:53 AM    GLUC 84 12/25/2024 07:10 PM    CALCIUM 7.3 (L) 01/29/2025 09:13 AM    CALCIUM 6.6 (L) 12/25/2024 07:10 PM    AST 15 01/29/2025 09:13 AM    AST 17 12/25/2024 07:10 PM    ALT 13 01/29/2025 09:13 AM    ALT 10 12/25/2024 07:10 PM    ALB 4.1 01/29/2025 09:13 AM    ALB 4.1 12/25/2024 07:10 PM    TP 7.5 01/29/2025 09:13 AM    TP 7.2 12/25/2024 07:10 PM    EGFR 57 01/29/2025 09:13 AM    EGFR 54 (L) 12/25/2024 07:10 PM    EGFR 68 08/25/2020 02:00 AM     Lab Results   Component Value Date/Time    HGB 13.8 01/29/2025 09:13 AM    HGB 11.7 04/02/2015 05:46 AM    WBC 9.73 01/29/2025 09:13 AM    WBC 8.97 04/02/2015 05:46 AM     01/29/2025 09:13 AM     04/02/2015 05:46 AM    INR 0.96 01/29/2025 09:13 AM    INR 1.08 04/01/2015 12:51 PM    PTT 38 (H) 01/29/2025 09:13 AM    PTT 37 (H) 04/01/2015 12:51 PM     Lab Results   Component Value Date/Time    NFW3EFKPSETU 124.137 (H) 06/01/2016 07:59 PM    FDH3TGBBEQSP 99.220 (H) 04/02/2015 05:46 AM       Recent Imaging:  Procedure: Echo complete w/ contrast if indicated  Result Date: 1/24/2025  Narrative:   Left Ventricle: Left ventricular cavity size is normal. Wall thickness is normal. The left ventricular ejection fraction is 60%. Systolic function is normal. Wall motion is normal. Diastolic function is normal.   Right Ventricle: Right ventricular cavity size is normal. Systolic function is normal.      Procedure: CT chest wo contrast  Result Date: 1/13/2025  Narrative: CT CHEST WITHOUT IV CONTRAST INDICATION: C49.A2: Gastrointestinal stromal tumor of stomach. COMPARISON: None. TECHNIQUE: CT examination of the chest was performed without intravenous contrast. Multiplanar 2D reformatted images were created from the source data. This examination, like all CT scans performed in the [facility], was performed utilizing techniques to minimize radiation dose exposure, including the use of iterative reconstruction and automated exposure control. Radiation dose length product (DLP) for this visit: 1064 mGy-cm FINDINGS: LUNGS: Scattered tiny 1 to 2 mm solid nodules throughout both lungs. There is no tracheal or endobronchial lesion. PLEURA: Unremarkable. HEART/GREAT VESSELS: Heart is unremarkable for patient's age. No thoracic aortic aneurysm. MEDIASTINUM AND CHAZ: Mildly enlarged lymph node anterior to the left main pulmonary artery measuring 1.1 cm, present 2010 measuring 0.8 cm. CHEST WALL AND LOWER NECK: Unremarkable. VISUALIZED STRUCTURES IN THE UPPER ABDOMEN: Again seen is a posterior gastric fundal mass, better evaluated on prior abdominal CT. OSSEOUS STRUCTURES: No acute fracture or destructive osseous lesion.     Impression: Scattered tiny 1 to 2 mm solid nodules throughout both lungs, unlikely to represent metastatic disease. However, follow-up chest CT in 3 months is recommended to ensure stability. Mildly enlarged lymph node anterior to the left main pulmonary artery, slightly increased in size since 2010, likely reactive. Attention on above recommended CT. The study was marked in EPIC for significant notification. Workstation performed: [id number]     Procedure: Endoscopic ultrasonography, GI (Upper) Linear with FNA  Result Date: 1/7/2025  Narrative: Table formatting from the original result was not included. [facility] [address] [facility] [address]  [phone] DATE OF SERVICE: 1/07/25 PHYSICIAN(S): Attending: Luciana Doan MD Fellow: No Staff Documented INDICATION: Submucosal neoplasm of gastrointestinal tract POST-OP DIAGNOSIS: See the impression below. PREPROCEDURE: Informed consent was obtained for the procedure, including sedation.  Risks of perforation, hemorrhage, adverse drug reaction and aspiration were discussed. The patient was placed in the left lateral decubitus position. Patient was explained about the risks and benefits of the procedure. Risks including but not limited to bleeding, infection, and perforation were explained in detail. Also explained about less than 100% sensitivity with the exam and other alternatives. PROCEDURE: EUS UPPER DETAILS OF PROCEDURE: Patient was taken to the procedure room where a time out was performed to confirm correct patient and correct procedure. The patient underwent monitored anesthesia care, which was administered by an anesthesia professional. The patient's blood pressure, heart rate, oxygen, respirations, level of consciousness, ECG and ETCO2 were monitored throughout the procedure. The endoscope and linear scope were introduced through the mouth. The patient experienced no blood loss. The procedure was not difficult. The patient tolerated the procedure well. There were no apparent adverse events. ANESTHESIA INFORMATION: ASA: III Anesthesia Type: IV Sedation with Anesthesia MEDICATIONS: No administrations occurring from 1020 to 1045 on 01/07/25 FINDINGS: EGD The esophagus appeared normal. Submucosal mass in the gastric cardia. Overlying mucosa appeared normal. The duodenal bulb and 2nd part of the duodenum appeared normal. EUS Homogeneous, hypoechoic, lobulated and oval mass measuring 45 mm x 47 mm with well-defined and smooth margins was visualized in the cardia and fundus of the stomach, contained within the muscularis propria; 3 successful fine needle biopsy passes were taken with a 22 gauge Chandler  Scientific needle using a transgastric approach guided by Doppler. An adequate sample was obtained. Likely consistent with gastrointestinal stromal tumor. SPECIMENS: ID Type Source Tests Collected by Time Destination 1 : gastric submucosal lesion - concerning for GIST Tissue Stomach TISSUE EXAM Luciana Doan MD 1/7/2025 10:35 AM       Impression: EGD The esophagus appeared normal. Submucosal mass in the gastric cardia. Overlying mucosa appeared normal. The duodenal bulb and 2nd part of the duodenum appeared normal. EUS Homogeneous, hypoechoic, lobulated and oval mass measuring 45 mm x 47 mm with well-defined and smooth margins was visualized in the cardia and fundus of the stomach, contained within the muscularis propria; 3 fine needle biopsy passes were taken. An adequate sample was obtained RECOMMENDATION: Follow up biopsy results      Procedure: EGD  Result Date: 12/30/2024  Narrative: Table formatting from the original result was not included. [facility] [address] [facility] [id number] [phone] DATE OF SERVICE: 12/30/24 PHYSICIAN(S): Attending: Katerina Garcia MD Fellow: Msoes Guo MD INDICATION: Gastric mass POST-OP DIAGNOSIS: See the impression below. PREPROCEDURE: Informed consent was obtained for the procedure, including sedation.  Risks of perforation, hemorrhage, adverse drug reaction and aspiration were discussed. The patient was placed in the left lateral decubitus position. Patient was explained about the risks and benefits of the procedure. Risks including but not limited to bleeding, infection, and perforation were explained in detail. Also explained about less than 100% sensitivity with the exam and other alternatives. PROCEDURE: EGD DETAILS OF PROCEDURE: Patient was taken to the procedure room where a time out was performed to confirm correct patient and correct procedure. The patient underwent monitored anesthesia care, which was administered by an  anesthesia professional. The patient's blood pressure, heart rate, level of consciousness, respirations, oxygen, ECG and ETCO2 were monitored throughout the procedure. The scope was introduced through the mouth and advanced to the second part of the duodenum. Retroflexion was performed in the fundus. Prior to the procedure, the patient's H. Pylori status was unknown. The patient experienced no blood loss. The procedure was not difficult. The patient tolerated the procedure well. There were no apparent adverse events. ANESTHESIA INFORMATION: ASA: III Anesthesia Type: IV Sedation with Anesthesia MEDICATIONS: simethicone (MYLICON) 30 mg in sterile water 60 mL 30 mg (Totals for administrations occurring from 1316 to 1338 on 12/30/24) FINDINGS: The esophagus was normal. Regular Z-line 38 cm from the incisors There was a large mass on the posterior wall of the cardia just below the GEJ. The mass appeared to be submucosal. Biopsy forceps were used to attempt a pillow sign but went through the lesion and caused bleeding. Biopsies were not performed given concern for bleeding. The duodenum was normal. SPECIMENS: * No specimens in log *     Impression: The esophagus, duodenal bulb and 2nd part of the duodenum appeared normal. Single large subepithelial lesion in the cardia. Probing with forceps resulted in penetration into the lesion and bleeding without further interrogation to avoid further hemorrhage  RECOMMENDATION: Recommend EUS with FNA either inpatient or outpatient for tissue sampling    Katerina Garcia MD     Procedure: US pelvis complete w transvaginal  Result Date: 12/29/2024  Narrative: PELVIC ULTRASOUND, COMPLETE INDICATION: The patient is 39 years old. ovarian cysts vs. masses seen on earlier CT today, require further characterization. Last menstrual period unknown. COMPARISON: CT abdomen pelvis 12/29/2024. TECHNIQUE: Transabdominal pelvic ultrasound was performed in sagittal and transverse planes with a  curvilinear transducer. Additional transvaginal imaging was performed to better evaluate the endometrium and ovaries. Imaging included volumetric sweeps as  well as traditional still imaging technique. FINDINGS: UTERUS: The uterus is anteverted in position, measuring 7.9 x 3.6 x 4.7 cm. The uterus has a normal contour and echotexture. The cervix appears within normal limits. ENDOMETRIUM: The endometrial echo complex has an AP caliber of 6.0 mm. Appearance within normal limits. OVARIES/ADNEXA: Right ovary: 7.0 x 6.8 x 5.1 cm. 127.2 mL. Ovarian Doppler flow is within normal limits. Focal pelvic lesion as follows: Lesion: 1 - Situs: Right. - Relation to ovary: Intraovarian. - Size: 6.3 x 4.5 x 6.4 cm. - Category/Lesion Type: Non-classic lesion as detailed below: *  Morphology: Unilocular cystic lesion. *  Inner wall: Irregular. *  Internal septations: Irregular. *  Solid component: None. *  Outer contour: Smooth. *  Color score: Color score 1 = No flow *  Ascites: Absent. *  Peritoneal nodules: Absent. - O-RADS Assessment Category: O-RADS 3 = Low risk - Management recommendation: If not surgically excised, consider follow-up US within 6 months. REFERENCE: O-RADS US v2022 Left ovary: 7.6 x 4.0 x 5.0 cm. 79.8 mL. Ovarian Doppler flow is within normal limits. Focal pelvic lesion as follows: Lesion: 2 - Situs: Left. - Relation to ovary: Intraovarian. - Size: 7.4 x 4.1 x 4.3 cm. - Category/Lesion Type: Non-classic lesion as detailed below: *  Morphology: Bilocular cystic lesion. *  Inner wall: Irregular. *  Internal septations: Irregular. *  Solid component: 1-3 papillary projections. *  Outer contour: Smooth. *  Color score: Color score 1 = No flow *  Ascites: Absent. *  Peritoneal nodules: Absent. - O-RADS Assessment Category: O-RADS 4 = Intermediate risk - Management recommendation: Imaging options include evaluation by ultrsaound specialist, MRI, or per GYN-oncologist protocol. Clinical management by gynecologist with  GYN-oncologist consultation or soley by GYN-oncologist. REFERENCE: O-RADS US v2022 OTHER: No free fluid or loculated fluid collections.     Impression: A 7.4 cm left ovarian bilocular cystic lesion with solid papillary projections without internal vascularity. O-RADS 4 = Intermediate risk.  Imaging options include evaluation by ultrsaound specialist, MRI, or per GYN-oncologist protocol. Clinical management by gynecologist with GYN-oncologist consultation or soley by GYN-oncologist. A 6.4 cm right ovarian unilocular cystic lesion with irregular septations without internal vascularity. O-RADS 3 = Low risk  If not surgically excised, consider follow-up US within 6 months. The study was marked in EPIC for immediate notification. Workstation performed: [id number]     Procedure: CT abdomen pelvis wo contrast  Result Date: 12/29/2024  Narrative: CT ABDOMEN AND PELVIS WITHOUT IV CONTRAST INDICATION: epigatric pain, concern for mass on recent non-contrasted CT with recommendation to repeat with contrast. Patient with IV contrast allergy. COMPARISON: None. TECHNIQUE: CT examination of the abdomen and pelvis was performed without intravenous contrast. Multiplanar 2D reformatted images were created from the source data. This examination, like all CT scans performed in the [facility], was performed utilizing techniques to minimize radiation dose exposure, including the use of iterative reconstruction and automated exposure control. Radiation dose length product (DLP) for this visit: 1774.57 mGy-cm Enteric Contrast: Not administered. FINDINGS: ABDOMEN LOWER CHEST: No clinically significant abnormality in the visualized lower chest. LIVER/BILIARY TREE: Unremarkable. GALLBLADDER: No calcified gallstones. No pericholecystic inflammatory change. SPLEEN: Unremarkable. PANCREAS: Mildly atrophic. ADRENAL GLANDS: Unremarkable. KIDNEYS/URETERS: Unremarkable. No hydronephrosis. STOMACH AND BOWEL: There is a 6.0 x 4.4 x 4.5  cm lobulated mass centered within the posterior gastric wall of the proximal stomach just beyond the GE junction. This partially extends into the lumen of the stomach as well as outside of the stomach thereby  localizing this mass likely within the wall of the stomach. An underlying tumor such as GIST must be excluded. There is no bowel obstruction or focal inflammatory process. APPENDIX: Normal. ABDOMINOPELVIC CAVITY: No ascites. No pneumoperitoneum. No lymphadenopathy. VESSELS: Scattered abdominal aortic calcifications. PELVIS REPRODUCTIVE ORGANS: There is a 7.3 x 6.9 cm hyperdense mass in the right adnexal region suspicious for a large hemorrhagic ovarian cyst but is not definitive. This can be further evaluated with pelvic ultrasound. Immediately anteriorly into the left is a septated mass measuring 5.9 x 4.3 cm possibly the left ovarian septated cystic lesion. These can be further evaluated with pelvic ultrasound. URINARY BLADDER: Unremarkable. ABDOMINAL WALL/INGUINAL REGIONS: Unremarkable. BONES: No acute fracture or suspicious osseous lesion.     Impression: Lobulated mass centered within the posterior gastric wall of the proximal stomach. A tumor such as GIST must be excluded. Pelvic masses suspicious for ovarian masses or cysts as described above. These can be further evaluated with pelvic ultrasound. Workstation performed: [id number]     Procedure: XR chest 1 view portable  Result Date: 12/29/2024  Narrative: XR CHEST PORTABLE INDICATION: chest pain. COMPARISON: Chest x-ray dated August 19, 2020 FINDINGS: Clear lungs. No pneumothorax or pleural effusion. Normal cardiomediastinal silhouette. Bones are unremarkable for age. Normal upper abdomen.     Impression: No acute cardiopulmonary disease. Workstation performed: [id number]     Procedure: CT renal stone study abdomen pelvis wo contrast  Result Date: 12/25/2024  Narrative: History: Epigastric pain Exam: Nonenhanced CT of the abdomen and pelvis.    Technique: Using helical technique, axial images were obtained through the abdomen and pelvis. Coronal and sagittal reformations were performed.   Comparison: None     Findings: Bones: Osseous structures are intact. Abdominal wall: Normal. Lung bases: Normal. Bowel/mesentery: No evidence for free air or free fluid. No GI obstruction. Appendix is normal. Somewhat exophytic mass involving the posterior wall the gastric cardia measuring 4.4 x 3.5 cm. Lack of IV and oral contrast limits evaluation. Vessels: Abdominal aorta is normal caliber with mild atherosclerotic disease. Lymph nodes: No retroperitoneal lymphadenopathy. Spleen: Normal. Pancreas: Normal. Adrenal glands: Normal. Liver: Normal. Gallbladder: Normal. Kidneys/ureters: No evidence for hydronephrosis or nephrolithiasis. Bladder/pelvis: Bladder is grossly unremarkable. Lobulated heterogeneous uterus, likely secondary to underlying fibroids.    Impression: IMPRESSION: Possible 4.4 x 3.5 cm exophytic mass involving the posterior gastric cardia/fundus wall. Lack of IV and oral contrast limits evaluation. This could be related to a gastric diverticulum, however, gastric wall mass such as gist tumor cannot be excluded. Consider repeat evaluation with oral and IV contrast for further characterization. No GI obstruction. No hydronephrosis or nephrolithiasis. Lobulated heterogeneous uterus likely secondary to underlying fibroids. Nonemergent outpatient pelvic ultrasound can be performed for better evaluation. Workstation:[id number]    Procedure: XR chest portable  Result Date: 12/25/2024  Narrative: History: Mid upper abdomen and epigastric pain Technique: Single semierect portable AP frontal view of the chest Comparison: August 15, 2024 Findings: Normal heart size. Normal pulmonary vascularity. Central airways are patent. The lungs are well inflated. No acute geographic interstitial process or focal airspace disease. No pneumothorax or pleural effusion. No acute  osseous abnormality.     Impression: Impression: No acute radiographic cardiopulmonary process. Workstation:[id number]      Administrative Statements   I have spent a total time of 46 minutes in caring for this patient on the day of the visit/encounter including Risks and benefits of tx options, Instructions for management, Patient and family education, Importance of tx compliance, Risk factor reductions, Impressions, Counseling / Coordination of care, Documenting in the medical record, Reviewing / ordering tests, medicine, procedures  , and Obtaining or reviewing history  . Topics discussed with the patient / family include symptom assessment and management, medication review, psychosocial support, goals of care, supportive listening, and anticipatory guidance.

## 2025-02-03 NOTE — ASSESSMENT & PLAN NOTE
Following Dr. South - Surgical Oncology  Dr. Jung -gynecology oncology    Plan for surgery on 2/5/2025  Laparoscopic total hysterectomy    Appetite has been stable.  No issues with pain.  No nausea, vomiting.  Is anxious about her surgery and is hopeful that she has good outcomes.  She is fully focused on disease directed cares at this time.  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  She states she follows Dr. Overton's office (Office # [phone])  She was advised to continue taking her Suboxone daily up until the surgery.  Discussed that based on her outcome of the surgery and see how she is doing with her pain issues, we can tailor her regimen.  We will work with her substance use specialist to determine a plan once we have a better idea of her pain needs after surgery.  Patient will have her operation in [place] and therefore our team can certainly assist with cancer related pain management postsurgery and determination of treatment plan.    Patient voices understanding and no other concerns at this time.    Orders:    Ambulatory Referral to Palliative Care

## 2025-02-03 NOTE — PATIENT INSTRUCTIONS
It was a pleasure speaking with you today.    As discussed:  -Continue your medications as prescribed.  -Continue with a bowel regimen to avoid constipation.    Follow-up in: 2/26/2025     Please do not hesitate to call me sooner should you have any questions/concerns or needs.    Medication safety issues - Do not drive under the influence of narcotics (including opioids), watch for adverse effects including confusion / altered mental status / respiratory depression (slowed breathing), keep medications stored in a safe/locked environment, do not use alcohol while opioids or other narcotics are in your system. Do not travel with more than the minimum number of tablets or capsules required for the trip.    ER Precautions  Watch for red flag symptoms including, but not limited to fevers, chills, chest pain, shortness of breath, intractable nausea/vomiting/diarrhea, or acute intractable pain (especially if pain is new or has changed).

## 2025-02-05 ENCOUNTER — HOSPITAL ENCOUNTER (INPATIENT)
Facility: HOSPITAL | Age: 40
LOS: 4 days | Discharge: HOME | DRG: 327 | End: 2025-02-09
Attending: OBSTETRICS & GYNECOLOGY | Admitting: OBSTETRICS & GYNECOLOGY
Payer: COMMERCIAL

## 2025-02-05 ENCOUNTER — ANESTHESIA (OUTPATIENT)
Dept: PERIOP | Facility: HOSPITAL | Age: 40
DRG: 327 | End: 2025-02-05
Payer: COMMERCIAL

## 2025-02-05 DIAGNOSIS — Z90.710 STATUS POST TOTAL HYSTERECTOMY AND BILATERAL SALPINGO-OOPHORECTOMY (BSO): ICD-10-CM

## 2025-02-05 DIAGNOSIS — F11.11 HISTORY OF HEROIN ABUSE (HCC): Primary | ICD-10-CM

## 2025-02-05 DIAGNOSIS — C49.A2 MALIGNANT GASTROINTESTINAL STROMAL TUMOR (GIST) OF STOMACH (HCC): ICD-10-CM

## 2025-02-05 DIAGNOSIS — E89.40 SURGICAL MENOPAUSE: ICD-10-CM

## 2025-02-05 DIAGNOSIS — Z90.79 STATUS POST TOTAL HYSTERECTOMY AND BILATERAL SALPINGO-OOPHORECTOMY (BSO): ICD-10-CM

## 2025-02-05 DIAGNOSIS — D49.0: ICD-10-CM

## 2025-02-05 DIAGNOSIS — Z90.722 STATUS POST TOTAL HYSTERECTOMY AND BILATERAL SALPINGO-OOPHORECTOMY (BSO): ICD-10-CM

## 2025-02-05 DIAGNOSIS — N83.8 OTHER NONINFLAMMATORY DISORDERS OF OVARY, FALLOPIAN TUBE AND BROAD LIGAMENT: ICD-10-CM

## 2025-02-05 DIAGNOSIS — F11.91 OPIOID USE DISORDER IN REMISSION: ICD-10-CM

## 2025-02-05 DIAGNOSIS — G89.3 CANCER RELATED PAIN: ICD-10-CM

## 2025-02-05 PROBLEM — Z71.89 GOALS OF CARE, COUNSELING/DISCUSSION: Status: ACTIVE | Noted: 2025-02-05

## 2025-02-05 PROBLEM — Z51.5 PALLIATIVE CARE ENCOUNTER: Status: ACTIVE | Noted: 2025-02-05

## 2025-02-05 LAB
ABO GROUP BLD: NORMAL
EXT PREGNANCY TEST URINE: NEGATIVE
EXT. CONTROL: NORMAL
GLUCOSE SERPL-MCNC: 228 MG/DL (ref 65–140)
RH BLD: POSITIVE

## 2025-02-05 PROCEDURE — 58571 TLH W/T/O 250 G OR LESS: CPT | Performed by: OBSTETRICS & GYNECOLOGY

## 2025-02-05 PROCEDURE — 88331 PATH CONSLTJ SURG 1 BLK 1SPC: CPT | Performed by: PATHOLOGY

## 2025-02-05 PROCEDURE — 82948 REAGENT STRIP/BLOOD GLUCOSE: CPT

## 2025-02-05 PROCEDURE — 88309 TISSUE EXAM BY PATHOLOGIST: CPT | Performed by: PATHOLOGY

## 2025-02-05 PROCEDURE — 0UT74ZZ RESECTION OF BILATERAL FALLOPIAN TUBES, PERCUTANEOUS ENDOSCOPIC APPROACH: ICD-10-PCS | Performed by: OBSTETRICS & GYNECOLOGY

## 2025-02-05 PROCEDURE — 88307 TISSUE EXAM BY PATHOLOGIST: CPT | Performed by: PATHOLOGY

## 2025-02-05 PROCEDURE — 88341 IMHCHEM/IMCYTCHM EA ADD ANTB: CPT | Performed by: PATHOLOGY

## 2025-02-05 PROCEDURE — 0DB60ZZ EXCISION OF STOMACH, OPEN APPROACH: ICD-10-PCS | Performed by: SURGERY

## 2025-02-05 PROCEDURE — 0UT24ZZ RESECTION OF BILATERAL OVARIES, PERCUTANEOUS ENDOSCOPIC APPROACH: ICD-10-PCS | Performed by: OBSTETRICS & GYNECOLOGY

## 2025-02-05 PROCEDURE — 81025 URINE PREGNANCY TEST: CPT | Performed by: OBSTETRICS & GYNECOLOGY

## 2025-02-05 PROCEDURE — 43610 EXCISION OF STOMACH LESION: CPT | Performed by: SURGERY

## 2025-02-05 PROCEDURE — 88342 IMHCHEM/IMCYTCHM 1ST ANTB: CPT | Performed by: PATHOLOGY

## 2025-02-05 PROCEDURE — 0UT94ZZ RESECTION OF UTERUS, PERCUTANEOUS ENDOSCOPIC APPROACH: ICD-10-PCS | Performed by: OBSTETRICS & GYNECOLOGY

## 2025-02-05 RX ORDER — HYDROMORPHONE HCL/PF 1 MG/ML
0.5 SYRINGE (ML) INJECTION
Status: DISCONTINUED | OUTPATIENT
Start: 2025-02-05 | End: 2025-02-05 | Stop reason: HOSPADM

## 2025-02-05 RX ORDER — METRONIDAZOLE 500 MG/100ML
500 INJECTION, SOLUTION INTRAVENOUS ONCE
Status: DISCONTINUED | OUTPATIENT
Start: 2025-02-05 | End: 2025-02-05 | Stop reason: SDUPTHER

## 2025-02-05 RX ORDER — CEFAZOLIN SODIUM 2 G/50ML
2000 SOLUTION INTRAVENOUS ONCE
Status: COMPLETED | OUTPATIENT
Start: 2025-02-05 | End: 2025-02-05

## 2025-02-05 RX ORDER — ACETAMINOPHEN 325 MG/1
975 TABLET ORAL ONCE
Status: COMPLETED | OUTPATIENT
Start: 2025-02-05 | End: 2025-02-05

## 2025-02-05 RX ORDER — PROPOFOL 10 MG/ML
INJECTION, EMULSION INTRAVENOUS CONTINUOUS PRN
Status: DISCONTINUED | OUTPATIENT
Start: 2025-02-05 | End: 2025-02-05

## 2025-02-05 RX ORDER — ROPIVACAINE HYDROCHLORIDE 2 MG/ML
INJECTION, SOLUTION EPIDURAL; INFILTRATION; PERINEURAL
Status: DISCONTINUED | OUTPATIENT
Start: 2025-02-05 | End: 2025-02-05

## 2025-02-05 RX ORDER — ACETAMINOPHEN 10 MG/ML
1000 INJECTION, SOLUTION INTRAVENOUS ONCE
Status: COMPLETED | OUTPATIENT
Start: 2025-02-05 | End: 2025-02-05

## 2025-02-05 RX ORDER — CEFAZOLIN SODIUM 2 G/50ML
2000 SOLUTION INTRAVENOUS ONCE
Status: DISCONTINUED | OUTPATIENT
Start: 2025-02-05 | End: 2025-02-05 | Stop reason: SDUPTHER

## 2025-02-05 RX ORDER — HEPARIN SODIUM 5000 [USP'U]/ML
5000 INJECTION, SOLUTION INTRAVENOUS; SUBCUTANEOUS
Status: COMPLETED | OUTPATIENT
Start: 2025-02-05 | End: 2025-02-05

## 2025-02-05 RX ORDER — ONDANSETRON 2 MG/ML
INJECTION INTRAMUSCULAR; INTRAVENOUS AS NEEDED
Status: DISCONTINUED | OUTPATIENT
Start: 2025-02-05 | End: 2025-02-05

## 2025-02-05 RX ORDER — SODIUM CHLORIDE, SODIUM GLUCONATE, SODIUM ACETATE, POTASSIUM CHLORIDE, MAGNESIUM CHLORIDE, SODIUM PHOSPHATE, DIBASIC, AND POTASSIUM PHOSPHATE .53; .5; .37; .037; .03; .012; .00082 G/100ML; G/100ML; G/100ML; G/100ML; G/100ML; G/100ML; G/100ML
125 INJECTION, SOLUTION INTRAVENOUS CONTINUOUS
Status: DISCONTINUED | OUTPATIENT
Start: 2025-02-05 | End: 2025-02-06

## 2025-02-05 RX ORDER — ALBUTEROL SULFATE 0.83 MG/ML
2.5 SOLUTION RESPIRATORY (INHALATION) ONCE AS NEEDED
Status: DISCONTINUED | OUTPATIENT
Start: 2025-02-05 | End: 2025-02-05 | Stop reason: HOSPADM

## 2025-02-05 RX ORDER — SODIUM CHLORIDE, SODIUM LACTATE, POTASSIUM CHLORIDE, CALCIUM CHLORIDE 600; 310; 30; 20 MG/100ML; MG/100ML; MG/100ML; MG/100ML
75 INJECTION, SOLUTION INTRAVENOUS CONTINUOUS
Status: DISCONTINUED | OUTPATIENT
Start: 2025-02-05 | End: 2025-02-05

## 2025-02-05 RX ORDER — MIDAZOLAM HYDROCHLORIDE 2 MG/2ML
INJECTION, SOLUTION INTRAMUSCULAR; INTRAVENOUS AS NEEDED
Status: DISCONTINUED | OUTPATIENT
Start: 2025-02-05 | End: 2025-02-05

## 2025-02-05 RX ORDER — MAGNESIUM HYDROXIDE 1200 MG/15ML
LIQUID ORAL AS NEEDED
Status: DISCONTINUED | OUTPATIENT
Start: 2025-02-05 | End: 2025-02-05 | Stop reason: HOSPADM

## 2025-02-05 RX ORDER — KETAMINE HCL IN NACL, ISO-OSM 100MG/10ML
SYRINGE (ML) INJECTION AS NEEDED
Status: DISCONTINUED | OUTPATIENT
Start: 2025-02-05 | End: 2025-02-05

## 2025-02-05 RX ORDER — METRONIDAZOLE 500 MG/100ML
500 INJECTION, SOLUTION INTRAVENOUS ONCE
Status: COMPLETED | OUTPATIENT
Start: 2025-02-05 | End: 2025-02-05

## 2025-02-05 RX ORDER — HYDROMORPHONE HCL/PF 1 MG/ML
SYRINGE (ML) INJECTION AS NEEDED
Status: DISCONTINUED | OUTPATIENT
Start: 2025-02-05 | End: 2025-02-05

## 2025-02-05 RX ORDER — HEPARIN SODIUM 5000 [USP'U]/ML
5000 INJECTION, SOLUTION INTRAVENOUS; SUBCUTANEOUS EVERY 8 HOURS SCHEDULED
Status: DISCONTINUED | OUTPATIENT
Start: 2025-02-05 | End: 2025-02-09 | Stop reason: HOSPADM

## 2025-02-05 RX ORDER — LIDOCAINE HYDROCHLORIDE 20 MG/ML
INJECTION, SOLUTION EPIDURAL; INFILTRATION; INTRACAUDAL; PERINEURAL AS NEEDED
Status: DISCONTINUED | OUTPATIENT
Start: 2025-02-05 | End: 2025-02-05

## 2025-02-05 RX ORDER — FENTANYL CITRATE/PF 50 MCG/ML
25 SYRINGE (ML) INJECTION
Status: DISCONTINUED | OUTPATIENT
Start: 2025-02-05 | End: 2025-02-05 | Stop reason: HOSPADM

## 2025-02-05 RX ORDER — SODIUM CHLORIDE, SODIUM LACTATE, POTASSIUM CHLORIDE, CALCIUM CHLORIDE 600; 310; 30; 20 MG/100ML; MG/100ML; MG/100ML; MG/100ML
125 INJECTION, SOLUTION INTRAVENOUS CONTINUOUS
Status: DISCONTINUED | OUTPATIENT
Start: 2025-02-05 | End: 2025-02-05

## 2025-02-05 RX ORDER — PROPOFOL 10 MG/ML
INJECTION, EMULSION INTRAVENOUS AS NEEDED
Status: DISCONTINUED | OUTPATIENT
Start: 2025-02-05 | End: 2025-02-05

## 2025-02-05 RX ORDER — ROCURONIUM BROMIDE 10 MG/ML
INJECTION, SOLUTION INTRAVENOUS AS NEEDED
Status: DISCONTINUED | OUTPATIENT
Start: 2025-02-05 | End: 2025-02-05

## 2025-02-05 RX ORDER — ONDANSETRON 2 MG/ML
4 INJECTION INTRAMUSCULAR; INTRAVENOUS EVERY 6 HOURS PRN
Status: DISCONTINUED | OUTPATIENT
Start: 2025-02-05 | End: 2025-02-09 | Stop reason: HOSPADM

## 2025-02-05 RX ORDER — BUPIVACAINE HYDROCHLORIDE 2.5 MG/ML
INJECTION, SOLUTION EPIDURAL; INFILTRATION; INTRACAUDAL AS NEEDED
Status: DISCONTINUED | OUTPATIENT
Start: 2025-02-05 | End: 2025-02-05 | Stop reason: HOSPADM

## 2025-02-05 RX ORDER — DEXAMETHASONE SODIUM PHOSPHATE 10 MG/ML
INJECTION, SOLUTION INTRAMUSCULAR; INTRAVENOUS AS NEEDED
Status: DISCONTINUED | OUTPATIENT
Start: 2025-02-05 | End: 2025-02-05

## 2025-02-05 RX ORDER — ONDANSETRON 2 MG/ML
4 INJECTION INTRAMUSCULAR; INTRAVENOUS ONCE AS NEEDED
Status: DISCONTINUED | OUTPATIENT
Start: 2025-02-05 | End: 2025-02-05 | Stop reason: HOSPADM

## 2025-02-05 RX ORDER — INSULIN LISPRO 100 [IU]/ML
2 INJECTION, SOLUTION INTRAVENOUS; SUBCUTANEOUS ONCE
Status: COMPLETED | OUTPATIENT
Start: 2025-02-05 | End: 2025-02-05

## 2025-02-05 RX ORDER — MAGNESIUM SULFATE HEPTAHYDRATE 40 MG/ML
INJECTION, SOLUTION INTRAVENOUS AS NEEDED
Status: DISCONTINUED | OUTPATIENT
Start: 2025-02-05 | End: 2025-02-05

## 2025-02-05 RX ORDER — FENTANYL CITRATE 50 UG/ML
INJECTION, SOLUTION INTRAMUSCULAR; INTRAVENOUS AS NEEDED
Status: DISCONTINUED | OUTPATIENT
Start: 2025-02-05 | End: 2025-02-05

## 2025-02-05 RX ORDER — SODIUM CHLORIDE 9 MG/ML
INJECTION, SOLUTION INTRAVENOUS CONTINUOUS PRN
Status: DISCONTINUED | OUTPATIENT
Start: 2025-02-05 | End: 2025-02-05

## 2025-02-05 RX ADMIN — PROPOFOL 60 MCG/KG/MIN: 10 INJECTION, EMULSION INTRAVENOUS at 08:48

## 2025-02-05 RX ADMIN — SODIUM CHLORIDE, SODIUM GLUCONATE, SODIUM ACETATE, POTASSIUM CHLORIDE, MAGNESIUM CHLORIDE, SODIUM PHOSPHATE, DIBASIC, AND POTASSIUM PHOSPHATE 125 ML/HR: .53; .5; .37; .037; .03; .012; .00082 INJECTION, SOLUTION INTRAVENOUS at 23:49

## 2025-02-05 RX ADMIN — ROCURONIUM BROMIDE 40 MG: 10 INJECTION, SOLUTION INTRAVENOUS at 10:06

## 2025-02-05 RX ADMIN — ROCURONIUM BROMIDE 80 MG: 10 INJECTION, SOLUTION INTRAVENOUS at 08:34

## 2025-02-05 RX ADMIN — SUGAMMADEX 400 MG: 100 INJECTION, SOLUTION INTRAVENOUS at 12:43

## 2025-02-05 RX ADMIN — FENTANYL CITRATE 100 MCG: 50 INJECTION INTRAMUSCULAR; INTRAVENOUS at 08:34

## 2025-02-05 RX ADMIN — ROCURONIUM BROMIDE 20 MG: 10 INJECTION, SOLUTION INTRAVENOUS at 11:17

## 2025-02-05 RX ADMIN — PHENYLEPHRINE HYDROCHLORIDE 200 MCG: 10 INJECTION INTRAVENOUS at 11:57

## 2025-02-05 RX ADMIN — Medication 10 MG: at 08:48

## 2025-02-05 RX ADMIN — PROPOFOL 30 MG: 10 INJECTION, EMULSION INTRAVENOUS at 12:15

## 2025-02-05 RX ADMIN — PROPOFOL 160 MG: 10 INJECTION, EMULSION INTRAVENOUS at 08:34

## 2025-02-05 RX ADMIN — Medication 20 MG: at 11:45

## 2025-02-05 RX ADMIN — HYDROMORPHONE HYDROCHLORIDE 1 MG: 1 INJECTION, SOLUTION INTRAMUSCULAR; INTRAVENOUS; SUBCUTANEOUS at 09:45

## 2025-02-05 RX ADMIN — DEXMEDETOMIDINE HYDROCHLORIDE 8 MCG: 100 INJECTION, SOLUTION INTRAVENOUS at 13:02

## 2025-02-05 RX ADMIN — HEPARIN SODIUM 5000 UNITS: 5000 INJECTION INTRAVENOUS; SUBCUTANEOUS at 21:37

## 2025-02-05 RX ADMIN — DEXAMETHASONE SODIUM PHOSPHATE 10 MG: 10 INJECTION, SOLUTION INTRAMUSCULAR; INTRAVENOUS at 08:34

## 2025-02-05 RX ADMIN — ROCURONIUM BROMIDE 20 MG: 10 INJECTION, SOLUTION INTRAVENOUS at 10:39

## 2025-02-05 RX ADMIN — FENTANYL CITRATE 50 MCG: 50 INJECTION INTRAMUSCULAR; INTRAVENOUS at 13:02

## 2025-02-05 RX ADMIN — METRONIDAZOLE: 500 SOLUTION INTRAVENOUS at 08:58

## 2025-02-05 RX ADMIN — PHENYLEPHRINE HYDROCHLORIDE 20 MCG/MIN: 10 INJECTION INTRAVENOUS at 08:48

## 2025-02-05 RX ADMIN — LIDOCAINE HYDROCHLORIDE 100 MG: 20 INJECTION, SOLUTION EPIDURAL; INFILTRATION; INTRACAUDAL; PERINEURAL at 08:34

## 2025-02-05 RX ADMIN — HYDROMORPHONE HYDROCHLORIDE 0.5 MG: 1 INJECTION, SOLUTION INTRAMUSCULAR; INTRAVENOUS; SUBCUTANEOUS at 13:32

## 2025-02-05 RX ADMIN — ROPIVACAINE HYDROCHLORIDE 30 ML: 2 INJECTION, SOLUTION EPIDURAL; INFILTRATION at 12:36

## 2025-02-05 RX ADMIN — ROPIVACAINE HYDROCHLORIDE 30 ML: 2 INJECTION, SOLUTION EPIDURAL; INFILTRATION at 12:35

## 2025-02-05 RX ADMIN — ACETAMINOPHEN 975 MG: 325 TABLET, FILM COATED ORAL at 07:18

## 2025-02-05 RX ADMIN — DEXMEDETOMIDINE HYDROCHLORIDE 16 MCG: 100 INJECTION, SOLUTION INTRAVENOUS at 11:50

## 2025-02-05 RX ADMIN — SODIUM CHLORIDE, SODIUM GLUCONATE, SODIUM ACETATE, POTASSIUM CHLORIDE, MAGNESIUM CHLORIDE, SODIUM PHOSPHATE, DIBASIC, AND POTASSIUM PHOSPHATE 125 ML/HR: .53; .5; .37; .037; .03; .012; .00082 INJECTION, SOLUTION INTRAVENOUS at 15:56

## 2025-02-05 RX ADMIN — MAGNESIUM SULFATE HEPTAHYDRATE 2 G: 40 INJECTION, SOLUTION INTRAVENOUS at 09:05

## 2025-02-05 RX ADMIN — Medication 40 MG: at 08:34

## 2025-02-05 RX ADMIN — SODIUM CHLORIDE: 0.9 INJECTION, SOLUTION INTRAVENOUS at 08:39

## 2025-02-05 RX ADMIN — PHENYLEPHRINE HYDROCHLORIDE 200 MCG: 10 INJECTION INTRAVENOUS at 11:09

## 2025-02-05 RX ADMIN — ONDANSETRON 4 MG: 2 INJECTION INTRAMUSCULAR; INTRAVENOUS at 12:43

## 2025-02-05 RX ADMIN — MIDAZOLAM 2 MG: 1 INJECTION INTRAMUSCULAR; INTRAVENOUS at 08:25

## 2025-02-05 RX ADMIN — SODIUM CHLORIDE, SODIUM LACTATE, POTASSIUM CHLORIDE, AND CALCIUM CHLORIDE: .6; .31; .03; .02 INJECTION, SOLUTION INTRAVENOUS at 08:25

## 2025-02-05 RX ADMIN — FENTANYL CITRATE 25 MCG: 50 INJECTION INTRAMUSCULAR; INTRAVENOUS at 13:15

## 2025-02-05 RX ADMIN — Medication: at 13:39

## 2025-02-05 RX ADMIN — FENTANYL CITRATE 25 MCG: 50 INJECTION INTRAMUSCULAR; INTRAVENOUS at 13:25

## 2025-02-05 RX ADMIN — INSULIN LISPRO 2 UNITS: 100 INJECTION, SOLUTION INTRAVENOUS; SUBCUTANEOUS at 13:51

## 2025-02-05 RX ADMIN — Medication 20 MG: at 10:39

## 2025-02-05 RX ADMIN — ACETAMINOPHEN 1000 MG: 10 INJECTION INTRAVENOUS at 13:08

## 2025-02-05 RX ADMIN — FENTANYL CITRATE 50 MCG: 50 INJECTION INTRAMUSCULAR; INTRAVENOUS at 09:16

## 2025-02-05 RX ADMIN — Medication 10 MG: at 11:17

## 2025-02-05 RX ADMIN — CEFAZOLIN SODIUM 2000 MG: 2 SOLUTION INTRAVENOUS at 08:48

## 2025-02-05 RX ADMIN — HEPARIN SODIUM 5000 UNITS: 5000 INJECTION, SOLUTION INTRAVENOUS; SUBCUTANEOUS at 07:44

## 2025-02-05 RX ADMIN — ROCURONIUM BROMIDE 20 MG: 10 INJECTION, SOLUTION INTRAVENOUS at 09:16

## 2025-02-05 NOTE — OP NOTE
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 EXAM UNDER ANESTHESIA  Bilateral - SALPINGO-OOPHORECTOMY. LAPAROSCOPIC  PARTIAL GASTRECTOMY (separate operative note)    Specimen(s):  ID Type Source Tests Collected by Time Destination   1 : uterus, cervix Tissue Uterus TISSUE EXAM Varghese Jung MD 2025 0947    2 : bilateral ovaries and fallopian tubes Tissue Uterus w/Bilateral Ovaries and Fallopian Tubes TISSUE EXAM Varghese Jung MD 2025 0950        Estimated Blood Loss:   50cc    Operative Findings:  Normal external female genitalia, vaginal mucosa and cervix  Grossly normal liver, stomach, intestines, and omentum  Grossly normal uterus and bilateral fallopian tubes   Bilateral ovarian masses measuring approx. 8 cm with cystic and solid components  Both ovarian specimens were removed intact in an Endocatch bag without rupture or spillage, serous fluid contents in one and hemosiderin-laden fluid in the other  Intraoperative frozen pathology indicated a serous cystadenoma with focal proliferation therefore no further staging was performed    Procedure Technique:  The patient was identified in pre-op holding where  procedure was reviewed and confirmed. She was transported to the operating room where SCDs were applied to the lower extremities for DVT prophylaxis. General anesthesia was administered without difficulty. Ancef and Flagyl were given for surgical prophylaxis. She was positioned in dorsal lithotomy using yellofin stirrups with the arms tucked. All pressure points were padded, and a alessandra hugger was placed to maintain body temperature. Exam under anesthesia was performed with the above-noted findings. The vagina was prepped with chlorhexidine and the abdomen was prepped with Chloraprep. After appropriate drying time, sterile drapes were placed. Time-out was performed with all parties in agreement.    A cesar catheter was inserted into the bladder. A bivalve speculum was placed in the vagina for visualization of the cervix, and the anterior lip of the cervix was grasped with a single-tooth tenaculum. The uterus was sounded to 8 cm. The cervix was serially dilated to accommodate the MEGHAN uterine manipulator. A stay suture of 0-Vicryl was applied to the anterior lip of the cervix, and the tenaculum was removed. The MEGHAN was placed, and the vaginal occluder balloon was inflated.     Sterile gloves were exchanged, and attention was turned to the abdomen. After injection of local, a 5 mm vertical incision was made adjacent to the umbilicus at the patient's anatomic midline.    The laparoscope was inserted under direct visualization. Pneumoperitoneum was established and maintained at 15 mmHg. There was no evidence of injury directly below the port insertion site.    After injection of local, two additional 5 mm incisions were made in the right and left lower quadrants approximately 2 cm superior and medial to the anterior superior iliac spines. Two 5 mm ports were introduced under direct visualization. The patient was placed in Trendelenburg. The abdomen and pelvis were examined with the above-noted findings. The remainder of  the procedure was performed bilaterally in the same fashion unless otherwise stated.    Attention was turned to the pelvis and the uterus was elevated using the manipulator. The right ovary was retracted anteriorly using an atraumatic grasper to mobilize the infundibulopelvic ligament (IP) away from the pelvic sidewall. The round ligament was taken down with the LigaSure device and the retroperitoneum was opened parallel to the IP ligament. The right ureter was identified to be well out of the surgical field. A window was created in the posterior broad ligament to skeletonize the right IP ligament which was clamped, coagulated and transected using the LigaSure device. The right ovary/mass and tube were then  from the uterus in a similar fashion and moved into the abdomen.    The instruments were then switched in the ports and the same was performed on the patient's left with the exception of accessing the retroperitoneum. The left ureter was able to be visualized transperitoneally below the surgical field prior to coagulation and transection of the left IP ligament. The left ovary and mass were also  from the uterus and placed into the abdomen.     Attention was then turned to the hysterectomy. A bladder flap was created by dissecting approximately half of the way across the uterus at the level of the lower uterine segment and then completed coming across from the other side. The bladder was further dissected with monopolar spatula and displaced inferiorly below the level of planned colpotomy. Both uterine arteries were subsequently coagulated and transected followed by the cardinal ligaments.    At this point, the uterus was visualized and noted to be blanched.  The colpotomy was performed with the monopolar spatula.  Once the colpotomy was completed, the attention was then turned back to the perineum where the vaginal cuff occluder was deflated. The MEGHAN manipulator, uterus and cervix were  removed first. Then, an EndoCatch bag was inserted vaginally for retrieval of both fallopian tubes and ovaries which were removed without rupture or spillage into the abdominal cavity and sent for frozen pathologic evaluation. The vaginal occluder balloon was replaced in the vagina to maintain pneumoperitoneum.     At this point, attention was then turned back to the abdomen where a stratifix suture was placed into the abdomen through trocar site and the vaginal cuff was closed laparoscopically in a running fashion with 2 back stitches placed.  The suture was cut, and the needle and suture was removed from the body.  The abdomen and pelvis was visualized, irrigated, and good hemostasis was noted under low pressure.     Pneumoperitoneum and an insufflation port were maintained to facilitate subsequent abdominal entry by our Surgical Oncology colleagues. The 2 lateral trocars were removed and the skin incisions were closed using 4-0 monocryl with Exofin.  The cesar was left in place and all instruments were then removed from the vagina.    All needle, sponge, and instrument counts were noted to be correct x 2 at the end of our portion of the procedure and Dr. Jung was present for all steps.    The patient was then turned over to Dr. South for planned partial gastrectomy which will be documented separately.      SIGNATURE: Carol Lion MD  DATE: February 5, 2025  TIME: 11:05 AM

## 2025-02-05 NOTE — OP NOTE
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 EXAM UNDER ANESTHESIA  Bilateral - SALPINGO-OOPHORECTOMY. LAPAROSCOPIC  PARTIAL GASTRECTOMY    Specimen(s):  ID Type Source Tests Collected by Time Destination   1 : uterus, cervix Tissue Uterus TISSUE EXAM Varghese Jung MD 2025 0947    2 : bilateral ovaries and fallopian tubes Tissue Uterus w/Bilateral Ovaries and Fallopian Tubes TISSUE EXAM Varghese Jung MD 2025 0950    3 : Resection of Gastric Mass Tissue Stomach TISSUE EXAM Varghese Jung MD 2025 1132        Estimated Blood Loss:   50 mL    Drains:  NG/OG/Enteral Tube Nasogastric 18 Fr Right nare (Active)   Number of days: 0       Urethral Catheter Non-latex;Straight-tip 16 Fr. 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  Periumbilical 5 mm port was left in place with insufflation running but all other port sites had been closed.  Timeout was performed and all parties were in agreement.  Vertical upper midline skin incision was made using a 10 blade scalpel.  Dissection was carried down through dermis and subcutaneous tissues using Bovie electrocautery in order to expose the fascia along the length of the incision.  The fascia was scored using Bovie electrocautery and extended along the length of the incision.  The peritoneum was scored using Bovie electrocautery and pneumoperitoneum was released.  5 mm periumbilical trocar was removed.  2 fingers were slid underneath the peritoneum and the peritoneum was opened along the length of the incision using Bovie electrocautery.  Bookwalter retractor was placed.  We then entered the lesser sac through the gastrocolic ligament using Bovie electrocautery.  The greater curvature of the stomach was mobilized using LigaSure device.  This was continued all the way out to the left mariel in order to mobilize the stomach off of the spleen.  At this point attention was turned to the lesser curve.  The pars flaccida was scored using Bovie electrocautery and this was mobilized up to the right mariel using LigaSure device.  The GE junction was then completely mobilized off the crura using a combination of Bovie electrocautery and LigaSure device.  At this point a 60 [place] bougie was passed by anesthesia into the stomach.  The mass was palpated in the proximal stomach just distal to the GE junction.  Partial stapled gastrectomy was performed using multiple fires of 45 mm purple load linear staplers being sure not to narrow the GE  junction. The specimen was then passed off the field. The abdomen was copiously irrigated with normal saline and suctioned out. Hemostasis was ensured with a combination of bovie electrocautery, ligasure device and SurgiFlo.  Bougie was removed and NG tube was placed and confirmed in the stomach by palpation.  Bookwalter was removed.  The midline fascia was closed using #1 PDS in a running fashion.  The subcutaneous tissues were copiously irrigated with normal saline and suctioned out.  The subcutaneous tissues were approximated with interrupted 2-0 Vicryl suture.  Deep dermal layer was closed with a running 2-0 Vicryl suture.  Skin was closed using running 4 Monocryl subcuticular suture.  Remaining 5 mm port site was closed using 4 Monocryl subcuticular suture.  The abdomen was washed and dried and sterile Exofin was applied.  The patient was then awakened in the operating room and returned to the PACU in stable condition having tolerated the procedure well.  Dr. South was present for the entire procedure.    Patient Disposition:  PACU              SIGNATURE: Mahesh Matthews MD  DATE: February 5, 2025  TIME: 12:58 PM

## 2025-02-05 NOTE — ANESTHESIA PREPROCEDURE EVALUATION
Procedure:  LAPAROSCOPIC TOTAL HYSTERECTOMY, EXAM UNDER ANESTHESIA W/ POSS BSO, POSS X-LAP (Abdomen)  POSSIBLE LAPAROTOMY EXPLORATORY / POSS. BSO (Abdomen)  PARTIAL GASTRECTOMY (Abdomen)    Echo 1/2025:      Left Ventricle: Left ventricular cavity size is normal. Wall thickness is normal. The left ventricular ejection fraction is 60%. Systolic function is normal. Wall motion is normal. Diastolic function is normal.    Right Ventricle: Right ventricular cavity size is normal. Systolic function is normal.     EKG 12/2024:  Normal sinus rhythm with sinus arrhythmia  T wave abnormality, consider anterior ischemia  Prolonged QT  Abnormal ECG       Relevant Problems   CARDIO   (+) Chest pain      ENDO   (+) Hypothyroidism      GI/HEPATIC   (+) GIST (gastrointestinal stromal tumor), malignant (HCC)      MUSCULOSKELETAL   (+) Cervical strain      NEURO/PSYCH   (+) Anxiety      Meds:  Suboxone  Levothyroxine  Protonix    METS:      NPO?:    Physical Exam    Airway    Mallampati score: II         Dental        Cardiovascular  Rhythm: regular, Rate: normal, Cardiovascular exam normal    Pulmonary  Pulmonary exam normal Breath sounds clear to auscultation    Other Findings  Short thick neckpost-pubertal.      Anesthesia Plan  ASA Score- 3     Anesthesia Type- general with ASA Monitors.         Additional Monitors: arterial line.    Airway Plan: ETT.    Comment: +/- Regional  Patient amenable to receiving blood products if necessary.       Plan Factors-Exercise tolerance (METS): >4 METS.    Chart reviewed. EKG reviewed.  Existing labs reviewed. Patient summary reviewed.    Patient is a current smoker.  Patient did not smoke on day of surgery.            Induction- intravenous.    Postoperative Plan- Plan for postoperative opioid use. Planned trial extubation    Perioperative Resuscitation Plan - Level 1 - Full Code.       Informed Consent- Anesthetic plan and risks discussed with patient.  I personally reviewed this patient with  the CRNA. Discussed and agreed on the Anesthesia Plan with the CRNA..      NPO Status:  No vitals data found for the desired time range.

## 2025-02-05 NOTE — H&P
Assessment/Plan:  40-year-old with a BMI of 42 kg/m² who does not desire future fertility with a known gastric GIST, O rad category 4 left ovarian mass measuring 5.9 x 4.3 cm and a right ovarian mass measuring 7.3 x 6.9 cm.  She has negative tumor markers.  Her performance status is 0.  1.  Plan for examination under anesthesia, total laparoscopic hysterectomy, bilateral salpingectomy, left oophorectomy, possible bilateral oophorectomy, possible exploratory laparotomy and all other indicated procedures.  She understands the risks of the operation and agrees to proceed as outlined.  Consent for surgery was obtained by me.      CHIEF COMPLAINT: Here for surgery          Problem:  Cancer Staging   No matching staging information was found for the patient.      Previous therapy:  Oncology History   GIST (gastrointestinal stromal tumor), malignant (HCC)   1/7/2025 Biopsy    Endoscopic Ultrasound    Stomach, Gastric Submucosal, FNA:  Neoplastic.  Gastrointestinal stromal tumor (GIST), mixed spindle cell and epithelioid type.  > 5 mitoses per 50 HPF     1/13/2025 Initial Diagnosis    GIST (gastrointestinal stromal tumor), malignant (HCC)           Patient ID: Tanya Rosenthal is a 40 y.o. female  Who presents for combined surgery to remove a gastric gastrointestinal stromal tumor as well as hysterectomy and possible bilateral salpingo-oophorectomy.  There is been no interval change in medications or medical history since her last visit the office.  She has no complaints today.  She is anxious about the upcoming operation.        Review of Systems   Constitutional:  Negative for activity change and unexpected weight change.   HENT: Negative.     Eyes: Negative.    Respiratory: Negative.     Cardiovascular: Negative.    Gastrointestinal:  Negative for abdominal distention and abdominal pain.   Endocrine: Negative.    Genitourinary:  Negative for pelvic pain and vaginal bleeding.   Musculoskeletal: Negative.    Skin:  Negative.    Allergic/Immunologic: Negative.    Neurological: Negative.    Hematological: Negative.    Psychiatric/Behavioral:  The patient is nervous/anxious.        Current Facility-Administered Medications   Medication Dose Route Frequency Provider Last Rate Last Admin    ceFAZolin (ANCEF) IVPB (premix in dextrose) 2,000 mg 50 mL  2,000 mg Intravenous Once Tomás South MD        And    metroNIDAZOLE (FLAGYL) IVPB (premix) 500 mg 100 mL  500 mg Intravenous Once Tomsá South MD        lactated ringers infusion  125 mL/hr Intravenous Continuous Varghese Jung MD           Allergies   Allergen Reactions    Diphenhydramine Shortness Of Breath     Other reaction(s): DIPHENHYDRAMINE CITRATE (BENADRYL)    Erythromycin Shortness Of Breath and Hives     Category: Allergy;     Iodine-131 - Food Allergy Other (See Comments)    Iv Dye [Iodinated Contrast Media]        Past Medical History:   Diagnosis Date    Anxiety     Depression     Heroin abuse (HCC)     clean since     Thyroid cancer (HCC)     with thyroidectomy and RTX       Past Surgical History:   Procedure Laterality Date    EGD      GANGLION CYST EXCISION Left     THYROIDECTOMY         OB History          1    Para   1    Term   1            AB        Living             SAB        IAB        Ectopic        Multiple        Live Births                     Family History   Problem Relation Age of Onset    Diabetes Mother     Stomach cancer Mother         maltoma lyphoma    Hyperlipidemia Father     Hypertension Father     Lung cancer Maternal Aunt     Breast cancer Maternal Aunt 52    Lymphoma Paternal Uncle         non hodgkins       The following portions of the patient's history were reviewed and updated as appropriate: allergies, current medications, past family history, past medical history, past social history, past surgical history, and problem list.      Objective:    Blood pressure 156/89, pulse 69, temperature (!) 96.8 °F (36  "°C), temperature source Temporal, height 5' 5\" (1.651 m), weight 115 kg (254 lb), SpO2 97%.  Body mass index is 42.27 kg/m².    Physical Exam  Vitals reviewed.   Constitutional:       General: She is not in acute distress.     Appearance: Normal appearance. She is not ill-appearing.   HENT:      Head: Normocephalic and atraumatic.      Mouth/Throat:      Mouth: Mucous membranes are moist.   Eyes:      General: No scleral icterus.        Right eye: No discharge.         Left eye: No discharge.      Conjunctiva/sclera: Conjunctivae normal.   Cardiovascular:      Rate and Rhythm: Normal rate and regular rhythm.      Heart sounds: Normal heart sounds.   Pulmonary:      Effort: Pulmonary effort is normal.      Breath sounds: Normal breath sounds.   Abdominal:      Palpations: Abdomen is soft.   Musculoskeletal:      Right lower leg: No edema.      Left lower leg: No edema.   Skin:     General: Skin is warm and dry.      Coloration: Skin is not jaundiced.      Findings: No rash.   Neurological:      General: No focal deficit present.      Mental Status: She is alert and oriented to person, place, and time.      Cranial Nerves: No cranial nerve deficit.      Motor: No weakness.      Gait: Gait normal.   Psychiatric:         Mood and Affect: Mood normal.         Behavior: Behavior normal.         Thought Content: Thought content normal.         Judgment: Judgment normal.           Lab Results   Component Value Date     16.6 12/31/2024     Lab Results   Component Value Date    WBC 9.73 01/29/2025    HGB 13.8 01/29/2025    HCT 46.7 (H) 01/29/2025    MCV 86 01/29/2025     01/29/2025     Lab Results   Component Value Date     04/04/2015    K 3.7 01/29/2025    CL 98 01/29/2025    CO2 30 01/29/2025    ANIONGAP 10 04/04/2015    BUN 18 01/29/2025    CREATININE 1.18 01/29/2025    GLUCOSE 102 04/04/2015    GLUF 77 01/29/2025    CALCIUM 7.3 (L) 01/29/2025    AST 15 01/29/2025    ALT 13 01/29/2025    ALKPHOS 71 " 01/29/2025    PROT 7.6 06/29/2015    BILITOT 0.13 (L) 06/29/2015    EGFR 57 01/29/2025        Trend:  Lab Results   Component Value Date     16.6 12/31/2024

## 2025-02-05 NOTE — ASSESSMENT & PLAN NOTE
Psychosocial   Supportive listening provided  Normalized experience of patient/family  Provided anxiety containment     Referrals Placed / Medical Equipment Ordered  -None    Follow-Up Recommendations  -Follow-up with PCP and current medical specialists  -Follow-up with palliative care: follow-up after planned surgery

## 2025-02-05 NOTE — ASSESSMENT & PLAN NOTE
Palliative diagnosis: GIST  Established with Dr. Potter 2/3/25    Goals:  Level 1 full code  Treatment focused     Symptom management:  See cancer related pain    Social support:  Supportive listening provided  Normalized experience of patient/family  Provided anxiety containment  Provided anticipatory guidance  Encouraged self care  Advocated for patient/family with interdisciplinary care team    Coordination of care:  Reviewed case with rn    Follow up  Palliative Care will continue to follow and goals of care discussions will be ongoing.   Please reach out via [employer] secure chat if questions or concerns arise.    We appreciate the invitation to be involved in this patient's care.

## 2025-02-05 NOTE — INTERVAL H&P NOTE
H&P reviewed. After examining the patient I find no changes in the patients condition since the H&P had been written.    Vitals:    02/05/25 0644   BP: 156/89   Pulse: 69   Temp: (!) 96.8 °F (36 °C)   SpO2: 97%

## 2025-02-05 NOTE — ANESTHESIA PROCEDURE NOTES
Peripheral Block    Patient location during procedure: OR  Start time: 2/5/2025 12:36 PM  Reason for block: at surgeon's request and post-op pain management  Staffing  Performed by: Ju Rosario MD  Authorized by: Ju Rosario MD    Preanesthetic Checklist  Completed: patient identified, IV checked, site marked, risks and benefits discussed, surgical consent, monitors and equipment checked, pre-op evaluation and timeout performed  Peripheral Block  Patient position: supine  Prep: ChloraPrep  Patient monitoring: frequent blood pressure checks, continuous pulse oximetry and heart rate  Block type: Rectus Sheath  Laterality: bilateral  Injection technique: single-shot  Procedures: ultrasound guided, Ultrasound guidance required for the procedure to increase accuracy and safety of medication placement and decrease risk of complications.  Ultrasound permanent image saved  ropivacaine (NAROPIN) 0.2% injection 20 mL - Perineural   30 mL - 2/5/2025 12:36:00 PM   30 mL - 2/5/2025 12:35:00 PM  Needle  Needle type: Stimuplex   Needle gauge: 20 G  Needle length: 4 in  Needle localization: anatomical landmarks and ultrasound guidance  Assessment  Injection assessment: incremental injection, frequent aspiration, injected with ease, negative aspiration, negative for heart rate change, no paresthesia on injection, no symptoms of intraneural/intravenous injection and needle tip visualized at all times  Paresthesia pain: none  Post-procedure:  site cleaned  patient tolerated the procedure well with no immediate complications  Additional Notes  Bilateral Rectus sheath blocks placed  Ropi 0.25% used; 30mL in each side  Blocks placed while under GA  Patient tolerated procedure well with no evidence of immediate complications

## 2025-02-05 NOTE — DISCHARGE INSTR - AVS FIRST PAGE
[facility] - Gynecologic Oncology  Renetta Koo, Clovis Santamaria and Yue  [phone]    Hysterectomy Discharge Instructions    WHAT YOU NEED TO KNOW:   A hysterectomy is surgery to remove your uterus. Your ovaries, fallopian tubes, cervix, or part of your vagina may also need to be removed. The organs and tissue that will be removed depends on your medical condition.  After a hysterectomy, you will not be able to become pregnant.  If your ovaries are removed, you will go through menopause if you have not already.    DISCHARGE INSTRUCTIONS:   Contact your doctor at the number above if:   You have a fever over 101o.  You have nausea or are vomiting that does not improve after a light meal.   Your pain is getting worse, even after you take medicine.   You feel pain or burning when you urinate, or you have trouble urinating.   You have pus or a foul-smelling odor coming from your vagina.    Your wound is red, swollen, or draining pus.  You see new or an increased amount of bright red blood coming from your vagina or your incisions.   You have questions or concerns about your condition or care.    Seek care immediately:   Your arm or leg feels warm, tender, and painful. It may look swollen and red.  You have increasing abdominal or pelvic pain.   You have heavy vaginal bleeding that fills 1 or more sanitary pads in 1 hour.    Call 911 for any of the following:   You feel lightheaded, short of breath, and have chest pain.   You cough up blood.    Medicines: You may need any of the following:  Prescription medicine may be given. You may receive a prescription for pain medication or be advised to use over the counter (OTC) pain medication such as acetaminophen (Tylenol) or ibuprofen (Advil, Motrin). Ask your healthcare provider how to take this medicine safely.  NSAIDs , such as ibuprofen, help decrease swelling, pain, and fever. NSAIDs can cause stomach bleeding or kidney  problems in certain people. If you take blood thinner medicine, always ask your healthcare provider if NSAIDs are safe for you. Always read the medicine label and follow directions.   Stool softeners help treat or prevent constipation.    Take your medicine as directed. Contact your healthcare provider if you think your medicine is not helping or if you have side effects. Tell him or her if you are allergic to any medicine. Keep a list of the medicines, vitamins, and herbs you take. Include the amounts, and when and why you take them. Bring the list or the pill bottles to follow-up visits. Carry your medicine list with you in case of an emergency.    Activity:   Rest as needed. Get up and move around as directed to help prevent blood clots. Start with short walks and slowly increase the distance every day. Limit the number of times you climb stairs to 2 times each day for the first week. Plan most of your daily activities on one level of your home.      Do not lift objects heavier than 10 pounds for 6 weeks. Avoid strenuous activity for 2 weeks.      Do not strain during bowel movements. High-fiber foods and extra liquids can help you prevent constipation. Examples of high-fiber foods are fruit and bran. Prune juice and water are good liquids to drink.      Do not have sex, use tampons, or douche for up to 8 weeks.     You may shower as soon as the day after surgery.  Tub baths can be taken starting 2 weeks after surgery.Do not go into pools or hot tubs until cleared by your doctor.      Ask when it is safe for you to drive. It is generally safe to drive after 2 weeks and when no longer taking prescription pain medication.    Ask when you may return to work and to other regular activities.    Wound care: Care for your abdominal incisions as directed. Carefully wash around the wound with soap and water. If you have Hibiclens or medicated soap that you were instructed to use before surgery, you may use that to wash  with for up to 2 days after surgery.  If not, any mild non-scented, non-abrasive soap is safe.  It is okay to let the soap and water run over your incision. Do not scrub your incision. Dry the area and put on new, clean bandages as directed. Change your bandages when they get wet or dirty. If you have strips of medical tape, let them fall off on their own. It may take 7 to 14 days for them to fall off. Check your incision every day for redness, swelling, or pus.   Deep breathing: Take deep breaths and cough 10 times each hour. This will decrease your risk for a lung infection. Take a deep breath and hold it for as long as you can. Let the air out and then cough strongly. Deep breaths help open your airway. You may be given an incentive spirometer to help you take deep breaths. Put the plastic piece in your mouth and take a slow, deep breath, then let the air out and cough. Repeat these steps 10 times every hour.   Get support: This surgery may be life-changing for you and your family. You will no longer be able to get pregnant. Sudden changes in the levels of your hormones may occur and cause mood swings and depression. You may feel angry, sad, or frightened, or cry frequently and unexpectedly. These feelings are normal. Talk to your healthcare provider about where you can get support. You can also ask if hormone replacement medicine is right for you.   Follow up with your healthcare provider or gynecologist as directed: You may need to return to have stitches removed, and for other tests. Write down your questions so you remember to ask them during your visits.      © 2017 [employer] Information is for End User's use only and may not be sold, redistributed or otherwise used for commercial purposes. All illustrations and images included in CareNotes® are the copyrighted property of [employer] or [employer].  The above information is an  only. It is not intended as  medical advice for individual conditions or treatments. Talk to your doctor, nurse or pharmacist before following any medical regimen to see if it is safe and effective for you.       DISCHARGE INSTRUCTIONS    Follow Up: Follow up with Dr. South on 2/26 at 11AM.    Diet: You may resume a regular diet    Pain: As prescribed.     Shower: You may shower over the wound, unless there are drain tubes left in place. Do not bathe or use a pool or hot tub until cleared by the physician.    Activity: As tolerated. You may go up and down stairs, walk as much as you are comfortable, but walk at least 3 times each day. Do not lift anything heavier than 15 pounds for at least 2-4 weeks, unless cleared by the doctor.    Driving: Do not drive or make any important decisions while on narcotic pain medication. Generally, you may drive when your off all narcotic pain medications.    Medications: Resume all of your previous medications, unless told otherwise by the doctor. Avoid aspirin or ibuprofen (Advil, Motrin, etc.) products for 2-3 days after the date of surgery. You may, at that time, began to take them again. Tylenol is always fine. You do not need to take the narcotic pain medications unless you are having significant pain and discomfort.    Call the office: If you are experiencing any of the following, fevers above 101.5° or chills, significant nausea or vomiting, increase in abdominal pain, if the wound develops drainage and/or is excessive redness around the wound, or if you have significant diarrhea or other worsening symptoms.

## 2025-02-05 NOTE — ANESTHESIA POSTPROCEDURE EVALUATION
Post-Op Assessment Note    CV Status:  Stable    Pain management: adequate       Mental Status:  Alert and awake   Hydration Status:  Euvolemic   PONV Controlled:  Controlled   Airway Patency:  Patent     Post Op Vitals Reviewed: Yes    No anethesia notable event occurred.    Staff: Anesthesiologist, CRNA           Last Filed PACU Vitals:  Vitals Value Taken Time   Temp 97.4 °F (36.3 °C) 02/05/25 1500   Pulse 65 02/05/25 1529   /66 02/05/25 1501   Resp 12 02/05/25 1529   SpO2 93 % 02/05/25 1529   Vitals shown include unfiled device data.    Modified Luis Carlos:     Vitals Value Taken Time   Activity 2 02/05/25 1500   Respiration 2 02/05/25 1500   Circulation 2 02/05/25 1500   Consciousness 2 02/05/25 1500   Oxygen Saturation 1 02/05/25 1500     Modified Luis Carlos Score: 9

## 2025-02-05 NOTE — ASSESSMENT & PLAN NOTE
12/25 presented to [facility] ED with abdominal pain, CT A/P 12/25 demonstrated possible 4.4 x 3.5 exophytic mass involving the posterior gastric cardia/fundus wall -at that time was discharged home with Protonix and Carafate and outpatient general surgery referral  12/29 subsequently presented to  ED with 10 out of 10 abdominal pain, CT A/P 12/29 demonstrated 6.0 x 4.4 x 4.5 cm lobulated mass posterior gastric wall of the proximal stomach just be on the GE junction extending into the lumen of the stomach as well as outside of the stomach, additionally 7.3 x 6.9 cm hyperdense mass in the right adnexal region suspect large hemorrhagic ovarian cyst as well as a septated mass 5.9 x 4.3 cm possibly left ovarian septated cystic lesion -EGD performed that admission though unable to obtain biopsies at the time  Outpatient follow-up with GI for EUS 1/7, biopsy confirming GIST, mixed spindle cell and epithelioid type  She is a patient of Dr. South and Dr. 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

## 2025-02-06 ENCOUNTER — APPOINTMENT (INPATIENT)
Dept: RADIOLOGY | Facility: HOSPITAL | Age: 40
DRG: 327 | End: 2025-02-06
Payer: COMMERCIAL

## 2025-02-06 PROBLEM — G89.3 CANCER RELATED PAIN: Status: ACTIVE | Noted: 2025-02-06

## 2025-02-06 LAB
ANION GAP SERPL CALCULATED.3IONS-SCNC: 9 MMOL/L (ref 4–13)
BASOPHILS # BLD AUTO: 0.01 THOUSANDS/ΜL (ref 0–0.1)
BASOPHILS NFR BLD AUTO: 0 % (ref 0–1)
BUN SERPL-MCNC: 11 MG/DL (ref 5–25)
CALCIUM SERPL-MCNC: 6.1 MG/DL (ref 8.4–10.2)
CHLORIDE SERPL-SCNC: 101 MMOL/L (ref 96–108)
CO2 SERPL-SCNC: 28 MMOL/L (ref 21–32)
CREAT SERPL-MCNC: 1.13 MG/DL (ref 0.6–1.3)
EOSINOPHIL # BLD AUTO: 0.12 THOUSAND/ΜL (ref 0–0.61)
EOSINOPHIL NFR BLD AUTO: 1 % (ref 0–6)
ERYTHROCYTE [DISTWIDTH] IN BLOOD BY AUTOMATED COUNT: 14 % (ref 11.6–15.1)
GFR SERPL CREATININE-BSD FRML MDRD: 60 ML/MIN/1.73SQ M
GLUCOSE SERPL-MCNC: 106 MG/DL (ref 65–140)
HBV SURFACE AG SER QL: NORMAL
HCT VFR BLD AUTO: 41.4 % (ref 34.8–46.1)
HCV AB SER QL: NORMAL
HGB BLD-MCNC: 12.7 G/DL (ref 11.5–15.4)
HIV 1+2 AB+HIV1 P24 AG SERPL QL IA: NORMAL
HIV1 P24 AG SER QL: NORMAL
IMM GRANULOCYTES # BLD AUTO: 0.04 THOUSAND/UL (ref 0–0.2)
IMM GRANULOCYTES NFR BLD AUTO: 0 % (ref 0–2)
LYMPHOCYTES # BLD AUTO: 0.68 THOUSANDS/ΜL (ref 0.6–4.47)
LYMPHOCYTES NFR BLD AUTO: 7 % (ref 14–44)
MAGNESIUM SERPL-MCNC: 2.1 MG/DL (ref 1.9–2.7)
MCH RBC QN AUTO: 25.7 PG (ref 26.8–34.3)
MCHC RBC AUTO-ENTMCNC: 30.7 G/DL (ref 31.4–37.4)
MCV RBC AUTO: 84 FL (ref 82–98)
MONOCYTES # BLD AUTO: 0.45 THOUSAND/ΜL (ref 0.17–1.22)
MONOCYTES NFR BLD AUTO: 5 % (ref 4–12)
NEUTROPHILS # BLD AUTO: 8.28 THOUSANDS/ΜL (ref 1.85–7.62)
NEUTS SEG NFR BLD AUTO: 87 % (ref 43–75)
NRBC BLD AUTO-RTO: 0 /100 WBCS
PHOSPHATE SERPL-MCNC: 5.5 MG/DL (ref 2.7–4.5)
PLATELET # BLD AUTO: 217 THOUSANDS/UL (ref 149–390)
PMV BLD AUTO: 11.2 FL (ref 8.9–12.7)
POTASSIUM SERPL-SCNC: 4.3 MMOL/L (ref 3.5–5.3)
RBC # BLD AUTO: 4.95 MILLION/UL (ref 3.81–5.12)
SODIUM SERPL-SCNC: 138 MMOL/L (ref 135–147)
WBC # BLD AUTO: 9.58 THOUSAND/UL (ref 4.31–10.16)

## 2025-02-06 PROCEDURE — 99232 SBSQ HOSP IP/OBS MODERATE 35: CPT | Performed by: STUDENT IN AN ORGANIZED HEALTH CARE EDUCATION/TRAINING PROGRAM

## 2025-02-06 PROCEDURE — 87340 HEPATITIS B SURFACE AG IA: CPT

## 2025-02-06 PROCEDURE — 99255 IP/OBS CONSLTJ NEW/EST HI 80: CPT

## 2025-02-06 PROCEDURE — 83735 ASSAY OF MAGNESIUM: CPT | Performed by: SURGERY

## 2025-02-06 PROCEDURE — 74240 X-RAY XM UPR GI TRC 1CNTRST: CPT

## 2025-02-06 PROCEDURE — 86803 HEPATITIS C AB TEST: CPT

## 2025-02-06 PROCEDURE — 99024 POSTOP FOLLOW-UP VISIT: CPT | Performed by: OBSTETRICS & GYNECOLOGY

## 2025-02-06 PROCEDURE — 85025 COMPLETE CBC W/AUTO DIFF WBC: CPT | Performed by: SURGERY

## 2025-02-06 PROCEDURE — 80048 BASIC METABOLIC PNL TOTAL CA: CPT | Performed by: SURGERY

## 2025-02-06 PROCEDURE — 84100 ASSAY OF PHOSPHORUS: CPT | Performed by: SURGERY

## 2025-02-06 PROCEDURE — 99024 POSTOP FOLLOW-UP VISIT: CPT | Performed by: SURGERY

## 2025-02-06 PROCEDURE — 87806 HIV AG W/HIV1&2 ANTB W/OPTIC: CPT

## 2025-02-06 RX ORDER — HYDROMORPHONE HCL/PF 1 MG/ML
0.5 SYRINGE (ML) INJECTION ONCE
Status: COMPLETED | OUTPATIENT
Start: 2025-02-06 | End: 2025-02-06

## 2025-02-06 RX ORDER — ACETAMINOPHEN 10 MG/ML
1000 INJECTION, SOLUTION INTRAVENOUS EVERY 8 HOURS
Status: DISCONTINUED | OUTPATIENT
Start: 2025-02-06 | End: 2025-02-07

## 2025-02-06 RX ORDER — DEXTROSE MONOHYDRATE, SODIUM CHLORIDE, AND POTASSIUM CHLORIDE 50; 1.49; 4.5 G/1000ML; G/1000ML; G/1000ML
75 INJECTION, SOLUTION INTRAVENOUS CONTINUOUS
Status: DISCONTINUED | OUTPATIENT
Start: 2025-02-06 | End: 2025-02-08

## 2025-02-06 RX ORDER — BUPRENORPHINE 2 MG/1
8 TABLET SUBLINGUAL DAILY
Status: DISCONTINUED | OUTPATIENT
Start: 2025-02-06 | End: 2025-02-09 | Stop reason: HOSPADM

## 2025-02-06 RX ADMIN — Medication: at 09:52

## 2025-02-06 RX ADMIN — HYDROMORPHONE HYDROCHLORIDE 0.5 MG: 1 INJECTION, SOLUTION INTRAMUSCULAR; INTRAVENOUS; SUBCUTANEOUS at 09:05

## 2025-02-06 RX ADMIN — DEXTROSE, SODIUM CHLORIDE, AND POTASSIUM CHLORIDE 100 ML/HR: 5; .45; .15 INJECTION INTRAVENOUS at 14:57

## 2025-02-06 RX ADMIN — HEPARIN SODIUM 5000 UNITS: 5000 INJECTION INTRAVENOUS; SUBCUTANEOUS at 14:58

## 2025-02-06 RX ADMIN — SODIUM CHLORIDE, SODIUM GLUCONATE, SODIUM ACETATE, POTASSIUM CHLORIDE, MAGNESIUM CHLORIDE, SODIUM PHOSPHATE, DIBASIC, AND POTASSIUM PHOSPHATE 125 ML/HR: .53; .5; .37; .037; .03; .012; .00082 INJECTION, SOLUTION INTRAVENOUS at 08:43

## 2025-02-06 RX ADMIN — BUPRENORPHINE 8 MG: 2 TABLET SUBLINGUAL at 15:23

## 2025-02-06 RX ADMIN — HEPARIN SODIUM 5000 UNITS: 5000 INJECTION INTRAVENOUS; SUBCUTANEOUS at 05:35

## 2025-02-06 RX ADMIN — ACETAMINOPHEN 1000 MG: 10 INJECTION INTRAVENOUS at 15:10

## 2025-02-06 RX ADMIN — HEPARIN SODIUM 5000 UNITS: 5000 INJECTION INTRAVENOUS; SUBCUTANEOUS at 21:48

## 2025-02-06 NOTE — PLAN OF CARE
Problem: PAIN - ADULT  Goal: Verbalizes/displays adequate comfort level or baseline comfort level  Description: Interventions:  - Encourage patient to monitor pain and request assistance  - Assess pain using appropriate pain scale  - Administer analgesics based on type and severity of pain and evaluate response  - Implement non-pharmacological measures as appropriate and evaluate response  - Consider cultural and social influences on pain and pain management  - Notify physician/advanced practitioner if interventions unsuccessful or patient reports new pain  Outcome: Progressing     Problem: INFECTION - ADULT  Goal: Absence or prevention of progression during hospitalization  Description: INTERVENTIONS:  - Assess and monitor for signs and symptoms of infection  - Monitor lab/diagnostic results  - Monitor all insertion sites, i.e. indwelling lines, tubes, and drains  - Monitor endotracheal if appropriate and nasal secretions for changes in amount and color  - [place] appropriate cooling/warming therapies per order  - Administer medications as ordered  - Instruct and encourage patient and family to use good hand hygiene technique  - Identify and instruct in appropriate isolation precautions for identified infection/condition  Outcome: Progressing  Goal: Absence of fever/infection during neutropenic period  Description: INTERVENTIONS:  - Monitor WBC    Outcome: Progressing     Problem: SAFETY ADULT  Goal: Patient will remain free of falls  Description: INTERVENTIONS:  - Educate patient/family on patient safety including physical limitations  - Instruct patient to call for assistance with activity   - Consult OT/PT to assist with strengthening/mobility   - Keep Call bell within reach  - Keep bed low and locked with side rails adjusted as appropriate  - Keep care items and personal belongings within reach  - Initiate and maintain comfort rounds  - Make Fall Risk Sign visible to staff  - Apply yellow socks and bracelet  for high fall risk patients  - Consider moving patient to room near nurses station  Outcome: Progressing  Goal: Maintain or return to baseline ADL function  Description: INTERVENTIONS:  -  Assess patient's ability to carry out ADLs; assess patient's baseline for ADL function and identify physical deficits which impact ability to perform ADLs (bathing, care of mouth/teeth, toileting, grooming, dressing, etc.)  - Assess/evaluate cause of self-care deficits   - Assess range of motion  - Assess patient's mobility; develop plan if impaired  - Assess patient's need for assistive devices and provide as appropriate  - Encourage maximum independence but intervene and supervise when necessary  - Involve family in performance of ADLs  - Assess for home care needs following discharge   - Consider OT consult to assist with ADL evaluation and planning for discharge  - Provide patient education as appropriate  Outcome: Progressing  Goal: Maintains/Returns to pre admission functional level  Description: INTERVENTIONS:  - Perform AM-PAC 6 Click Basic Mobility/ Daily Activity assessment daily.  - Set and communicate daily mobility goal to care team and patient/family/caregiver.   - Collaborate with rehabilitation services on mobility goals if consulted  - Out of bed for toileting  - Record patient progress and toleration of activity level   Outcome: Progressing     Problem: DISCHARGE PLANNING  Goal: Discharge to home or other facility with appropriate resources  Description: INTERVENTIONS:  - Identify barriers to discharge w/patient and caregiver  - Arrange for needed discharge resources and transportation as appropriate  - Identify discharge learning needs (meds, wound care, etc.)  - Arrange for interpretive services to assist at discharge as needed  - Refer to Case Management Department for coordinating discharge planning if the patient needs post-hospital services based on physician/advanced practitioner order or complex needs  related to functional status, cognitive ability, or social support system  Outcome: Progressing     Problem: Knowledge Deficit  Goal: Patient/family/caregiver demonstrates understanding of disease process, treatment plan, medications, and discharge instructions  Description: Complete learning assessment and assess knowledge base.  Interventions:  - Provide teaching at level of understanding  - Provide teaching via preferred learning methods  Outcome: Progressing

## 2025-02-06 NOTE — ASSESSMENT & PLAN NOTE
41 yo with bilateral adnexal masses and GI stromal tumor now POD#1 s/p TLH, BSO and concurrent partial gastrectomy. 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   - Abdomen benign this AM (02/06) with well healing surgical incisions  - Urine output adequate   - AM labs not yet back    Plan:   - Await final pathology  - Appropriate for outpatient follow-up from GYN-Oncology perspective   - Remainder of acute post-operative care per primary team, including diet, pain management, disposition

## 2025-02-06 NOTE — CERTIFIED RECOVERY SPECIALIST
"Time spent: 14 minutes      CRS met with patient to offer support for her SHANNAN and hx of being on Suboxone. Patient was receptive to conversation about her recovery from heroin, and that she is 10 yrs abstinent, still attends Narcotics Anonymous and Alcoholics Anonymous support meetings. CRS and patient discussed pt's surgeries and she shared her fear of being on pain medication, though patient said she \"feels blessed that the cancerous tumor was removed from stomach\". Also discussed between both, the importance of continued support while in hospital, if desired, and once discharged. CRS encouraged patient to reach out to her supports/ sponsor, and be transparent about her feelings and fears. Patient was receptive, talked about her recovery program, and expressed appreciation for the visit, welcoming support again during her hospitalization.    CRS provided business card and will continue to support patient.        "

## 2025-02-06 NOTE — PROGRESS NOTES
"Inpatient consult to Acute Pain Service  Consult performed by: Jaany Jeffers MD  Consult ordered by: Mahesh Matthews MD      Peripheral Nerve Block Follow-up Note - Acute Pain Service    Tanya Rosenthal 40 y.o. female MRN: [id number]  Unit/Bed#: [facility] 833-01 Encounter: [id number]      Assessment:   Principal Problem:    Status post total hysterectomy and bilateral salpingo-oophorectomy (BSO)  Active Problems:    History of heroin abuse (HCC)    Morbid obesity (HCC)    GIST (gastrointestinal stromal tumor), malignant (HCC)    Palliative care encounter    Cancer related pain    Tanya Rosenthal is a 40 y.o. year old female status post laparoscopic hysterectomy and BSO and open partial gastrectomy for GIST on 2/5/25.  Pt received an intraoperative rectus sheath block with plain local prior to extubation.    Pt seen and examined this morning, laying in bed discussing with other provider upon arrival.  Pt feels \"miserable\" however NGT was removed and she is much happier.    Sharp pain resumed overnight, achy pain in back more chronic in nature.  Remains on Dilaudid PCA, NPO still, swallow study for GE junction evaluation today.    Plan:   - Coordination with Palliative, known to patient   - IV/PO regimen per palliative   - Interventional pain per APS, no further procedures planned  - Rectus sheath nerve block appropriately worn off   - skin site c/d/I  - IS OOB PT as able    Appreciate multi-disciplinary recommendations, at this time, will defer to our Palliative colleagues.  Please re-consult if any further intervention warranted, thank you.  APS will sign off at this time. Thank you for the consult. All opioids and other analgesics to be written at discretion of primary team. Please contact Acute Pain Service - [facility] via [employer] from [phone] with additional questions or concerns. See [employer] or [employer] for additional contacts and after hours information.        Pain History  Current pain location(s): " abdomen  Pain Scale:   6-10  Quality: sharp and shooting  24 hour history: as above    Opioid requirement previous 24 hours: 9.2mg IV Dilaudid via PCA    Meds/Allergies   all current active meds have been reviewed    Allergies   Allergen Reactions    Diphenhydramine Shortness Of Breath     Other reaction(s): DIPHENHYDRAMINE CITRATE (BENADRYL)    Erythromycin Shortness Of Breath and Hives     Category: Allergy;     Iodine-131 - Food Allergy Other (See Comments)    Iv Dye [Iodinated Contrast Media]      Review of Systems - ROS reviewed and negative except as indicated    FH/SH - reviewed    Objective     Temp:  [97.4 °F (36.3 °C)-100.2 °F (37.9 °C)] 100.2 °F (37.9 °C)  HR:  [64-84] 84  BP: (136-169)/(66-97) 141/74  Resp:  [15-24] 15  SpO2:  [89 %-100 %] 89 %  O2 Device: Nasal cannula    Physical Exam  Vitals and nursing note reviewed.   Constitutional:       General: She is not in acute distress.     Appearance: Normal appearance.   HENT:      Head: Normocephalic and atraumatic.      Mouth/Throat:      Mouth: Mucous membranes are dry.   Eyes:      Extraocular Movements: Extraocular movements intact.   Cardiovascular:      Rate and Rhythm: Normal rate.   Pulmonary:      Effort: No respiratory distress.      Comments: Nasal cannula  Chest:      Chest wall: No tenderness.   Abdominal:      General: There is no distension.      Palpations: Abdomen is soft.      Tenderness: There is abdominal tenderness.   Musculoskeletal:         General: No deformity.   Skin:     General: Skin is warm and dry.   Neurological:      Mental Status: She is alert and oriented to person, place, and time.   Psychiatric:         Mood and Affect: Mood normal.         Behavior: Behavior normal.           Lab Results:   Results from last 7 days   Lab Units 02/06/25  0541   WBC Thousand/uL 9.58   HEMOGLOBIN g/dL 12.7   HEMATOCRIT % 41.4   PLATELETS Thousands/uL 217      Results from last 7 days   Lab Units 02/06/25  0541   POTASSIUM mmol/L 4.3    CHLORIDE mmol/L 101   CO2 mmol/L 28   BUN mg/dL 11   CREATININE mg/dL 1.13   CALCIUM mg/dL 6.1*       Imaging Studies: Results Review Statement: No pertinent imaging studies reviewed.  EKG, Pathology, and Other Studies: Results Review Statement: No pertinent imaging studies reviewed.    Counseling / Coordination of Care  Total floor / unit time spent today 20 minutes. Greater than 50% of total time was spent with the patient and / or family counseling and / or coordination of care. A description of the counseling / coordination of care: chart review, post op pain and regional/neuraxial pain management, discussion/planning with nursing/medical/surgical teams    Please note that the APS provides consultative services regarding pain management only.  With the exception of ketamine, peripheral nerve catheters, and epidural infusions (and except when indicated), final decisions regarding starting or changing doses of analgesic medications are at the discretion of the consulting service.  Off hours consultation and/or medication management is generally not available.    Janay Jeffers MD  Acute Pain Service

## 2025-02-06 NOTE — ASSESSMENT & PLAN NOTE
Current regimen:  S/p intraoperative bilateral rectus sheath blocks    This morning with 10/10 pain in the abdomen, 1 time dose dilaudid 0.5 mg IV provided. Patient notes pain relief for approximately 15 minutes and pain had resumed at 9/10 in the abdomen at this time.    Dilaudid PCA started post-op per surgical onc  37 total bolus received of 103 bolus attempted / 24 hrs  7.4 total mg used over 24 hrs  (approximately 148 ome)  Education provided on use of pca bolus at this time  Start 0.2 mg / hr basal rate  Increase bolus dose to 0.5 mg Q 15 Minutes PRN  Max hourly 2.2 mg    Start tylenol 1,000 mg IV Q 8 H PRN    Narcan ordered for respiratory depression    Bowel regimen per surgical team    I have reviewed the patient's controlled substance dispensing history in the Prescription Drug Monitoring Program in compliance with the [facility] regulations before prescribing any controlled substances.  Last refills  02/04/2025 02/04/2025 1 Buprenorphine-Nalox 8-2mg Film 45.00 30 Ma Can [id number] Bet ([id number]) 0 12.00 mg Comm Ins PA   12/06/2024 11/25/2024 1 Buprenorphine-Nalox 8-2mg Film 45.00 30 Ma Can [id number] Mac ([id number]) 0 12.00 mg Comm Ins PA   10/07/2024 10/04/2024 1 Buprenorphine-Nalox 8-2mg Film 45.00 30 Ma Can [id number] Mac ([id number]) 0 12.00 mg Comm Ins PA   09/12/2024 09/09/2024 1 Buprenorphine-Nalox 8-2mg Film 45.00 30 Ma Can [id number] Mac ([id number]) 0 12.00 mg Comm Ins PA   08/17/2024 08/16/2024 1 Buprenorphine-Nalox 8-2mg Film 45.00 30 Ma Can [id number] Mac ([id number]) 0 12.00 mg Comm Ins PA

## 2025-02-06 NOTE — ASSESSMENT & PLAN NOTE
39 yo with bilateral adnexal masses and GI stromal tumor now POD#1 s/p TLH, BSO and concurrent partial gastrectomy. 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   Management per gyn primary

## 2025-02-06 NOTE — PROGRESS NOTES
Progress Note - GYN Oncology  Name: Tanya Rosenthal 40 y.o. female I MRN: [id number]  Unit/Bed#: [facility] 833-01 I Date of Admission: 2/5/2025   Date of Service: 2/6/2025 I Hospital Day: 1     Assessment & Plan  Status post total hysterectomy and bilateral salpingo-oophorectomy (BSO)  39 yo with bilateral adnexal masses and GI stromal tumor now POD#1 s/p TLH, BSO and concurrent partial gastrectomy. 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   - Abdomen benign this AM (02/06) with well healing surgical incisions  - Urine output adequate   - AM labs not yet back    Plan:   - Await final pathology  - Appropriate for outpatient follow-up from GYN-Oncology perspective   - Remainder of acute post-operative care per primary team, including diet, pain management, disposition  GIST (gastrointestinal stromal tumor), malignant (HCC)  - Care per primary     GYNECOLOGY  Subjective   Tanya reports significant pain this AM, mostly concentrated around the center of her abdomen. She has had to hit her PCA multiple times. She otherwise denies any fevers, chills, chest pain, shortness of breath, nausea, vomiting, vaginal bleeding, abnormal vaginal discharge, pelvic pain.    Objective :  Temp:  [96.8 °F (36 °C)-99.9 °F (37.7 °C)] 99.9 °F (37.7 °C)  HR:  [64-75] 72  BP: (136-169)/(66-97) 147/80  Resp:  [15-24] 18  SpO2:  [91 %-100 %] 91 %  O2 Device: Nasal cannula    Physical Exam  Constitutional:       Appearance: She is obese.   Cardiovascular:      Rate and Rhythm: Normal rate.      Pulses: Normal pulses.   Pulmonary:      Effort: Pulmonary effort is normal.      Breath sounds: Normal breath sounds.   Abdominal:      Palpations: Abdomen is soft.      Comments: Absent bowel sounds  Laparoscopic port sites clean/dry/intact, exofin in place  Midline vertical incision clean/dry/intact, exofin in place  Abdomen soft and appropriately tender to palpation   NGT in place   Genitourinary:     Comments: Arevalo in place, draining clear yellow  urine   Musculoskeletal:         General: Normal range of motion.   Skin:     General: Skin is warm and dry.   Neurological:      Mental Status: She is alert.       Lab Results: I have reviewed the following results:CBC/BMP: No new results in last 24 hours.     Ericka Gonzalez MD  OBGYN PGY2   Patient

## 2025-02-06 NOTE — CONSULTS
Consultation - Palliative Care   Name: Tanya Rosenthal 40 y.o. female I MRN: [id number]  Unit/Bed#: [facility] 833-01 I Date of Admission: 2/5/2025   Date of Service: 2/6/2025 I Hospital Day: 1   Inpatient consult to Palliative Care  Consult performed by: BERTHA Vasquez  Consult ordered by: Mahesh Matthews MD      Physician Requesting Evaluation: Varghese Jung,*   Reason for Evaluation / Principal Problem: symptom management    Assessment & Plan  Cancer related pain  Current regimen:  S/p intraoperative bilateral rectus sheath blocks    This morning with 10/10 pain in the abdomen, 1 time dose dilaudid 0.5 mg IV provided. Patient notes pain relief for approximately 15 minutes and pain had resumed at 9/10 in the abdomen at this time.    Dilaudid PCA started post-op per surgical onc  37 total bolus received of 103 bolus attempted / 24 hrs  7.4 total mg used over 24 hrs  (approximately 148 ome)  Education provided on use of pca bolus at this time  Start 0.2 mg / hr basal rate  Increase bolus dose to 0.5 mg Q 15 Minutes PRN  Max hourly 2.2 mg    Start tylenol 1,000 mg IV Q 8 H PRN    Narcan ordered for respiratory depression    Bowel regimen per surgical team    I have reviewed the patient's controlled substance dispensing history in the Prescription Drug Monitoring Program in compliance with the [facility] regulations before prescribing any controlled substances.  Last refills  02/04/2025 02/04/2025 1 Buprenorphine-Nalox 8-2mg Film 45.00 30 Ma Can [id number] Bet ([id number]) 0 12.00 mg Comm Ins PA   12/06/2024 11/25/2024 1 Buprenorphine-Nalox 8-2mg Film 45.00 30 Ma Can [id number] Mac ([id number]) 0 12.00 mg Comm Ins PA   10/07/2024 10/04/2024 1 Buprenorphine-Nalox 8-2mg Film 45.00 30 Ma Can [id number] Mac ([id number]) 0 12.00 mg Comm Ins PA   09/12/2024 09/09/2024 1 Buprenorphine-Nalox 8-2mg Film 45.00 30 Ma Can [id number] Mac ([id number]) 0 12.00 mg Comm Ins PA   08/17/2024 08/16/2024 1 Buprenorphine-Nalox 8-2mg Film 45.00 30 Ma Can [id number] Mac ([id number]) 0 12.00  mg Comm Ins PA     GIST (gastrointestinal stromal tumor), malignant (HCC)  12/25 presented to [facility] ED with abdominal pain, CT A/P 12/25 demonstrated possible 4.4 x 3.5 exophytic mass involving the posterior gastric cardia/fundus wall -at that time was discharged home with Protonix and Carafate and outpatient general surgery referral  12/29 subsequently presented to  ED with 10 out of 10 abdominal pain, CT A/P 12/29 demonstrated 6.0 x 4.4 x 4.5 cm lobulated mass posterior gastric wall of the proximal stomach just be on the GE junction extending into the lumen of the stomach as well as outside of the stomach, additionally 7.3 x 6.9 cm hyperdense mass in the right adnexal region suspect large hemorrhagic ovarian cyst as well as a septated mass 5.9 x 4.3 cm possibly left ovarian septated cystic lesion -EGD performed that admission though unable to obtain biopsies at the time  Outpatient follow-up with GI for EUS 1/7, biopsy confirming GIST, mixed spindle cell and epithelioid type  She is a patient of Dr. South and Dr. Jung  POD 1 TLH, BSO, partial gastrectomy  Intra op frozen path of adenexal massess - serous cystadenoma  Status post total hysterectomy and bilateral salpingo-oophorectomy (BSO)  See GIST a/p  Palliative care encounter  Palliative diagnosis: GIST  Established with Dr. Potter 2/3/25    Goals:  Level 1 full code  Treatment focused     Symptom management:  See cancer related pain    Social support:  Supportive listening provided  Normalized experience of patient/family  Provided anxiety containment  Provided anticipatory guidance  Encouraged self care  Advocated for patient/family with interdisciplinary care team    Coordination of care:  Reviewed case with rn    Follow up  Palliative Care will continue to follow and goals of care discussions will be ongoing.   Please reach out via [employer] secure chat if questions or concerns arise.    We appreciate the invitation to be involved in this patient's care.    History of heroin abuse (HCC)  Patient reports 10 year sober  Dr. Overton's office managing Suboxone 8-2 mg daily  Last refill 2/4/25    Recommend start Subutex 8 mg while inpatient and resume management per Dr. Overton's office on discharge  Will need 12-24 hours off short acting opioids prior to resuming home Suboxone to prevent precipitated withdraw.    History of Present Illness:  Tanya Rosenthal is a 40 y.o. female who presented yesterday for elective TLH BSO and partial gastrectomy with surgical and GYN oncology.  She is postop day 1 today.  Palliative care consulted for symptom management.  Patient seen lying in bed, in NAD, no family present at bedside.  Introduced palliative care services.  Patient endorsing severe 9/10 abdominal pain.  Prior to this encounter was contacted per RN for severe pain, provided a one-time dose of 0.5 mg of Dilaudid, which patient reports was effective for approximately 15 minutes.  Adjustments to Dilaudid PCA as above.  She denies nausea, vomiting, dyspnea.  Patient notes that prior to this admission was not having any pain and is hopeful for quick resolution of postoperative pain. Patient is open about her substance use disorder, notes that she has been sober for 10 years, CRS following, follows outpatient with Dr. Overton for management and Suboxone therapy. She expressed understanding and agreement with the current pain regimen. She offers no additional concerns today.    Revisited to this afternoon to re-assess pain following adjustments. Patient sleeping comfortably in bed, family present at bedside. Did not wake patient.      Past Medical History:   Diagnosis Date    Anxiety     Depression     Heroin abuse (HCC)     clean since 2015    Thyroid cancer (HCC) 1999    with thyroidectomy and RTX     Past Surgical History:   Procedure Laterality Date    EGD      GANGLION CYST EXCISION Left     THYROIDECTOMY       Social History     Socioeconomic History    Marital status: Single      Spouse name: Not on file    Number of children: Not on file    Years of education: Not on file    Highest education level: Not on file   Occupational History    Not on file   Tobacco Use    Smoking status: Some Days     Current packs/day: 0.25     Average packs/day: 0.3 packs/day for 26.1 years (6.5 ttl pk-yrs)     Types: Cigarettes     Start date: 1999    Smokeless tobacco: Never    Tobacco comments:     Trying quitting  ago   Vaping Use    Vaping status: Every Day    Substances: THC, CBD   Substance and Sexual Activity    Alcohol use: Not Currently    Drug use: Yes     Types: Marijuana     Comment: 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    Sexual activity: Not on file   Other Topics Concern    Not on file   Social History Narrative    Not on file     Social Drivers of Health     Financial Resource Strain: Not on file   Food Insecurity: No Food Insecurity (2025)    Nursing - Inadequate Food Risk Classification     Worried About Running Out of Food in the Last Year: Not on file     Ran Out of Food in the Last Year: Not on file     Ran Out of Food in the Last Year: Never true   Transportation Needs: No Transportation Needs (2025)    Nursing - Transportation Risk Classification     Lack of Transportation: Not on file     Lack of Transportation: No   Physical Activity: Not on file   Stress: Not on file   Social Connections: Not on file   Intimate Partner Violence: Unknown (2025)    Nursing IPS     Feels Physically and Emotionally Safe: Not on file     Physically Hurt by Someone: Not on file     Humiliated or Emotionally Abused by Someone: Not on file     Physically Hurt by Someone: No     Hurt or Threatened by Someone: No   Housing Stability: Unknown (2025)    Nursing: Inadequate Housing Risk Classification     Has Housing: Not on file     Worried About Losing Housing: Not on file     Unable to Get Utilities: Not on file     Unable to Pay for Housing in the Last Year: No     Has Housin  "    Family History   Problem Relation Age of Onset    Diabetes Mother     Stomach cancer Mother         maltoma lyphoma    Hyperlipidemia Father     Hypertension Father     Lung cancer Maternal Aunt     Breast cancer Maternal Aunt 52    Lymphoma Paternal Uncle         non hodgkins       Medications:  all current active meds have been reviewed and PTA meds:   Prior to Admission Medications   Prescriptions Last Dose Informant Patient Reported? Taking?   buprenorphine-naloxone (SUBOXONE) 8-2 mg per SL tablet 2/4/2025 at  4:30 PM Self Yes Yes   Sig: Place 1 tablet under the tongue daily   busPIRone (BUSPAR) 7.5 mg tablet  Self Yes No   Sig: Take 7.5 mg by mouth 2 (two) times a day   Patient not taking: Reported on 2/3/2025   calcium carbonate (OS-ALL) 600 MG tablet 1/27/2025 Self No Yes   Sig: Take 1 tablet (600 mg total) by mouth daily   hydrOXYzine HCL (ATARAX) 10 mg tablet  Self No No   Sig: Take 1 tablet (10 mg total) by mouth every 6 (six) hours as needed for anxiety   levothyroxine 112 mcg tablet 2/4/2025 Morning Self Yes Yes   Sig: Take 112 mcg by mouth daily   ondansetron (ZOFRAN-ODT) 4 mg disintegrating tablet More than a month Self Yes No   Sig: Take 4 mg by mouth every 6 (six) hours as needed for nausea or vomiting   pantoprazole (PROTONIX) 40 mg tablet 2/4/2025 Morning Self No Yes   Sig: Take 1 tablet (40 mg total) by mouth daily   sucralfate (CARAFATE) 1 g tablet 2/4/2025 Evening Self No Yes   Sig: Take 1 tablet (1 g total) by mouth every 6 (six) hours      Facility-Administered Medications: None       Allergies   Allergen Reactions    Diphenhydramine Shortness Of Breath     Other reaction(s): DIPHENHYDRAMINE CITRATE (BENADRYL)    Erythromycin Shortness Of Breath and Hives     Category: Allergy;     Iodine-131 - Food Allergy Other (See Comments)    Iv Dye [Iodinated Contrast Media]        Objective:  /74   Pulse 84   Temp 100.2 °F (37.9 °C)   Resp 15   Ht 5' 5\" (1.651 m)   Wt 118 kg (260 lb 4.8 " "oz)   LMP  (LMP Unknown)   SpO2 92%   BMI 43.32 kg/m²     Physical Exam  Vitals and nursing note reviewed.   Constitutional:       General: She is awake. She is not in acute distress.     Interventions: Nasal cannula in place.   HENT:      Head: Atraumatic.      Mouth/Throat:      Mouth: Mucous membranes are moist.   Cardiovascular:      Rate and Rhythm: Normal rate.   Pulmonary:      Effort: Pulmonary effort is normal. No respiratory distress.   Abdominal:      Palpations: Abdomen is soft.      Tenderness: There is abdominal tenderness.   Neurological:      General: No focal deficit present.      Mental Status: She is alert and oriented to person, place, and time.   Psychiatric:         Mood and Affect: Mood normal.         Behavior: Behavior normal.       Lab Results: I have personally reviewed pertinent labs.  Imaging Studies: I have personally reviewed pertinent reports.  EKG, Pathology, and Other Studies: I have personally reviewed pertinent reports.    Counseling / Coordination of Care  Total floor / unit time spent today 50+ minutes. Greater than 50% of total time was spent with the patient and / or family counseling and / or coordination of care. A description of the counseling / coordination of care: Reviewed chart, provided medical updates, determined goals of care, discussed palliative care and symptom management, provided anticipatory guidance, determined competency and POA/HCA, determined social/family support, provided psychosocial support.     Portions of this document may have been created using dictation software and as such some \"sound alike\" terms may have been generated by the system. Do not hesitate to contact me with any questions or clarifications.    "

## 2025-02-06 NOTE — PROGRESS NOTES
Order received this morning for removal of cesar catheter. Patient informed of this at 10:00. Per patient she would like the catheter removed at 5-6pm. This RN stated it would need to come out before shift change and educated on risk of infection. Primary team surgical-oncology PA-C made aware of this, verbal order received OK to remove this evening.    At 18:00 patient stated she was in too much pain and was refusing to have catheter removed. Will defer to night shift and address with primary team in the morning.

## 2025-02-06 NOTE — ASSESSMENT & PLAN NOTE
Patient reports 10 year sober  Dr. Overton's office managing Suboxone 8-2 mg daily  Last refill 2/4/25    Recommend start Subutex 8 mg while inpatient and resume management per Dr. Overton's office on discharge  Will need 12-24 hours off short acting opioids prior to resuming home Suboxone to prevent precipitated withdraw.

## 2025-02-06 NOTE — PLAN OF CARE
Problem: PAIN - ADULT  Goal: Verbalizes/displays adequate comfort level or baseline comfort level  Description: Interventions:  - Encourage patient to monitor pain and request assistance  - Assess pain using appropriate pain scale  - Administer analgesics based on type and severity of pain and evaluate response  - Implement non-pharmacological measures as appropriate and evaluate response  - Consider cultural and social influences on pain and pain management  - Notify physician/advanced practitioner if interventions unsuccessful or patient reports new pain  Outcome: Progressing     Problem: INFECTION - ADULT  Goal: Absence or prevention of progression during hospitalization  Description: INTERVENTIONS:  - Assess and monitor for signs and symptoms of infection  - Monitor lab/diagnostic results  - Monitor all insertion sites, i.e. indwelling lines, tubes, and drains  - Monitor endotracheal if appropriate and nasal secretions for changes in amount and color  - Brave appropriate cooling/warming therapies per order  - Administer medications as ordered  - Instruct and encourage patient and family to use good hand hygiene technique  - Identify and instruct in appropriate isolation precautions for identified infection/condition  Outcome: Progressing  Goal: Absence of fever/infection during neutropenic period  Description: INTERVENTIONS:  - Monitor WBC    Outcome: Progressing     Problem: SAFETY ADULT  Goal: Patient will remain free of falls  Description: INTERVENTIONS:  - Educate patient/family on patient safety including physical limitations  - Instruct patient to call for assistance with activity   - Consult OT/PT to assist with strengthening/mobility   - Keep Call bell within reach  - Keep bed low and locked with side rails adjusted as appropriate  - Keep care items and personal belongings within reach  - Initiate and maintain comfort rounds  - Make Fall Risk Sign visible to staff  - Apply yellow socks and bracelet  for high fall risk patients  - Consider moving patient to room near nurses station  Outcome: Progressing  Goal: Maintain or return to baseline ADL function  Description: INTERVENTIONS:  -  Assess patient's ability to carry out ADLs; assess patient's baseline for ADL function and identify physical deficits which impact ability to perform ADLs (bathing, care of mouth/teeth, toileting, grooming, dressing, etc.)  - Assess/evaluate cause of self-care deficits   - Assess range of motion  - Assess patient's mobility; develop plan if impaired  - Assess patient's need for assistive devices and provide as appropriate  - Encourage maximum independence but intervene and supervise when necessary  - Involve family in performance of ADLs  - Assess for home care needs following discharge   - Consider OT consult to assist with ADL evaluation and planning for discharge  - Provide patient education as appropriate  Outcome: Progressing  Goal: Maintains/Returns to pre admission functional level  Description: INTERVENTIONS:  - Perform AM-PAC 6 Click Basic Mobility/ Daily Activity assessment daily.  - Set and communicate daily mobility goal to care team and patient/family/caregiver.   - Collaborate with rehabilitation services on mobility goals if consulted  - Out of bed for toileting  - Record patient progress and toleration of activity level   Outcome: Progressing     Problem: DISCHARGE PLANNING  Goal: Discharge to home or other facility with appropriate resources  Description: INTERVENTIONS:  - Identify barriers to discharge w/patient and caregiver  - Arrange for needed discharge resources and transportation as appropriate  - Identify discharge learning needs (meds, wound care, etc.)  - Arrange for interpretive services to assist at discharge as needed  - Refer to Case Management Department for coordinating discharge planning if the patient needs post-hospital services based on physician/advanced practitioner order or complex needs  related to functional status, cognitive ability, or social support system  Outcome: Progressing     Problem: Knowledge Deficit  Goal: Patient/family/caregiver demonstrates understanding of disease process, treatment plan, medications, and discharge instructions  Description: Complete learning assessment and assess knowledge base.  Interventions:  - Provide teaching at level of understanding  - Provide teaching via preferred learning methods  Outcome: Progressing

## 2025-02-06 NOTE — CASE MANAGEMENT
Case Management Assessment & Discharge Planning Note    Patient name Tanya Rosenthal  Location [facility] 833/[facility] 833-01 MRN [id number]  : 1985 Date 2025       Current Admission Date: 2025  Current Admission Diagnosis:Status post total hysterectomy and bilateral salpingo-oophorectomy (BSO)   Patient Active Problem List    Diagnosis Date Noted Date Diagnosed    Cancer related pain 2025     Palliative care encounter 2025     Goals of care, counseling/discussion 2025     Submucosal neoplasm of gastrointestinal tract 2025     GIST (gastrointestinal stromal tumor), malignant (HCC) 2025     Gastric lesion 2024     Pelvic mass 2024     Status post total hysterectomy and bilateral salpingo-oophorectomy (BSO) 2024     Abdominal pain 2024     Hypocalcemia 2024     Anxiety 2024     Chest pain 2020     Elevated d-dimer 2020     Gastritis 2020     Cervical strain 2019     Head injury 2019     Hypothyroidism 2016     Fatigue 2016     Medically noncompliant 2016     History of heroin abuse (HCC) 2016     Environmental allergies 2016     Morbid obesity (HCC) 2016     Bradycardia 2016       LOS (days): 1  Geometric Mean LOS (GMLOS) (days): 2.7  Days to GMLOS:1.7     OBJECTIVE:    Risk of Unplanned Readmission Score: 13.3         Current admission status: Inpatient       Preferred Pharmacy:   [facility]/pharmacy #[id number] - [place] - [address]  [address] [address]  Phone: [phone] Fax: [phone]    Primary Care Provider: Feliciano Ledezma MD    Primary Insurance: AETNA  Secondary Insurance:     ASSESSMENT:  Active Health Care Proxies    There are no active Health Care Proxies on file.                 Readmission Root Cause  30 Day Readmission: No    Patient Information  Admitted from:: Home  Mental Status: Alert  During Assessment patient was accompanied by:  Not accompanied during assessment  Assessment information provided by:: Daughter (Theresa Obrien)  Support Systems: Self, Children, Daughter, Parent, Family members  [place]idence[place]  What city do you live in?: [place]  Home entry access options. Select all that apply.: Stairs  Number of steps to enter home.: 2  Do the steps have railings?: Yes  Type of Current Residence: 3 story home  Upon entering residence, is there a bedroom on the main floor (no further steps)?: No  A bedroom is located on the following floor levels of residence (select all that apply):: 3rd Floor  Upon entering residence, is there a bathroom on the main floor (no further steps)?: No  Indicate which floors of current residence have a bathroom (select all the apply):: 2nd Floor  Number of steps to 2nd floor from main floor: One Flight  Number of steps to 3rd floor from main floor: Two Flights  Living Arrangements: Lives w/ Parent(s), Lives w/ Daughter  Is patient a ?: No    Activities of Daily Living Prior to Admission  Functional Status: Independent  Completes ADLs independently?: Yes  Ambulates independently?: Yes  Does patient use assisted devices?: No  Does patient currently own DME?: No  Does patient have a history of Outpatient Therapy (PT/OT)?: No  Does the patient have a history of Short-Term Rehab?: No  Does patient have a history of HHC?: No  Does patient currently have HHC?: No         Patient Information Continued  Income Source: Employed  Does patient have prescription coverage?: Yes  Does patient receive dialysis treatments?: No  Does patient have a history of substance abuse?: Yes  Historical substance use preference: Heroin  Is patient currently in treatment for substance abuse?: N/A - sober  Does patient have a history of Mental Health Diagnosis?: Yes (anxiety)  Is patient receiving treatment for mental health?: Yes (PCP follows)  Has patient received inpatient treatment related to mental health in the last  2 years?: Yes         Means of Transportation  Means of Transport to \[facility] [place] [facility]\"":: Drives Self          DISCHARGE DETAILS:    Discharge planning discussed with:: patient bay Cardenas @ bedside  [place] of Choice: Yes     CM contacted family/caregiver?: Yes  Were Treatment Team discharge recommendations reviewed with patient/caregiver?: Yes  Did patient/caregiver verbalize understanding of patient care needs?: Yes  Were patient/caregiver advised of the risks associated with not following Treatment Team discharge recommendations?: Yes    Contacts  Patient Contacts: Bay Cardenas   Relationship to Patient:: Family  Contact Method: Phone  Phone Number: [phone]  Reason/Outcome: Emergency Contact                    Patient off floor at procedure, met with martin Cardenas at bedside  Per Theresa, patient resides with parents and daughter in 3SH, FFOS to 2nd floor bathroom, additional FFOS to 3rd floor bedroom  Independent with ADLs, and ambulation, no DME Use  Employed, + Drives  Hx of heroin use, sober for 10 years, follows with Dr. Monroe Overton for suboxone maintenance  Anticipate no post acute needs, will follow

## 2025-02-06 NOTE — PROGRESS NOTES
Progress Note - Oncology-Surgical   Name: Tanya Rosenthal 40 y.o. female I MRN: [id number]  [facility] I Date of Admission: 2/5/2025   Date of Service: 2/5/2025 I Hospital Day: 0    Assessment & Plan  Status post total hysterectomy and bilateral salpingo-oophorectomy (BSO)  41 yo with bilateral adnexal masses and GI stromal tumor now POD#1 s/p TLH, BSO and concurrent partial gastrectomy. 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   Management per gyn primary  GIST (gastrointestinal stromal tumor), malignant (HCC)  41 yo with bilateral adnexal masses and GI stromal tumor now POD#1 s/p TLH, BSO and concurrent partial gastrectomy. 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     Plan:  - NPO/NGT  - UGI today POD1  - Isolyte@125  - Cesar  - D-PCA  - DVT ppx  - Out of bed, encourage ambulation  - Encourage incentive spirometer use  - Strict I's and O's  - Pain and nausea control p.r.n.  - APS consulted  Palliative care encounter  Appreciate recommendations    Please contact the SecureChat role for the Oncology-Surgical service with any questions/concerns.    Subjective   No acute events overnight. Patient sleeping comfortably overnight. Patient reports pain in abdomen and back. They are voiding into cesar. Deny gas or BM yet. They are using their IS to 250-750. They deny nausea, vomiting, chest pain, shortness of breath, fevers, chills.      Objective :  Temp:  [96.8 °F (36 °C)-99.3 °F (37.4 °C)] 99.3 °F (37.4 °C)  HR:  [64-74] 68  BP: (136-169)/(66-97) 166/94  Resp:  [15-24] 15  SpO2:  [92 %-100 %] 92 %  O2 Device: Nasal cannula    I/O         02/04 0701  02/05 0700 02/05 0701  02/06 0700    I.V. (mL/kg)  1490.6 (13)    IV Piggyback  100    Total Intake(mL/kg)  1590.6 (13.8)    Urine (mL/kg/hr)  1445 (0.9)    Emesis/NG output  50    Blood  50    Total Output  1545    Net  +45.6                Lines/Drains/Airways       Active Status       Name Placement date Placement time Site Days    NG/OG/Enteral  "Tube Nasogastric 18 Fr Right nare 02/05/25  1145  Right nare  less than 1    Urethral Catheter Non-latex;Straight-tip 16 Fr. 02/05/25  0910  Non-latex;Straight-tip  less than 1                  Physical Exam  Constitutional:       General: She is not in acute distress.  HENT:      Head: Normocephalic and atraumatic.      Right Ear: External ear normal.      Left Ear: External ear normal.      Nose: Nose normal.      Mouth/Throat:      Pharynx: Oropharynx is clear.   Eyes:      Extraocular Movements: Extraocular movements intact.   Cardiovascular:      Rate and Rhythm: Normal rate.   Pulmonary:      Effort: Pulmonary effort is normal.   Abdominal:      General: There is no distension.      Palpations: Abdomen is soft.      Tenderness: There is abdominal tenderness.   Musculoskeletal:      Cervical back: Normal range of motion.   Skin:     General: Skin is warm and dry.      Comments: Incision clean, dry, intact   Neurological:      Mental Status: She is alert. Mental status is at baseline.   Psychiatric:         Mood and Affect: Mood normal.           Lab Results: I have reviewed the following results:  No results for input(s): \"WBC\", \"HGB\", \"HCT\", \"PLT\", \"BANDSPCT\", \"SODIUM\", \"K\", \"CL\", \"CO2\", \"BUN\", \"CREATININE\", \"GLUC\", \"CAIONIZED\", \"MG\", \"PHOS\", \"AST\", \"ALT\", \"ALB\", \"TBILI\", \"DBILI\", \"ALKPHOS\", \"PTT\", \"INR\", \"HSTNI0\", \"HSTNI2\", \"BNP\", \"LACTICACID\" in the last 72 hours.    Imaging Results Review: No pertinent imaging studies reviewed.  Other Study Results Review: No additional pertinent studies reviewed.    VTE Pharmacologic Prophylaxis: VTE covered by:  heparin (porcine), Subcutaneous, 5,000 Units at 02/05/25 6719     VTE Mechanical Prophylaxis: sequential compression device  "

## 2025-02-06 NOTE — ASSESSMENT & PLAN NOTE
41 yo with bilateral adnexal masses and GI stromal tumor now POD#1 s/p TLH, BSO and concurrent partial gastrectomy. 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     Plan:  - NPO/NGT  - UGI today POD1  - Isolyte@125  - Arevalo  - D-PCA  - DVT ppx  - Out of bed, encourage ambulation  - Encourage incentive spirometer use  - Strict I's and O's  - Pain and nausea control p.r.n.  - APS consulted

## 2025-02-06 NOTE — UTILIZATION REVIEW
Initial Clinical Review    Elective Inpatient surgical procedure  Age/Sex: 40 y.o. female  Surgery Date: 2/5/25  Procedure: LAPAROSCOPIC TOTAL HYSTERECTOMY. EXAM UNDER ANESTHESIA  Bilateral - SALPINGO-OOPHORECTOMY. LAPAROSCOPIC  PARTIAL GASTRECTOMY  Anesthesia: general  Operative Findings:   Normal external female genitalia, vaginal mucosa and cervix  Grossly normal liver, stomach, intestines, and omentum  Grossly normal uterus and bilateral fallopian tubes   Bilateral ovarian masses measuring approx. 8 cm with cystic and solid components  Both ovarian specimens were removed intact in an Endocatch bag without rupture or spillage, serous fluid contents in one and hemosiderin-laden fluid in the other  Intraoperative frozen pathology indicated a serous cystadenoma with focal proliferation therefore no further staging was performed    POD#1 Progress Note:  No acute events overnight. Patient reports pain in abdomen and back. Abdominal tenderness noted, incision CDI. Voiding with cesar. Denies gas or BM yet. Using IS to 250-750. Keep NPO, maintain NGT, UGI today, continue IVF, Cesar, D-PCA, encourage inc spirometry, OOB, continue pain and nausea control prn, Acute Pain Services consult.    Admission Orders: Date/Time/Statement:   Admission Orders (From admission, onward)       Ordered        02/05/25 1238  Inpatient Admission  Once                          Orders Placed This Encounter   Procedures    Inpatient Admission     Standing Status:   Standing     Number of Occurrences:   1     Level of Care:   Med Surg [16]     Estimated length of stay:   Inpatient Only Surgery     Diet: NPO  Mobility: OOB and ambulatory  DVT Prophylaxis: heparin, scd, ambulation    Medications/Pain Control:   Scheduled Medications:  heparin (porcine), 5,000 Units, Subcutaneous, Q8H CRUZITO  nicotine, 1 patch, Transdermal, Daily      Continuous IV Infusions:  HYDROmorphone, , Intravenous, Continuous  multi-electrolyte, 125 mL/hr, Intravenous,  Continuous      PRN Meds:  ondansetron, 4 mg, Intravenous, Q6H PRN      Vital Signs (last 3 days)       Date/Time Temp Pulse Resp BP MAP (mmHg) SpO2 O2 Flow Rate (L/min) O2 Device Cardiac (WDL) Twyla Coma Scale Score Pain    02/06/25 06:45:15 100.2 °F (37.9 °C) 84 15 141/74 96 92 % -- -- -- -- --    02/06/25 02:38:36 -- 72 18 147/80 102 91 % 4 L/min Nasal cannula -- -- 5    02/05/25 22:35:20 99.9 °F (37.7 °C) 75 17 151/82 105 91 % 4 L/min Nasal cannula -- -- 6    02/05/25 1925 -- -- -- -- -- -- 4 L/min Nasal cannula -- 15 8    02/05/25 18:53:13 99.3 °F (37.4 °C) 68 15 166/94 118 92 % 4 L/min Nasal cannula -- -- 8    02/05/25 17:23:39 98.9 °F (37.2 °C) 73 18 167/95 119 93 % -- -- -- -- --    02/05/25 15:46:10 98.1 °F (36.7 °C) 74 17 169/97 121 95 % 4 L/min Nasal cannula -- -- 10 - Worst Possible Pain    02/05/25 1539 -- -- -- -- -- -- -- None (Room air) -- 15 10 - Worst Possible Pain    02/05/25 1500 97.4 °F (36.3 °C) 66 21 140/66 94 92 % 2 L/min Nasal cannula -- 15 --    02/05/25 1430 -- 64 20 136/83 106 93 % 2 L/min Nasal cannula -- -- --    02/05/25 1415 -- 64 23 155/76 109 93 % 2 L/min Nasal cannula -- -- --    02/05/25 1400 -- 67 20 156/77 110 98 % 2 L/min Nasal cannula -- -- 10 - Worst Possible Pain    02/05/25 1345 -- 67 20 156/77 110 96 % 2 L/min Nasal cannula -- -- 10 - Worst Possible Pain    02/05/25 1332 -- -- -- -- -- -- -- -- -- -- 10 - Worst Possible Pain    02/05/25 1330 -- 74 24 161/81 115 93 % -- None (Room air) -- -- 10 - Worst Possible Pain    02/05/25 1315 -- 68 23 165/94 123 99 % 6 L/min Simple mask -- 14 10 - Worst Possible Pain    02/05/25 1302 97.4 °F (36.3 °C) 68 20 165/92 123 100 % -- Simple mask WDL -- --    02/05/25 1256 97.4 °F (36.3 °C) 74 -- -- -- -- -- -- -- -- --    02/05/25 0718 -- -- -- -- -- -- -- -- -- -- Med Not Given for Pain - for MAR use only    02/05/25 0644 96.8 °F (36 °C) 69 -- 156/89 -- 97 % -- None (Room air) -- -- --    02/05/25 0632 -- -- -- -- -- -- -- -- -- -- No  Pain          Weight (last 2 days)       Date/Time Weight    02/06/25 06:45:15 118 (260.3)    02/06/25 0300 115 (254)    02/05/25 0632 115 (254)            Pertinent Labs/Diagnostic Test Results:   Radiology:  No orders to display     Cardiology:  No orders to display     GI:  No orders to display           Results from last 7 days   Lab Units 02/06/25  0541   WBC Thousand/uL 9.58   HEMOGLOBIN g/dL 12.7   HEMATOCRIT % 41.4   PLATELETS Thousands/uL 217   TOTAL NEUT ABS Thousands/µL 8.28*         Results from last 7 days   Lab Units 02/06/25  0541   SODIUM mmol/L 138   POTASSIUM mmol/L 4.3   CHLORIDE mmol/L 101   CO2 mmol/L 28   ANION GAP mmol/L 9   BUN mg/dL 11   CREATININE mg/dL 1.13   EGFR ml/min/1.73sq m 60   CALCIUM mg/dL 6.1*   MAGNESIUM mg/dL 2.1   PHOSPHORUS mg/dL 5.5*         Results from last 7 days   Lab Units 02/05/25  1322   POC GLUCOSE mg/dl 228*     Results from last 7 days   Lab Units 02/06/25  0541   GLUCOSE RANDOM mg/dL 106     Network Utilization Review Department  ATTENTION: Please call with any questions or concerns to [phone] and carefully listen to the prompts so that you are directed to the right person. All voicemails are confidential.   For Discharge needs, contact Care Management DC Support Team at [phone] opt. 2  Send all requests for admission clinical reviews, approved or denied determinations and any other requests to dedicated fax number below belonging to the campus where the patient is receiving treatment. List of dedicated fax numbers for the Facilities:  FACILITY NAME UR FAX NUMBER   ADMISSION DENIALS (Administrative/Medical Necessity) [phone]   DISCHARGE SUPPORT TEAM (NETWORK) [phone]   PARENT CHILD HEALTH (Maternity/NICU/Pediatrics) [phone]   [facility] [phone]   [facility] [phone]   [facility] [phone]   [facility] [phone]   [facility]  [facility] [phone]   [facility] [phone]   [facility] [phone]   [facility] [phone]   [facility] [phone]   [facility] [phone]   [facility] [phone]   [facility] [phone]

## 2025-02-06 NOTE — QUICK NOTE
Surgical Oncology Post-Op Check  Tanya Rosenthal 40 y.o. female MRN: [id number]  Unit/Bed#: [facility] 833-01 Encounter: [id number]     S: Patient seen and evaluated at bedside after arrival to floor. Patient reports pain in abdomen and back, PCA helps a little. She is voiding into cesar, denies flatus or BM. She is using ID to 750. She denies chest pain, shortness of breath, fevers, chills. NGT dark gastric, cesar light clear yellow output.    O:   Vitals:    02/05/25 1853   BP: 166/94   Pulse: 68   Resp: 15   Temp: 99.3 °F (37.4 °C)   SpO2: 92%     I/O         02/04 0701  02/05 0700 02/05 0701 02/06 0700    I.V. (mL/kg)  1490.6 (13)    IV Piggyback  100    Total Intake(mL/kg)  1590.6 (13.8)    Urine (mL/kg/hr)  1445 (1)    Emesis/NG output  50    Blood  50    Total Output  1545    Net  +45.6                PE:  Gen:  No acute distress  CV:  Warm, well-perfused  Lung:  Normal work of breathing, no respiratory distress  Abd:  Soft, appropriately tender, nondistended   Ext:  Moving all extremities  Neuro:  Alert and oriented, M/S grossly intact  Skin:  Incisions clean, dry, intact     Lab Results   Component Value Date    WBC 9.73 01/29/2025    HGB 13.8 01/29/2025    HCT 46.7 (H) 01/29/2025    MCV 86 01/29/2025     01/29/2025     Lab Results   Component Value Date    GLUCOSE 102 04/04/2015    CALCIUM 7.3 (L) 01/29/2025     04/04/2015    K 3.7 01/29/2025    CO2 30 01/29/2025    CL 98 01/29/2025    BUN 18 01/29/2025    CREATININE 1.18 01/29/2025         A/P: 40 y.o. female * Day of Surgery * s/p Procedure(s) (LRB):  LAPAROSCOPIC TOTAL HYSTERECTOMY, EXAM UNDER ANESTHESIA (N/A)  SALPINGO-OOPHORECTOMY, LAPAROSCOPIC (Bilateral)  PARTIAL GASTRECTOMY (N/A)    Plan:  NPO/NGT  UGI POD1  Isolyte@125  Cesar  D-PCA  DVT ppx  Out of bed, encourage ambulation  Encourage incentive spirometer use  Strict I's and O's  Pain and nausea control p.r.n.  APS consulted      Martha De León MD  PGYI, General Surgery

## 2025-02-07 ENCOUNTER — APPOINTMENT (INPATIENT)
Dept: NON INVASIVE DIAGNOSTICS | Facility: HOSPITAL | Age: 40
DRG: 327 | End: 2025-02-07
Payer: COMMERCIAL

## 2025-02-07 ENCOUNTER — APPOINTMENT (INPATIENT)
Dept: RADIOLOGY | Facility: HOSPITAL | Age: 40
DRG: 327 | End: 2025-02-07
Payer: COMMERCIAL

## 2025-02-07 LAB
ALBUMIN SERPL BCG-MCNC: 3.6 G/DL (ref 3.5–5)
ANION GAP SERPL CALCULATED.3IONS-SCNC: 8 MMOL/L (ref 4–13)
BASOPHILS # BLD AUTO: 0.01 THOUSANDS/ΜL (ref 0–0.1)
BASOPHILS NFR BLD AUTO: 0 % (ref 0–1)
BUN SERPL-MCNC: 10 MG/DL (ref 5–25)
CALCIUM SERPL-MCNC: 5.7 MG/DL (ref 8.4–10.2)
CALCIUM SERPL-MCNC: 6.5 MG/DL (ref 8.4–10.2)
CHLORIDE SERPL-SCNC: 98 MMOL/L (ref 96–108)
CO2 SERPL-SCNC: 29 MMOL/L (ref 21–32)
CREAT SERPL-MCNC: 1.11 MG/DL (ref 0.6–1.3)
EOSINOPHIL # BLD AUTO: 0 THOUSAND/ΜL (ref 0–0.61)
EOSINOPHIL NFR BLD AUTO: 0 % (ref 0–6)
ERYTHROCYTE [DISTWIDTH] IN BLOOD BY AUTOMATED COUNT: 14.3 % (ref 11.6–15.1)
GFR SERPL CREATININE-BSD FRML MDRD: 61 ML/MIN/1.73SQ M
GLUCOSE SERPL-MCNC: 103 MG/DL (ref 65–140)
GLUCOSE SERPL-MCNC: 98 MG/DL (ref 65–140)
HCT VFR BLD AUTO: 41.8 % (ref 34.8–46.1)
HGB BLD-MCNC: 12.4 G/DL (ref 11.5–15.4)
IMM GRANULOCYTES # BLD AUTO: 0.04 THOUSAND/UL (ref 0–0.2)
IMM GRANULOCYTES NFR BLD AUTO: 1 % (ref 0–2)
LYMPHOCYTES # BLD AUTO: 1.06 THOUSANDS/ΜL (ref 0.6–4.47)
LYMPHOCYTES NFR BLD AUTO: 14 % (ref 14–44)
MAGNESIUM SERPL-MCNC: 2.1 MG/DL (ref 1.9–2.7)
MCH RBC QN AUTO: 25.9 PG (ref 26.8–34.3)
MCHC RBC AUTO-ENTMCNC: 29.7 G/DL (ref 31.4–37.4)
MCV RBC AUTO: 87 FL (ref 82–98)
MONOCYTES # BLD AUTO: 0.52 THOUSAND/ΜL (ref 0.17–1.22)
MONOCYTES NFR BLD AUTO: 7 % (ref 4–12)
NEUTROPHILS # BLD AUTO: 5.78 THOUSANDS/ΜL (ref 1.85–7.62)
NEUTS SEG NFR BLD AUTO: 78 % (ref 43–75)
NRBC BLD AUTO-RTO: 0 /100 WBCS
PLATELET # BLD AUTO: 170 THOUSANDS/UL (ref 149–390)
PLATELET # BLD AUTO: 178 THOUSANDS/UL (ref 149–390)
PMV BLD AUTO: 12 FL (ref 8.9–12.7)
PMV BLD AUTO: 12.1 FL (ref 8.9–12.7)
POTASSIUM SERPL-SCNC: 4.4 MMOL/L (ref 3.5–5.3)
RBC # BLD AUTO: 4.79 MILLION/UL (ref 3.81–5.12)
SODIUM SERPL-SCNC: 135 MMOL/L (ref 135–147)
WBC # BLD AUTO: 7.41 THOUSAND/UL (ref 4.31–10.16)

## 2025-02-07 PROCEDURE — 85025 COMPLETE CBC W/AUTO DIFF WBC: CPT | Performed by: PHYSICIAN ASSISTANT

## 2025-02-07 PROCEDURE — 80048 BASIC METABOLIC PNL TOTAL CA: CPT | Performed by: PHYSICIAN ASSISTANT

## 2025-02-07 PROCEDURE — 97163 PT EVAL HIGH COMPLEX 45 MIN: CPT

## 2025-02-07 PROCEDURE — 93321 DOPPLER ECHO F-UP/LMTD STD: CPT | Performed by: INTERNAL MEDICINE

## 2025-02-07 PROCEDURE — 93321 DOPPLER ECHO F-UP/LMTD STD: CPT

## 2025-02-07 PROCEDURE — 82948 REAGENT STRIP/BLOOD GLUCOSE: CPT

## 2025-02-07 PROCEDURE — 93308 TTE F-UP OR LMTD: CPT

## 2025-02-07 PROCEDURE — 93308 TTE F-UP OR LMTD: CPT | Performed by: INTERNAL MEDICINE

## 2025-02-07 PROCEDURE — 99024 POSTOP FOLLOW-UP VISIT: CPT | Performed by: SURGERY

## 2025-02-07 PROCEDURE — 71046 X-RAY EXAM CHEST 2 VIEWS: CPT

## 2025-02-07 PROCEDURE — 93325 DOPPLER ECHO COLOR FLOW MAPG: CPT

## 2025-02-07 PROCEDURE — 83735 ASSAY OF MAGNESIUM: CPT | Performed by: PHYSICIAN ASSISTANT

## 2025-02-07 PROCEDURE — 93356 MYOCRD STRAIN IMG SPCKL TRCK: CPT | Performed by: INTERNAL MEDICINE

## 2025-02-07 PROCEDURE — 82040 ASSAY OF SERUM ALBUMIN: CPT | Performed by: PHYSICIAN ASSISTANT

## 2025-02-07 PROCEDURE — 93325 DOPPLER ECHO COLOR FLOW MAPG: CPT | Performed by: INTERNAL MEDICINE

## 2025-02-07 PROCEDURE — 85049 AUTOMATED PLATELET COUNT: CPT | Performed by: SURGERY

## 2025-02-07 PROCEDURE — 99233 SBSQ HOSP IP/OBS HIGH 50: CPT

## 2025-02-07 PROCEDURE — 97167 OT EVAL HIGH COMPLEX 60 MIN: CPT

## 2025-02-07 PROCEDURE — 71045 X-RAY EXAM CHEST 1 VIEW: CPT

## 2025-02-07 RX ORDER — HYDROMORPHONE HCL/PF 1 MG/ML
0.5 SYRINGE (ML) INJECTION EVERY 2 HOUR PRN
Status: DISCONTINUED | OUTPATIENT
Start: 2025-02-07 | End: 2025-02-09 | Stop reason: HOSPADM

## 2025-02-07 RX ORDER — HYDROMORPHONE HCL/PF 1 MG/ML
0.5 SYRINGE (ML) INJECTION
Status: DISCONTINUED | OUTPATIENT
Start: 2025-02-07 | End: 2025-02-07

## 2025-02-07 RX ORDER — CALCIUM GLUCONATE 20 MG/ML
1 INJECTION, SOLUTION INTRAVENOUS ONCE
Status: COMPLETED | OUTPATIENT
Start: 2025-02-07 | End: 2025-02-07

## 2025-02-07 RX ORDER — CALCIUM CARBONATE 500 MG/1
1000 TABLET, CHEWABLE ORAL 3 TIMES DAILY
Status: DISCONTINUED | OUTPATIENT
Start: 2025-02-07 | End: 2025-02-09 | Stop reason: HOSPADM

## 2025-02-07 RX ORDER — ACETAMINOPHEN 325 MG/1
975 TABLET ORAL EVERY 8 HOURS SCHEDULED
Status: DISCONTINUED | OUTPATIENT
Start: 2025-02-07 | End: 2025-02-09 | Stop reason: HOSPADM

## 2025-02-07 RX ORDER — OXYCODONE HYDROCHLORIDE 10 MG/1
10 TABLET ORAL
Refills: 0 | Status: DISCONTINUED | OUTPATIENT
Start: 2025-02-07 | End: 2025-02-09 | Stop reason: HOSPADM

## 2025-02-07 RX ADMIN — CALCIUM CARBONATE (ANTACID) CHEW TAB 500 MG 1000 MG: 500 CHEW TAB at 09:20

## 2025-02-07 RX ADMIN — DEXTROSE, SODIUM CHLORIDE, AND POTASSIUM CHLORIDE 100 ML/HR: 5; .45; .15 INJECTION INTRAVENOUS at 03:17

## 2025-02-07 RX ADMIN — CALCIUM CARBONATE (ANTACID) CHEW TAB 500 MG 1000 MG: 500 CHEW TAB at 16:52

## 2025-02-07 RX ADMIN — OXYCODONE HYDROCHLORIDE 15 MG: 5 TABLET ORAL at 14:41

## 2025-02-07 RX ADMIN — OXYCODONE HYDROCHLORIDE 15 MG: 5 TABLET ORAL at 11:36

## 2025-02-07 RX ADMIN — CALCIUM GLUCONATE 1 G: 20 INJECTION, SOLUTION INTRAVENOUS at 10:30

## 2025-02-07 RX ADMIN — Medication: at 00:13

## 2025-02-07 RX ADMIN — BUPRENORPHINE 8 MG: 2 TABLET SUBLINGUAL at 08:05

## 2025-02-07 RX ADMIN — OXYCODONE HYDROCHLORIDE 10 MG: 10 TABLET ORAL at 23:58

## 2025-02-07 RX ADMIN — CALCIUM CARBONATE (ANTACID) CHEW TAB 500 MG 1000 MG: 500 CHEW TAB at 20:29

## 2025-02-07 RX ADMIN — ACETAMINOPHEN 1000 MG: 10 INJECTION INTRAVENOUS at 06:07

## 2025-02-07 RX ADMIN — HYDROMORPHONE HYDROCHLORIDE 0.5 MG: 1 INJECTION, SOLUTION INTRAMUSCULAR; INTRAVENOUS; SUBCUTANEOUS at 22:17

## 2025-02-07 RX ADMIN — OXYCODONE HYDROCHLORIDE 15 MG: 5 TABLET ORAL at 20:28

## 2025-02-07 RX ADMIN — HEPARIN SODIUM 5000 UNITS: 5000 INJECTION INTRAVENOUS; SUBCUTANEOUS at 06:07

## 2025-02-07 RX ADMIN — ACETAMINOPHEN 975 MG: 325 TABLET, FILM COATED ORAL at 22:17

## 2025-02-07 RX ADMIN — HEPARIN SODIUM 5000 UNITS: 5000 INJECTION INTRAVENOUS; SUBCUTANEOUS at 14:04

## 2025-02-07 RX ADMIN — ACETAMINOPHEN 975 MG: 325 TABLET, FILM COATED ORAL at 14:04

## 2025-02-07 RX ADMIN — HEPARIN SODIUM 5000 UNITS: 5000 INJECTION INTRAVENOUS; SUBCUTANEOUS at 22:17

## 2025-02-07 NOTE — PLAN OF CARE
Problem: PHYSICAL THERAPY ADULT  Goal: Performs mobility at highest level of function for planned discharge setting.  See evaluation for individualized goals.  Description: Treatment/Interventions: Functional transfer training, Therapeutic exercise, Elevations, Bed mobility, Gait training, Spoke to nursing, Spoke to case management, OT  Equipment Recommended: Walker       See flowsheet documentation for full assessment, interventions and recommendations.  Note: Prognosis: Fair  Problem List: Decreased endurance, Pain, Decreased mobility  Assessment: Pt is a 40 y.o. female seen for PT evaluation s/p admit to [facility] on 2/5/2025. 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 PT now consulted for assessment of mobility and d/c needs. Pt with Ambulate orders.  Pts current clinical presentation is Unstable/ Unpredictable (high complexity) due to Ongoing medical management for primary dx, Increased reliance on more restrictive AD compared to baseline, Decreased activity tolerance compared to baseline, Fall risk, Increased assistance needed from caregiver at current time, s/p surgical intervention. Prior to admission, pt was Ind for mobility and ADLs, lives with parents and Dtr. Upon evaluation, pt currently is requiring mod assist for bed mobility , transfers and min assist for ambulation with HHA. Pt presents at PT eval functioning below baseline and currently w/ overall mobility deficits 2* to: decreased endurance, pain, decreased activity tolerance compared to baseline, decreased functional mobility tolerance compared to baseline. Pt currently at a fall risk 2* to impairments listed above.  Pt will continue to benefit from skilled acute PT interventions to address stated impairments; to maximize functional mobility; for ongoing pt/ family  training; and DME needs. At conclusion of PT session all needs in reach and RN notified of session findings/recommendations with phone and call bell within reach. Pt denies any further questions at this time. The patient's AM-PAC Basic Mobility Inpatient Short Form Raw Score is 14. A Raw score of less than or equal to 16 suggests the patient may benefit from discharge to post-acute rehabilitation services. Please also refer to the recommendation of the Physical Therapist for safe discharge planning. Although with low AM PAC score, anticipate pt to progress in mobility through hospital course pending pain mx, Recommend Level III upon hospital D/C pending further progress.        Rehab Resource Intensity Level, PT: III (Minimum Resource Intensity) (pending further progress with mobility, stair trial)    See flowsheet documentation for full assessment.

## 2025-02-07 NOTE — CERTIFIED RECOVERY SPECIALIST
Time spent: 15 minutes      CRS followed up with patient. Patient said she feels horrible but better than yesterday because today she has a lot of discomfort with her incision. CRS and patient discussed practicing reaching out to her sober support network and talking about her pain, as well as using her recovery tools to alleviate any concern about taking pain medicine if she is in excruciating pain. Patient was appreciative for the visit and support.      CRS provided resources and CRS team will continue to follow and support as needed.

## 2025-02-07 NOTE — ASSESSMENT & PLAN NOTE
12/25 presented to [facility] ED with abdominal pain, CT A/P 12/25 demonstrated possible 4.4 x 3.5 exophytic mass involving the posterior gastric cardia/fundus wall -at that time was discharged home with Protonix and Carafate and outpatient general surgery referral  12/29 subsequently presented to  ED with 10 out of 10 abdominal pain, CT A/P 12/29 demonstrated 6.0 x 4.4 x 4.5 cm lobulated mass posterior gastric wall of the proximal stomach just be on the GE junction extending into the lumen of the stomach as well as outside of the stomach, additionally 7.3 x 6.9 cm hyperdense mass in the right adnexal region suspect large hemorrhagic ovarian cyst as well as a septated mass 5.9 x 4.3 cm possibly left ovarian septated cystic lesion -EGD performed that admission though unable to obtain biopsies at the time  Outpatient follow-up with GI for EUS 1/7, biopsy confirming GIST, mixed spindle cell and epithelioid type  She is a patient of Dr. South and Dr. 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

## 2025-02-07 NOTE — RESTORATIVE TECHNICIAN NOTE
Restorative Technician Note      Patient Name: Tanya Rosenthal     Pt deferred ambulation at this time due to pain.     Yeni Savage BS

## 2025-02-07 NOTE — PLAN OF CARE
Problem: PAIN - ADULT  Goal: Verbalizes/displays adequate comfort level or baseline comfort level  Description: Interventions:  - Encourage patient to monitor pain and request assistance  - Assess pain using appropriate pain scale  - Administer analgesics based on type and severity of pain and evaluate response  - Implement non-pharmacological measures as appropriate and evaluate response  - Consider cultural and social influences on pain and pain management  - Notify physician/advanced practitioner if interventions unsuccessful or patient reports new pain  Outcome: Progressing     Problem: INFECTION - ADULT  Goal: Absence or prevention of progression during hospitalization  Description: INTERVENTIONS:  - Assess and monitor for signs and symptoms of infection  - Monitor lab/diagnostic results  - Monitor all insertion sites, i.e. indwelling lines, tubes, and drains  - Monitor endotracheal if appropriate and nasal secretions for changes in amount and color  - [place] appropriate cooling/warming therapies per order  - Administer medications as ordered  - Instruct and encourage patient and family to use good hand hygiene technique  - Identify and instruct in appropriate isolation precautions for identified infection/condition  Outcome: Progressing  Goal: Absence of fever/infection during neutropenic period  Description: INTERVENTIONS:  - Monitor WBC    Outcome: Progressing     Problem: SAFETY ADULT  Goal: Patient will remain free of falls  Description: INTERVENTIONS:  - Educate patient/family on patient safety including physical limitations  - Instruct patient to call for assistance with activity   - Consult OT/PT to assist with strengthening/mobility   - Keep Call bell within reach  - Keep bed low and locked with side rails adjusted as appropriate  - Keep care items and personal belongings within reach  - Initiate and maintain comfort rounds  - Make Fall Risk Sign visible to staff  - Apply yellow socks and bracelet  for high fall risk patients  - Consider moving patient to room near nurses station  Outcome: Progressing  Goal: Maintain or return to baseline ADL function  Description: INTERVENTIONS:  -  Assess patient's ability to carry out ADLs; assess patient's baseline for ADL function and identify physical deficits which impact ability to perform ADLs (bathing, care of mouth/teeth, toileting, grooming, dressing, etc.)  - Assess/evaluate cause of self-care deficits   - Assess range of motion  - Assess patient's mobility; develop plan if impaired  - Assess patient's need for assistive devices and provide as appropriate  - Encourage maximum independence but intervene and supervise when necessary  - Involve family in performance of ADLs  - Assess for home care needs following discharge   - Consider OT consult to assist with ADL evaluation and planning for discharge  - Provide patient education as appropriate  Outcome: Progressing  Goal: Maintains/Returns to pre admission functional level  Description: INTERVENTIONS:  - Perform AM-PAC 6 Click Basic Mobility/ Daily Activity assessment daily.  - Set and communicate daily mobility goal to care team and patient/family/caregiver.   - Collaborate with rehabilitation services on mobility goals if consulted  - Out of bed for toileting  - Record patient progress and toleration of activity level   Outcome: Progressing     Problem: DISCHARGE PLANNING  Goal: Discharge to home or other facility with appropriate resources  Description: INTERVENTIONS:  - Identify barriers to discharge w/patient and caregiver  - Arrange for needed discharge resources and transportation as appropriate  - Identify discharge learning needs (meds, wound care, etc.)  - Arrange for interpretive services to assist at discharge as needed  - Refer to Case Management Department for coordinating discharge planning if the patient needs post-hospital services based on physician/advanced practitioner order or complex needs  related to functional status, cognitive ability, or social support system  Outcome: Progressing     Problem: Knowledge Deficit  Goal: Patient/family/caregiver demonstrates understanding of disease process, treatment plan, medications, and discharge instructions  Description: Complete learning assessment and assess knowledge base.  Interventions:  - Provide teaching at level of understanding  - Provide teaching via preferred learning methods  Outcome: Progressing

## 2025-02-07 NOTE — ASSESSMENT & PLAN NOTE
Current regimen:  S/p intraoperative bilateral rectus sheath blocks    Patient notes pain significantly improved from yesterday.  Location: abdomen    Upon re-evaluation following discontinuation of the PCA patient is resting comfortably in the bedside chair, notes pain is controlled at this time.    Discontinue dPCA today 30 minutes following administration of oral oxycodone  39 total bolus received of 137 bolus attempted / 24 hrs  20.45 total mg used over 24 hrs  (approximately 409 ome)    -Start oxycodone 10 mg for moderate pain Q 3 H PRN  -Start oxycodone 15 mg for severe pain Q 3 H PRN  -Start dilaudid 0.5 mg for breakthrough pain Q 2 H PRN  -Transition to p.o. tylenol 975 mg q8h ATC    Narcan ordered for respiratory depression    Bowel regimen per surgical team    I have reviewed the patient's controlled substance dispensing history in the Prescription Drug Monitoring Program in compliance with the [facility] regulations before prescribing any controlled substances.  Last refills  02/04/2025 02/04/2025 1 Buprenorphine-Nalox 8-2mg Film 45.00 30 Ma Can [id number] Bet ([id number]) 0 12.00 mg Comm Ins PA   12/06/2024 11/25/2024 1 Buprenorphine-Nalox 8-2mg Film 45.00 30 Ma Can [id number] Mac ([id number]) 0 12.00 mg Comm Ins PA   10/07/2024 10/04/2024 1 Buprenorphine-Nalox 8-2mg Film 45.00 30 Ma Can [id number] Mac ([id number]) 0 12.00 mg Comm Ins PA   09/12/2024 09/09/2024 1 Buprenorphine-Nalox 8-2mg Film 45.00 30 Ma Can [id number] Mac ([id number]) 0 12.00 mg Comm Ins PA   08/17/2024 08/16/2024 1 Buprenorphine-Nalox 8-2mg Film 45.00 30 Ma Can [id number] Mac ([id number]) 0 12.00 mg Comm Ins PA

## 2025-02-07 NOTE — PHYSICAL THERAPY NOTE
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) [C49.A2]  Submucosal neoplasm of gastrointestinal tract [D49.0]  Other noninflammatory disorders of ovary, fallopian tube and broad ligament [N83.8]    Problem List  Patient Active Problem List   Diagnosis    Hypothyroidism    Fatigue    Medically noncompliant    History of heroin abuse (HCC)    Environmental allergies    Morbid obesity (HCC)    Bradycardia    Cervical strain    Head injury    Chest pain    Elevated d-dimer    Gastritis    Abdominal pain    Hypocalcemia    Anxiety    Gastric lesion    Pelvic mass    Status post total hysterectomy and bilateral salpingo-oophorectomy (BSO)    GIST (gastrointestinal stromal tumor), malignant (HCC)    Submucosal neoplasm of gastrointestinal tract    Palliative care encounter    Goals of care, counseling/discussion    Cancer related pain       Past Medical History  Past Medical History:   Diagnosis Date    Anxiety     Depression     Heroin abuse (HCC)     clean since 2015    Thyroid cancer (HCC) 1999    with thyroidectomy and RTX       Past Surgical History  Past Surgical History:   Procedure Laterality Date    EGD      GANGLION CYST EXCISION Left     PARTIAL GASTRECTOMY N/A 2/5/2025    Procedure: PARTIAL GASTRECTOMY;  Surgeon: Tomás South MD;  Location: [facility];  Service: Surgical Oncology    PELVIC LAPAROSCOPY Bilateral 2/5/2025    Procedure: SALPINGO-OOPHORECTOMY, LAPAROSCOPIC;  Surgeon: Varghese Jung MD;  Location: [facility];  Service: Gynecology Oncology    NM LAPAROSCOPY W TOTAL HYSTERECTOMY UTERUS 250 GM/< N/A 2/5/2025    Procedure: LAPAROSCOPIC TOTAL HYSTERECTOMY, EXAM UNDER ANESTHESIA;  Surgeon: Varghese Jung MD;  Location: [facility];  Service: Gynecology Oncology    THYROIDECTOMY            02/07/25 0928   PT Last Visit   PT Visit Date 02/07/25   Note Type   Note type Evaluation    Pain Assessment   Pain Assessment Tool 0-10   Pain Score 8   Pain Location/Orientation Location: Abdomen   Pain Onset/Description Onset: Ongoing   Patient's Stated Pain Goal No pain   Hospital Pain Intervention(s) Repositioned;Ambulation/increased activity;Emotional support   Restrictions/Precautions   Weight Bearing Precautions Per Order No   Other Precautions Chair Alarm;Bed Alarm;Multiple lines;Fall Risk;Pain;O2  (5L O2)   Home Living   Type of Home House   Home Layout Multi-level;Bed/bath upstairs;Stairs to enter with rails;Able to live on main level with bedroom/bathroom   Bathroom Shower/Tub Walk-in shower   Bathroom Toilet Standard   Bathroom Equipment Grab bars in shower;Shower chair   Home Equipment Other (Comment)  (No DME)   Additional Comments 3STH with bed on 3rd floor, bathroom on2nd floor. Pt able to have FFSU with bed and full bath if needed   Prior Function   Level of [place] Independent with ADLs;Independent with functional mobility;Independent with IADLS   Lives With Family;Daughter  (Parents, Dtr)   Receives Help From Family   IADLs Independent with driving;Independent with meal prep;Independent with medication management   Falls in the last 6 months 0   Vocational Full time employment   Comments no AD used PTA   General   Family/Caregiver Present No   Cognition   Overall Cognitive Status WFL   Arousal/Participation Responsive   Attention Attends with cues to redirect   Orientation Level Oriented X4   Following Commands Follows one step commands without difficulty   Comments Pt cooperative and pleasant during session. Increased time for tasks 2* pain   RLE Assessment   RLE Assessment WFL   LLE Assessment   LLE Assessment WFL   Bed Mobility   Supine to Sit 3  Moderate assistance   Additional items Assist x 1;HOB elevated;Bedrails;Increased time required;Verbal cues;LE management   Sit to Supine 3  Moderate assistance   Additional items Assist x 1;Increased time required;Verbal cues;LE  management   Additional Comments Pt in bed upon arrival. Assisted to stretcher for transport to test.   Transfers   Sit to Stand 3  Moderate assistance   Additional items Assist x 1;Increased time required;Verbal cues   Stand to Sit 3  Moderate assistance   Additional items Assist x 1;Increased time required;Verbal cues   Additional Comments w/HHA   Ambulation/Elevation   Gait pattern Wide JOSE ENRIQUE;Excessively slow;Step to;Short stride   Gait Assistance 4  Minimal assist   Additional items Assist x 1;Verbal cues   Assistive Device Other (Comment)  (HHA)   Distance 5 ft from bed to stretcher   Ambulation/Elevation Additional Comments Limited ambulation distance 2* abd pain and dizziness. BP checked 130/75. RN updated   Balance   Static Sitting Fair +   Dynamic Sitting Fair   Static Standing Fair -   Dynamic Standing Poor +   Ambulatory Poor +   Endurance Deficit   Endurance Deficit Yes   Activity Tolerance   Activity Tolerance Patient limited by fatigue;Patient limited by pain   Medical Staff Made Aware Co-eval with PT 2* medical complexity and multiple co morbidities   Nurse Made Aware RN cleared pt for therapy   Assessment   Prognosis Fair   Problem List Decreased endurance;Pain;Decreased mobility   Assessment Pt is a 40 y.o. female seen for PT evaluation s/p admit to [facility] on 2/5/2025. 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 PT now consulted for assessment of mobility and d/c needs. Pt with Ambulate orders.  Pts current clinical presentation is Unstable/ Unpredictable (high complexity) due to Ongoing medical management for primary dx, Increased reliance on more restrictive AD compared to baseline, Decreased activity tolerance compared to baseline, Fall risk, Increased assistance needed from caregiver at current time, s/p surgical  intervention. Prior to admission, pt was Ind for mobility and ADLs, lives with parents and Dtr. Upon evaluation, pt currently is requiring mod assist for bed mobility , transfers and min assist for ambulation with HHA. Pt presents at PT eval functioning below baseline and currently w/ overall mobility deficits 2* to: decreased endurance, pain, decreased activity tolerance compared to baseline, decreased functional mobility tolerance compared to baseline. Pt currently at a fall risk 2* to impairments listed above.  Pt will continue to benefit from skilled acute PT interventions to address stated impairments; to maximize functional mobility; for ongoing pt/ family training; and DME needs. At conclusion of PT session all needs in reach and RN notified of session findings/recommendations with phone and call bell within reach. Pt denies any further questions at this time. The patient's AM-PAC Basic Mobility Inpatient Short Form Raw Score is 14. A Raw score of less than or equal to 16 suggests the patient may benefit from discharge to post-acute rehabilitation services. Please also refer to the recommendation of the Physical Therapist for safe discharge planning. Although with low AM PAC score, anticipate pt to progress in mobility through hospital course pending pain mx, Recommend Level III upon hospital D/C pending further progress.   Goals   Patient Goals to feel better   STG Expiration Date 02/21/25   Short Term Goal #1 STG 1. Pt will be able to perform bed mobility tasks with Sup in order to improve overall functional mobility and assist in safe d/c. STG 2. Pt with sit EOB for at least 25 minutes at Sup level in order to strengthen abdominal musculature and assist in future transfers/ ambulation. STG 3. Pt will be able to perform functional transfer with Sup in order to improve overall functional mobility and assist in safe d/c. STG 4. Pt will be able to ambulate at least 150 feet with least restrictive device with  Sup A in order to improve overall functional mobility and assist in safe d/c. STG 5. Pt will improve sitting/standing static/dynamic balance 1/2 grade in order to improve functional mobility and assist in safe d/c. STG 6.  Pt will be able to negotiate at least 10 stairs with least restrictive device with Sup A in order to improve overall functional mobility and assist in safe d/c.   PT Treatment Day 0   Plan   Treatment/Interventions Functional transfer training;Therapeutic exercise;Elevations;Bed mobility;Gait training;Spoke to nursing;Spoke to case management;OT   PT Frequency 3-5x/wk   Discharge Recommendation   Rehab Resource Intensity Level, PT III (Minimum Resource Intensity)  (pending further progress with mobility, stair trial)   Equipment Recommended Walker   AM-PAC Basic Mobility Inpatient   Turning in Flat Bed Without Bedrails 3   Lying on Back to Sitting on Edge of Flat Bed Without Bedrails 2   Moving Bed to Chair 2   Standing Up From Chair Using Arms 2   Walk in Room 3   Climb 3-5 Stairs With Railing 2   Basic Mobility Inpatient Raw Score 14   Basic Mobility Standardized Score 35.55   [facility] Highest Level Of Mobility   -[facility] Goal 4: Move to chair/commode   -HLM Achieved 5: Stand (1 or more minutes)   Modified [place] Scale   Modified [place] Scale 4   End of Consult   Patient Position at End of Consult All needs within reach;Supine  (Assisted on to stretcher for test, left in care of transport staff)         Ciara Purdy, PT DPT

## 2025-02-07 NOTE — PLAN OF CARE
Problem: OCCUPATIONAL THERAPY ADULT  Goal: Performs self-care activities at highest level of function for planned discharge setting.  See evaluation for individualized goals.  Description: Treatment Interventions: ADL retraining, Functional transfer training, UE strengthening/ROM, Endurance training, Patient/family training, Cognitive reorientation, Equipment evaluation/education, Fine motor coordination activities, Compensatory technique education, Continued evaluation, Energy conservation, Activityengagement          See flowsheet documentation for full assessment, interventions and recommendations.   Note: Limitation: Decreased ADL status, Decreased endurance, Decreased self-care trans, Decreased high-level ADLs  Prognosis: Fair  Assessment: 40 year old pt seen today for an OT evaluation following admission to [facility] 2/2 bilateral adnexal masses and GI stromal tumor, s/p TLH, BSO and concurrent partial gastrectomy, IOFS of adnexal masses c/w serous cystadenomas 2/5  with present symptoms significant for pain, fatigue, weakness, decreased ADL status, decreased functional mobility. Pt  has a past medical history of Anxiety, Depression, Heroin abuse (HCC), and Thyroid cancer (HCC). Pt reported living with parents and Dtr in a 3SH, bed/bath upstairs usually but can stay on 1st floor with bed/bath if needed. Pt reports being IND with all ADLs/IADLs PTA with no DME used. +. Works FT. Pt very pleasant and cooperative t/o session. Pt completed functional bed mobility with mod A. Mod A for functional STS txfs and functional mobility with HHA. Pt was Min A for UB ADLs and mod A for LB ADLs. The patient's raw score on the AM-PAC Daily Activity Inpatient Short Form is 15. A raw score of less than 19 suggests the patient may benefit from discharge to post-acute rehabilitation services. Please refer to the recommendation of the Occupational Therapist for safe discharge planning. Pt is functioning  below baseline level of function and will continue to benefit from skilled acute OT to promote increased independence and return to PLOF. At this time, current OT recommendation is Level 4 resources pending progress. Will continue to follow and assess IP.     Rehab Resource Intensity Level, OT: No post-acute rehabilitation needs (pending progress)

## 2025-02-07 NOTE — PROGRESS NOTES
Progress Note - Surgery-General   Name: Tanya Rosenthal 40 y.o. female I MRN: [id number]  Unit/Bed#: [facility] 833-01 I Date of Admission: 2/5/2025   Date of Service: 2/6/2025 I Hospital Day: 1     Assessment & Plan  Status post total hysterectomy and bilateral salpingo-oophorectomy (BSO)  See below  GIST (gastrointestinal stromal tumor), malignant (HCC)  41 yo with bilateral adnexal masses and GI stromal tumor, s/p TLH, BSO and concurrent partial gastrectomy. 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 labs pending    Plan:  -Advance to clear liquid diet  -Maintenance IV fluids at 84  -Monitor for return of bowel function  -Palliative on board, appreciate recommendations for pain management regimen  -Wean O2 as tolerated  -Encourage ambulation/out of bed, 3 times daily  -Encourage incentive spirometer use, 10 times per hour  Palliative care encounter  Appreciate recommendations  History of heroin abuse (HCC)  -Palliative consulted      Subjective   Patient seen and examined at bedside.  Patient expressed concern regarding her pain.  Patient is passing flatus but denies bowel movements.  Patient tolerated sips.  No nausea or vomiting.    Objective :  Temp:  [98.7 °F (37.1 °C)-101 °F (38.3 °C)] 98.7 °F (37.1 °C)  HR:  [57-84] 57  BP: (137-151)/(73-80) 151/76  Resp:  [14-18] 16  SpO2:  [89 %-94 %] 94 %  O2 Device: Nasal cannula    I/O         02/05 0701  02/06 0700 02/06 0701  02/07 0700    P.O.  480    I.V. (mL/kg) 2483.4 (21) 11.9 (0.1)    IV Piggyback 100     Total Intake(mL/kg) 2583.4 (21.9) 491.9 (4.2)    Urine (mL/kg/hr) 2145 (0.8) 750 (0.4)    Emesis/NG output 50     Blood 50     Total Output 2245 750    Net +338.4 -258.2                Lines/Drains/Airways       Active Status       Name Placement date Placement time Site Days     NG/OG/Enteral Tube Nasogastric 18 Fr Right nare 02/05/25  1145  Right nare  1    Urethral Catheter Non-latex;Straight-tip 16 Fr. 02/05/25  0910  Non-latex;Straight-tip  1                  Physical Exam  Vitals reviewed.   HENT:      Head: Normocephalic.   Eyes:      General: No scleral icterus.     Conjunctiva/sclera: Conjunctivae normal.   Cardiovascular:      Rate and Rhythm: Normal rate.      Pulses: Normal pulses.   Pulmonary:      Effort: Pulmonary effort is normal. No respiratory distress.      Comments: 5L NC  Abdominal:      General: Abdomen is flat. There is no distension.      Palpations: Abdomen is soft.      Tenderness: There is abdominal tenderness. There is no guarding.   Musculoskeletal:      Right lower leg: No edema.      Left lower leg: No edema.   Skin:     General: Skin is warm.   Neurological:      Mental Status: She is alert and oriented to person, place, and time.   Psychiatric:         Mood and Affect: Mood normal.           Lab Results: I have reviewed the following results:  Recent Labs     02/06/25  0541   WBC 9.58   HGB 12.7   HCT 41.4      SODIUM 138   K 4.3      CO2 28   BUN 11   CREATININE 1.13   GLUC 106   MG 2.1   PHOS 5.5*       Imaging Results Review: No pertinent imaging studies reviewed.  Other Study Results Review: No additional pertinent studies reviewed.    VTE Pharmacologic Prophylaxis: VTE covered by:  heparin (porcine), Subcutaneous, 5,000 Units at 02/06/25 0410     VTE Mechanical Prophylaxis: sequential compression device

## 2025-02-07 NOTE — ASSESSMENT & PLAN NOTE
39 yo with bilateral adnexal masses and GI stromal tumor, s/p TLH, BSO and concurrent partial gastrectomy. 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 labs pending    Plan:  -Advance to clear liquid diet  -Maintenance IV fluids at 84  -Monitor for return of bowel function  -Palliative on board, appreciate recommendations for pain management regimen  -Wean O2 as tolerated  -Encourage ambulation/out of bed, 3 times daily  -Encourage incentive spirometer use, 10 times per hour

## 2025-02-07 NOTE — OCCUPATIONAL THERAPY NOTE
Occupational Therapy Evaluation     Patient Name: Tanya Rosenthal  Today's Date: 2/7/2025  Problem List  Principal Problem:    Status post total hysterectomy and bilateral salpingo-oophorectomy (BSO)  Active Problems:    History of heroin abuse (HCC)    Morbid obesity (HCC)    GIST (gastrointestinal stromal tumor), malignant (HCC)    Palliative care encounter    Cancer related pain    Past Medical History  Past Medical History:   Diagnosis Date    Anxiety     Depression     Heroin abuse (HCC)     clean since 2015    Thyroid cancer (HCC) 1999    with thyroidectomy and RTX     Past Surgical History  Past Surgical History:   Procedure Laterality Date    EGD      GANGLION CYST EXCISION Left     PARTIAL GASTRECTOMY N/A 2/5/2025    Procedure: PARTIAL GASTRECTOMY;  Surgeon: Tomás South MD;  Location: [facility];  Service: Surgical Oncology    PELVIC LAPAROSCOPY Bilateral 2/5/2025    Procedure: SALPINGO-OOPHORECTOMY, LAPAROSCOPIC;  Surgeon: Varghese Jung MD;  Location: [facility];  Service: Gynecology Oncology    WI LAPAROSCOPY W TOTAL HYSTERECTOMY UTERUS 250 GM/< N/A 2/5/2025    Procedure: LAPAROSCOPIC TOTAL HYSTERECTOMY, EXAM UNDER ANESTHESIA;  Surgeon: Varghese Jung MD;  Location: [facility];  Service: Gynecology Oncology    THYROIDECTOMY               02/07/25 0927   OT Last Visit   OT Visit Date 02/07/25   Note Type   Note type Evaluation   Pain Assessment   Pain Assessment Tool 0-10   Pain Score 8   Pain Location/Orientation Location: Abdomen   Pain Onset/Description Onset: Ongoing   Patient's Stated Pain Goal No pain   Hospital Pain Intervention(s) Repositioned;Ambulation/increased activity;Emotional support   Restrictions/Precautions   Weight Bearing Precautions Per Order No   Other Precautions Chair Alarm;Bed Alarm;Multiple lines;Fall Risk;Pain;O2  (5L O2)   Home Living   Type of Home House   Home Layout Multi-level;Bed/bath upstairs;Stairs to enter with rails;Able to live on main level  "with bedroom/bathroom  (2nd floor bath and 3rd floor bedroom, however able to stay on FF with bed/bath if needed)   Bathroom Shower/Tub Walk-in shower   Bathroom Toilet Standard   Bathroom Equipment Grab bars in shower;Shower chair   Home Equipment Other (Comment)  (No DME used)   Additional Comments 3SH, normally bed on 3rd floor and bathroom on 2nd floor, but can stay on FF with extra bed/bath if needed. No DME used PTA   Prior Function   Level of [place] Independent with ADLs;Independent with functional mobility;Independent with IADLS   Lives With Family;Daughter  (Parents, Dtr)   Receives Help From Family   IADLs Independent with driving;Independent with meal prep;Independent with medication management   Falls in the last 6 months 0   Vocational Full time employment   Lifestyle   Autonomy IND PTA with all ADLs/IADLs. No DME used. +   Reciprocal Relationships Lives with supportive family who can assist PRN   Service to Others Works FT   Intrinsic Gratification Reports not doing much right now outside of work   General   Family/Caregiver Present No   Subjective   Subjective \"Its so hot in here\"   ADL   Where Assessed Edge of bed   Eating Assistance 5  Supervision/Setup   Grooming Assistance 5  Supervision/Setup   UB Bathing Assistance 4  Minimal Assistance   LB Bathing Assistance 3  Moderate Assistance   UB Dressing Assistance 4  Minimal Assistance   LB Dressing Assistance 3  Moderate Assistance   Toileting Assistance  3  Moderate Assistance   Bed Mobility   Supine to Sit 3  Moderate assistance   Additional items Assist x 1;HOB elevated;Bedrails;Increased time required;Verbal cues;LE management   Sit to Supine 3  Moderate assistance   Additional items Assist x 1;HOB elevated;Bedrails;Increased time required;Verbal cues;LE management   Additional Comments Pt in bed upon arrival. Transport present to take pt to x-ray, assisted to transport stretcher post session, all needs met.   Transfers   Sit to Stand " 3  Moderate assistance   Additional items Assist x 1;Increased time required;Verbal cues   Stand to Sit 3  Moderate assistance   Additional items Assist x 1;Increased time required;Verbal cues   Additional Comments HHA   Functional Mobility   Functional Mobility 3  Moderate assistance   Additional Comments steps from bed>transport stretcher. HHA   Additional items Hand hold assistance   Balance   Static Sitting Fair +   Dynamic Sitting Fair -   Static Standing Poor +   Dynamic Standing Poor   Ambulatory Poor   Activity Tolerance   Activity Tolerance Patient limited by fatigue;Patient limited by pain   Medical Staff Made Aware Co-eval w PT 2/2 increased medical complexity and comorbidities.   Nurse Made Aware RN cleared for therapy   RUE Assessment   RUE Assessment WFL   LUE Assessment   LUE Assessment WFL   Hand Function   Gross Motor Coordination Functional   Fine Motor Coordination Functional   Cognition   Overall Cognitive Status WFL   Arousal/Participation Alert;Cooperative   Attention Attends with cues to redirect   Orientation Level Oriented X4   Memory Within functional limits   Following Commands Follows one step commands without difficulty   Comments Pt overall pleasant and cooperative. Required encouragement to participate.   Assessment   Limitation Decreased ADL status;Decreased endurance;Decreased self-care trans;Decreased high-level ADLs   Prognosis Fair   Assessment 40 year old pt seen today for an OT evaluation following admission to [facility] 2/2 bilateral adnexal masses and GI stromal tumor, s/p TLH, BSO and concurrent partial gastrectomy, IOFS of adnexal masses c/w serous cystadenomas 2/5  with present symptoms significant for pain, fatigue, weakness, decreased ADL status, decreased functional mobility. Pt  has a past medical history of Anxiety, Depression, Heroin abuse (HCC), and Thyroid cancer (HCC). Pt reported living with parents and Dtr in a 3SH, bed/bath upstairs usually  but can stay on 1st floor with bed/bath if needed. Pt reports being IND with all ADLs/IADLs PTA with no DME used. +. Works FT. Pt very pleasant and cooperative t/o session. Pt completed functional bed mobility with mod A. Mod A for functional STS txfs and functional mobility with HHA. Pt was Min A for UB ADLs and mod A for LB ADLs. The patient's raw score on the AM-PAC Daily Activity Inpatient Short Form is 15. A raw score of less than 19 suggests the patient may benefit from discharge to post-acute rehabilitation services. Please refer to the recommendation of the Occupational Therapist for safe discharge planning. Pt is functioning below baseline level of function and will continue to benefit from skilled acute OT to promote increased independence and return to PLOF. At this time, current OT recommendation is Level 4 resources pending progress. Will continue to follow and assess IP.   Goals   Patient Goals to feel better   LTG Time Frame 10-14   Long Term Goal #1 See below for goals   Plan   Treatment Interventions ADL retraining;Functional transfer training;UE strengthening/ROM;Endurance training;Patient/family training;Cognitive reorientation;Equipment evaluation/education;Fine motor coordination activities;Compensatory technique education;Continued evaluation;Energy conservation;Activityengagement   Goal Expiration Date 02/21/25   OT Frequency 2-3x/wk   Discharge Recommendation   Rehab Resource Intensity Level, OT No post-acute rehabilitation needs  (pending progress)   AM-PAC Daily Activity Inpatient   Lower Body Dressing 2   Bathing 2   Toileting 2   Upper Body Dressing 2   Grooming 3   Eating 4   Daily Activity Raw Score 15   Daily Activity Standardized Score (Calc for Raw Score >=11) 34.69   AM-PAC Applied Cognition Inpatient   Following a Speech/Presentation 3   Understanding Ordinary Conversation 4   Taking Medications 4   Remembering Where Things Are Placed or Put Away 4   Remembering List of 4-5  Errands 4   Taking Care of Complicated Tasks 3   Applied Cognition Raw Score 22   Applied Cognition Standardized Score 47.83   End of Consult   Education Provided Yes   Patient Position at End of Consult Supine;All needs within reach;Other (comment)  (on transport stretcher)   Nurse Communication Nurse aware of consult         Occupational Therapy Goals    Pt will be Mod I with bed mobility with good sitting balance/tolerance to engage in self care tasks within this plan of care.      Pt will be Mod I for UB dressing and bathing with use of assistive devices PRN within this plan of care.     Pt will be Mod I for LB dressing and bathing with use of assistive devices PRN within this plan of care.     Pt will demonstrate standing tolerance of 2-3 minutes increase independence for toileting ADLs/tasks with use of assistive devices PRN within this plan of care.     Pt will complete functional transfers with mod I, with use of appropriate assistive devices within this plan of care.     Pt will demonstrate good carryover of safety awareness with use of appropriate assistive devices during functional tasks within this plan of care.     Pt will demonstrate increased activity tolerance to 30 mins for participation in ADLs and functional tasks within this plan of care.     Pt will complete further functional cognitive assessment with good attention/participation to assist with safe d/c planning recommendations.        Juan Moncada, MS, OTR/L     MOCA CERTIFIED RATER ID [id number]

## 2025-02-07 NOTE — PLAN OF CARE
Problem: PAIN - ADULT  Goal: Verbalizes/displays adequate comfort level or baseline comfort level  Description: Interventions:  - Encourage patient to monitor pain and request assistance  - Assess pain using appropriate pain scale  - Administer analgesics based on type and severity of pain and evaluate response  - Implement non-pharmacological measures as appropriate and evaluate response  - Consider cultural and social influences on pain and pain management  - Notify physician/advanced practitioner if interventions unsuccessful or patient reports new pain  Outcome: Progressing     Problem: INFECTION - ADULT  Goal: Absence or prevention of progression during hospitalization  Description: INTERVENTIONS:  - Assess and monitor for signs and symptoms of infection  - Monitor lab/diagnostic results  - Monitor all insertion sites, i.e. indwelling lines, tubes, and drains  - Monitor endotracheal if appropriate and nasal secretions for changes in amount and color  - [place] appropriate cooling/warming therapies per order  - Administer medications as ordered  - Instruct and encourage patient and family to use good hand hygiene technique  - Identify and instruct in appropriate isolation precautions for identified infection/condition  Outcome: Progressing  Goal: Absence of fever/infection during neutropenic period  Description: INTERVENTIONS:  - Monitor WBC    Outcome: Progressing     Problem: SAFETY ADULT  Goal: Patient will remain free of falls  Description: INTERVENTIONS:  - Educate patient/family on patient safety including physical limitations  - Instruct patient to call for assistance with activity   - Consult OT/PT to assist with strengthening/mobility   - Keep Call bell within reach  - Keep bed low and locked with side rails adjusted as appropriate  - Keep care items and personal belongings within reach  - Initiate and maintain comfort rounds  - Make Fall Risk Sign visible to staff  - Apply yellow socks and bracelet  for high fall risk patients  - Consider moving patient to room near nurses station  Outcome: Progressing  Goal: Maintain or return to baseline ADL function  Description: INTERVENTIONS:  -  Assess patient's ability to carry out ADLs; assess patient's baseline for ADL function and identify physical deficits which impact ability to perform ADLs (bathing, care of mouth/teeth, toileting, grooming, dressing, etc.)  - Assess/evaluate cause of self-care deficits   - Assess range of motion  - Assess patient's mobility; develop plan if impaired  - Assess patient's need for assistive devices and provide as appropriate  - Encourage maximum independence but intervene and supervise when necessary  - Involve family in performance of ADLs  - Assess for home care needs following discharge   - Consider OT consult to assist with ADL evaluation and planning for discharge  - Provide patient education as appropriate  Outcome: Progressing  Goal: Maintains/Returns to pre admission functional level  Description: INTERVENTIONS:  - Perform AM-PAC 6 Click Basic Mobility/ Daily Activity assessment daily.  - Set and communicate daily mobility goal to care team and patient/family/caregiver.   - Collaborate with rehabilitation services on mobility goals if consulted  - Out of bed for toileting  - Record patient progress and toleration of activity level   Outcome: Progressing     Problem: DISCHARGE PLANNING  Goal: Discharge to home or other facility with appropriate resources  Description: INTERVENTIONS:  - Identify barriers to discharge w/patient and caregiver  - Arrange for needed discharge resources and transportation as appropriate  - Identify discharge learning needs (meds, wound care, etc.)  - Arrange for interpretive services to assist at discharge as needed  - Refer to Case Management Department for coordinating discharge planning if the patient needs post-hospital services based on physician/advanced practitioner order or complex needs  related to functional status, cognitive ability, or social support system  Outcome: Progressing     Problem: Knowledge Deficit  Goal: Patient/family/caregiver demonstrates understanding of disease process, treatment plan, medications, and discharge instructions  Description: Complete learning assessment and assess knowledge base.  Interventions:  - Provide teaching at level of understanding  - Provide teaching via preferred learning methods  Outcome: Progressing

## 2025-02-07 NOTE — ASSESSMENT & PLAN NOTE
Palliative diagnosis: GIST  Established with Dr. Potter 2/3/25    Goals:  Level 1 full code  Treatment focused     Symptom management:  See cancer related pain    Social support:  Supportive listening provided  Normalized experience of patient/family  Provided anxiety containment  Provided anticipatory guidance  Encouraged self care  Advocated for patient/family with interdisciplinary care team    Coordination of care:  Reviewed case with rn, surgical oncology - [facility]l LEWIS    Follow up  Palliative Care will continue to follow and goals of care discussions will be ongoing.   Please reach out via [facility] secure chat if questions or concerns arise.    We appreciate the invitation to be involved in this patient's care.

## 2025-02-07 NOTE — PROGRESS NOTES
Progress Note - Palliative Care   Name: Tanya Rosenthal 40 y.o. female I MRN: [id number]  Unit/Bed#: [facility] 833-01 I Date of Admission: 2/5/2025   Date of Service: 2/7/2025 I Hospital Day: 2     Assessment & Plan  Cancer related pain  Current regimen:  S/p intraoperative bilateral rectus sheath blocks    Patient notes pain significantly improved from yesterday.  Location: abdomen    Upon re-evaluation following discontinuation of the PCA patient is resting comfortably in the bedside chair, notes pain is controlled at this time.    Discontinue dPCA today 30 minutes following administration of oral oxycodone  39 total bolus received of 137 bolus attempted / 24 hrs  20.45 total mg used over 24 hrs  (approximately 409 ome)    -Start oxycodone 10 mg for moderate pain Q 3 H PRN  -Start oxycodone 15 mg for severe pain Q 3 H PRN  -Start dilaudid 0.5 mg for breakthrough pain Q 2 H PRN  -Transition to p.o. tylenol 975 mg q8h ATC    Narcan ordered for respiratory depression    Bowel regimen per surgical team    I have reviewed the patient's controlled substance dispensing history in the Prescription Drug Monitoring Program in compliance with the [facility] regulations before prescribing any controlled substances.  Last refills  02/04/2025 02/04/2025 1 Buprenorphine-Nalox 8-2mg Film 45.00 30 Ma Can [id number] Bet ([id number]) 0 12.00 mg Comm Ins PA   12/06/2024 11/25/2024 1 Buprenorphine-Nalox 8-2mg Film 45.00 30 Ma Can [id number] Mac ([id number]) 0 12.00 mg Comm Ins PA   10/07/2024 10/04/2024 1 Buprenorphine-Nalox 8-2mg Film 45.00 30 Ma Can [id number] Mac ([id number]) 0 12.00 mg Comm Ins PA   09/12/2024 09/09/2024 1 Buprenorphine-Nalox 8-2mg Film 45.00 30 Ma Can [id number] Mac ([id number]) 0 12.00 mg Comm Ins PA   08/17/2024 08/16/2024 1 Buprenorphine-Nalox 8-2mg Film 45.00 30 Ma Can [id number] Mac ([id number]) 0 12.00 mg Comm Ins PA     GIST (gastrointestinal stromal tumor), malignant (HCC)  12/25 presented to [facility] ED with abdominal pain, CT A/P 12/25 demonstrated possible 4.4 x 3.5  exophytic mass involving the posterior gastric cardia/fundus wall -at that time was discharged home with Protonix and Carafate and outpatient general surgery referral  12/29 subsequently presented to  ED with 10 out of 10 abdominal pain, CT A/P 12/29 demonstrated 6.0 x 4.4 x 4.5 cm lobulated mass posterior gastric wall of the proximal stomach just be on the GE junction extending into the lumen of the stomach as well as outside of the stomach, additionally 7.3 x 6.9 cm hyperdense mass in the right adnexal region suspect large hemorrhagic ovarian cyst as well as a septated mass 5.9 x 4.3 cm possibly left ovarian septated cystic lesion -EGD performed that admission though unable to obtain biopsies at the time  Outpatient follow-up with GI for EUS 1/7, biopsy confirming GIST, mixed spindle cell and epithelioid type  She is a patient of Dr. South and Dr. Jung  POD 2 TLH, BSO, partial gastrectomy  UGI study 2/6 demonstrated no extravasation of contrast, no obstruction to flow of contrast  Intra op frozen path of adenexal massess - serous cystadenoma  Clear liquid diet  Management per surgical oncology  Status post total hysterectomy and bilateral salpingo-oophorectomy (BSO)  See GIST a/p  Palliative care encounter  Palliative diagnosis: GIST  Established with Dr. Potter 2/3/25    Goals:  Level 1 full code  Treatment focused     Symptom management:  See cancer related pain    Social support:  Supportive listening provided  Normalized experience of patient/family  Provided anxiety containment  Provided anticipatory guidance  Encouraged self care  Advocated for patient/family with interdisciplinary care team    Coordination of care:  Reviewed case with rn, surgical oncology - Rl LEWIS    Follow up  Palliative Care will continue to follow and goals of care discussions will be ongoing.   Please reach out via [email or web address] secure chat if questions or concerns arise.    We appreciate the invitation to be involved in this  patient's care.   History of heroin abuse (HCC)  Patient reports 10 year sober  Dr. Overton's office managing Suboxone 8-2 mg daily  Last refill 2/4/25    Recommend start Subutex 8 mg while inpatient and resume management per Dr. Overton's office on discharge  Will need 12-24 hours off short acting opioids prior to resuming home Suboxone to prevent precipitated withdraw.  Morbid obesity (HCC)    24 Hour History  Chart reviewed before visit. No acute overnight events. Patient seen in bedside chair this morning, in NAD, no family present. She notes significant improvement in pain from yesterday. As discussed with SurgOnc plan for start clear liquid diet. Patient agreeable to transition from dPCA to oral regimen today. Discussed with RN, plan to administer dose of oral oxycodone and discontinue dPCA approximately 30 minutes later. She denies nausea, vomiting, dyspnea. Passing gas, no bowel movement. Encouraged ambulation and out of bed to chair.     Oxycodone IR administered 1136, dPCA discontinued 1215. Patient seen at 1300, resting in bedside chair. She woke easily to voice. Notes pain is well controlled at this time. We reviewed the above regimen and patient is in agreement with the plan. Notes ongoing fatigue. No additional concerns or needs at this time.    Medications    Current Facility-Administered Medications:     acetaminophen (TYLENOL) tablet 975 mg, 975 mg, Oral, Q8H Thea EAST CRNP    buprenorphine (SUBUTEX) 2 mg SL tablet 8 mg, 8 mg, Sublingual, Daily, BERTHA Vasquez, 8 mg at 02/07/25 0805    calcium carbonate (TUMS) chewable tablet 1,000 mg, 1,000 mg, Oral, TID, Kaylyn Shine PA-C, 1,000 mg at 02/07/25 0920    calcium gluconate 1 g in sodium chloride 0.9% 50 mL (premix), 1 g, Intravenous, Once, Kaylyn Shine PA-C    dextrose 5 % and sodium chloride 0.45 % with KCl 20 mEq/L infusion, 75 mL/hr, Intravenous, Continuous, Kaylyn Shine PA-C, Last Rate: 75 mL/hr at  "02/07/25 0924, 75 mL/hr at 02/07/25 0924    heparin (porcine) subcutaneous injection 5,000 Units, 5,000 Units, Subcutaneous, Q8H CRUZITO, 5,000 Units at 02/07/25 0607 **AND** [COMPLETED] Platelet count, , , Once, Mahesh Matthews MD    HYDROmorphone (DILAUDID) 1 mg/mL 50 mL PCA, , Intravenous, Continuous, BERTHA Vasquez, New Bag at 02/07/25 0013    naloxone (NARCAN) 0.04 mg/mL syringe 0.04 mg, 0.04 mg, Intravenous, Q1MIN PRN, BERTHA Vasquez    nicotine (NICODERM CQ) 7 mg/24hr TD 24 hr patch 1 patch, 1 patch, Transdermal, Daily, Mahesh Matthews MD    ondansetron (ZOFRAN) injection 4 mg, 4 mg, Intravenous, Q6H PRN, Mahesh Matthews MD    Objective  /75   Pulse 74   Temp 98 °F (36.7 °C)   Resp 17   Ht 5' 5\" (1.651 m)   Wt 118 kg (260 lb 4.8 oz)   LMP  (LMP Unknown)   SpO2 (!) 89%   BMI 43.32 kg/m²   Physical Exam  Vitals and nursing note reviewed.   Constitutional:       General: She is awake. She is not in acute distress.     Interventions: Nasal cannula in place.   HENT:      Head: Atraumatic.      Mouth/Throat:      Mouth: Mucous membranes are moist.   Cardiovascular:      Rate and Rhythm: Normal rate.   Pulmonary:      Effort: Pulmonary effort is normal. No respiratory distress.   Abdominal:      Palpations: Abdomen is soft.      Tenderness: There is abdominal tenderness.   Neurological:      General: No focal deficit present.      Mental Status: She is alert and oriented to person, place, and time.   Psychiatric:         Mood and Affect: Mood normal.         Behavior: Behavior normal.       Lab Results: I have personally reviewed pertinent labs.  Imaging Studies: I have personally reviewed pertinent reports.  EKG, Pathology, and Other Studies: I have personally reviewed pertinent reports.    Counseling / Coordination of Care  Total floor / unit time spent today 45 minutes. Greater than 50% of total time was spent with the patient and / or family counseling and / or coordinating of care. A " "description of the counseling / coordination of care: Chart reviewed,  provided medical updates, determined goals of care, discussed palliative care and symptom management, provided anticipatory guidance, determined competency and POA/HCA, determined social/family support, provided psychosocial support.       BERTHA Vasquez  Palliative & Supportive Care    Portions of this document may have been created using dictation software and as such some \"sound alike\" terms may have been generated by the system. Do not hesitate to contact me with any questions or clarifications.    "

## 2025-02-08 LAB
ALBUMIN SERPL BCG-MCNC: 3.5 G/DL (ref 3.5–5)
ALP SERPL-CCNC: 46 U/L (ref 34–104)
ALT SERPL W P-5'-P-CCNC: 24 U/L (ref 7–52)
ANION GAP SERPL CALCULATED.3IONS-SCNC: 9 MMOL/L (ref 4–13)
AST SERPL W P-5'-P-CCNC: 27 U/L (ref 13–39)
BASOPHILS # BLD AUTO: 0.02 THOUSANDS/ΜL (ref 0–0.1)
BASOPHILS NFR BLD AUTO: 0 % (ref 0–1)
BILIRUB SERPL-MCNC: 0.31 MG/DL (ref 0.2–1)
BSA FOR ECHO PROCEDURE: 2.21 M2
BUN SERPL-MCNC: 10 MG/DL (ref 5–25)
CA-I BLD-SCNC: 0.88 MMOL/L (ref 1.12–1.32)
CALCIUM SERPL-MCNC: 6.6 MG/DL (ref 8.4–10.2)
CHLORIDE SERPL-SCNC: 102 MMOL/L (ref 96–108)
CO2 SERPL-SCNC: 28 MMOL/L (ref 21–32)
CREAT SERPL-MCNC: 0.99 MG/DL (ref 0.6–1.3)
EOSINOPHIL # BLD AUTO: 0.05 THOUSAND/ΜL (ref 0–0.61)
EOSINOPHIL NFR BLD AUTO: 1 % (ref 0–6)
ERYTHROCYTE [DISTWIDTH] IN BLOOD BY AUTOMATED COUNT: 14.4 % (ref 11.6–15.1)
GFR SERPL CREATININE-BSD FRML MDRD: 70 ML/MIN/1.73SQ M
GLUCOSE SERPL-MCNC: 91 MG/DL (ref 65–140)
HCT VFR BLD AUTO: 38.7 % (ref 34.8–46.1)
HGB BLD-MCNC: 11.7 G/DL (ref 11.5–15.4)
IMM GRANULOCYTES # BLD AUTO: 0.02 THOUSAND/UL (ref 0–0.2)
IMM GRANULOCYTES NFR BLD AUTO: 0 % (ref 0–2)
LV EF US.2D.A4C+ESTIMATED: 63 %
LYMPHOCYTES # BLD AUTO: 1.45 THOUSANDS/ΜL (ref 0.6–4.47)
LYMPHOCYTES NFR BLD AUTO: 22 % (ref 14–44)
MCH RBC QN AUTO: 25.7 PG (ref 26.8–34.3)
MCHC RBC AUTO-ENTMCNC: 30.2 G/DL (ref 31.4–37.4)
MCV RBC AUTO: 85 FL (ref 82–98)
MONOCYTES # BLD AUTO: 0.45 THOUSAND/ΜL (ref 0.17–1.22)
MONOCYTES NFR BLD AUTO: 7 % (ref 4–12)
NEUTROPHILS # BLD AUTO: 4.55 THOUSANDS/ΜL (ref 1.85–7.62)
NEUTS SEG NFR BLD AUTO: 70 % (ref 43–75)
NRBC BLD AUTO-RTO: 0 /100 WBCS
PLATELET # BLD AUTO: 151 THOUSANDS/UL (ref 149–390)
PMV BLD AUTO: 11.6 FL (ref 8.9–12.7)
POTASSIUM SERPL-SCNC: 4 MMOL/L (ref 3.5–5.3)
PROT SERPL-MCNC: 6.3 G/DL (ref 6.4–8.4)
RBC # BLD AUTO: 4.56 MILLION/UL (ref 3.81–5.12)
RIGHT VENTRICLE ID DIMENSION: 4.4 CM
SL CV LV EF: 65
SODIUM SERPL-SCNC: 139 MMOL/L (ref 135–147)
WBC # BLD AUTO: 6.54 THOUSAND/UL (ref 4.31–10.16)

## 2025-02-08 PROCEDURE — 82330 ASSAY OF CALCIUM: CPT | Performed by: SURGERY

## 2025-02-08 PROCEDURE — 99233 SBSQ HOSP IP/OBS HIGH 50: CPT | Performed by: INTERNAL MEDICINE

## 2025-02-08 PROCEDURE — 80053 COMPREHEN METABOLIC PANEL: CPT | Performed by: PHYSICIAN ASSISTANT

## 2025-02-08 PROCEDURE — 99024 POSTOP FOLLOW-UP VISIT: CPT | Performed by: STUDENT IN AN ORGANIZED HEALTH CARE EDUCATION/TRAINING PROGRAM

## 2025-02-08 PROCEDURE — 85025 COMPLETE CBC W/AUTO DIFF WBC: CPT | Performed by: PHYSICIAN ASSISTANT

## 2025-02-08 RX ORDER — CALCIUM GLUCONATE 20 MG/ML
2 INJECTION, SOLUTION INTRAVENOUS ONCE
Status: COMPLETED | OUTPATIENT
Start: 2025-02-08 | End: 2025-02-08

## 2025-02-08 RX ORDER — ACETAMINOPHEN 325 MG/1
975 TABLET ORAL EVERY 8 HOURS SCHEDULED
Start: 2025-02-08

## 2025-02-08 RX ORDER — OXYCODONE HYDROCHLORIDE 10 MG/1
10 TABLET ORAL EVERY 4 HOURS PRN
Qty: 60 TABLET | Refills: 0 | Status: SHIPPED | OUTPATIENT
Start: 2025-02-08

## 2025-02-08 RX ADMIN — ACETAMINOPHEN 975 MG: 325 TABLET, FILM COATED ORAL at 13:31

## 2025-02-08 RX ADMIN — DEXTROSE, SODIUM CHLORIDE, AND POTASSIUM CHLORIDE 75 ML/HR: 5; .45; .15 INJECTION INTRAVENOUS at 08:00

## 2025-02-08 RX ADMIN — HEPARIN SODIUM 5000 UNITS: 5000 INJECTION INTRAVENOUS; SUBCUTANEOUS at 05:19

## 2025-02-08 RX ADMIN — CALCIUM CARBONATE (ANTACID) CHEW TAB 500 MG 1000 MG: 500 CHEW TAB at 17:00

## 2025-02-08 RX ADMIN — HEPARIN SODIUM 5000 UNITS: 5000 INJECTION INTRAVENOUS; SUBCUTANEOUS at 21:55

## 2025-02-08 RX ADMIN — OXYCODONE HYDROCHLORIDE 15 MG: 5 TABLET ORAL at 05:18

## 2025-02-08 RX ADMIN — CALCIUM CARBONATE (ANTACID) CHEW TAB 500 MG 1000 MG: 500 CHEW TAB at 21:55

## 2025-02-08 RX ADMIN — OXYCODONE HYDROCHLORIDE 15 MG: 5 TABLET ORAL at 10:14

## 2025-02-08 RX ADMIN — CALCIUM GLUCONATE 2 G: 20 INJECTION, SOLUTION INTRAVENOUS at 13:29

## 2025-02-08 RX ADMIN — HEPARIN SODIUM 5000 UNITS: 5000 INJECTION INTRAVENOUS; SUBCUTANEOUS at 13:31

## 2025-02-08 RX ADMIN — CALCIUM CARBONATE (ANTACID) CHEW TAB 500 MG 1000 MG: 500 CHEW TAB at 08:03

## 2025-02-08 RX ADMIN — BUPRENORPHINE 8 MG: 2 TABLET SUBLINGUAL at 08:02

## 2025-02-08 NOTE — PLAN OF CARE
Problem: PAIN - ADULT  Goal: Verbalizes/displays adequate comfort level or baseline comfort level  Description: Interventions:  - Encourage patient to monitor pain and request assistance  - Assess pain using appropriate pain scale  - Administer analgesics based on type and severity of pain and evaluate response  - Implement non-pharmacological measures as appropriate and evaluate response  - Consider cultural and social influences on pain and pain management  - Notify physician/advanced practitioner if interventions unsuccessful or patient reports new pain  Outcome: Progressing     Problem: INFECTION - ADULT  Goal: Absence or prevention of progression during hospitalization  Description: INTERVENTIONS:  - Assess and monitor for signs and symptoms of infection  - Monitor lab/diagnostic results  - Monitor all insertion sites, i.e. indwelling lines, tubes, and drains  - Monitor endotracheal if appropriate and nasal secretions for changes in amount and color  - [place] appropriate cooling/warming therapies per order  - Administer medications as ordered  - Instruct and encourage patient and family to use good hand hygiene technique  - Identify and instruct in appropriate isolation precautions for identified infection/condition  Outcome: Progressing     Problem: SAFETY ADULT  Goal: Patient will remain free of falls  Description: INTERVENTIONS:  - Educate patient/family on patient safety including physical limitations  - Instruct patient to call for assistance with activity   - Consult OT/PT to assist with strengthening/mobility   - Keep Call bell within reach  - Keep bed low and locked with side rails adjusted as appropriate  - Keep care items and personal belongings within reach  - Initiate and maintain comfort rounds  - Make Fall Risk Sign visible to staff  Problem: DISCHARGE PLANNING  Goal: Discharge to home or other facility with appropriate resources  Description: INTERVENTIONS:  - Identify barriers to discharge  w/patient and caregiver  - Arrange for needed discharge resources and transportation as appropriate  - Identify discharge learning needs (meds, wound care, etc.)  - Arrange for interpretive services to assist at discharge as needed  - Refer to Case Management Department for coordinating discharge planning if the patient needs post-hospital services based on physician/advanced practitioner order or complex needs related to functional status, cognitive ability, or social support system  Outcome: Progressing     Problem: Knowledge Deficit  Goal: Patient/family/caregiver demonstrates understanding of disease process, treatment plan, medications, and discharge instructions  Description: Complete learning assessment and assess knowledge base.  Interventions:  - Provide teaching at level of understanding  - Provide teaching via preferred learning methods  Outcome: Progressing     Problem: RESPIRATORY - ADULT  Goal: Achieves optimal ventilation and oxygenation  Description: INTERVENTIONS:  - Assess for changes in respiratory status  - Assess for changes in mentation and behavior  - Position to facilitate oxygenation and minimize respiratory effort  - Oxygen administered by appropriate delivery if ordered  - Initiate smoking cessation education as indicated  - Encourage broncho-pulmonary hygiene including cough, deep breathe, Incentive Spirometry  - Assess the need for suctioning and aspirate as needed  - Assess and instruct to report SOB or any respiratory difficulty  - Respiratory Therapy support as indicated  Outcome: Progressing     Problem: GASTROINTESTINAL - ADULT  Goal: Minimal or absence of nausea and/or vomiting  Description: INTERVENTIONS:  - Administer IV fluids if ordered to ensure adequate hydration  - Maintain NPO status until nausea and vomiting are resolved  - Nasogastric tube if ordered  - Administer ordered antiemetic medications as needed  - Provide nonpharmacologic comfort measures as appropriate  -  Advance diet as tolerated, if ordered  - Consider nutrition services referral to assist patient with adequate nutrition and appropriate food choices  Outcome: Progressing     Problem: GASTROINTESTINAL - ADULT  Goal: Maintains or returns to baseline bowel function  Description: INTERVENTIONS:  - Assess bowel function  - Encourage oral fluids to ensure adequate hydration  - Administer IV fluids if ordered to ensure adequate hydration  - Administer ordered medications as needed  - Encourage mobilization and activity  - Consider nutritional services referral to assist patient with adequate nutrition and appropriate food choices  Outcome: Progressing     Problem: GASTROINTESTINAL - ADULT  Goal: Maintains adequate nutritional intake  Description: INTERVENTIONS:  - Monitor percentage of each meal consumed  - Identify factors contributing to decreased intake, treat as appropriate  - Assist with meals as needed  - Monitor I&O, weight, and lab values if indicated  - Obtain nutrition services referral as needed  Outcome: Progressing     Problem: METABOLIC, FLUID AND ELECTROLYTES - ADULT  Goal: Electrolytes maintained within normal limits  Description: INTERVENTIONS:  - Monitor labs and assess patient for signs and symptoms of electrolyte imbalances  - Administer electrolyte replacement as ordered  - Monitor response to electrolyte replacements, including repeat lab results as appropriate  - Instruct patient on fluid and nutrition as appropriate  Outcome: Progressing     Problem: METABOLIC, FLUID AND ELECTROLYTES - ADULT  Goal: Fluid balance maintained  Description: INTERVENTIONS:  - Monitor labs   - Monitor I/O and WT  - Instruct patient on fluid and nutrition as appropriate  - Assess for signs & symptoms of volume excess or deficit  Outcome: Progressing     Problem: HEMATOLOGIC - ADULT  Goal: Maintains hematologic stability  Description: INTERVENTIONS  - Assess for signs and symptoms of bleeding or hemorrhage  - Monitor  labs  - Administer supportive blood products/factors as ordered and appropriate  Outcome: Progressing     Problem: SKIN/TISSUE INTEGRITY - ADULT  Goal: Incision(s), wounds(s) or drain site(s) healing without S/S of infection  Description: INTERVENTIONS  - Assess and document dressing, incision, wound bed, drain sites and surrounding tissue  - Provide patient and family education  - Perform skin care/dressing changes every   Outcome: Progressing     - Apply yellow socks and bracelet for high fall risk patients  - Consider moving patient to room near nurses station  Outcome: Progressing

## 2025-02-08 NOTE — PROGRESS NOTES
Progress Note - Surgery-General   Name: Tanya Rosenthal 40 y.o. female I MRN: [id number]  Unit/Bed#: [facility] 833-01 I Date of Admission: 2/5/2025   Date of Service: 2/7/2025 I Hospital Day: 2    Assessment & Plan  Status post total hysterectomy and bilateral salpingo-oophorectomy (BSO)  See below  GIST (gastrointestinal stromal tumor), malignant (HCC)  41 yo with bilateral adnexal masses and GI stromal tumor, s/p TLH, BSO and concurrent partial gastrectomy. IOFS of adnexal masses c/w serous cystadenomas 2/5, now recovering well with persistent pain    Febrile with Tmax 102 early yesterday, otherwise vital signs stable within normal limits on 5 L nasal cannula     Plan:  -Clear liquid diet, consider adding toast and crackers  -Maintenance IV fluids at 84  -Monitor for return of bowel function  -Palliative on board, appreciate recommendations for pain management regimen  -Wean O2 as tolerated  -Encourage ambulation/out of bed, 3 times daily  -Encourage incentive spirometer use, 10 times per hour  Palliative care encounter  Appreciate recommendations  History of heroin abuse (HCC)  -Palliative consulted    Please contact the SecureChat role for the Oncology-Surgical service with any questions/concerns.    Subjective   No acute events overnight. Patient reports pain is improving, more controlled. They are tolerating their clear liquid diet but had had decreased intake. They are having flatus but no BM. They are voiding. They are ambulating to the bathroom and out to chair. They are using their IS to 1000. They deny nausea, vomiting, chest pain, shortness of breath, fevers, chills.      Objective :  Temp:  [98 °F (36.7 °C)-102 °F (38.9 °C)] 98.7 °F (37.1 °C)  HR:  [62-78] 66  BP: (128-140)/(73-81) 128/73  Resp:  [15-19] 16  SpO2:  [89 %-96 %] 93 %  O2 Device: Nasal cannula    I/O         02/06 0701  02/07 0700 02/07 0701  02/08 0700    P.O. 480 0    I.V. (mL/kg) 1020.5 (8.6) 2.4 (0)    IV Piggyback      Total Intake(mL/kg)  1500.5 (12.7) 2.4 (0)    Urine (mL/kg/hr) 1125 (0.4) 1000 (0.6)    Emesis/NG output      Blood      Total Output 1125 1000    Net +375.5 -997.7          Unmeasured Urine Occurrence  1 x            Physical Exam  Constitutional:       General: She is not in acute distress.  HENT:      Head: Normocephalic and atraumatic.      Right Ear: External ear normal.      Left Ear: External ear normal.      Nose: Nose normal.      Mouth/Throat:      Pharynx: Oropharynx is clear.   Eyes:      Extraocular Movements: Extraocular movements intact.   Cardiovascular:      Rate and Rhythm: Normal rate.   Pulmonary:      Effort: Pulmonary effort is normal.   Abdominal:      General: There is no distension.      Palpations: Abdomen is soft.      Tenderness: There is no abdominal tenderness.   Musculoskeletal:      Cervical back: Normal range of motion.   Skin:     General: Skin is warm and dry.      Comments: Incision clean, dry, intact   Neurological:      Mental Status: She is alert. Mental status is at baseline.   Psychiatric:         Mood and Affect: Mood normal.           Lab Results: I have reviewed the following results:  Recent Labs     02/06/25  0541 02/07/25  0617 02/07/25  1733   WBC 9.58 7.41  --    HGB 12.7 12.4  --    HCT 41.4 41.8  --     170  178  --    SODIUM 138 135  --    K 4.3 4.4  --     98  --    CO2 28 29  --    BUN 11 10  --    CREATININE 1.13 1.11  --    GLUC 106 98  --    MG 2.1 2.1  --    PHOS 5.5*  --   --    ALB  --   --  3.6       Imaging Results Review: I reviewed radiology reports from this admission including: chest xray.  Other Study Results Review: No additional pertinent studies reviewed.    VTE Pharmacologic Prophylaxis: VTE covered by:  heparin (porcine), Subcutaneous, 5,000 Units at 02/07/25 1404     VTE Mechanical Prophylaxis: sequential compression device

## 2025-02-08 NOTE — ASSESSMENT & PLAN NOTE
S/p intraoperative bilateral rectus sheath blocks  Was on PCA, d/c on 2/7/2025  MAR: used 4 x 15mg oxy, 1 x 10mg oxy and 1 x 0.5mg IV dilaudid since PCA was d/c yesterday  Current regimen effective, continue without changes:  Oxycodone 10 mg for moderate pain Q 3 H PRN  Oxycodone 15 mg for severe pain Q 3 H PRN  IV Dilaudid 0.5 mg for breakthrough pain Q 2 H PRN  PO tylenol 975 mg q8h ATC. Max of 3g in 24H  Narcan ordered for respiratory depression  Bowel regimen per surgical team    I have reviewed the patient's controlled substance dispensing history in the Prescription Drug Monitoring Program in compliance with the [facility] regulations before prescribing any controlled substances.  Last refills  02/04/2025 02/04/2025 1 Buprenorphine-Nalox 8-2mg Film 45.00 30 Ma Can [id number] Bet ([id number]) 0 12.00 mg Comm Ins PA   12/06/2024 11/25/2024 1 Buprenorphine-Nalox 8-2mg Film 45.00 30 Ma Can [id number] Mac ([id number]) 0 12.00 mg Comm Ins PA   10/07/2024 10/04/2024 1 Buprenorphine-Nalox 8-2mg Film 45.00 30 Ma Can [id number] Mac ([id number]) 0 12.00 mg Comm Ins PA   09/12/2024 09/09/2024 1 Buprenorphine-Nalox 8-2mg Film 45.00 30 Ma Can [id number] Mac ([id number]) 0 12.00 mg Comm Ins PA   08/17/2024 08/16/2024 1 Buprenorphine-Nalox 8-2mg Film 45.00 30 Ma Can [id number] Mac ([id number]) 0 12.00 mg Comm Ins PA

## 2025-02-08 NOTE — ASSESSMENT & PLAN NOTE
39 yo with bilateral adnexal masses and GI stromal tumor, s/p TLH, BSO and concurrent partial gastrectomy. IOFS of adnexal masses c/w serous cystadenomas 2/5, now recovering well with persistent pain    Febrile with Tmax 102 early yesterday, otherwise vital signs stable within normal limits on 5 L nasal cannula     Plan:  -Clear liquid diet, consider adding toast and crackers  -Maintenance IV fluids at 84  -Monitor for return of bowel function  -Palliative on board, appreciate recommendations for pain management regimen  -Wean O2 as tolerated  -Encourage ambulation/out of bed, 3 times daily  -Encourage incentive spirometer use, 10 times per hour

## 2025-02-08 NOTE — ASSESSMENT & PLAN NOTE
12/25 presented to [facility] ED with abdominal pain, CT A/P 12/25 demonstrated possible 4.4 x 3.5 exophytic mass involving the posterior gastric cardia/fundus wall -at that time was discharged home with Protonix and Carafate and outpatient general surgery referral  12/29 subsequently presented to  ED with 10 out of 10 abdominal pain, CT A/P 12/29 demonstrated 6.0 x 4.4 x 4.5 cm lobulated mass posterior gastric wall of the proximal stomach just be on the GE junction extending into the lumen of the stomach as well as outside of the stomach, additionally 7.3 x 6.9 cm hyperdense mass in the right adnexal region suspect large hemorrhagic ovarian cyst as well as a septated mass 5.9 x 4.3 cm possibly left ovarian septated cystic lesion -EGD performed that admission though unable to obtain biopsies at the time  Outpatient follow-up with GI for EUS 1/7, biopsy confirming GIST, mixed spindle cell and epithelioid type  She is a patient of Dr. South and Dr. 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

## 2025-02-08 NOTE — ASSESSMENT & PLAN NOTE
Palliative diagnosis: GIST  Established with Dr. Potter 2/3/25    Goals:  Level 1 full code  Treatment focused     Symptom management:  See cancer related pain    Social support:  Supportive listening provided  Normalized experience of patient/family  Provided anxiety containment  Provided anticipatory guidance  Encouraged self care  Advocated for patient/family with interdisciplinary care team    Coordination of care:  Reviewed case with rn, surgical oncology - Rl LEWIS    Follow up  Palliative Care will continue to follow and goals of care discussions will be ongoing.   Please reach out via [employer] secure chat if questions or concerns arise.    We appreciate the invitation to be involved in this patient's care.

## 2025-02-08 NOTE — PROGRESS NOTES
Progress Note - Palliative Care   Name: Tanya Rosenthal 40 y.o. female I MRN: [id number]  Unit/Bed#: [facility] 833-01 I Date of Admission: 2/5/2025   Date of Service: 2/8/2025 I Hospital Day: 3    Assessment & Plan  Cancer related pain  S/p intraoperative bilateral rectus sheath blocks  Was on PCA, d/c on 2/7/2025  MAR: used 4 x 15mg oxy, 1 x 10mg oxy and 1 x 0.5mg IV dilaudid since PCA was d/c yesterday  Current regimen effective, continue without changes:  Oxycodone 10 mg for moderate pain Q 3 H PRN  Oxycodone 15 mg for severe pain Q 3 H PRN  IV Dilaudid 0.5 mg for breakthrough pain Q 2 H PRN  PO tylenol 975 mg q8h ATC. Max of 3g in 24H  Narcan ordered for respiratory depression  Bowel regimen per surgical team    I have reviewed the patient's controlled substance dispensing history in the Prescription Drug Monitoring Program in compliance with the [place] regulations before prescribing any controlled substances.  Last refills  02/04/2025 02/04/2025 1 Buprenorphine-Nalox 8-2mg Film 45.00 30 Ma Can [id number] Bet ([id number]) 0 12.00 mg Comm Ins [place]   12/06/2024 11/25/2024 1 Buprenorphine-Nalox 8-2mg Film 45.00 30 Ma Can [id number] Mac ([id number]) 0 12.00 mg Comm Ins [place]   10/07/2024 10/04/2024 1 Buprenorphine-Nalox 8-2mg Film 45.00 30 Ma Can [id number] Mac ([id number]) 0 12.00 mg Comm Ins [place]   09/12/2024 09/09/2024 1 Buprenorphine-Nalox 8-2mg Film 45.00 30 Ma Can [id number] Mac ([id number]) 0 12.00 mg Comm Ins [place]   08/17/2024 08/16/2024 1 Buprenorphine-Nalox 8-2mg Film 45.00 30 Ma Can [id number] Mac ([id number]) 0 12.00 mg Comm Ins [place]     GIST (gastrointestinal stromal tumor), malignant (HCC)  12/25 presented to [facility] ED with abdominal pain, CT A/P 12/25 demonstrated possible 4.4 x 3.5 exophytic mass involving the posterior gastric cardia/fundus wall -at that time was discharged home with Protonix and Carafate and outpatient general surgery referral  12/29 subsequently presented to  ED with 10 out of 10 abdominal pain, CT A/P 12/29 demonstrated 6.0 x 4.4 x 4.5 cm lobulated  mass posterior gastric wall of the proximal stomach just be on the GE junction extending into the lumen of the stomach as well as outside of the stomach, additionally 7.3 x 6.9 cm hyperdense mass in the right adnexal region suspect large hemorrhagic ovarian cyst as well as a septated mass 5.9 x 4.3 cm possibly left ovarian septated cystic lesion -EGD performed that admission though unable to obtain biopsies at the time  Outpatient follow-up with GI for EUS 1/7, biopsy confirming GIST, mixed spindle cell and epithelioid type  She is a patient of Dr. South and Dr. Jung  POD 2 TLH, BSO, partial gastrectomy  UGI study 2/6 demonstrated no extravasation of contrast, no obstruction to flow of contrast  Intra op frozen path of adenexal massess - serous cystadenoma  Clear liquid diet  Management per surgical oncology  Status post total hysterectomy and bilateral salpingo-oophorectomy (BSO)  See GIST a/p  Palliative care encounter  Palliative diagnosis: GIST  Established with Dr. Potter 2/3/25    Goals:  Level 1 full code  Treatment focused     Symptom management:  See cancer related pain    Social support:  Supportive listening provided  Normalized experience of patient/family  Provided anxiety containment  Provided anticipatory guidance  Encouraged self care  Advocated for patient/family with interdisciplinary care team    Coordination of care:  Reviewed case with rn, surgical oncology - Rl LEWIS    Follow up  Palliative Care will continue to follow and goals of care discussions will be ongoing.   Please reach out via [employer] secure chat if questions or concerns arise.    We appreciate the invitation to be involved in this patient's care.   History of heroin abuse (HCC)  Patient reports 10 year sober  Dr. Overton's office managing Suboxone 8-2 mg daily  Last refill 2/4/25    Recommend start Subutex 8 mg while inpatient and resume management per Dr. Overton's office on discharge  Will need 12-24 hours off short acting  opioids prior to resuming home Suboxone to prevent precipitated withdraw.  Morbid obesity (HCC)      Decisional apparatus: Patient is competent on my exam today. If competence is lost, patient's substitute decision maker would default to daughter [place].     PDMP Review: I have reviewed the patient's controlled substance dispensing history in the Prescription Drug Monitoring Program in compliance with the [facility] regulations before prescribing any controlled substances.    Subjective   Chart and MAR reviewed. HARINDER. Saw patient in her bedside chair, AAO x 3. Appears comfortable. Reports pain is very well controlled. Encouraged to try to take smaller doses if her pain continues to improve. She said she may be d/c tomorrow, but she is a bit worried about that given no BM yet and she has yet to move around. In case she is d/c tomorrow, will send a small script of oxyIR 10mg to [facility] per her preference. Aware that she may have to pay out of pocket if with PA issues. She sees Dr. Potter and will follow up in the office on dc.    Objective :  Temp:  [98.2 °F (36.8 °C)-98.8 °F (37.1 °C)] 98.2 °F (36.8 °C)  HR:  [62-72] 72  BP: (128-140)/(73-95) 140/95  Resp:  [16-19] 18  SpO2:  [93 %-96 %] 93 %    Physical Exam  Constitutional:       General: She is not in acute distress.     Appearance: Normal appearance. She is not ill-appearing, toxic-appearing or diaphoretic.   HENT:      Head: Normocephalic and atraumatic.   Eyes:      General: No scleral icterus.        Right eye: No discharge.         Left eye: No discharge.   Pulmonary:      Effort: Pulmonary effort is normal. No respiratory distress.      Comments: On RA  Abdominal:      General: Abdomen is flat. There is no distension.   Musculoskeletal:         General: No swelling.   Skin:     General: Skin is dry.      Coloration: Skin is not jaundiced or pale.   Neurological:      General: No focal deficit present.      Mental Status: She is alert and oriented  to person, place, and time.   Psychiatric:         Mood and Affect: Mood normal.         Behavior: Behavior normal.         Thought Content: Thought content normal.         Judgment: Judgment normal.            Lab Results: I have reviewed the following results:  Lab Results   Component Value Date/Time    SODIUM 139 02/08/2025 05:15 AM    SODIUM 142 12/25/2024 07:10 PM    K 4.0 02/08/2025 05:15 AM    K 4.3 12/25/2024 07:10 PM    BUN 10 02/08/2025 05:15 AM    BUN 19 12/25/2024 07:10 PM    CREATININE 0.99 02/08/2025 05:15 AM    CREATININE 1.28 (H) 12/25/2024 07:10 PM    GLUC 91 02/08/2025 05:15 AM    GLUC 84 12/25/2024 07:10 PM    CALCIUM 6.6 (L) 02/08/2025 05:15 AM    CALCIUM 6.6 (L) 12/25/2024 07:10 PM    AST 27 02/08/2025 05:15 AM    AST 17 12/25/2024 07:10 PM    ALT 24 02/08/2025 05:15 AM    ALT 10 12/25/2024 07:10 PM    ALB 3.5 02/08/2025 05:15 AM    ALB 4.1 12/25/2024 07:10 PM    TP 6.3 (L) 02/08/2025 05:15 AM    TP 7.2 12/25/2024 07:10 PM    EGFR 70 02/08/2025 05:15 AM    EGFR 54 (L) 12/25/2024 07:10 PM    EGFR 68 08/25/2020 02:00 AM     Lab Results   Component Value Date/Time    HGB 11.7 02/08/2025 05:15 AM    HGB 11.7 04/02/2015 05:46 AM    WBC 6.54 02/08/2025 05:15 AM    WBC 8.97 04/02/2015 05:46 AM     02/08/2025 05:15 AM     04/02/2015 05:46 AM    INR 0.96 01/29/2025 09:13 AM    INR 1.08 04/01/2015 12:51 PM    PTT 38 (H) 01/29/2025 09:13 AM    PTT 37 (H) 04/01/2015 12:51 PM     Lab Results   Component Value Date/Time    CND0NJZSARJS 124.137 (H) 06/01/2016 07:59 PM    TGJ5VSBDNODS 99.220 (H) 04/02/2015 05:46 AM       Code Status: Level 1 - Full Code  Advance Directive and Living Will:      Power of :    POLST:      Administrative Statements   I have spent a total time of 15 minutes in caring for this patient on the day of the visit/encounter including Risks and benefits of tx options, Instructions for management, Patient and family education, Importance of tx compliance, Risk factor  reductions, Impressions, Counseling / Coordination of care, Documenting in the medical record, Reviewing / ordering tests, medicine, procedures  , and Obtaining or reviewing history  . Topics discussed with the patient / family include symptom assessment and management, medication review, medication adjustment, opioid titration, supportive listening, and anticipatory guidance.    Jenise Soto MD  Palliative Medicine & Supportive Care  Internal Medicine  Available via [employer] Text  Office: [phone]  Fax: [phone]

## 2025-02-08 NOTE — PLAN OF CARE
Problem: PAIN - ADULT  Goal: Verbalizes/displays adequate comfort level or baseline comfort level  Description: Interventions:  - Encourage patient to monitor pain and request assistance  - Assess pain using appropriate pain scale  - Administer analgesics based on type and severity of pain and evaluate response  - Implement non-pharmacological measures as appropriate and evaluate response  - Consider cultural and social influences on pain and pain management  - Notify physician/advanced practitioner if interventions unsuccessful or patient reports new pain  Outcome: Progressing     Problem: INFECTION - ADULT  Goal: Absence or prevention of progression during hospitalization  Description: INTERVENTIONS:  - Assess and monitor for signs and symptoms of infection  - Monitor lab/diagnostic results  - Monitor all insertion sites, i.e. indwelling lines, tubes, and drains  - Monitor endotracheal if appropriate and nasal secretions for changes in amount and color  - [place] appropriate cooling/warming therapies per order  - Administer medications as ordered  - Instruct and encourage patient and family to use good hand hygiene technique  - Identify and instruct in appropriate isolation precautions for identified infection/condition  Outcome: Progressing  Goal: Absence of fever/infection during neutropenic period  Description: INTERVENTIONS:  - Monitor WBC    Outcome: Progressing     Problem: SAFETY ADULT  Goal: Patient will remain free of falls  Description: INTERVENTIONS:  - Educate patient/family on patient safety including physical limitations  - Instruct patient to call for assistance with activity   - Consult OT/PT to assist with strengthening/mobility   - Keep Call bell within reach  - Keep bed low and locked with side rails adjusted as appropriate  - Keep care items and personal belongings within reach  - Initiate and maintain comfort rounds  - Make Fall Risk Sign visible to staff  - Apply yellow socks and bracelet  for high fall risk patients  - Consider moving patient to room near nurses station  Outcome: Progressing  Goal: Maintain or return to baseline ADL function  Description: INTERVENTIONS:  -  Assess patient's ability to carry out ADLs; assess patient's baseline for ADL function and identify physical deficits which impact ability to perform ADLs (bathing, care of mouth/teeth, toileting, grooming, dressing, etc.)  - Assess/evaluate cause of self-care deficits   - Assess range of motion  - Assess patient's mobility; develop plan if impaired  - Assess patient's need for assistive devices and provide as appropriate  - Encourage maximum independence but intervene and supervise when necessary  - Involve family in performance of ADLs  - Assess for home care needs following discharge   - Consider OT consult to assist with ADL evaluation and planning for discharge  - Provide patient education as appropriate  Outcome: Progressing  Goal: Maintains/Returns to pre admission functional level  Description: INTERVENTIONS:  - Perform AM-PAC 6 Click Basic Mobility/ Daily Activity assessment daily.  - Set and communicate daily mobility goal to care team and patient/family/caregiver.   - Collaborate with rehabilitation services on mobility goals if consulted  - Record patient progress and toleration of activity level   Outcome: Progressing     Problem: DISCHARGE PLANNING  Goal: Discharge to home or other facility with appropriate resources  Description: INTERVENTIONS:  - Identify barriers to discharge w/patient and caregiver  - Arrange for needed discharge resources and transportation as appropriate  - Identify discharge learning needs (meds, wound care, etc.)  - Arrange for interpretive services to assist at discharge as needed  - Refer to Case Management Department for coordinating discharge planning if the patient needs post-hospital services based on physician/advanced practitioner order or complex needs related to functional status,  cognitive ability, or social support system  Outcome: Progressing

## 2025-02-09 VITALS
OXYGEN SATURATION: 95 % | HEART RATE: 66 BPM | SYSTOLIC BLOOD PRESSURE: 124 MMHG | WEIGHT: 260 LBS | RESPIRATION RATE: 17 BRPM | BODY MASS INDEX: 43.32 KG/M2 | TEMPERATURE: 98.4 F | DIASTOLIC BLOOD PRESSURE: 73 MMHG | HEIGHT: 65 IN

## 2025-02-09 LAB
ANION GAP SERPL CALCULATED.3IONS-SCNC: 10 MMOL/L (ref 4–13)
BUN SERPL-MCNC: 9 MG/DL (ref 5–25)
CA-I BLD-SCNC: 0.94 MMOL/L (ref 1.12–1.32)
CA-I BLD-SCNC: 1 MMOL/L (ref 1.12–1.32)
CALCIUM SERPL-MCNC: 7.5 MG/DL (ref 8.4–10.2)
CHLORIDE SERPL-SCNC: 99 MMOL/L (ref 96–108)
CO2 SERPL-SCNC: 30 MMOL/L (ref 21–32)
CREAT SERPL-MCNC: 1.06 MG/DL (ref 0.6–1.3)
GFR SERPL CREATININE-BSD FRML MDRD: 64 ML/MIN/1.73SQ M
GLUCOSE SERPL-MCNC: 93 MG/DL (ref 65–140)
POTASSIUM SERPL-SCNC: 3.9 MMOL/L (ref 3.5–5.3)
SODIUM SERPL-SCNC: 139 MMOL/L (ref 135–147)

## 2025-02-09 PROCEDURE — NC001 PR NO CHARGE: Performed by: STUDENT IN AN ORGANIZED HEALTH CARE EDUCATION/TRAINING PROGRAM

## 2025-02-09 PROCEDURE — 99233 SBSQ HOSP IP/OBS HIGH 50: CPT | Performed by: INTERNAL MEDICINE

## 2025-02-09 PROCEDURE — 80048 BASIC METABOLIC PNL TOTAL CA: CPT

## 2025-02-09 PROCEDURE — 82330 ASSAY OF CALCIUM: CPT

## 2025-02-09 PROCEDURE — 99024 POSTOP FOLLOW-UP VISIT: CPT | Performed by: STUDENT IN AN ORGANIZED HEALTH CARE EDUCATION/TRAINING PROGRAM

## 2025-02-09 RX ORDER — ENOXAPARIN SODIUM 100 MG/ML
40 INJECTION SUBCUTANEOUS DAILY
Qty: 9.6 ML | Refills: 0 | Status: SHIPPED | OUTPATIENT
Start: 2025-02-09 | End: 2025-03-05

## 2025-02-09 RX ORDER — ESTRADIOL 0.05 MG/D
1 PATCH, EXTENDED RELEASE TRANSDERMAL 2 TIMES WEEKLY
Qty: 8 PATCH | Refills: 0 | Status: SHIPPED | OUTPATIENT
Start: 2025-02-10 | End: 2025-02-11

## 2025-02-09 RX ORDER — CALCIUM GLUCONATE 20 MG/ML
2 INJECTION, SOLUTION INTRAVENOUS ONCE
Status: COMPLETED | OUTPATIENT
Start: 2025-02-09 | End: 2025-02-09

## 2025-02-09 RX ADMIN — BUPRENORPHINE 8 MG: 2 TABLET SUBLINGUAL at 08:39

## 2025-02-09 RX ADMIN — CALCIUM GLUCONATE 2 G: 20 INJECTION, SOLUTION INTRAVENOUS at 08:33

## 2025-02-09 RX ADMIN — HEPARIN SODIUM 5000 UNITS: 5000 INJECTION INTRAVENOUS; SUBCUTANEOUS at 05:18

## 2025-02-09 RX ADMIN — CALCIUM CARBONATE (ANTACID) CHEW TAB 500 MG 1000 MG: 500 CHEW TAB at 08:32

## 2025-02-09 NOTE — PROGRESS NOTES
Progress Note - Palliative Care   Name: Tanya Rosenthal 40 y.o. female I MRN: [id number]  Unit/Bed#: [facility] 833-01 I Date of Admission: 2/5/2025   Date of Service: 2/9/2025 I Hospital Day: 4     Assessment & Plan  Cancer related pain  S/p intraoperative bilateral rectus sheath blocks  Was on PCA, d/c on 2/7/2025  MAR: used 1 x 15mg oxy from 2/8 10am to 2/9 10am   Today, pain is reported to be almost negligible except for mild discomfort when she coughs/laughs. Had a small BM overnight. Was given advise from surgery to do colace, dulcolax and miralax prn at home  Current regimen effective, continue without changes:  Oxycodone 10 mg for moderate pain Q 3 H PRN  Oxycodone 15 mg for severe pain Q 3 H PRN  IV Dilaudid 0.5 mg for breakthrough pain Q 2 H PRN  PO tylenol 975 mg q8h ATC. Max of 3g in 24H  Narcan ordered for respiratory depression  Bowel regimen per surgical team  Okay to d/c from the palliative perspective - I already sent a small supply of OxyIR 10mg prn. Advise to take 1/2 tab prn. Aware and okay to buy out of pocket if needing prior auth. Daughter is going to assist and keep close eye on opioid use, given Hx of drug addiction. Patient also committed to avoiding taking opioids if possible as she does not want to go back to previous situation. Sees addiction specialist OP. Will resume home suboxone once at home.     I have reviewed the patient's controlled substance dispensing history in the Prescription Drug Monitoring Program in compliance with the [employer] regulations before prescribing any controlled substances.  Last refills  02/04/2025 02/04/2025 1 Buprenorphine-Nalox 8-2mg Film 45.00 30 Ma Can [id number] Bet (5593) 0 12.00 mg Comm Ins [place]   12/06/2024 11/25/2024 1 Buprenorphine-Nalox 8-2mg Film 45.00 30 Ma Can [id number] Mac (5675) 0 12.00 mg Comm Ins [place]   10/07/2024 10/04/2024 1 Buprenorphine-Nalox 8-2mg Film 45.00 30 Ma Can [id number] Mac (5675) 0 12.00 mg Comm Ins [place]   09/12/2024 09/09/2024 1 Buprenorphine-Nalox 8-2mg  Film 45.00 30 Ma Can [id number] Mac ([id number]) 0 12.00 mg Comm Ins [place]   08/17/2024 08/16/2024 1 Buprenorphine-Nalox 8-2mg Film 45.00 30 Ma Can [id number] Mac ([id number]) 0 12.00 mg Comm Ins [place]     GIST (gastrointestinal stromal tumor), malignant (HCC)  12/25 presented to [facility] ED with abdominal pain, CT A/P 12/25 demonstrated possible 4.4 x 3.5 exophytic mass involving the posterior gastric cardia/fundus wall -at that time was discharged home with Protonix and Carafate and outpatient general surgery referral  12/29 subsequently presented to  ED with 10 out of 10 abdominal pain, CT A/P 12/29 demonstrated 6.0 x 4.4 x 4.5 cm lobulated mass posterior gastric wall of the proximal stomach just be on the GE junction extending into the lumen of the stomach as well as outside of the stomach, additionally 7.3 x 6.9 cm hyperdense mass in the right adnexal region suspect large hemorrhagic ovarian cyst as well as a septated mass 5.9 x 4.3 cm possibly left ovarian septated cystic lesion -EGD performed that admission though unable to obtain biopsies at the time  Outpatient follow-up with GI for EUS 1/7, biopsy confirming GIST, mixed spindle cell and epithelioid type  She is a patient of Dr. South and Dr. Jung  POD 2 TLH, BSO, partial gastrectomy  UGI study 2/6 demonstrated no extravasation of contrast, no obstruction to flow of contrast  Intra op frozen path of adenexal massess - serous cystadenoma  Clear liquid diet  Management per surgical oncology  Status post total hysterectomy and bilateral salpingo-oophorectomy (BSO)  See GIST a/p  Palliative care encounter  Palliative diagnosis: GIST  Established with Dr. Potter 2/3/25, follow outpatient    Goals:  Level 1 full code  Treatment focused     Symptom management:  See cancer related pain    Social support:  Supportive listening provided  Normalized experience of patient/family  Provided anxiety containment  Provided anticipatory guidance  Encouraged self care  Advocated for patient/family with  "interdisciplinary care team    Coordination of care:  Reviewed case with rn, surgical oncology - Rl LEWIS    Follow up  Palliative Care will continue to follow and goals of care discussions will be ongoing.   Please reach out via [employer] secure chat if questions or concerns arise.    We appreciate the invitation to be involved in this patient's care.   History of heroin abuse (HCC)  Patient reports 10 year sober  Dr. Overton's office managing Suboxone 8-2 mg daily  Last refill 2/4/25    Recommend start Subutex 8 mg while inpatient and resume management per Dr. Overton's office on discharge  Will need 12-24 hours off short acting opioids prior to resuming home Suboxone to prevent precipitated withdraw.  Morbid obesity (HCC)    We appreciate the opportunity to participate in this patient's care. We will continue to follow. Please do not hesitate to contact our on-call provider through our clinic answering service at [phone] should you have acute symptom control concerns.    Subjective:  Chart and MAR reviewed. NAEO. Patient seen sitting in chair. Appears very comfortable. In very good spirits.     Review of Systems   All other systems reviewed and are negative.      MEDICATIONS / ALLERGIES:  all current active meds have been reviewed    Allergies   Allergen Reactions    Diphenhydramine Shortness Of Breath     Other reaction(s): DIPHENHYDRAMINE CITRATE (BENADRYL)    Erythromycin Shortness Of Breath and Hives     Category: Allergy;     Iodine-131 - Food Allergy Other (See Comments)    Iv Dye [Iodinated Contrast Media]        OBJECTIVE:  /73   Pulse 66   Temp 98.4 °F (36.9 °C)   Resp 17   Ht 5' 5\" (1.651 m)   Wt 118 kg (260 lb)   LMP  (LMP Unknown)   SpO2 95%   BMI 43.27 kg/m²   Nursing notes reviewed.  Physical Exam  Constitutional:       General: She is not in acute distress.     Appearance: She is not ill-appearing, toxic-appearing or diaphoretic.   HENT:      Head: Normocephalic and atraumatic. "   Eyes:      General: No scleral icterus.        Right eye: No discharge.         Left eye: No discharge.   Pulmonary:      Effort: Pulmonary effort is normal. No respiratory distress.   Abdominal:      General: Abdomen is flat. There is no distension.   Musculoskeletal:         General: No swelling.   Skin:     General: Skin is dry.      Coloration: Skin is not jaundiced or pale.   Neurological:      General: No focal deficit present.      Mental Status: She is alert and oriented to person, place, and time. Mental status is at baseline.   Psychiatric:         Mood and Affect: Mood normal.         Behavior: Behavior normal.         Thought Content: Thought content normal.         Judgment: Judgment normal.         Lab Results: I have personally reviewed pertinent labs.    HEMATOLOGY PROFILE:  Results from last 7 days   Lab Units 02/08/25  0515 02/07/25  0617 02/06/25  0541   WBC Thousand/uL 6.54 7.41 9.58   HEMOGLOBIN g/dL 11.7 12.4 12.7   HEMATOCRIT % 38.7 41.8 41.4   PLATELETS Thousands/uL 151 170  178 217   SEGS PCT % 70 78* 87*   MONO PCT % 7 7 5   EOS PCT % 1 0 1       CHEMISTRY PROFILE:  Results from last 7 days   Lab Units 02/09/25  0604 02/08/25  0515 02/07/25  1733 02/07/25  0617   SODIUM mmol/L 139 139  --  135   POTASSIUM mmol/L 3.9 4.0  --  4.4   CHLORIDE mmol/L 99 102  --  98   CO2 mmol/L 30 28  --  29   BUN mg/dL 9 10  --  10   CREATININE mg/dL 1.06 0.99  --  1.11   ALBUMIN g/dL  --  3.5 3.6  --    CALCIUM mg/dL 7.5* 6.6* 6.5* 5.7*   ALK PHOS U/L  --  46  --   --    ALT U/L  --  24  --   --    AST U/L  --  27  --   --        Albumin:  0   Lab Value Date/Time    ALB 3.5 02/08/2025 0515    ALB 4.1 12/25/2024 1910     Imaging Studies: I have personally reviewed pertinent reports.  EKG, Pathology, and Other Studies: I have personally reviewed pertinent reports.    Counseling / Coordination of Care:  Total floor / unit time spent today 15 minutes. Greater than 50% of total time was spent with the patient  "and / or family counseling and / or coordination of care. A description of the counseling / coordination of care: obtaining/reviewing history, symptom assessment and management, medication review / adjustment, psychosocial support, reviewing / ordering tests, medicines, imaging, procedures, opioid titration, supportive listening, anticipatory guidance, patient/family education, instructions for management, importance of adhering to treatment plan, risks/benefits of treatment(s), risk factor reduction, and documenting in the medical record.    Jenise Soto MD  [facility] Palliative and Supportive Care  [phone]    Portions of this document may have been created using dictation software and as such some \"sound alike\" terms may have been generated by the system. Do not hesitate to contact me with any questions or clarifications.    "

## 2025-02-09 NOTE — CASE MANAGEMENT
Case Management Discharge Planning Note    Patient name Tanya Rosenthal  Location [facility] 833/[facility] 833-01 MRN [id number]  : 1985 Date 2025       Current Admission Date: 2025  Current Admission Diagnosis:Status post total hysterectomy and bilateral salpingo-oophorectomy (BSO)   Patient Active Problem List    Diagnosis Date Noted Date Diagnosed    Cancer related pain 2025     Palliative care encounter 2025     Goals of care, counseling/discussion 2025     Submucosal neoplasm of gastrointestinal tract 2025     GIST (gastrointestinal stromal tumor), malignant (HCC) 2025     Gastric lesion 2024     Pelvic mass 2024     Status post total hysterectomy and bilateral salpingo-oophorectomy (BSO) 2024     Abdominal pain 2024     Hypocalcemia 2024     Anxiety 2024     Chest pain 2020     Elevated d-dimer 2020     Gastritis 2020     Cervical strain 2019     Head injury 2019     Hypothyroidism 2016     Fatigue 2016     Medically noncompliant 2016     History of heroin abuse (HCC) 2016     Environmental allergies 2016     Morbid obesity (HCC) 2016     Bradycardia 2016       LOS (days): 4  Geometric Mean LOS (GMLOS) (days): 2.7  Days to GMLOS:-1.3     OBJECTIVE:  Risk of Unplanned Readmission Score: 13.38         Current admission status: Inpatient   Preferred Pharmacy:   [id number] - [place] - [address]  [address] [address]  Phone: [phone] Fax: [phone]    [facility] - [place] - [address]  [address]  [place] [address]  Phone: [phone] Fax: [phone]    Primary Care Provider: Feliciano Ledezma MD    Primary Insurance: [employer]  Secondary Insurance:     DISCHARGE DETAILS:     Lovenox priced checked at [facility] cost 15.00,  Bedside RN updated.

## 2025-02-09 NOTE — ASSESSMENT & PLAN NOTE
12/25 presented to [facility] ED with abdominal pain, CT A/P 12/25 demonstrated possible 4.4 x 3.5 exophytic mass involving the posterior gastric cardia/fundus wall -at that time was discharged home with Protonix and Carafate and outpatient general surgery referral  12/29 subsequently presented to  ED with 10 out of 10 abdominal pain, CT A/P 12/29 demonstrated 6.0 x 4.4 x 4.5 cm lobulated mass posterior gastric wall of the proximal stomach just be on the GE junction extending into the lumen of the stomach as well as outside of the stomach, additionally 7.3 x 6.9 cm hyperdense mass in the right adnexal region suspect large hemorrhagic ovarian cyst as well as a septated mass 5.9 x 4.3 cm possibly left ovarian septated cystic lesion -EGD performed that admission though unable to obtain biopsies at the time  Outpatient follow-up with GI for EUS 1/7, biopsy confirming GIST, mixed spindle cell and epithelioid type  She is a patient of Dr. South and Dr. 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

## 2025-02-09 NOTE — PROGRESS NOTES
Progress Note - Colorectal   Name: Tanya Rosenthal 40 y.o. female I MRN: [id number]  Unit/Bed#: [facility] 833-01 I Date of Admission: 2/5/2025   Date of Service: 2/9/2025 I Hospital Day: 4    Assessment & Plan  Status post total hysterectomy and bilateral salpingo-oophorectomy (BSO)  See below  GIST (gastrointestinal stromal tumor), malignant (HCC)  39 yo with bilateral adnexal masses and GI stromal tumor, s/p TLH, BSO and concurrent partial gastrectomy. IOFS of adnexal masses c/w serous cystadenomas 2/5, now recovering well.    Plan:  -Post gastrectomy diet  -Monitor for return of bowel function  -Palliative on board, appreciate recommendations for pain management regimen  -Encourage ambulation/out of bed, 3 times daily  -Encourage incentive spirometer use, 10 times per hour  -Dispo planning  Palliative care encounter  Appreciate recommendations  History of heroin abuse (HCC)  -Palliative consulted    Please contact the SecureChat role for the Oncology-Surgical service with any questions/concerns.    Subjective   No acute events overnight. Patient reports pain is improved, not requiring any pain meds. They are tolerating their diet. They are having flatus but no BM. They are voiding. They are ambulating. They are using their IS to 1500. They deny nausea, vomiting, chest pain, shortness of breath, fevers, chills.      Objective :  Temp:  [98.2 °F (36.8 °C)-99.3 °F (37.4 °C)] 98.4 °F (36.9 °C)  HR:  [66-72] 69  BP: (135-140)/(74-95) 138/74  Resp:  [17-20] 20  SpO2:  [92 %] 92 %    I/O         02/07 0701  02/08 0700 02/08 0701  02/09 0700    P.O. 0 296    I.V. (mL/kg) 2.4 (0) 600 (5.1)    Total Intake(mL/kg) 2.4 (0) 896 (7.6)    Urine (mL/kg/hr) 1600 (0.6) 1900 (0.7)    Stool 0     Total Output 1600 1900    Net -1597.7 -1004          Unmeasured Urine Occurrence 1 x             Physical Exam  Constitutional:       General: She is not in acute distress.  HENT:      Head: Normocephalic and atraumatic.      Right Ear:  External ear normal.      Left Ear: External ear normal.      Nose: Nose normal.      Mouth/Throat:      Pharynx: Oropharynx is clear.   Eyes:      Extraocular Movements: Extraocular movements intact.   Cardiovascular:      Rate and Rhythm: Normal rate.   Pulmonary:      Effort: Pulmonary effort is normal.   Abdominal:      General: There is no distension.      Palpations: Abdomen is soft.      Tenderness: There is no abdominal tenderness.   Musculoskeletal:      Cervical back: Normal range of motion.   Skin:     General: Skin is warm and dry.      Comments: Incisions clean, dry, intact   Neurological:      Mental Status: She is alert. Mental status is at baseline.   Psychiatric:         Mood and Affect: Mood normal.           Lab Results: I have reviewed the following results:  Recent Labs     02/07/25  0617 02/07/25  1733 02/08/25  0515 02/08/25  0832   WBC 7.41  --  6.54  --    HGB 12.4  --  11.7  --    HCT 41.8  --  38.7  --      178  --  151  --    SODIUM 135  --  139  --    K 4.4  --  4.0  --    CL 98  --  102  --    CO2 29  --  28  --    BUN 10  --  10  --    CREATININE 1.11  --  0.99  --    GLUC 98  --  91  --    CAIONIZED  --   --   --  0.88*   MG 2.1  --   --   --    AST  --   --  27  --    ALT  --   --  24  --    ALB  --    < > 3.5  --    TBILI  --   --  0.31  --    ALKPHOS  --   --  46  --     < > = values in this interval not displayed.       Imaging Results Review: No pertinent imaging studies reviewed.  Other Study Results Review: No additional pertinent studies reviewed.    VTE Pharmacologic Prophylaxis: VTE covered by:  heparin (porcine), Subcutaneous, 5,000 Units at 02/09/25 0518     VTE Mechanical Prophylaxis: sequential compression device

## 2025-02-09 NOTE — PLAN OF CARE
Problem: PAIN - ADULT  Goal: Verbalizes/displays adequate comfort level or baseline comfort level  Description: Interventions:  - Encourage patient to monitor pain and request assistance  - Assess pain using appropriate pain scale  - Administer analgesics based on type and severity of pain and evaluate response  - Implement non-pharmacological measures as appropriate and evaluate response  - Consider cultural and social influences on pain and pain management  - Notify physician/advanced practitioner if interventions unsuccessful or patient reports new pain  Outcome: Progressing     Problem: INFECTION - ADULT  Goal: Absence or prevention of progression during hospitalization  Description: INTERVENTIONS:  - Assess and monitor for signs and symptoms of infection  - Monitor lab/diagnostic results  - Monitor all insertion sites, i.e. indwelling lines, tubes, and drains  - Monitor endotracheal if appropriate and nasal secretions for changes in amount and color  - [place] appropriate cooling/warming therapies per order  - Administer medications as ordered  - Instruct and encourage patient and family to use good hand hygiene technique  - Identify and instruct in appropriate isolation precautions for identified infection/condition  Outcome: Progressing     Problem: SAFETY ADULT  Goal: Patient will remain free of falls  Description: INTERVENTIONS:  - Educate patient/family on patient safety including physical limitations  - Instruct patient to call for assistance with activity   - Consult OT/PT to assist with strengthening/mobility   - Keep Call bell within reach  - Keep bed low and locked with side rails adjusted as appropriate  - Keep care items and personal belongings within reach  - Initiate and maintain comfort rounds  - Make Fall Risk Sign visible to staff  Problem: Knowledge Deficit  Goal: Patient/family/caregiver demonstrates understanding of disease process, treatment plan, medications, and discharge  instructions  Description: Complete learning assessment and assess knowledge base.  Interventions:  - Provide teaching at level of understanding  - Provide teaching via preferred learning methods  Outcome: Progressing     Problem: RESPIRATORY - ADULT  Goal: Achieves optimal ventilation and oxygenation  Description: INTERVENTIONS:  - Assess for changes in respiratory status  - Assess for changes in mentation and behavior  - Position to facilitate oxygenation and minimize respiratory effort  - Oxygen administered by appropriate delivery if ordered  - Initiate smoking cessation education as indicated  - Encourage broncho-pulmonary hygiene including cough, deep breathe, Incentive Spirometry  - Assess the need for suctioning and aspirate as needed  - Assess and instruct to report SOB or any respiratory difficulty  - Respiratory Therapy support as indicated  Outcome: Progressing     - Consider moving patient to room near nurses station  Outcome: Progressing

## 2025-02-09 NOTE — ASSESSMENT & PLAN NOTE
41 yo with bilateral adnexal masses and GI stromal tumor, s/p TLH, BSO and concurrent partial gastrectomy. IOFS of adnexal masses c/w serous cystadenomas 2/5, now recovering well.    Plan:  -Post gastrectomy diet  -Monitor for return of bowel function  -Palliative on board, appreciate recommendations for pain management regimen  -Encourage ambulation/out of bed, 3 times daily  -Encourage incentive spirometer use, 10 times per hour  -Dispo planning

## 2025-02-09 NOTE — PLAN OF CARE
Problem: PAIN - ADULT  Goal: Verbalizes/displays adequate comfort level or baseline comfort level  Description: Interventions:  - Encourage patient to monitor pain and request assistance  - Assess pain using appropriate pain scale  - Administer analgesics based on type and severity of pain and evaluate response  - Implement non-pharmacological measures as appropriate and evaluate response  - Consider cultural and social influences on pain and pain management  - Notify physician/advanced practitioner if interventions unsuccessful or patient reports new pain  2/9/2025 1331 by Concepcion Cuenca RN  Outcome: Completed  2/9/2025 0745 by Concepcion Cuenca RN  Outcome: Progressing     Problem: INFECTION - ADULT  Goal: Absence or prevention of progression during hospitalization  Description: INTERVENTIONS:  - Assess and monitor for signs and symptoms of infection  - Monitor lab/diagnostic results  - Monitor all insertion sites, i.e. indwelling lines, tubes, and drains  - Monitor endotracheal if appropriate and nasal secretions for changes in amount and color  - [place] appropriate cooling/warming therapies per order  - Administer medications as ordered  - Instruct and encourage patient and family to use good hand hygiene technique  - Identify and instruct in appropriate isolation precautions for identified infection/condition  2/9/2025 1331 by Concepcion Cuenca RN  Outcome: Completed  2/9/2025 0745 by Concepcion Cuenca RN  Outcome: Progressing     Problem: SAFETY ADULT  Goal: Patient will remain free of falls  Description: INTERVENTIONS:  - Educate patient/family on patient safety including physical limitations  - Instruct patient to call for assistance with activity   - Consult OT/PT to assist with strengthening/mobility   - Keep Call bell within reach  - Keep bed low and locked with side rails adjusted as appropriate  - Keep care items and personal belongings within reach  - Initiate and maintain comfort rounds  -  Make Fall Risk Sign visible to staff  - Apply yellow socks and bracelet for high fall risk patients  - Consider moving patient to room near nurses station  2/9/2025 1331 by Concepcion Cuenca RN  Outcome: Completed  2/9/2025 0745 by Concepcion Cuenca RN  Outcome: Progressing     Problem: DISCHARGE PLANNING  Goal: Discharge to home or other facility with appropriate resources  Description: INTERVENTIONS:  - Identify barriers to discharge w/patient and caregiver  - Arrange for needed discharge resources and transportation as appropriate  - Identify discharge learning needs (meds, wound care, etc.)  - Arrange for interpretive services to assist at discharge as needed  - Refer to Case Management Department for coordinating discharge planning if the patient needs post-hospital services based on physician/advanced practitioner order or complex needs related to functional status, cognitive ability, or social support system  Outcome: Completed     Problem: Knowledge Deficit  Goal: Patient/family/caregiver demonstrates understanding of disease process, treatment plan, medications, and discharge instructions  Description: Complete learning assessment and assess knowledge base.  Interventions:  - Provide teaching at level of understanding  - Provide teaching via preferred learning methods  2/9/2025 1331 by Concepcion Cuenca RN  Outcome: Completed  2/9/2025 0745 by Concepcion Cuenca RN  Outcome: Progressing     Problem: RESPIRATORY - ADULT  Goal: Achieves optimal ventilation and oxygenation  Description: INTERVENTIONS:  - Assess for changes in respiratory status  - Assess for changes in mentation and behavior  - Position to facilitate oxygenation and minimize respiratory effort  - Oxygen administered by appropriate delivery if ordered  - Initiate smoking cessation education as indicated  - Encourage broncho-pulmonary hygiene including cough, deep breathe, Incentive Spirometry  - Assess the need for suctioning and aspirate as  needed  - Assess and instruct to report SOB or any respiratory difficulty  - Respiratory Therapy support as indicated  Outcome: Completed     Problem: GASTROINTESTINAL - ADULT  Goal: Minimal or absence of nausea and/or vomiting  Description: INTERVENTIONS:  - Administer IV fluids if ordered to ensure adequate hydration  - Maintain NPO status until nausea and vomiting are resolved  - Nasogastric tube if ordered  - Administer ordered antiemetic medications as needed  - Provide nonpharmacologic comfort measures as appropriate  - Advance diet as tolerated, if ordered  - Consider nutrition services referral to assist patient with adequate nutrition and appropriate food choices  Outcome: Completed  Goal: Maintains or returns to baseline bowel function  Description: INTERVENTIONS:  - Assess bowel function  - Encourage oral fluids to ensure adequate hydration  - Administer IV fluids if ordered to ensure adequate hydration  - Administer ordered medications as needed  - Encourage mobilization and activity  - Consider nutritional services referral to assist patient with adequate nutrition and appropriate food choices  Outcome: Completed  Goal: Maintains adequate nutritional intake  Description: INTERVENTIONS:  - Monitor percentage of each meal consumed  - Identify factors contributing to decreased intake, treat as appropriate  - Assist with meals as needed  - Monitor I&O, weight, and lab values if indicated  - Obtain nutrition services referral as needed  Outcome: Completed     Problem: METABOLIC, FLUID AND ELECTROLYTES - ADULT  Goal: Electrolytes maintained within normal limits  Description: INTERVENTIONS:  - Monitor labs and assess patient for signs and symptoms of electrolyte imbalances  - Administer electrolyte replacement as ordered  - Monitor response to electrolyte replacements, including repeat lab results as appropriate  - Instruct patient on fluid and nutrition as appropriate  Outcome: Completed  Goal: Fluid balance  maintained  Description: INTERVENTIONS:  - Monitor labs   - Monitor I/O and WT  - Instruct patient on fluid and nutrition as appropriate  - Assess for signs & symptoms of volume excess or deficit  Outcome: Completed     Problem: SKIN/TISSUE INTEGRITY - ADULT  Goal: Incision(s), wounds(s) or drain site(s) healing without S/S of infection  Description: INTERVENTIONS  - Assess and document dressing, incision, wound bed, drain sites and surrounding tissue  - Provide patient and family education  - Perform skin care/dressing changes every   Outcome: Completed     Problem: HEMATOLOGIC - ADULT  Goal: Maintains hematologic stability  Description: INTERVENTIONS  - Assess for signs and symptoms of bleeding or hemorrhage  - Monitor labs  - Administer supportive blood products/factors as ordered and appropriate  Outcome: Completed     Problem: Prexisting or High Potential for Compromised Skin Integrity  Goal: Skin integrity is maintained or improved  Description: INTERVENTIONS:  - Identify patients at risk for skin breakdown  - Assess and monitor skin integrity  - Assess and monitor nutrition and hydration status  - Monitor labs   - Assess for incontinence   - Turn and reposition patient  - Assist with mobility/ambulation  - Relieve pressure over bony prominences  - Avoid friction and shearing  - Provide appropriate hygiene as needed including keeping skin clean and dry  - Evaluate need for skin moisturizer/barrier cream  - Collaborate with interdisciplinary team   - Patient/family teaching  - Consider wound care consult   Outcome: Completed

## 2025-02-09 NOTE — PLAN OF CARE
Problem: PAIN - ADULT  Goal: Verbalizes/displays adequate comfort level or baseline comfort level  Description: Interventions:  - Encourage patient to monitor pain and request assistance  - Assess pain using appropriate pain scale  - Administer analgesics based on type and severity of pain and evaluate response  - Implement non-pharmacological measures as appropriate and evaluate response  - Consider cultural and social influences on pain and pain management  - Notify physician/advanced practitioner if interventions unsuccessful or patient reports new pain  Outcome: Progressing     Problem: INFECTION - ADULT  Goal: Absence or prevention of progression during hospitalization  Description: INTERVENTIONS:  - Assess and monitor for signs and symptoms of infection  - Monitor lab/diagnostic results  - Monitor all insertion sites, i.e. indwelling lines, tubes, and drains  - Monitor endotracheal if appropriate and nasal secretions for changes in amount and color  - [place] appropriate cooling/warming therapies per order  - Administer medications as ordered  - Instruct and encourage patient and family to use good hand hygiene technique  - Identify and instruct in appropriate isolation precautions for identified infection/condition  Outcome: Progressing     Problem: SAFETY ADULT  Goal: Patient will remain free of falls  Description: INTERVENTIONS:  - Educate patient/family on patient safety including physical limitations  - Instruct patient to call for assistance with activity   - Consult OT/PT to assist with strengthening/mobility   - Keep Call bell within reach  - Keep bed low and locked with side rails adjusted as appropriate  - Keep care items and personal belongings within reach  - Initiate and maintain comfort rounds  - Make Fall Risk Sign visible to staff  - Offer Toileting every 2 Hours, in advance of need  - Initiate/Maintain alarm  - Obtain necessary fall risk management equipment:   - Apply yellow socks and  bracelet for high fall risk patients  - Consider moving patient to room near nurses station  Outcome: Progressing     Problem: Knowledge Deficit  Goal: Patient/family/caregiver demonstrates understanding of disease process, treatment plan, medications, and discharge instructions  Description: Complete learning assessment and assess knowledge base.  Interventions:  - Provide teaching at level of understanding  - Provide teaching via preferred learning methods  Outcome: Progressing

## 2025-02-09 NOTE — ASSESSMENT & PLAN NOTE
S/p intraoperative bilateral rectus sheath blocks  Was on PCA, d/c on 2/7/2025  MAR: used 1 x 15mg oxy from 2/8 10am to 2/9 10am   Today, pain is reported to be almost negligible except for mild discomfort when she coughs/laughs. Had a small BM overnight. Was given advise from surgery to do colace, dulcolax and miralax prn at home  Current regimen effective, continue without changes:  Oxycodone 10 mg for moderate pain Q 3 H PRN  Oxycodone 15 mg for severe pain Q 3 H PRN  IV Dilaudid 0.5 mg for breakthrough pain Q 2 H PRN  PO tylenol 975 mg q8h ATC. Max of 3g in 24H  Narcan ordered for respiratory depression  Bowel regimen per surgical team  Okay to d/c from the palliative perspective - I already sent a small supply of OxyIR 10mg prn. Advise to take 1/2 tab prn. Aware and okay to buy out of pocket if needing prior auth. Daughter is going to assist and keep close eye on opioid use, given Hx of drug addiction. Patient also committed to avoiding taking opioids if possible as she does not want to go back to previous situation. Sees addiction specialist OP. Will resume home suboxone once at home.     I have reviewed the patient's controlled substance dispensing history in the Prescription Drug Monitoring Program in compliance with the [facility] regulations before prescribing any controlled substances.  Last refills  02/04/2025 02/04/2025 1 Buprenorphine-Nalox 8-2mg Film 45.00 30 Ma Can [id number] Bet ([id number]) 0 12.00 mg Comm Ins [place]   12/06/2024 11/25/2024 1 Buprenorphine-Nalox 8-2mg Film 45.00 30 Ma Can [id number] Mac ([id number]) 0 12.00 mg Comm Ins PA   10/07/2024 10/04/2024 1 Buprenorphine-Nalox 8-2mg Film 45.00 30 Ma Can [id number] Mac ([id number]) 0 12.00 mg Comm Ins [place]   09/12/2024 09/09/2024 1 Buprenorphine-Nalox 8-2mg Film 45.00 30 Ma Can [id number] Mac ([id number]) 0 12.00 mg Comm Ins [place]   08/17/2024 08/16/2024 1 Buprenorphine-Nalox 8-2mg Film 45.00 30 Ma Can [id number] Mac ([id number]) 0 12.00 mg Comm Ins [place]

## 2025-02-10 ENCOUNTER — TELEPHONE (OUTPATIENT)
Dept: SURGICAL ONCOLOGY | Facility: CLINIC | Age: 40
End: 2025-02-10

## 2025-02-10 ENCOUNTER — TELEPHONE (OUTPATIENT)
Dept: PALLIATIVE MEDICINE | Facility: CLINIC | Age: 40
End: 2025-02-10

## 2025-02-10 DIAGNOSIS — R79.89 LOW SERUM CALCIUM: ICD-10-CM

## 2025-02-10 DIAGNOSIS — C49.A2 MALIGNANT GASTROINTESTINAL STROMAL TUMOR (GIST) OF STOMACH (HCC): Primary | ICD-10-CM

## 2025-02-10 NOTE — TELEPHONE ENCOUNTER
Spoke to patient and confirmed she is taking her calcium supplements as she was prior to surgery. Also informed blood work was ordered for her to get done before her post op appointment.

## 2025-02-10 NOTE — UTILIZATION REVIEW
NOTIFICATION OF ADMISSION DISCHARGE   This is a Notification of Discharge from [facility]. Please be advised that this patient has been discharge from our facility. Below you will find the admission and discharge date and time including the patient’s disposition.   UTILIZATION REVIEW CONTACT:  Roseanna Zepeda  Utilization   Network Utilization Review Department  Phone: [phone] x carefully listen to the prompts. All voicemails are confidential.  Email: [email or web address]     ADMISSION INFORMATION  PRESENTATION DATE: 2/5/2025  6:08 AM  OBERVATION ADMISSION DATE: N/A  INPATIENT ADMISSION DATE: 2/5/25 12:38 PM   DISCHARGE DATE: 2/9/2025  1:33 PM   DISPOSITION:Home/Self Care    Network Utilization Review Department  ATTENTION: Please call with any questions or concerns to [phone] and carefully listen to the prompts so that you are directed to the right person. All voicemails are confidential.   For Discharge needs, contact Care Management DC Support Team at [phone] opt. 2  Send all requests for admission clinical reviews, approved or denied determinations and any other requests to dedicated fax number below belonging to the campus where the patient is receiving treatment. List of dedicated fax numbers for the Facilities:  FACILITY NAME UR FAX NUMBER   ADMISSION DENIALS (Administrative/Medical Necessity) [phone]   DISCHARGE SUPPORT TEAM ([facility]) [phone]   PARENT CHILD HEALTH (Maternity/NICU/Pediatrics) [phone]   [facility] [phone]   [facility] [phone]   Counts include 234 beds at the [facility] [phone]   [facility] [phone]   [facility] [phone]   [facility] [phone]   [facility] [phone]   [facility] [phone]    [facility] [phone]   [facility] [phone]   [facility] [phone]   [facility] [phone]

## 2025-02-10 NOTE — TELEPHONE ENCOUNTER
PA for OXYCODONE (ROXICODONE ) 10 MG  APPROVED     Date(s) approved February 10, 2025 to February 10, 2026     Case #[id number]     Patient advised by          [x]QuanTemplatehart Message  []Phone call   [x]LMOM  []L/M to call office as no active Communication consent on file  []Unable to leave detailed message as VM not approved on Communication consent       Pharmacy advised by    [x]Fax  []Phone call    Approval letter scanned into Media Yes

## 2025-02-10 NOTE — DISCHARGE SUMMARY
"Discharge Summary - Surgery-General   Name: Tanya Rosenthal 40 y.o. 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    Procedures Performed: No orders of the defined types were placed in this encounter.    Hospital Course: Patient presented for elective total hysterectomy, BSO, and partial gastrectomy on 2/5. Patient initially requiring oxygen, eventually weaned to room air. Patient tolerated diet advancement and was OOB, ambulating, and pain control improved. Patient stable for discharge with outpatient follow-up with gyn-onc and surgical oncology on 2/9.    Significant Findings, Care, Treatment and Services Provided:  elective total hysterectomy, BSO, and partial gastrectomy    Lab Results: CBC: No results found for: \"WBC\", \"HGB\", \"HCT\", \"MCV\", \"PLT\", \"ADJUSTEDWBC\", \"RBC\", \"MCH\", \"MCHC\", \"RDW\", \"MPV\", \"NRBC\", CMP:   Lab Results   Component Value Date    SODIUM 139 02/09/2025    K 3.9 02/09/2025    CL 99 02/09/2025    CO2 30 02/09/2025    BUN 9 02/09/2025    CREATININE 1.06 02/09/2025    CALCIUM 7.5 (L) 02/09/2025    EGFR 64 02/09/2025   , Magnesium: No components found for: \"MAG\", Phosphorous: No results found for: \"PHOS\"    Complications: None    Medical Problems       Resolved Problems  Date Reviewed: 2/5/2025   None         Condition at Discharge: good         Discharge instructions/Information to patient and family:   See after visit summary for information provided to patient and family.      Provisions for Follow-Up Care:  See " after visit summary for information related to follow-up care and any pertinent home health orders.      Disposition: Home      Planned Readmission: No    Discharge Statement:  I have spent a total time of 21 minutes in caring for this patient on the day of the visit/encounter. >30 minutes of time was spent on: Diagnostic results, Prognosis, Risks and benefits of tx options, Instructions for management, Patient and family education, Importance of tx compliance, Risk factor reductions, Impressions, Counseling / Coordination of care, Documenting in the medical record, Reviewing / ordering tests, medicine, procedures  , and Communicating with other healthcare professionals .    Discharge Medications:  See after visit summary for reconciled discharge medications provided to patient and family.

## 2025-02-10 NOTE — TELEPHONE ENCOUNTER
PA for OXYCODONE (ROXICODONE) 10 MG SUBMITTED to [facility]     via    []CMM-KEY:   [x]Surescripts-Case ID # [id number]   []Availity-Auth ID # NDC #   []Faxed to plan   []Other website   []Phone call Case ID #     [x]PA sent as URGENT    All office notes, labs and other pertaining documents and studies sent. Clinical questions answered. Awaiting determination from insurance company.     Turnaround time for your insurance to make a decision on your Prior Authorization can take 7-21 business days.

## 2025-02-10 NOTE — TELEPHONE ENCOUNTER
Prior Authorization [NEEDED] for [Oxycodone HCL 10mg Tablets]    KEY# [id number]    [facility]  Phone: [phone]  Fax: [phone]

## 2025-02-11 DIAGNOSIS — E89.40 SURGICAL MENOPAUSE: Primary | ICD-10-CM

## 2025-02-11 RX ORDER — ESTRADIOL 0.5 MG/1
0.5 TABLET ORAL DAILY
Qty: 30 TABLET | Refills: 2 | Status: SHIPPED | OUTPATIENT
Start: 2025-02-11

## 2025-02-13 ENCOUNTER — RESULTS FOLLOW-UP (OUTPATIENT)
Age: 40
End: 2025-02-13

## 2025-02-13 LAB
HIV 1+2 AB+HIV1 P24 AG SERPL QL IA: NORMAL
HIV1 P24 AG SER QL: NORMAL

## 2025-02-17 ENCOUNTER — PATIENT OUTREACH (OUTPATIENT)
Dept: CASE MANAGEMENT | Facility: OTHER | Age: 40
End: 2025-02-17

## 2025-02-17 NOTE — PROGRESS NOTES
OSW received a TC from pt this day. She is recovering from her recent surgery and states that she feel exhausted. It is very tiring for her to take a shower. Thankfully she reports that she has not had any pain since day three after her surgery. Pt is requesting assistance with a few bills. OSW encouraged her to email them to this writer and they will be presented to the team for approval. She was very grateful. OSW will continue to follow.     ADDENDUM:  OSW received pts bills for car payment and insurance. OSW emailed her back asking her to send this writer the entire statement for the car insurance. OSW sent an email to the team requesting assistance. OSW will continue to follow.

## 2025-02-18 NOTE — CONSULTS
"  Inpatient consult to Acute Pain Service  Consult performed by: Janay Jeffers MD  Consult ordered by: Mahesh Matthews MD        Peripheral Nerve Block Follow-up Note - Acute Pain Service    Tanya Rosenthal 40 y.o. female MRN: [id number]  Unit/Bed#: [facility] 833-01 Encounter: [id number]        Assessment:   Principal Problem:    Status post total hysterectomy and bilateral salpingo-oophorectomy (BSO)  Active Problems:    History of heroin abuse (HCC)    Morbid obesity (HCC)    GIST (gastrointestinal stromal tumor), malignant (HCC)    Palliative care encounter    Cancer related pain     Tanya Rosenthal is a 40 y.o. year old female status post laparoscopic hysterectomy and BSO and open partial gastrectomy for GIST on 2/5/25.  Pt received an intraoperative rectus sheath block with plain local prior to extubation.     Pt seen and examined this morning, laying in bed discussing with other provider upon arrival.  Pt feels \"miserable\" however NGT was removed and she is much happier.     Sharp pain resumed overnight, achy pain in back more chronic in nature.  Remains on Dilaudid PCA, NPO still, swallow study for GE junction evaluation today.     Plan:   - Coordination with Palliative, known to patient              - IV/PO regimen per palliative              - Interventional pain per APS, no further procedures planned  - Rectus sheath nerve block appropriately worn off              - skin site c/d/I  - IS OOB PT as able     Appreciate multi-disciplinary recommendations, at this time, will defer to our Palliative colleagues.  Please re-consult if any further intervention warranted, thank you.  APS will sign off at this time. Thank you for the consult. All opioids and other analgesics to be written at discretion of primary team. Please contact Acute Pain Service - [facility] via [employer] from [phone] with additional questions or concerns. See [employer] or [employer] for additional contacts and after hours information.          Pain " History  Current pain location(s): abdomen  Pain Scale:   6-10  Quality: sharp and shooting  24 hour history: as above     Opioid requirement previous 24 hours: 9.2mg IV Dilaudid via PCA     Meds/Allergies   all current active meds have been reviewed           Allergies   Allergen Reactions    Diphenhydramine Shortness Of Breath       Other reaction(s): DIPHENHYDRAMINE CITRATE (BENADRYL)    Erythromycin Shortness Of Breath and Hives       Category: Allergy;     Iodine-131 - Food Allergy Other (See Comments)    Iv Dye [Iodinated Contrast Media]        Review of Systems - ROS reviewed and negative except as indicated     FH/SH - reviewed     Objective      Temp:  [97.4 °F (36.3 °C)-100.2 °F (37.9 °C)] 100.2 °F (37.9 °C)  HR:  [64-84] 84  BP: (136-169)/(66-97) 141/74  Resp:  [15-24] 15  SpO2:  [89 %-100 %] 89 %  O2 Device: Nasal cannula     Physical Exam  Vitals and nursing note reviewed.   Constitutional:       General: She is not in acute distress.     Appearance: Normal appearance.   HENT:      Head: Normocephalic and atraumatic.      Mouth/Throat:      Mouth: Mucous membranes are dry.   Eyes:      Extraocular Movements: Extraocular movements intact.   Cardiovascular:      Rate and Rhythm: Normal rate.   Pulmonary:      Effort: No respiratory distress.      Comments: Nasal cannula  Chest:      Chest wall: No tenderness.   Abdominal:      General: There is no distension.      Palpations: Abdomen is soft.      Tenderness: There is abdominal tenderness.   Musculoskeletal:         General: No deformity.   Skin:     General: Skin is warm and dry.   Neurological:      Mental Status: She is alert and oriented to person, place, and time.   Psychiatric:         Mood and Affect: Mood normal.         Behavior: Behavior normal.               Lab Results:        Results from last 7 days   Lab Units 02/06/25  0541   WBC Thousand/uL 9.58   HEMOGLOBIN g/dL 12.7   HEMATOCRIT % 41.4   PLATELETS Thousands/uL 217           Results from  last 7 days   Lab Units 02/06/25  0541   POTASSIUM mmol/L 4.3   CHLORIDE mmol/L 101   CO2 mmol/L 28   BUN mg/dL 11   CREATININE mg/dL 1.13   CALCIUM mg/dL 6.1*         Imaging Studies: Results Review Statement: No pertinent imaging studies reviewed.  EKG, Pathology, and Other Studies: Results Review Statement: No pertinent imaging studies reviewed.     Counseling / Coordination of Care  Total floor / unit time spent today 20 minutes. Greater than 50% of total time was spent with the patient and / or family counseling and / or coordination of care. A description of the counseling / coordination of care: chart review, post op pain and regional/neuraxial pain management, discussion/planning with nursing/medical/surgical teams     Please note that the APS provides consultative services regarding pain management only.  With the exception of ketamine, peripheral nerve catheters, and epidural infusions (and except when indicated), final decisions regarding starting or changing doses of analgesic medications are at the discretion of the consulting service.  Off hours consultation and/or medication management is generally not available.     Janay Jeffers MD  Acute Pain Service

## 2025-02-21 RX ORDER — BUPRENORPHINE AND NALOXONE 8; 2 MG/1; MG/1
FILM, SOLUBLE BUCCAL; SUBLINGUAL
COMMUNITY
Start: 2025-02-04

## 2025-02-21 RX ORDER — LEVOTHYROXINE SODIUM 175 UG/1
1 TABLET ORAL DAILY
COMMUNITY
Start: 2025-02-10

## 2025-02-24 ENCOUNTER — PATIENT OUTREACH (OUTPATIENT)
Dept: CASE MANAGEMENT | Facility: OTHER | Age: 40
End: 2025-02-24

## 2025-02-24 NOTE — PROGRESS NOTES
OSW received an email from the pt inquiring about her bills that were submitted for compassion funds. She states that she is worried because they are due in 4 days. This writer informed her that the request was approved, however it may take up to 3 weeks for them to receive the check. OSW encouraged her to call them and inform them of same, as well as request that any late fees be waived. She was very grateful for the assistance.

## 2025-02-25 ENCOUNTER — OFFICE VISIT (OUTPATIENT)
Dept: GYNECOLOGIC ONCOLOGY | Facility: CLINIC | Age: 40
End: 2025-02-25

## 2025-02-25 VITALS
HEART RATE: 96 BPM | DIASTOLIC BLOOD PRESSURE: 86 MMHG | BODY MASS INDEX: 42.27 KG/M2 | SYSTOLIC BLOOD PRESSURE: 144 MMHG | TEMPERATURE: 97.8 F | WEIGHT: 254 LBS

## 2025-02-25 DIAGNOSIS — N95.1 MENOPAUSAL SYMPTOMS: Primary | ICD-10-CM

## 2025-02-25 DIAGNOSIS — N83.8 BILATERAL TUBO-OVARIAN MASS: ICD-10-CM

## 2025-02-25 PROCEDURE — 99024 POSTOP FOLLOW-UP VISIT: CPT | Performed by: OBSTETRICS & GYNECOLOGY

## 2025-02-25 NOTE — PROGRESS NOTES
Name: Tanya Rosenthal      : 1985      MRN: [id number]  Encounter Provider: Varghese Jung MD  Encounter Date: 2025   [facility]  :  Assessment & Plan  Menopausal symptoms  Symptoms controlled.  Continue estradiol 0.5 mg daily       Bilateral tubo-ovarian mass  40-year-old status post total laparoscopic hysterectomy and bilateral salpingo-oophorectomy performed simultaneously with partial gastrectomy on 2025 for a preoperative diagnosis of bilateral complex adnexal masses, biopsy-proven gastric GIST.  Final pathology was consistent with endometrial polyps, serous cystadenoma's with focal epithelial proliferation, just.  She has follow-up with surgical oncology tomorrow.  She is otherwise recovering well from surgery.  Her performance status is 0.  1.  We discussed ongoing activity limitations  2.  Return in 4 weeks for postoperative pelvic examination               History of Present Illness   Reason for Visit / CC: Postoperative evaluation   Tanya Rosenthal is a 40 y.o. female   Who returns for postoperative evaluation.  She is recovering well from surgery.  She is eating, voiding, afebrile, no vaginal bleeding.  Is controlled.  Menopausal symptoms are controlled with the current dose of estrogen.    Pertinent Medical History     Morbid obesity with BMI 43 kg/m²       Oncology History   Cancer Staging   No matching staging information was found for the patient.  Oncology History   GIST (gastrointestinal stromal tumor), malignant (HCC)   2025 Biopsy    Endoscopic Ultrasound    Stomach, Gastric Submucosal, FNA:  Neoplastic.  Gastrointestinal stromal tumor (GIST), mixed spindle cell and epithelioid type.  > 5 mitoses per 50 HPF     2025 Initial Diagnosis    GIST (gastrointestinal stromal tumor), malignant (HCC)     2025 Surgery    Partial gastrectomy:   Gastric mass, resection:  -   Gastrointestinal stromal tumor of  stomach, 5.8 cm in greatest dimension.    TUMOR   Tumor Focality  Unifocal   Tumor Site  Stomach: Just distal to GE junction per op note   Tumor Size  Greatest Dimension (Centimeters): 5.8 cm   Mitotic Rate  18 mitoses per 5 mm2   Histologic Grade  G2, high grade   MARGINS   Margin Status  All margins negative for GIST   pTNM CLASSIFICATION (AJCC 8th Edition)   pT Category  pT3   pN Category  pN not assigned (no nodes submitted or found)              Review of Systems A complete review of systems is negative other than that noted above in the HPI.  Current Outpatient Medications on File Prior to Visit   Medication Sig Dispense Refill    acetaminophen (TYLENOL) 325 mg tablet Take 3 tablets (975 mg total) by mouth every 8 (eight) hours      buprenorphine-naloxone (SUBOXONE) 8-2 mg per SL tablet Place 1 tablet under the tongue daily      calcium carbonate (OS-ALL) 600 MG tablet Take 1 tablet (600 mg total) by mouth daily 30 tablet 0    enoxaparin (Lovenox) 40 mg/0.4 mL Inject 0.4 mL (40 mg total) under the skin in the morning for 24 days 9.6 mL 0    estradiol (Estrace) 0.5 MG tablet Take 1 tablet (0.5 mg total) by mouth daily 30 tablet 2    hydrOXYzine HCL (ATARAX) 10 mg tablet Take 1 tablet (10 mg total) by mouth every 6 (six) hours as needed for anxiety 30 tablet 0    levothyroxine 112 mcg tablet Take 112 mcg by mouth daily      ondansetron (ZOFRAN-ODT) 4 mg disintegrating tablet Take 4 mg by mouth every 6 (six) hours as needed for nausea or vomiting      oxyCODONE (ROXICODONE) 10 MG TABS Take 1 tablet (10 mg total) by mouth every 4 (four) hours as needed for severe pain Max Daily Amount: 60 mg 60 tablet 0    pantoprazole (PROTONIX) 40 mg tablet Take 1 tablet (40 mg total) by mouth daily 90 tablet 0    sucralfate (CARAFATE) 1 g tablet Take 1 tablet (1 g total) by mouth every 6 (six) hours 120 tablet 0    busPIRone (BUSPAR) 7.5 mg tablet Take 7.5 mg by mouth 2 (two) times a day (Patient not taking: Reported on  2/3/2025)       No current facility-administered medications on file prior to visit.         Objective   /86 (BP Location: Left arm, Patient Position: Sitting, Cuff Size: Large)   Pulse 96   Temp 97.8 °F (36.6 °C) (Temporal)   Wt 115 kg (254 lb)   LMP  (LMP Unknown)   BMI 42.27 kg/m²     Body mass index is 42.27 kg/m².  Pain Screening:  Pain Score: 0-No pain  ECOG   0  Physical Exam  Vitals reviewed.   Constitutional:       General: She is not in acute distress.     Appearance: Normal appearance.   HENT:      Head: Normocephalic and atraumatic.      Mouth/Throat:      Mouth: Mucous membranes are moist.   Pulmonary:      Effort: Pulmonary effort is normal.      Breath sounds: Normal breath sounds.   Abdominal:      Palpations: Abdomen is soft. There is no mass.      Tenderness: There is no abdominal tenderness.   Musculoskeletal:      Right lower leg: No edema.      Left lower leg: No edema.   Skin:     General: Skin is warm and dry.      Comments: Surgical trocar sites are intact, clean and dry without induration, erythema or purulent drainage.  Supraumbilical midline laparotomy clean dry and intact.   Neurological:      Mental Status: She is alert and oriented to person, place, and time.   Psychiatric:         Mood and Affect: Mood normal.         Behavior: Behavior normal.         Thought Content: Thought content normal.         Judgment: Judgment normal.          Labs: I have reviewed pertinent labs.   Lab Results   Component Value Date/Time     16.6 12/31/2024 09:43 AM     Lab Results   Component Value Date/Time    Potassium 3.9 02/09/2025 06:04 AM    Potassium 4.3 12/25/2024 07:10 PM    Chloride 99 02/09/2025 06:04 AM    Chloride 101 12/25/2024 07:10 PM    Carbon Dioxide 29 12/25/2024 07:10 PM    CO2 30 02/09/2025 06:04 AM    BUN 9 02/09/2025 06:04 AM    BUN 19 12/25/2024 07:10 PM    Creatinine 1.06 02/09/2025 06:04 AM    Creatinine 1.28 (H) 12/25/2024 07:10 PM    Glucose, Fasting 77 01/29/2025  09:13 AM    Calcium 7.5 (L) 02/09/2025 06:04 AM    Calcium 6.6 (L) 12/25/2024 07:10 PM    AST 27 02/08/2025 05:15 AM    AST 17 12/25/2024 07:10 PM    ALT 24 02/08/2025 05:15 AM    ALT 10 12/25/2024 07:10 PM    Alkaline Phosphatase 46 02/08/2025 05:15 AM    Alkaline Phosphatase 65 12/25/2024 07:10 PM    eGFRcr 54 (L) 12/25/2024 07:10 PM    eGFR 64 02/09/2025 06:04 AM     Lab Results   Component Value Date/Time    WBC 6.54 02/08/2025 05:15 AM    Hemoglobin 11.7 02/08/2025 05:15 AM    Hematocrit 38.7 02/08/2025 05:15 AM    MCV 85 02/08/2025 05:15 AM    Platelets 151 02/08/2025 05:15 AM     Lab Results   Component Value Date/Time    Absolute Neutrophils 4.55 02/08/2025 05:15 AM        Trend:  Lab Results   Component Value Date     16.6 12/31/2024         Other Study Results Review : Pathology reports reviewed.

## 2025-02-25 NOTE — ASSESSMENT & PLAN NOTE
40-year-old status post total laparoscopic hysterectomy and bilateral salpingo-oophorectomy performed simultaneously with partial gastrectomy on 2/5/2025 for a preoperative diagnosis of bilateral complex adnexal masses, biopsy-proven gastric GIST.  Final pathology was consistent with endometrial polyps, serous cystadenoma's with focal epithelial proliferation, just.  She has follow-up with surgical oncology tomorrow.  She is otherwise recovering well from surgery.  Her performance status is 0.  1.  We discussed ongoing activity limitations  2.  Return in 4 weeks for postoperative pelvic examination

## 2025-02-26 ENCOUNTER — DOCUMENTATION (OUTPATIENT)
Dept: HEMATOLOGY ONCOLOGY | Facility: CLINIC | Age: 40
End: 2025-02-26

## 2025-02-26 ENCOUNTER — OFFICE VISIT (OUTPATIENT)
Dept: SURGICAL ONCOLOGY | Facility: CLINIC | Age: 40
End: 2025-02-26
Payer: COMMERCIAL

## 2025-02-26 ENCOUNTER — OFFICE VISIT (OUTPATIENT)
Dept: PALLIATIVE MEDICINE | Facility: CLINIC | Age: 40
End: 2025-02-26
Payer: COMMERCIAL

## 2025-02-26 ENCOUNTER — SOCIAL WORK (OUTPATIENT)
Dept: PALLIATIVE MEDICINE | Facility: CLINIC | Age: 40
End: 2025-02-26
Payer: COMMERCIAL

## 2025-02-26 ENCOUNTER — TELEPHONE (OUTPATIENT)
Dept: SURGICAL ONCOLOGY | Facility: CLINIC | Age: 40
End: 2025-02-26

## 2025-02-26 VITALS
HEIGHT: 65 IN | SYSTOLIC BLOOD PRESSURE: 140 MMHG | OXYGEN SATURATION: 94 % | WEIGHT: 256 LBS | DIASTOLIC BLOOD PRESSURE: 100 MMHG | HEART RATE: 74 BPM | BODY MASS INDEX: 42.65 KG/M2 | TEMPERATURE: 98.2 F

## 2025-02-26 VITALS
BODY MASS INDEX: 42.49 KG/M2 | HEART RATE: 74 BPM | SYSTOLIC BLOOD PRESSURE: 142 MMHG | DIASTOLIC BLOOD PRESSURE: 82 MMHG | RESPIRATION RATE: 16 BRPM | HEIGHT: 65 IN | OXYGEN SATURATION: 95 % | TEMPERATURE: 98 F | WEIGHT: 255 LBS

## 2025-02-26 DIAGNOSIS — F41.9 ANXIETY: ICD-10-CM

## 2025-02-26 DIAGNOSIS — N83.8 BILATERAL TUBO-OVARIAN MASS: ICD-10-CM

## 2025-02-26 DIAGNOSIS — F11.11 HISTORY OF HEROIN ABUSE (HCC): Primary | ICD-10-CM

## 2025-02-26 DIAGNOSIS — Z71.89 COUNSELING AND COORDINATION OF CARE: Primary | ICD-10-CM

## 2025-02-26 DIAGNOSIS — R10.84 GENERALIZED ABDOMINAL PAIN: ICD-10-CM

## 2025-02-26 DIAGNOSIS — C49.A2 MALIGNANT GASTROINTESTINAL STROMAL TUMOR (GIST) OF STOMACH (HCC): Primary | ICD-10-CM

## 2025-02-26 DIAGNOSIS — Z51.5 PALLIATIVE CARE ENCOUNTER: ICD-10-CM

## 2025-02-26 DIAGNOSIS — C49.A2 MALIGNANT GASTROINTESTINAL STROMAL TUMOR (GIST) OF STOMACH (HCC): ICD-10-CM

## 2025-02-26 PROCEDURE — 99212 OFFICE O/P EST SF 10 MIN: CPT | Performed by: SURGERY

## 2025-02-26 PROCEDURE — 99214 OFFICE O/P EST MOD 30 MIN: CPT | Performed by: STUDENT IN AN ORGANIZED HEALTH CARE EDUCATION/TRAINING PROGRAM

## 2025-02-26 PROCEDURE — NC001 PR NO CHARGE

## 2025-02-26 NOTE — ASSESSMENT & PLAN NOTE
40-year-old female status post partial gastrectomy for a H5H1Q1T6 gastrointestinal stromal tumor.  This has a high risk of recurrence.  I have recommended that she undergo adjuvant Gleevec for this.  Will place a referral to medical oncology.  She is doing well postsurgically.  I will see her again in 6 months with a repeat CT.  She is agreeable to this plan.  All her questions were answered.  This office visit took 10 minutes of face-to-face time with the patient and greater than 50% of time was spent counseling and coordinating care for this high risk gastrointestinal stromal tumor.

## 2025-02-26 NOTE — TELEPHONE ENCOUNTER
Pt did not stop by check out. Her six month f/u is scheduled. However when speaking on the phone I mentioned there is a CT order. Pt stated she is highly allergic to IV contrast and cannot have any of it. Please advise. She stated she forgot to tell Dr. South about her allergy.

## 2025-02-26 NOTE — ASSESSMENT & PLAN NOTE
Following Dr. Overton for Suboxone maintenance and to continue follow-up.  No complications at this time. Doing well with Suboxone treatment.

## 2025-02-26 NOTE — PROGRESS NOTES
Palliative Outpatient Assessment of Need    LSW completed an assessment of need which was completed with (patient, family, or both) in the office or via phone/video conference    Relationship status: Single   Duration of relationship:   Name of significant other:  Children and Ages: 1 adult dtrRashard  Pets: 2 dogs  Other important family information: Pt's dtr Theresa is expecting pt's first grandchild. Pt reports her mother is also in early stages of dementia.  Living situation (where and whom): Resides with parents    Patient's primary caregiver: Self    Any limitations of caregiver:  Environmental concerns or barriers:   history: None  Employment history/source of income: Works for an [employer]--will be returning to work on Monday full-time after being out on medical leave  Disability:    Concerns regarding literacy: None   Spirituality/ Buddhism: None   Patient's strengths, social supports, and resources: Supportive dtr  Cultural information:   Mental Health current or previous: Anxiety  Substance use or history: Hx heroin abuse. Has been sober for 8 years and is currently on Suboxone. Pt current smoker--is interested in smoking cessation assistance.  Sleep: Pt reports long-standing hx of sleep difficulties  Exercise: As tolerated due to fatigue  Diet/nutrition: Lack of appetite--pt agreeable with oncology dietician referral  Durable Medical Equipment needs: None  Transportation: Drives Self  Financial concerns: Has been connected with OSW for vero care assistance  Advanced Directive: None. Pt reports she would be comfortable with her dtr Theresa being substitute decision-maker  Other medical or social work providers involved: Oncology; SurgOnc; GynOnc  Patient/caregiver current level of coping: Pt tearful at times during conversation. She is experiencing anxiety related to dx and upcoming chemo tx. Reviewed roles of [facility] SW and LCSW services for on-going support. Pt would be interested in LCSW  services. [facility] Newsletter reviewed and provided to pt.    Understanding: Pt appears understanding of current medical status  Patient/family concerns and areas of need: Upcoming chemo; Pt interested in smoking cessation assistance; Anxiety-interested in [facility] LCSW services  Patient's interests: Music; Reading; Walking    I have spent 60 minutes with Patient  today in which greater than 50% of this time was spent in counseling/coordination of care.    *All questions may not be answered due to constraints.  Follow-up discussions may need to occur        Detail Level: Generalized Patient Specific Counseling (Will Not Stick From Patient To Patient): Recommend applying Vaseline to affected area Detail Level: Simple

## 2025-02-26 NOTE — PROGRESS NOTES
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 South of Surgical Oncology  S/P partial gastrectomy, laparoscopic total hysterectomy and salpingo-oophorectomy in setting of GIST tumor of stomach and bilateral tubo-ovarian mass on 2025.    Doing well post-procedure. Appetite is not great compared to prior to surgery, however, feels she will need time to get back to her baseline but she also was told that the partial gastrectomy could affect her appetite and could take about a month for it to improve. She voices no other concerns at this time. Denies constipation issues, no nausea or vomiting.       Bilateral tubo-ovarian mass  Following Dr. Jung of Gyn Onc  S/P partial gastrectomy, laparoscopic total hysterectomy and salpingo-oophorectomy in setting of GIST tumor of stomach and bilateral tubo-ovarian mass on 2025.  She is on Estradiol of 0.5 mg daily for surgical menopause.       Generalized abdominal pain  S/p surgery with post-surgical wound healing well in the midline of the abdomen.  Denies need of having to take Oxycodone since she was discharged from the hospital.  States she has not had any intolerable pain requiring the Oxycodone and is doing well overall. She is thankful that her surgery went well and that she was told her tumor was removed.     Patient will have follow-up with Dr. South of Surgical Oncology in 6 months with repeat CT.  -In the meantime, patient was referred to Medical Oncology for evaluation and plans for discussions regarding Gleevec.       Palliative care encounter  Psychosocial   Supportive listening provided  Normalized experience of patient/family  Provided anxiety containment     Referrals Placed / Medical Equipment  Ordered  -None    Follow-Up Recommendations  -Follow-up with PCP and current medical specialists  -Follow-up with palliative care: 6 weeks         History of heroin abuse (HCC)  Following Dr. Overton for Suboxone maintenance and to continue follow-up.  No complications at this time. Doing well with Suboxone treatment.       Anxiety  Patient is open to referral to our Palliative LCSW team for psychotherapy, support, and anxiety.  Patient also requested Smoking Cessation resources which we will look into providing to patient.  She states there are times where she is anxious and smokes a cigarette which has been helpful.  She does not smoke daily as this is infrequent, but noticed she has had to rely on a cigarette which has helped.  She understands importance of Smoking Cessation.         Decisional apparatus: Patient is competent on my exam today. If competence is lost, patient's substitute decision maker would default to adult children by [place] Act 169.   Advance Directive / Living Will / POLST: None on file     PDMP Review: I have reviewed the patient's controlled substance dispensing history in the Prescription Drug Monitoring Program in compliance with the [facility] regulations before prescribing any controlled substances.    History of Present Illness   Tanya Rosenthal is a 40 y.o. female who presents for follow-up.    Palliative Diagnosis - Malignant GIST, bilateral tubo-ovarian mass    Patient is seen today in office. Alert, oriented, pleasantly conversant.  Patient is seen in no acute distress or discomfort. Was teary during exam as she was thankful for the surgery and how well she is doing post-procedure. She is awaiting consultation with Medical Oncology for additional treatment planning from a systemic treatment standpoint.    Appetite has been decreased since surgery.    Pain is not an issue at this time. Has not needed oxycodone.    Patient is well supported by family.      Medical History Reviewed by provider  this encounter:  Tobacco  Allergies  Meds  Problems  Med Hx  Surg Hx  Fam Hx     .  Past Medical History   Past Medical History:   Diagnosis Date    Anxiety     Depression     Heroin abuse (HCC)     clean since 2015    Thyroid cancer (HCC) 1999    with thyroidectomy and RTX     Past Surgical History:   Procedure Laterality Date    EGD      GANGLION CYST EXCISION Left     PARTIAL GASTRECTOMY N/A 2/5/2025    Procedure: PARTIAL GASTRECTOMY;  Surgeon: Tomás South MD;  Location: [facility];  Service: Surgical Oncology    PELVIC LAPAROSCOPY Bilateral 2/5/2025    Procedure: SALPINGO-OOPHORECTOMY, LAPAROSCOPIC;  Surgeon: Varghese Jung MD;  Location: [facility];  Service: Gynecology Oncology    VA LAPAROSCOPY W TOTAL HYSTERECTOMY UTERUS 250 GM/< N/A 2/5/2025    Procedure: LAPAROSCOPIC TOTAL HYSTERECTOMY, EXAM UNDER ANESTHESIA;  Surgeon: Varghese Jung MD;  Location: [facility];  Service: Gynecology Oncology    THYROIDECTOMY       Family History   Problem Relation Age of Onset    Diabetes Mother     Stomach cancer Mother         maltoma lyphoma    Hyperlipidemia Father     Hypertension Father     Lung cancer Maternal Aunt     Breast cancer Maternal Aunt 52    Lymphoma Paternal Uncle         non hodgkins      reports that she has quit smoking. Her smoking use included cigarettes. She started smoking about 26 years ago. She has a 6.5 pack-year smoking history. She has never used smokeless tobacco. She reports that she does not currently use alcohol. She reports current drug use. Drug: Marijuana.  Current Outpatient Medications   Medication Instructions    acetaminophen (TYLENOL) 975 mg, Oral, Every 8 hours scheduled    buprenorphine-naloxone (SUBOXONE) 8-2 mg per SL tablet 1 tablet, Daily    buprenorphine-naloxone (Suboxone) 8-2 mg ONE AND ONE HALF FILM SUBLINGUALLY DAILY. MAX DAILY DOSE  1.5    busPIRone (BUSPAR) 7.5 mg, 2 times daily    calcium carbonate (OS-ALL) 600 mg, Oral, Daily    enoxaparin  (LOVENOX) 40 mg, Subcutaneous, Daily    estradiol (ESTRACE) 0.5 mg, Oral, Daily    hydrOXYzine HCL (ATARAX) 10 mg, Oral, Every 6 hours PRN    levothyroxine 175 mcg tablet 1 tablet, Daily    levothyroxine 112 mcg, Daily    ondansetron (ZOFRAN-ODT) 4 mg, Every 6 hours PRN    oxyCODONE (ROXICODONE) 10 mg, Oral, Every 4 hours PRN    pantoprazole (PROTONIX) 40 mg, Oral, Daily    sucralfate (CARAFATE) 1 g, Oral, Every 6 hours     Allergies   Allergen Reactions    Diphenhydramine Shortness Of Breath     Other reaction(s): DIPHENHYDRAMINE CITRATE (BENADRYL)    Erythromycin Shortness Of Breath and Hives     Category: Allergy;     Iodine-131 - Food Allergy Other (See Comments)    Iv Dye [Iodinated Contrast Media]       Current Outpatient Medications on File Prior to Visit   Medication Sig Dispense Refill    acetaminophen (TYLENOL) 325 mg tablet Take 3 tablets (975 mg total) by mouth every 8 (eight) hours      buprenorphine-naloxone (SUBOXONE) 8-2 mg per SL tablet Place 1 tablet under the tongue daily      buprenorphine-naloxone (Suboxone) 8-2 mg ONE AND ONE HALF FILM SUBLINGUALLY DAILY. MAX DAILY DOSE  1.5      calcium carbonate (OS-ALL) 600 MG tablet Take 1 tablet (600 mg total) by mouth daily 30 tablet 0    enoxaparin (Lovenox) 40 mg/0.4 mL Inject 0.4 mL (40 mg total) under the skin in the morning for 24 days 9.6 mL 0    estradiol (Estrace) 0.5 MG tablet Take 1 tablet (0.5 mg total) by mouth daily 30 tablet 2    levothyroxine 175 mcg tablet Take 1 tablet by mouth in the morning      pantoprazole (PROTONIX) 40 mg tablet Take 1 tablet (40 mg total) by mouth daily 90 tablet 0    sucralfate (CARAFATE) 1 g tablet Take 1 tablet (1 g total) by mouth every 6 (six) hours 120 tablet 0    busPIRone (BUSPAR) 7.5 mg tablet Take 7.5 mg by mouth 2 (two) times a day (Patient not taking: Reported on 2/26/2025)      hydrOXYzine HCL (ATARAX) 10 mg tablet Take 1 tablet (10 mg total) by mouth every 6 (six) hours as needed for anxiety (Patient  "not taking: Reported on 2/26/2025) 30 tablet 0    levothyroxine 112 mcg tablet Take 112 mcg by mouth daily (Patient not taking: Reported on 2/26/2025)      ondansetron (ZOFRAN-ODT) 4 mg disintegrating tablet Take 4 mg by mouth every 6 (six) hours as needed for nausea or vomiting (Patient not taking: Reported on 2/26/2025)      oxyCODONE (ROXICODONE) 10 MG TABS Take 1 tablet (10 mg total) by mouth every 4 (four) hours as needed for severe pain Max Daily Amount: 60 mg (Patient not taking: Reported on 2/26/2025) 60 tablet 0     No current facility-administered medications on file prior to visit.      Social History     Tobacco Use    Smoking status: Former     Current packs/day: 0.25     Average packs/day: 0.3 packs/day for 26.2 years (6.5 ttl pk-yrs)     Types: Cigarettes     Start date: 1/1/1999    Smokeless tobacco: Never    Tobacco comments:     Trying quitting  ago   Vaping Use    Vaping status: Every Day    Substances: THC, CBD   Substance and Sexual Activity    Alcohol use: Not Currently    Drug use: Yes     Types: Marijuana     Comment: 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    Sexual activity: Not on file        Objective   /100 (BP Location: Left arm, Patient Position: Sitting, Cuff Size: Large)   Pulse 74   Temp 98.2 °F (36.8 °C) (Temporal)   Ht 5' 5\" (1.651 m)   Wt 116 kg (256 lb)   LMP  (LMP Unknown)   SpO2 94%   BMI 42.60 kg/m²     Physical Exam  Vitals reviewed.   Constitutional:       General: She is not in acute distress.     Appearance: She is not ill-appearing, toxic-appearing or diaphoretic.   HENT:      Head: Normocephalic and atraumatic.      Nose: Nose normal.      Mouth/Throat:      Mouth: Mucous membranes are moist.   Eyes:      General:         Right eye: No discharge.         Left eye: No discharge.   Cardiovascular:      Rate and Rhythm: Normal rate.   Pulmonary:      Effort: Pulmonary effort is normal. No respiratory distress.   Abdominal:      General: There is no distension. "      Palpations: Abdomen is soft.      Comments: Midline abdominal surgical site healing well without signs of erythema, drainage, or open wounds.   Musculoskeletal:         General: No swelling.   Skin:     General: Skin is warm and dry.      Coloration: Skin is not jaundiced or pale.   Neurological:      General: No focal deficit present.      Mental Status: She is alert. Mental status is at baseline.   Psychiatric:         Mood and Affect: Mood normal.         Behavior: Behavior normal.         Thought Content: Thought content normal.         Judgment: Judgment normal.         Recent labs:  Lab Results   Component Value Date/Time    SODIUM 139 02/09/2025 06:04 AM    SODIUM 142 12/25/2024 07:10 PM    K 3.9 02/09/2025 06:04 AM    K 4.3 12/25/2024 07:10 PM    BUN 9 02/09/2025 06:04 AM    BUN 19 12/25/2024 07:10 PM    CREATININE 1.06 02/09/2025 06:04 AM    CREATININE 1.28 (H) 12/25/2024 07:10 PM    GLUC 93 02/09/2025 06:04 AM    GLUC 84 12/25/2024 07:10 PM    CALCIUM 7.5 (L) 02/09/2025 06:04 AM    CALCIUM 6.6 (L) 12/25/2024 07:10 PM    AST 27 02/08/2025 05:15 AM    AST 17 12/25/2024 07:10 PM    ALT 24 02/08/2025 05:15 AM    ALT 10 12/25/2024 07:10 PM    ALB 3.5 02/08/2025 05:15 AM    ALB 4.1 12/25/2024 07:10 PM    TP 6.3 (L) 02/08/2025 05:15 AM    TP 7.2 12/25/2024 07:10 PM    EGFR 64 02/09/2025 06:04 AM    EGFR 54 (L) 12/25/2024 07:10 PM    EGFR 68 08/25/2020 02:00 AM     Lab Results   Component Value Date/Time    HGB 11.7 02/08/2025 05:15 AM    HGB 11.7 04/02/2015 05:46 AM    WBC 6.54 02/08/2025 05:15 AM    WBC 8.97 04/02/2015 05:46 AM     02/08/2025 05:15 AM     04/02/2015 05:46 AM    INR 0.96 01/29/2025 09:13 AM    INR 1.08 04/01/2015 12:51 PM    PTT 38 (H) 01/29/2025 09:13 AM    PTT 37 (H) 04/01/2015 12:51 PM     Lab Results   Component Value Date/Time    [id number] 124.137 (H) 06/01/2016 07:59 PM    QML0NGMEVONN 99.220 (H) 04/02/2015 05:46 AM       Recent Imaging:  Procedure: XR chest pa &  lateral  Result Date: 2/7/2025  Impression: Stable interval exam. Cardiomegaly, mild pulmonary vascular congestion, and small left pleural effusion. Workstation performed: [id number]     Procedure: XR chest portable  Result Date: 2/7/2025  Impression: Mild central congestion and trace left basilar effusion. Workstation performed: [id number]     Procedure: FL upper GI UGI  Result Date: 2/6/2025  Impression: No extravasation of contrast seen No obstruction to flow of contrast Workstation performed: [id number]     Procedure: CT chest wo contrast  Result Date: 1/13/2025  Impression: Scattered tiny 1 to 2 mm solid nodules throughout both lungs, unlikely to represent metastatic disease. However, follow-up chest CT in 3 months is recommended to ensure stability. Mildly enlarged lymph node anterior to the left main pulmonary artery, slightly increased in size since 2010, likely reactive. Attention on above recommended CT. The study was marked in EPIC for significant notification. Workstation performed: [id number]     Procedure: Endoscopic ultrasonography, GI (Upper) Linear with FNA  Result Date: 1/7/2025  Impression: EGD The esophagus appeared normal. Submucosal mass in the gastric cardia. Overlying mucosa appeared normal. The duodenal bulb and 2nd part of the duodenum appeared normal. EUS Homogeneous, hypoechoic, lobulated and oval mass measuring 45 mm x 47 mm with well-defined and smooth margins was visualized in the cardia and fundus of the stomach, contained within the muscularis propria; 3 fine needle biopsy passes were taken. An adequate sample was obtained RECOMMENDATION: Follow up biopsy results      Procedure: EGD  Result Date: 12/30/2024  Impression: The esophagus, duodenal bulb and 2nd part of the duodenum appeared normal. Single large subepithelial lesion in the cardia. Probing with forceps resulted in penetration into the lesion and bleeding without further interrogation to avoid further hemorrhage   RECOMMENDATION: Recommend EUS with FNA either inpatient or outpatient for tissue sampling    Katerina Garcia MD     Procedure: US pelvis complete w transvaginal  Result Date: 12/29/2024  Impression: A 7.4 cm left ovarian bilocular cystic lesion with solid papillary projections without internal vascularity. O-RADS 4 = Intermediate risk.  Imaging options include evaluation by ultrsaound specialist, MRI, or per GYN-oncologist protocol. Clinical management by gynecologist with GYN-oncologist consultation or soley by GYN-oncologist. A 6.4 cm right ovarian unilocular cystic lesion with irregular septations without internal vascularity. O-RADS 3 = Low risk  If not surgically excised, consider follow-up US within 6 months. The study was marked in EPIC for immediate notification. Workstation performed: [id number]     Procedure: CT abdomen pelvis wo contrast  Result Date: 12/29/2024  Impression: Lobulated mass centered within the posterior gastric wall of the proximal stomach. A tumor such as GIST must be excluded. Pelvic masses suspicious for ovarian masses or cysts as described above. These can be further evaluated with pelvic ultrasound. Workstation performed: [id number]     Procedure: XR chest 1 view portable  Result Date: 12/29/2024  Impression: No acute cardiopulmonary disease. Workstation performed: [id number]     Procedure: CT renal stone study abdomen pelvis wo contrast  Result Date: 12/25/2024  Impression: IMPRESSION: Possible 4.4 x 3.5 cm exophytic mass involving the posterior gastric cardia/fundus wall. Lack of IV and oral contrast limits evaluation. This could be related to a gastric diverticulum, however, gastric wall mass such as gist tumor cannot be excluded. Consider repeat evaluation with oral and IV contrast for further characterization. No GI obstruction. No hydronephrosis or nephrolithiasis. Lobulated heterogeneous uterus likely secondary to underlying fibroids. Nonemergent outpatient pelvic ultrasound can be  performed for better evaluation. Workstation:[id number]    Procedure: XR chest portable  Result Date: 12/25/2024  Impression: Impression: No acute radiographic cardiopulmonary process. Workstation:[id number]      Administrative Statements   I have spent a total time of 31 minutes in caring for this patient on the day of the visit/encounter including Risks and benefits of tx options, Instructions for management, Patient and family education, Importance of tx compliance, Risk factor reductions, Impressions, Counseling / Coordination of care, Documenting in the medical record, Reviewing/placing orders in the medical record (including tests, medications, and/or procedures), and Obtaining or reviewing history  .   Topics discussed with the patient / family include symptom assessment and management, medication review, psychosocial support, supportive listening, and anticipatory guidance.

## 2025-02-26 NOTE — PROGRESS NOTES
Surgical Oncology Follow Up       [facility] [place]  [address]  [place] [address]    Tanya Rosenthal  1985  [id number]  [facility] [place]  [address]  [place] [address]    1. 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  This has a high risk of recurrence.  I have recommended that she undergo adjuvant Gleevec for this.  Will place a referral to medical oncology.  She is doing well postsurgically.  I will see her again in 6 months with a repeat CT.  She is agreeable to this plan.  All her questions were answered.  This office visit took 10 minutes of face-to-face time with the patient and greater than 50% of time was spent counseling and coordinating care for this high risk gastrointestinal stromal tumor.  Orders:  -     CT chest abdomen pelvis w contrast; Future; Expected date: 08/26/2025  -     BUN; Future; Expected date: 08/01/2025  -     Creatinine, serum; Future; Expected date: 08/01/2025  -     Ambulatory referral to Hematology / Oncology; Future         Chief Complaint   Patient presents with    Post-op       Return in about 6 months (around 8/26/2025) for Ofice visit short, Imaging - See orders, with Prabha.      Oncology History   GIST (gastrointestinal stromal tumor), malignant (HCC)   1/7/2025 Biopsy    Endoscopic Ultrasound    Stomach, Gastric Submucosal, FNA:  Neoplastic.  Gastrointestinal stromal tumor (GIST), mixed spindle cell and epithelioid type.  > 5 mitoses per 50 HPF     1/13/2025 Initial Diagnosis    GIST (gastrointestinal stromal tumor), malignant (HCC)     2/5/2025 Surgery    Partial gastrectomy:   Gastric mass, resection:  -   Gastrointestinal stromal tumor of stomach, 5.8 cm in greatest  dimension.    TUMOR   Tumor Focality  Unifocal   Tumor Site  Stomach: Just distal to GE junction per op note   Tumor Size  Greatest Dimension (Centimeters): 5.8 cm   Mitotic Rate  18 mitoses per 5 mm2   Histologic Grade  G2, high grade   MARGINS   Margin Status  All margins negative for GIST   pTNM CLASSIFICATION (AJCC 8th Edition)   pT Category  pT3   pN Category  pN not assigned (no nodes submitted or found)           2/26/2025 -  Cancer Staged    Staging form: Gastric Stromal Tumor - Gastric GIST, AJCC 8th Edition  - Pathologic: Stage IIIA (pT3, pN0, cM0, Mitotic Rate: High) - Signed by Tomás South MD on 2/26/2025  Histologic grade (G): High grade  Histologic grading system: 2 grade system  Residual tumor (R): R0 - None           Staging: [id number] intestinal stromal tumor, February 2025  55% risk of recurrence by NCCN  Treatment history: Partial gastrectomy  Current treatment: Gleevec pending  Disease status: EDUARDO    History of Present Illness: Patient returns in follow-up of her partial gastrectomy.  She is doing well at this time with no complaints.    Review of Systems  Complete ROS Surg Onc:   Complete ROS Surg Onc:   Constitutional: The patient denies new or recent history of general fatigue, no recent weight loss, no change in appetite.   Eyes: No complaints of visual problems, no scleral icterus.   ENT: no complaints of ear pain, no hoarseness, no difficulty swallowing,  no tinnitus and no new masses in head, oral cavity, or neck.   Cardiovascular: No complaints of chest pain, no palpitations, no ankle edema.   Respiratory: No complaints of shortness of breath, no cough.   Gastrointestinal: No complaints of jaundice, no bloody stools, no pale stools.   Genitourinary: No complaints of dysuria, no hematuria, no nocturia, no frequent urination, no urethral discharge.   Musculoskeletal: No complaints of weakness, paralysis, joint stiffness or arthralgias.  Integumentary: No complaints of rash, no new lesions.    Neurological: No complaints of convulsions, no seizures, no dizziness.   Hematologic/Lymphatic: No complaints of easy bruising.   Endocrine:  No hot or cold intolerance.  No polydipsia, polyphagia, or polyuria.  Allergy/immunology:  No environmental allergies.  No food allergies.  Not immunocompromised.  Skin:  No pallor or rash.  No wound.        Patient Active Problem List   Diagnosis    Hypothyroidism    Fatigue    Medically noncompliant    History of heroin abuse (HCC)    Environmental allergies    Morbid obesity (HCC)    Bradycardia    Cervical strain    Head injury    Chest pain    Elevated d-dimer    Gastritis    Abdominal pain    Hypocalcemia    Anxiety    Gastric lesion    Pelvic mass    Bilateral tubo-ovarian mass    GIST (gastrointestinal stromal tumor), malignant (HCC)    Submucosal neoplasm of gastrointestinal tract    Palliative care encounter    Goals of care, counseling/discussion    Cancer related pain    Menopausal symptoms     Past Medical History:   Diagnosis Date    Anxiety     Depression     Heroin abuse (HCC)     clean since 2015    Thyroid cancer (HCC) 1999    with thyroidectomy and RTX     Past Surgical History:   Procedure Laterality Date    EGD      GANGLION CYST EXCISION Left     PARTIAL GASTRECTOMY N/A 2/5/2025    Procedure: PARTIAL GASTRECTOMY;  Surgeon: Tomás South MD;  Location: [facility];  Service: Surgical Oncology    PELVIC LAPAROSCOPY Bilateral 2/5/2025    Procedure: SALPINGO-OOPHORECTOMY, LAPAROSCOPIC;  Surgeon: Varghese Jung MD;  Location: [facility];  Service: Gynecology Oncology    PA LAPAROSCOPY W TOTAL HYSTERECTOMY UTERUS 250 GM/< N/A 2/5/2025    Procedure: LAPAROSCOPIC TOTAL HYSTERECTOMY, EXAM UNDER ANESTHESIA;  Surgeon: Varghese Jung MD;  Location: [facility];  Service: Gynecology Oncology    THYROIDECTOMY       Family History   Problem Relation Age of Onset    Diabetes Mother     Stomach cancer Mother         maltoma lyphoma    Hyperlipidemia  Father     Hypertension Father     Lung cancer Maternal Aunt     Breast cancer Maternal Aunt 52    Lymphoma Paternal Uncle         non hodgkins     Social History     Socioeconomic History    Marital status: Single     Spouse name: Not on file    Number of children: Not on file    Years of education: Not on file    Highest education level: Not on file   Occupational History    Not on file   Tobacco Use    Smoking status: Former     Current packs/day: 0.25     Average packs/day: 0.3 packs/day for 26.2 years (6.5 ttl pk-yrs)     Types: Cigarettes     Start date: 1/1/1999    Smokeless tobacco: Never    Tobacco comments:     Trying quitting  ago   Vaping Use    Vaping status: Every Day    Substances: THC, CBD   Substance and Sexual Activity    Alcohol use: Not Currently    Drug use: Yes     Types: Marijuana     Comment: 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    Sexual activity: Not on file   Other Topics Concern    Not on file   Social History Narrative    Not on file     Social Drivers of Health     Financial Resource Strain: Not on file   Food Insecurity: No Food Insecurity (2/5/2025)    Nursing - Inadequate Food Risk Classification     Worried About Running Out of Food in the Last Year: Not on file     Ran Out of Food in the Last Year: Not on file     Ran Out of Food in the Last Year: Never true   Transportation Needs: No Transportation Needs (2/5/2025)    Nursing - Transportation Risk Classification     Lack of Transportation: Not on file     Lack of Transportation: No   Physical Activity: Not on file   Stress: Not on file   Social Connections: Not on file   Intimate Partner Violence: Unknown (2/5/2025)    Nursing IPS     Feels Physically and Emotionally Safe: Not on file     Physically Hurt by Someone: Not on file     Humiliated or Emotionally Abused by Someone: Not on file     Physically Hurt by Someone: No     Hurt or Threatened by Someone: No   Housing Stability: Unknown (2/5/2025)    Nursing: Inadequate  Housing Risk Classification     Has Housing: Not on file     Worried About Losing Housing: Not on file     Unable to Get Utilities: Not on file     Unable to Pay for Housing in the Last Year: No     Has Housin       Current Outpatient Medications:     acetaminophen (TYLENOL) 325 mg tablet, Take 3 tablets (975 mg total) by mouth every 8 (eight) hours, Disp: , Rfl:     buprenorphine-naloxone (SUBOXONE) 8-2 mg per SL tablet, Place 1 tablet under the tongue daily, Disp: , Rfl:     buprenorphine-naloxone (Suboxone) 8-2 mg, ONE AND ONE HALF FILM SUBLINGUALLY DAILY. MAX DAILY DOSE  1.5, Disp: , Rfl:     calcium carbonate (OS-ALL) 600 MG tablet, Take 1 tablet (600 mg total) by mouth daily, Disp: 30 tablet, Rfl: 0    enoxaparin (Lovenox) 40 mg/0.4 mL, Inject 0.4 mL (40 mg total) under the skin in the morning for 24 days, Disp: 9.6 mL, Rfl: 0    estradiol (Estrace) 0.5 MG tablet, Take 1 tablet (0.5 mg total) by mouth daily, Disp: 30 tablet, Rfl: 2    hydrOXYzine HCL (ATARAX) 10 mg tablet, Take 1 tablet (10 mg total) by mouth every 6 (six) hours as needed for anxiety, Disp: 30 tablet, Rfl: 0    levothyroxine 112 mcg tablet, Take 112 mcg by mouth daily, Disp: , Rfl:     levothyroxine 175 mcg tablet, Take 1 tablet by mouth in the morning, Disp: , Rfl:     pantoprazole (PROTONIX) 40 mg tablet, Take 1 tablet (40 mg total) by mouth daily, Disp: 90 tablet, Rfl: 0    sucralfate (CARAFATE) 1 g tablet, Take 1 tablet (1 g total) by mouth every 6 (six) hours, Disp: 120 tablet, Rfl: 0    busPIRone (BUSPAR) 7.5 mg tablet, Take 7.5 mg by mouth 2 (two) times a day (Patient not taking: Reported on 2/3/2025), Disp: , Rfl:     ondansetron (ZOFRAN-ODT) 4 mg disintegrating tablet, Take 4 mg by mouth every 6 (six) hours as needed for nausea or vomiting (Patient not taking: Reported on 2025), Disp: , Rfl:     oxyCODONE (ROXICODONE) 10 MG TABS, Take 1 tablet (10 mg total) by mouth every 4 (four) hours as needed for severe pain Max Daily  Amount: 60 mg (Patient not taking: Reported on 2/26/2025), Disp: 60 tablet, Rfl: 0  Allergies   Allergen Reactions    Diphenhydramine Shortness Of Breath     Other reaction(s): DIPHENHYDRAMINE CITRATE (BENADRYL)    Erythromycin Shortness Of Breath and Hives     Category: Allergy;     Iodine-131 - Food Allergy Other (See Comments)    Iv Dye [Iodinated Contrast Media]      Vitals:    02/26/25 1047   BP: 142/82   Pulse: 74   Resp: 16   Temp: 98 °F (36.7 °C)   SpO2: 95%       Physical Exam  Constitutional: General appearance: The Patient is well-developed and well-nourished who appears the stated age in no acute distress. Patient is pleasant and talkative.     HEENT:  Normocephalic.  Sclerae are anicteric. Mucous membranes are moist.   Abdomen: Abdomen is soft, non-tender, non-distended and without masses.  Vision is C/D/I.     Extremities: There is no clubbing or cyanosis. There is no edema.  Symmetric.  Neuro: Grossly nonfocal. Gait is normal.     Skin: Warm, anicteric.    Psych:  Patient is pleasant and talkative.  Breasts:        Pathology:  Final Diagnosis   A. Uterus with cervix, total hysterectomy:  -   Uterus with endometrial polyps and weakly proliferative endometrium.  -   Cervix with benign squamocolumnar mucosa and nabothian cysts.     B. Bilateral ovaries and fallopian tubes, bilateral salpingo-oophorectomy:  -   Serous cystadenomas with focal epithelial proliferation (less than 10%) of bilateral ovaries.  -   One fallopian tube with paratubal cyst.  -   The other fallopian tube with no significant histopathologic change.      C. Gastric mass, resection:  -   Gastrointestinal stromal tumor of stomach, 5.8 cm in greatest dimension.     -   Background gastric cardia-type and oxyntic mucosa with minimal chronic inflammation.  -   Adjoining squamous mucosa with no significant histopathologic change.  -   Negative for intestinal metaplasia, dysplasia and carcinoma.  -   Negative for Helicobacter pylori-type  organisms on H&E stain.   -   See comment and synoptic report.     Comment:   C) Immunohistochemical stains on C7 show the tumor cells are positive for DOG1, , CD34, and are negative for CK AE1/AE3, S100, desmin.  There is no lymph node identified in entirely submitted fibroadipose tissue.       Select slides (C7-1, IHC) are reviewed by Dr. Hanny Garcia who concurs with the diagnosis.      Interpretation performed at [facility]  [address] [facility] [place] [address]             Comments:   This is an appended report. These results have been appended to a previously preliminary verified report.      Electronically signed by Stefanie Greenberg MD on 2/12/2025 at 0944 EST   Note  [facility]   BEST TUMOR BLOCK(S) FOR FUTURE STUDIES:  C8, C7   Comment: This is an appended report. These results have been appended to a previously preliminary verified report.   Additional Information  [facility]   All reported additional testing was performed with appropriately reactive controls.  These tests were developed and their performance characteristics determined by [facility] or appropriate performing facility, though some tests may be performed on tissues which have not been validated for performance characteristics (such as staining performed on alcohol exposed cell blocks and decalcified tissues).  Results should be interpreted with caution and in the context of the patients’ clinical condition. These tests may not be cleared or approved by the U.S. Food and Drug Administration, though the FDA has determined that such clearance or approval is not necessary. These tests are used for clinical purposes and they should not be regarded as investigational or for research. This laboratory has been approved by CLIA 88, designated as a high-complexity laboratory and is qualified to perform these tests.  .   Comment: This is an appended report. These results have been appended to a previously preliminary verified report.    Synoptic Checklist   GASTROINTESTINAL STROMAL TUMOR (GIST): Resection   8th Edition - Protocol posted: 12/14/2022GASTROINTESTINAL STROMAL TUMOR (GIST): RESECTION - C  SPECIMEN   Procedure  Resection: Partial gastrectomy   TUMOR   Tumor Focality  Unifocal   Tumor Site  Stomach: Just distal to GE junction per op note   Histologic Type  Gastrointestinal stromal tumor, mixed   Tumor Size  Greatest Dimension (Centimeters): 5.8 cm   Additional Dimension (Centimeters)  5.7 cm     5.4 cm   Mitotic Rate  18 mitoses per 5 mm2   Histologic Grade  G2, high grade   Necrosis  Present   Extent of Necrosis  5 %   Treatment Effect  No known presurgical therapy   Risk Assessment  High risk   MARGINS   Margin Status  All margins negative for GIST   Closest Margin(s) to GIST  Stapled resection margin   Distance from GIST to Closest Margin  0.3 cm   REGIONAL LYMPH NODES   Regional Lymph Node Status  Not applicable (no regional lymph nodes submitted or found)   Regional Lymph Node Comment  There is no lymph node identified in entirely submitted fibroadipose tissue   pTNM CLASSIFICATION (AJCC 8th Edition)   Reporting of pT, pN, and (when applicable) pM categories is based on information available to the pathologist at the time the report is issued. As per the AJCC (Chapter 1, 8th Ed.) it is the managing physician's responsibility to establish the final pathologic stage based upon all pertinent information, including but potentially not limited to this pathology report.   pT Category  pT3   pN Category  pN not assigned (no nodes submitted or found)   SPECIAL STUDIES   Immunohistochemical Studies     KIT ()  Positive   Immunohistochemical Studies     DOG1 (ANO1)  Positive   Molecular Genetic Studies  Not performed   .          Labs:      Imaging  Echo follow up/limited w/ contrast if indicated  Result Date: 2/8/2025  Narrative:   Left Ventricle: Left ventricular cavity size is normal. Wall thickness is normal. The left ventricular  ejection fraction is 65%. Systolic function is normal.  Global longitudinal strain is decreased although limited by suboptimal imaging quality. Although no diagnostic regional wall motion abnormality was identified, this possibility cannot be completely excluded on the basis of this study.  Endocardial definition was poor.  Cannot rule out hypokinesis of the mid to distal lateral wall. Diastolic function is normal.   Right Ventricle: Right ventricular cavity size is normal. Systolic function is normal.   This was a technically difficult study     XR chest pa & lateral  Result Date: 2/7/2025  Narrative: XR CHEST PA AND LATERAL INDICATION: Unable to wean nasal cannula O2, postoperative setting.. COMPARISON: 2/7/2025 0800 hours FINDINGS: Mild pulmonary vascular congestion. Small left pleural effusion. Unchanged discoid atelectasis right midlung zone. Stable cardiomegaly. Bones are unremarkable for age. Contrast from recent upper GI noted.     Impression: Stable interval exam. Cardiomegaly, mild pulmonary vascular congestion, and small left pleural effusion. Workstation performed: [id number]     XR chest portable  Result Date: 2/7/2025  Narrative: XR CHEST PORTABLE INDICATION: oxygen requirement. COMPARISON: 12/29/2024 FINDINGS: Mild central congestion noted with trace left basilar effusion. No pneumothorax. Enlarged cardiac silhouette, unchanged. Bones are unremarkable for age. Normal upper abdomen.     Impression: Mild central congestion and trace left basilar effusion. Workstation performed: [id number]     FL upper GI UGI  Result Date: 2/6/2025  Narrative: LIMITED UPPER GI SERIES INDICATION: r/o gastric leak. COMPARISON: None TECHNIQUE: A limited single contrast upper GI study was performed with approximately 200 mL of dilute barium. Omnipaque/iodine-containing water-soluble contrast could not be used patient has a history of severe contrast reaction as per history provided by the patient IMAGES: 52 FLUOROSCOPY  TIME: 1 minute 41 seconds FINDINGS: Study limited due to limited mobility of the patient No evidence of a leak or fixed stricture. Contrast passes freely from the esophagus through the stomach into the duodenum. Deformity of the lesser curvature of the stomach from the recent surgery     Impression: No extravasation of contrast seen No obstruction to flow of contrast Workstation performed: [id number]     I personally reviewed and interpreted the above laboratory and imaging data.

## 2025-02-26 NOTE — LETTER
February 26, 2025     Feliciano Ledezma MD  [address] [facility]  [address]  [facility] [address]    Patient: Tanya Rosenthal   YOB: 1985   Date of Visit: 2/26/2025       Dear Dr. Ledezma:    Thank you for referring Tanya Rosenthal to me for evaluation. Below are my notes for this consultation.    If you have questions, please do not hesitate to call me. I look forward to following your patient along with you.         Sincerely,        Tomás South MD        CC: No Recipients    Tomás South MD  2/26/2025 11:04 AM  Sign when Signing Visit               Surgical Oncology Follow Up       [address] [facility] [place]  [address]  [place] [address]    Tanya Rosenthal  1985  [id number]  [address] [facility] [place]  [address]  [place] [address]    1. 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  This has a high risk of recurrence.  I have recommended that she undergo adjuvant Gleevec for this.  Will place a referral to medical oncology.  She is doing well postsurgically.  I will see her again in 6 months with a repeat CT.  She is agreeable to this plan.  All her questions were answered.  This office visit took 10 minutes of face-to-face time with the patient and greater than 50% of time was spent counseling and coordinating care for this high risk gastrointestinal stromal tumor.  Orders:  -     CT chest abdomen pelvis w contrast; Future; Expected date: 08/26/2025  -     BUN; Future; Expected date: 08/01/2025  -     Creatinine, serum; Future; Expected date: 08/01/2025  -     Ambulatory referral to Hematology / Oncology; Future         Chief Complaint   Patient presents with   • Post-op       Return in about 6 months (around 8/26/2025) for Ofice visit short,  Imaging - See orders, with Prabha.      Oncology History   GIST (gastrointestinal stromal tumor), malignant (HCC)   1/7/2025 Biopsy    Endoscopic Ultrasound    Stomach, Gastric Submucosal, FNA:  Neoplastic.  Gastrointestinal stromal tumor (GIST), mixed spindle cell and epithelioid type.  > 5 mitoses per 50 HPF     1/13/2025 Initial Diagnosis    GIST (gastrointestinal stromal tumor), malignant (HCC)     2/5/2025 Surgery    Partial gastrectomy:   Gastric mass, resection:  -   Gastrointestinal stromal tumor of stomach, 5.8 cm in greatest dimension.    TUMOR   Tumor Focality  Unifocal   Tumor Site  Stomach: Just distal to GE junction per op note   Tumor Size  Greatest Dimension (Centimeters): 5.8 cm   Mitotic Rate  18 mitoses per 5 mm2   Histologic Grade  G2, high grade   MARGINS   Margin Status  All margins negative for GIST   pTNM CLASSIFICATION (AJCC 8th Edition)   pT Category  pT3   pN Category  pN not assigned (no nodes submitted or found)           2/26/2025 -  Cancer Staged    Staging form: Gastric Stromal Tumor - Gastric GIST, AJCC 8th Edition  - Pathologic: Stage IIIA (pT3, pN0, cM0, Mitotic Rate: High) - Signed by Tomás South MD on 2/26/2025  Histologic grade (G): High grade  Histologic grading system: 2 grade system  Residual tumor (R): R0 - None           Staging: [id number] intestinal stromal tumor, February 2025  55% risk of recurrence by NCCN  Treatment history: Partial gastrectomy  Current treatment: Gleevec pending  Disease status: EDUARDO    History of Present Illness: Patient returns in follow-up of her partial gastrectomy.  She is doing well at this time with no complaints.    Review of Systems  Complete ROS Surg Onc:   Complete ROS Surg Onc:   Constitutional: The patient denies new or recent history of general fatigue, no recent weight loss, no change in appetite.   Eyes: No complaints of visual problems, no scleral icterus.   ENT: no complaints of ear pain, no hoarseness, no difficulty swallowing,  no  tinnitus and no new masses in head, oral cavity, or neck.   Cardiovascular: No complaints of chest pain, no palpitations, no ankle edema.   Respiratory: No complaints of shortness of breath, no cough.   Gastrointestinal: No complaints of jaundice, no bloody stools, no pale stools.   Genitourinary: No complaints of dysuria, no hematuria, no nocturia, no frequent urination, no urethral discharge.   Musculoskeletal: No complaints of weakness, paralysis, joint stiffness or arthralgias.  Integumentary: No complaints of rash, no new lesions.   Neurological: No complaints of convulsions, no seizures, no dizziness.   Hematologic/Lymphatic: No complaints of easy bruising.   Endocrine:  No hot or cold intolerance.  No polydipsia, polyphagia, or polyuria.  Allergy/immunology:  No environmental allergies.  No food allergies.  Not immunocompromised.  Skin:  No pallor or rash.  No wound.        Patient Active Problem List   Diagnosis   • Hypothyroidism   • Fatigue   • Medically noncompliant   • History of heroin abuse (HCC)   • Environmental allergies   • Morbid obesity (HCC)   • Bradycardia   • Cervical strain   • Head injury   • Chest pain   • Elevated d-dimer   • Gastritis   • Abdominal pain   • Hypocalcemia   • Anxiety   • Gastric lesion   • Pelvic mass   • Bilateral tubo-ovarian mass   • GIST (gastrointestinal stromal tumor), malignant (HCC)   • Submucosal neoplasm of gastrointestinal tract   • Palliative care encounter   • Goals of care, counseling/discussion   • Cancer related pain   • Menopausal symptoms     Past Medical History:   Diagnosis Date   • Anxiety    • Depression    • Heroin abuse (HCC)     clean since 2015   • Thyroid cancer (HCC) 1999    with thyroidectomy and RTX     Past Surgical History:   Procedure Laterality Date   • EGD     • GANGLION CYST EXCISION Left    • PARTIAL GASTRECTOMY N/A 2/5/2025    Procedure: PARTIAL GASTRECTOMY;  Surgeon: Tomás South MD;  Location: [facility];  Service: Surgical Oncology    • PELVIC LAPAROSCOPY Bilateral 2/5/2025    Procedure: SALPINGO-OOPHORECTOMY, LAPAROSCOPIC;  Surgeon: Varghese Jung MD;  Location: [facility];  Service: Gynecology Oncology   • OK LAPAROSCOPY W TOTAL HYSTERECTOMY UTERUS 250 GM/< N/A 2/5/2025    Procedure: LAPAROSCOPIC TOTAL HYSTERECTOMY, EXAM UNDER ANESTHESIA;  Surgeon: Varghese Jung MD;  Location: [facility];  Service: Gynecology Oncology   • THYROIDECTOMY       Family History   Problem Relation Age of Onset   • Diabetes Mother    • Stomach cancer Mother         maltoma lyphoma   • Hyperlipidemia Father    • Hypertension Father    • Lung cancer Maternal Aunt    • Breast cancer Maternal Aunt 52   • Lymphoma Paternal Uncle         non hodgkins     Social History     Socioeconomic History   • Marital status: Single     Spouse name: Not on file   • Number of children: Not on file   • Years of education: Not on file   • Highest education level: Not on file   Occupational History   • Not on file   Tobacco Use   • Smoking status: Former     Current packs/day: 0.25     Average packs/day: 0.3 packs/day for 26.2 years (6.5 ttl pk-yrs)     Types: Cigarettes     Start date: 1/1/1999   • Smokeless tobacco: Never   • Tobacco comments:     Trying quitting  ago   Vaping Use   • Vaping status: Every Day   • Substances: THC, CBD   Substance and Sexual Activity   • Alcohol use: Not Currently   • Drug use: Yes     Types: Marijuana     Comment: 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   • Sexual activity: Not on file   Other Topics Concern   • Not on file   Social History Narrative   • Not on file     Social Drivers of Health     Financial Resource Strain: Not on file   Food Insecurity: No Food Insecurity (2/5/2025)    Nursing - Inadequate Food Risk Classification    • Worried About Running Out of Food in the Last Year: Not on file    • Ran Out of Food in the Last Year: Not on file    • Ran Out of Food in the Last Year: Never true   Transportation Needs: No  Transportation Needs (2025)    Nursing - Transportation Risk Classification    • Lack of Transportation: Not on file    • Lack of Transportation: No   Physical Activity: Not on file   Stress: Not on file   Social Connections: Not on file   Intimate Partner Violence: Unknown (2025)    Nursing IPS    • Feels Physically and Emotionally Safe: Not on file    • Physically Hurt by Someone: Not on file    • Humiliated or Emotionally Abused by Someone: Not on file    • Physically Hurt by Someone: No    • Hurt or Threatened by Someone: No   Housing Stability: Unknown (2025)    Nursing: Inadequate Housing Risk Classification    • Has Housing: Not on file    • Worried About Losing Housing: Not on file    • Unable to Get Utilities: Not on file    • Unable to Pay for Housing in the Last Year: No    • Has Housin       Current Outpatient Medications:   •  acetaminophen (TYLENOL) 325 mg tablet, Take 3 tablets (975 mg total) by mouth every 8 (eight) hours, Disp: , Rfl:   •  buprenorphine-naloxone (SUBOXONE) 8-2 mg per SL tablet, Place 1 tablet under the tongue daily, Disp: , Rfl:   •  buprenorphine-naloxone (Suboxone) 8-2 mg, ONE AND ONE HALF FILM SUBLINGUALLY DAILY. MAX DAILY DOSE  1.5, Disp: , Rfl:   •  calcium carbonate (OS-ALL) 600 MG tablet, Take 1 tablet (600 mg total) by mouth daily, Disp: 30 tablet, Rfl: 0  •  enoxaparin (Lovenox) 40 mg/0.4 mL, Inject 0.4 mL (40 mg total) under the skin in the morning for 24 days, Disp: 9.6 mL, Rfl: 0  •  estradiol (Estrace) 0.5 MG tablet, Take 1 tablet (0.5 mg total) by mouth daily, Disp: 30 tablet, Rfl: 2  •  hydrOXYzine HCL (ATARAX) 10 mg tablet, Take 1 tablet (10 mg total) by mouth every 6 (six) hours as needed for anxiety, Disp: 30 tablet, Rfl: 0  •  levothyroxine 112 mcg tablet, Take 112 mcg by mouth daily, Disp: , Rfl:   •  levothyroxine 175 mcg tablet, Take 1 tablet by mouth in the morning, Disp: , Rfl:   •  pantoprazole (PROTONIX) 40 mg tablet, Take 1 tablet (40 mg  total) by mouth daily, Disp: 90 tablet, Rfl: 0  •  sucralfate (CARAFATE) 1 g tablet, Take 1 tablet (1 g total) by mouth every 6 (six) hours, Disp: 120 tablet, Rfl: 0  •  busPIRone (BUSPAR) 7.5 mg tablet, Take 7.5 mg by mouth 2 (two) times a day (Patient not taking: Reported on 2/3/2025), Disp: , Rfl:   •  ondansetron (ZOFRAN-ODT) 4 mg disintegrating tablet, Take 4 mg by mouth every 6 (six) hours as needed for nausea or vomiting (Patient not taking: Reported on 2/26/2025), Disp: , Rfl:   •  oxyCODONE (ROXICODONE) 10 MG TABS, Take 1 tablet (10 mg total) by mouth every 4 (four) hours as needed for severe pain Max Daily Amount: 60 mg (Patient not taking: Reported on 2/26/2025), Disp: 60 tablet, Rfl: 0  Allergies   Allergen Reactions   • Diphenhydramine Shortness Of Breath     Other reaction(s): DIPHENHYDRAMINE CITRATE (BENADRYL)   • Erythromycin Shortness Of Breath and Hives     Category: Allergy;    • Iodine-131 - Food Allergy Other (See Comments)   • Iv Dye [Iodinated Contrast Media]      Vitals:    02/26/25 1047   BP: 142/82   Pulse: 74   Resp: 16   Temp: 98 °F (36.7 °C)   SpO2: 95%       Physical Exam  Constitutional: General appearance: The Patient is well-developed and well-nourished who appears the stated age in no acute distress. Patient is pleasant and talkative.     HEENT:  Normocephalic.  Sclerae are anicteric. Mucous membranes are moist.   Abdomen: Abdomen is soft, non-tender, non-distended and without masses.  Vision is C/D/I.     Extremities: There is no clubbing or cyanosis. There is no edema.  Symmetric.  Neuro: Grossly nonfocal. Gait is normal.     Skin: Warm, anicteric.    Psych:  Patient is pleasant and talkative.  Breasts:        Pathology:  Final Diagnosis   A. Uterus with cervix, total hysterectomy:  -   Uterus with endometrial polyps and weakly proliferative endometrium.  -   Cervix with benign squamocolumnar mucosa and nabothian cysts.     B. Bilateral ovaries and fallopian tubes, bilateral  salpingo-oophorectomy:  -   Serous cystadenomas with focal epithelial proliferation (less than 10%) of bilateral ovaries.  -   One fallopian tube with paratubal cyst.  -   The other fallopian tube with no significant histopathologic change.      C. Gastric mass, resection:  -   Gastrointestinal stromal tumor of stomach, 5.8 cm in greatest dimension.     -   Background gastric cardia-type and oxyntic mucosa with minimal chronic inflammation.  -   Adjoining squamous mucosa with no significant histopathologic change.  -   Negative for intestinal metaplasia, dysplasia and carcinoma.  -   Negative for Helicobacter pylori-type organisms on H&E stain.   -   See comment and synoptic report.     Comment:   C) Immunohistochemical stains on C7 show the tumor cells are positive for DOG1, , CD34, and are negative for CK AE1/AE3, S100, desmin.  There is no lymph node identified in entirely submitted fibroadipose tissue.       Select slides (C7-1, IHC) are reviewed by Dr. Hanny Garcia who concurs with the diagnosis.      Interpretation performed at [facility] [address] [place] [id number]             Comments:   This is an appended report. These results have been appended to a previously preliminary verified report.      Electronically signed by Stefanie Greenberg MD on 2/12/2025 at 0944 EST   Note  [facility]   BEST TUMOR BLOCK(S) FOR FUTURE STUDIES:  C8, C7   Comment: This is an appended report. These results have been appended to a previously preliminary verified report.   Additional Information  [facility]   All reported additional testing was performed with appropriately reactive controls.  These tests were developed and their performance characteristics determined by [facility] or appropriate performing facility, though some tests may be performed on tissues which have not been validated for performance characteristics (such as staining performed on alcohol exposed cell blocks and decalcified  tissues).  Results should be interpreted with caution and in the context of the patients’ clinical condition. These tests may not be cleared or approved by the U.S. Food and Drug Administration, though the FDA has determined that such clearance or approval is not necessary. These tests are used for clinical purposes and they should not be regarded as investigational or for research. This laboratory has been approved by [facility], designated as a high-complexity laboratory and is qualified to perform these tests.  .   Comment: This is an appended report. These results have been appended to a previously preliminary verified report.   Synoptic Checklist   GASTROINTESTINAL STROMAL TUMOR (GIST): Resection   8th Edition - Protocol posted: 12/14/2022GASTROINTESTINAL STROMAL TUMOR (GIST): RESECTION - C  SPECIMEN   Procedure  Resection: Partial gastrectomy   TUMOR   Tumor Focality  Unifocal   Tumor Site  Stomach: Just distal to GE junction per op note   Histologic Type  Gastrointestinal stromal tumor, mixed   Tumor Size  Greatest Dimension (Centimeters): 5.8 cm   Additional Dimension (Centimeters)  5.7 cm     5.4 cm   Mitotic Rate  18 mitoses per 5 mm2   Histologic Grade  G2, high grade   Necrosis  Present   Extent of Necrosis  5 %   Treatment Effect  No known presurgical therapy   Risk Assessment  High risk   MARGINS   Margin Status  All margins negative for GIST   Closest Margin(s) to GIST  Stapled resection margin   Distance from GIST to Closest Margin  0.3 cm   REGIONAL LYMPH NODES   Regional Lymph Node Status  Not applicable (no regional lymph nodes submitted or found)   Regional Lymph Node Comment  There is no lymph node identified in entirely submitted fibroadipose tissue   pTNM CLASSIFICATION (AJCC 8th Edition)   Reporting of pT, pN, and (when applicable) pM categories is based on information available to the pathologist at the time the report is issued. As per the AJCC (Chapter 1, 8th Ed.) it is the managing  physician's responsibility to establish the final pathologic stage based upon all pertinent information, including but potentially not limited to this pathology report.   pT Category  pT3   pN Category  pN not assigned (no nodes submitted or found)   SPECIAL STUDIES   Immunohistochemical Studies     KIT ()  Positive   Immunohistochemical Studies     DOG1 (ANO1)  Positive   Molecular Genetic Studies  Not performed   .          Labs:      Imaging  Echo follow up/limited w/ contrast if indicated  Result Date: 2/8/2025  Narrative: •  Left Ventricle: Left ventricular cavity size is normal. Wall thickness is normal. The left ventricular ejection fraction is 65%. Systolic function is normal.  Global longitudinal strain is decreased although limited by suboptimal imaging quality. Although no diagnostic regional wall motion abnormality was identified, this possibility cannot be completely excluded on the basis of this study.  Endocardial definition was poor.  Cannot rule out hypokinesis of the mid to distal lateral wall. Diastolic function is normal. •  Right Ventricle: Right ventricular cavity size is normal. Systolic function is normal. •  This was a technically difficult study     XR chest pa & lateral  Result Date: 2/7/2025  Narrative: XR CHEST PA AND LATERAL INDICATION: Unable to wean nasal cannula O2, postoperative setting.. COMPARISON: 2/7/2025 0800 hours FINDINGS: Mild pulmonary vascular congestion. Small left pleural effusion. Unchanged discoid atelectasis right midlung zone. Stable cardiomegaly. Bones are unremarkable for age. Contrast from recent upper GI noted.     Impression: Stable interval exam. Cardiomegaly, mild pulmonary vascular congestion, and small left pleural effusion. Workstation performed: [id number]     XR chest portable  Result Date: 2/7/2025  Narrative: XR CHEST PORTABLE INDICATION: oxygen requirement. COMPARISON: 12/29/2024 FINDINGS: Mild central congestion noted with trace left basilar  effusion. No pneumothorax. Enlarged cardiac silhouette, unchanged. Bones are unremarkable for age. Normal upper abdomen.     Impression: Mild central congestion and trace left basilar effusion. Workstation performed: [id number]     FL upper GI UGI  Result Date: 2/6/2025  Narrative: LIMITED UPPER GI SERIES INDICATION: r/o gastric leak. COMPARISON: None TECHNIQUE: A limited single contrast upper GI study was performed with approximately 200 mL of dilute barium. Omnipaque/iodine-containing water-soluble contrast could not be used patient has a history of severe contrast reaction as per history provided by the patient IMAGES: 52 FLUOROSCOPY TIME: 1 minute 41 seconds FINDINGS: Study limited due to limited mobility of the patient No evidence of a leak or fixed stricture. Contrast passes freely from the esophagus through the stomach into the duodenum. Deformity of the lesser curvature of the stomach from the recent surgery     Impression: No extravasation of contrast seen No obstruction to flow of contrast Workstation performed: [id number]     I personally reviewed and interpreted the above laboratory and imaging data.

## 2025-02-27 NOTE — ASSESSMENT & PLAN NOTE
Following Dr. Jung of Gyn Onc  S/P partial gastrectomy, laparoscopic total hysterectomy and salpingo-oophorectomy in setting of GIST tumor of stomach and bilateral tubo-ovarian mass on 2/5/2025.  She is on Estradiol of 0.5 mg daily for surgical menopause.

## 2025-02-27 NOTE — ASSESSMENT & PLAN NOTE
Following Dr. South of Surgical Oncology  S/P partial gastrectomy, laparoscopic total hysterectomy and salpingo-oophorectomy in setting of GIST tumor of stomach and bilateral tubo-ovarian mass on 2/5/2025.    Doing well post-procedure. Appetite is not great compared to prior to surgery, however, feels she will need time to get back to her baseline but she also was told that the partial gastrectomy could affect her appetite and could take about a month for it to improve. She voices no other concerns at this time. Denies constipation issues, no nausea or vomiting.

## 2025-02-27 NOTE — ASSESSMENT & PLAN NOTE
Patient is open to referral to our Palliative LCSW team for psychotherapy, support, and anxiety.  Patient also requested Smoking Cessation resources which we will look into providing to patient.  She states there are times where she is anxious and smokes a cigarette which has been helpful.  She does not smoke daily as this is infrequent, but noticed she has had to rely on a cigarette which has helped.  She understands importance of Smoking Cessation.

## 2025-02-28 ENCOUNTER — CONSULT (OUTPATIENT)
Dept: HEMATOLOGY ONCOLOGY | Facility: CLINIC | Age: 40
End: 2025-02-28
Payer: COMMERCIAL

## 2025-02-28 ENCOUNTER — DOCUMENTATION (OUTPATIENT)
Dept: SURGICAL ONCOLOGY | Facility: CLINIC | Age: 40
End: 2025-02-28

## 2025-02-28 VITALS
BODY MASS INDEX: 42.65 KG/M2 | OXYGEN SATURATION: 99 % | HEART RATE: 76 BPM | HEIGHT: 65 IN | TEMPERATURE: 97.5 F | RESPIRATION RATE: 16 BRPM | SYSTOLIC BLOOD PRESSURE: 138 MMHG | WEIGHT: 256 LBS | DIASTOLIC BLOOD PRESSURE: 74 MMHG

## 2025-02-28 DIAGNOSIS — C49.A2 MALIGNANT GASTROINTESTINAL STROMAL TUMOR (GIST) OF STOMACH (HCC): Primary | ICD-10-CM

## 2025-02-28 PROCEDURE — 99204 OFFICE O/P NEW MOD 45 MIN: CPT | Performed by: INTERNAL MEDICINE

## 2025-02-28 RX ORDER — IMATINIB MESYLATE 400 MG/1
400 TABLET, FILM COATED ORAL DAILY
Qty: 30 TABLET | Refills: 5 | Status: SHIPPED | OUTPATIENT
Start: 2025-02-28 | End: 2025-03-03 | Stop reason: SDUPTHER

## 2025-02-28 NOTE — PROGRESS NOTES
Received Paperwork   What type of form FMLA   Scanned blank form into patient's Epic chart Yes   Method received form  In Person drop off   Provider responsible for form Dr. South   Informed patient our office turn around time for completing patient forms is 5-7 business days. Yes, informed patient of turn around time     Comments Please fax to [phone] and call patient when completed

## 2025-02-28 NOTE — ASSESSMENT & PLAN NOTE
40-year-old female with prior history of substance abuse, thyroid cancer (status post thyroidectomy) and a new diagnosis of pathologic T3 N0 M0 grade 2 gastrointestinal stromal tumor (GIST) of the stomach, diagnosed at the time of R0 partial gastrectomy on February 5, 2025.  The patient has high risk for micrometastatic disease and progression to gross metastases.  She referred to medical oncology clinic for consideration of adjuvant imatinib (Gleevec).    Today, I presented in detail, information about the diagnostic workup, staging, management, and prognosis of resected high risk, high-grade GIST of the stomach.  The patient has a substantial risk for residual micrometastatic disease with progression to gross metastases.  In addition, I discussed with the patient clinical benefits and risks of adjuvant imatinib (Gleevec) for completely resected high-grade gastric GIST.  Benefits of adjuvant imatinib include prolongation of overall survival, prolongation of relapse free survival, and treatment of micrometastatic disease.  I described potential imatinib-related adverse events and serious adverse events.  Overall, clinical benefits of adjuvant imatinib outweigh any potential treatment related toxicity.  Duration of adjuvant imatinib is a minimum of 3 years.    Plan:  Request next generation somatic gene sequencing on gastric GIST pathology specimen  Provide written educational information about imatinib  Patient to sign written informed consent for adjuvant imatinib  Patient to initiate adjuvant imatinib 400 mg per mouth daily.  Minimal duration of adjuvant systemic therapy is 3 years.  Based on results of next generation gene sequencing (mutational analysis of the KIT gene), the patient may require a higher dose of adjuvant imatinib.  Referral to oncology genetics clinic  Periodic contrast enhanced CT scan of the abdomen and pelvis   Return to medical oncology clinic the third week of March 2021

## 2025-02-28 NOTE — LETTER
2025     Tomás Souht MD  [address] [facility]  2nd Floor  [facility] [address]    Patient: Tanya Rosenthal   YOB: 1985   Date of Visit: 2025       Dear Dr. South:    Thank you for referring Tanya Rosenthal to me for evaluation. Below are my notes for this consultation.    If you have questions, please do not hesitate to call me. I look forward to following your patient along with you.         Sincerely,        Juan Conde MD        CC: No Recipients    Juan Conde MD  2025  5:31 PM  Sign when Signing Visit  Name: Tanya Rosenthal      : 1985      MRN: [id number]  Encounter Provider: Juan Conde MD  Encounter Date: 2025   Encounter department: 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  The patient has high risk for micrometastatic disease and progression to gross metastases.  She referred to medical oncology clinic for consideration of adjuvant imatinib (Gleevec).    Today, I presented in detail, information about the diagnostic workup, staging, management, and prognosis of resected high risk, high-grade GIST of the stomach.  The patient has a substantial risk for residual micrometastatic disease with progression to gross metastases.  In addition, I discussed with the patient clinical benefits and risks of adjuvant imatinib (Gleevec) for completely resected high-grade gastric GIST.  Benefits of adjuvant imatinib include prolongation of overall survival, prolongation of relapse free survival, and treatment of micrometastatic disease.  I described potential imatinib-related adverse events and  serious adverse events.  Overall, clinical benefits of adjuvant imatinib outweigh any potential treatment related toxicity.  Duration of adjuvant imatinib is a minimum of 3 years.    Plan:  Request next generation somatic gene sequencing on gastric GIST pathology specimen  Provide written educational information about imatinib  Patient to sign written informed consent for adjuvant imatinib  Patient to initiate adjuvant imatinib 400 mg per mouth daily.  Minimal duration of adjuvant systemic therapy is 3 years.  Based on results of next generation gene sequencing (mutational analysis of the KIT gene), the patient may require a higher dose of adjuvant imatinib.  Referral to oncology genetics clinic  Periodic contrast enhanced CT scan of the abdomen and pelvis   Return to medical oncology clinic the third week of March 2021    Mrs. Rosenthal understands and agrees with my management recommendations and plan of care. I answered questions to her satisfaction.      The time spent on this initial outpatient consultation was 45 minutes     History of Present Illness   No chief complaint on file.  40-year-old female with prior history of substance abuse, thyroid cancer (status post thyroidectomy) and a new diagnosis of pathologic T3 N0 M0 grade 2 gastrointestinal stromal tumor (GIST) of the stomach, diagnosed at the time of R0 partial gastrectomy on February 5, 2025.  The patient has high risk for micrometastatic disease and progression to gross metastases.  She referred to medical oncology clinic for consideration of adjuvant imatinib (Gleevec).    In summary, in late December 2024, Mrs. Make sure developed progressive, severe, acute epigastric pain.  On December 29, 2024, she underwent diagnostic workup and evaluation of epigastric pain at the emergency department.  On December 29, 2024, contrast-enhanced CT scan of the abdomen and pelvis showed a 6.0 x 4.4 x 4.5 cm lobulated mass centered within the posterior gastric wall of  the proximal stomach just beyond the GE junction. The mass partially extended into the lumen of the stomach as well as outside of the stomach wall, thereby   localizing this mass likely within the wall of the stomach. There was no bowel obstruction or focal inflammatory process. On December 29, 2024, pelvic ultrasound showed large bilateral ovarian cysts. On December 30, 2024, EGD showed a of the mass was attempted large mass on the posterior wall of the cardia just below the GEJ. The mass appeared to be submucosal. Biopsy but this resulted in bleeding and therefore biopsies were not performed.  On February 5, 2025, the patient underwent a partial gastrectomy as well as pelvic examination under anesthesia, laparoscopic total hysterectomy and bilateral salpingo-oophorectomy.  Pathology from partial gastrectomy showed pathologic T3 gastrointestinal stromal tumor (GIST), high-grade /grade 2 measuring 5.8 cm in maximum diameter, with 18 mitosis per 5 mm2, with necrosis present 5%, with negative surgical resection margins.  Pathology from total hysterectomy and bilateral salpingo-oophorectomy did not show malignancy.  In the ovaries there were bilateral cystadenomas.  The patient is recovering well after her partial gastrectomy and laparoscopic total hysterectomy and bilateral salpingo-oophorectomy.    Mrs. Rosenthal is recovering very well from her partial gastrectomy, hysterectomy and bilateral salpingo-oophorectomy. She does not have abdominal pain. She does not have new systemic, gastrointestinal, cardiorespiratory, genitourinary, gynecological, dermatological, musculoskeletal, or neurologic symptoms.  She is eager to initiate adjuvant therapy for her high risk gastric GIST.    Oncology History  Cancer Staging   GIST (gastrointestinal stromal tumor), malignant (HCC)  Staging form: Gastric Stromal Tumor - Gastric GIST, AJCC 8th Edition  - Pathologic: Stage IIIA (pT3, pN0, cM0, Mitotic Rate: High) - Signed by Tomás  MD Akosua on 2/26/2025  Histologic grade (G): High grade  Histologic grading system: 2 grade system  Residual tumor (R): R0 - None  Oncology History   GIST (gastrointestinal stromal tumor), malignant (HCC)   1/7/2025 Biopsy    Endoscopic Ultrasound    Stomach, Gastric Submucosal, FNA:  Neoplastic.  Gastrointestinal stromal tumor (GIST), mixed spindle cell and epithelioid type.  > 5 mitoses per 50 HPF     1/13/2025 Initial Diagnosis    GIST (gastrointestinal stromal tumor), malignant (HCC)     2/5/2025 Surgery    Partial gastrectomy:   Gastric mass, resection:  -   Gastrointestinal stromal tumor of stomach, 5.8 cm in greatest dimension.    TUMOR   Tumor Focality  Unifocal   Tumor Site  Stomach: Just distal to GE junction per op note   Tumor Size  Greatest Dimension (Centimeters): 5.8 cm   Mitotic Rate  18 mitoses per 5 mm2   Histologic Grade  G2, high grade   MARGINS   Margin Status  All margins negative for GIST   pTNM CLASSIFICATION (AJCC 8th Edition)   pT Category  pT3   pN Category  pN not assigned (no nodes submitted or found)           2/26/2025 -  Cancer Staged    Staging form: Gastric Stromal Tumor - Gastric GIST, AJCC 8th Edition  - Pathologic: Stage IIIA (pT3, pN0, cM0, Mitotic Rate: High) - Signed by Tomás South MD on 2/26/2025  Histologic grade (G): High grade  Histologic grading system: 2 grade system  Residual tumor (R): R0 - None          Pertinent Medical History   Past Medical History:   Diagnosis Date   • Anxiety    • Depression    • Heroin abuse (HCC)     clean since 2015   • Thyroid cancer (HCC) 1999    with thyroidectomy and RTX     Past Surgical History:   Procedure Laterality Date   • EGD     • GANGLION CYST EXCISION Left    • PARTIAL GASTRECTOMY N/A 2/5/2025    Procedure: PARTIAL GASTRECTOMY;  Surgeon: Tomás South MD;  Location: [facility];  Service: Surgical Oncology   • PELVIC LAPAROSCOPY Bilateral 2/5/2025    Procedure: SALPINGO-OOPHORECTOMY, LAPAROSCOPIC;  Surgeon: Varghese Diallo  MD Fabiana;  Location: [facility];  Service: Gynecology Oncology   • CA LAPAROSCOPY W TOTAL HYSTERECTOMY UTERUS 250 GM/< N/A 2/5/2025    Procedure: LAPAROSCOPIC TOTAL HYSTERECTOMY, EXAM UNDER ANESTHESIA;  Surgeon: Varghese Jung MD;  Location: [facility];  Service: Gynecology Oncology   • THYROIDECTOMY        Family History   Problem Relation Age of Onset   • Diabetes Mother    • Stomach cancer Mother         maltoma lyphoma   • Hyperlipidemia Father    • Hypertension Father    • Lung cancer Maternal Aunt    • Breast cancer Maternal Aunt 52   • Lymphoma Paternal Uncle         non hodgkins     Social History     Socioeconomic History   • Marital status: Single     Spouse name: Not on file   • Number of children: Not on file   • Years of education: Not on file   • Highest education level: Not on file   Occupational History   • Not on file   Tobacco Use   • Smoking status: Former     Current packs/day: 0.25     Average packs/day: 0.3 packs/day for 26.2 years (6.5 ttl pk-yrs)     Types: Cigarettes     Start date: 1/1/1999   • Smokeless tobacco: Never   • Tobacco comments:     Trying quitting  ago   Vaping Use   • Vaping status: Every Day   • Substances: THC, CBD   Substance and Sexual Activity   • Alcohol use: Not Currently   • Drug use: Yes     Types: Marijuana     Comment: 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   • Sexual activity: Not on file   Other Topics Concern   • Not on file   Social History Narrative   • Not on file     Social Drivers of Health     Financial Resource Strain: Not on file   Food Insecurity: No Food Insecurity (2/5/2025)    Nursing - Inadequate Food Risk Classification    • Worried About Running Out of Food in the Last Year: Not on file    • Ran Out of Food in the Last Year: Not on file    • Ran Out of Food in the Last Year: Never true   Transportation Needs: No Transportation Needs (2/5/2025)    Nursing - Transportation Risk Classification    • Lack of Transportation: Not on file    •  Lack of Transportation: No   Physical Activity: Not on file   Stress: Not on file   Social Connections: Not on file   Intimate Partner Violence: Unknown (2025)    Nursing IPS    • Feels Physically and Emotionally Safe: Not on file    • Physically Hurt by Someone: Not on file    • Humiliated or Emotionally Abused by Someone: Not on file    • Physically Hurt by Someone: No    • Hurt or Threatened by Someone: No   Housing Stability: Unknown (2025)    Nursing: Inadequate Housing Risk Classification    • Has Housing: Not on file    • Worried About Losing Housing: Not on file    • Unable to Get Utilities: Not on file    • Unable to Pay for Housing in the Last Year: No    • Has Housin        Review of Systems   Constitutional:  Negative for activity change, appetite change, chills, diaphoresis, fatigue, fever and unexpected weight change.   HENT:  Negative for congestion, dental problem, ear discharge, ear pain, facial swelling, hearing loss, mouth sores, nosebleeds, postnasal drip, rhinorrhea, sinus pain, sneezing, sore throat, tinnitus, trouble swallowing and voice change.    Eyes:  Negative for photophobia, pain, discharge, redness, itching and visual disturbance.   Respiratory:  Negative for apnea, cough, choking, chest tightness, shortness of breath, wheezing and stridor.    Cardiovascular:  Negative for chest pain, palpitations and leg swelling.   Gastrointestinal:  Negative for abdominal distention, abdominal pain, anal bleeding, blood in stool, constipation, diarrhea, nausea, rectal pain and vomiting.   Endocrine: Negative for cold intolerance and heat intolerance.   Genitourinary:  Negative for decreased urine volume, difficulty urinating, dysuria, enuresis, flank pain, frequency, hematuria and urgency.   Musculoskeletal:  Negative for arthralgias, back pain, gait problem, joint swelling, myalgias, neck pain and neck stiffness.   Skin:  Negative for color change, pallor, rash and wound.   Neurological:   Negative for dizziness, tremors, seizures, syncope, facial asymmetry, speech difficulty, weakness, light-headedness, numbness and headaches.   Hematological:  Negative for adenopathy. Does not bruise/bleed easily.   Psychiatric/Behavioral:  Negative for behavioral problems, confusion, dysphoric mood and sleep disturbance. The patient is not nervous/anxious.          Objective   LMP  (LMP Unknown)     Pain Screening:     ECOG   0  Physical Exam  Constitutional:       Comments: Female patient obese with no respiratory distress   HENT:      Head: Normocephalic and atraumatic.      Nose: Nose normal.      Mouth/Throat:      Mouth: Mucous membranes are moist.      Pharynx: Oropharynx is clear.   Eyes:      Extraocular Movements: Extraocular movements intact.      Conjunctiva/sclera: Conjunctivae normal.      Pupils: Pupils are equal, round, and reactive to light.      Comments: No scleral icterus    Cardiovascular:      Rate and Rhythm: Normal rate and regular rhythm.      Pulses: Normal pulses.      Heart sounds: Normal heart sounds.   Pulmonary:      Effort: Pulmonary effort is normal.      Breath sounds: Normal breath sounds.   Abdominal:      General: Bowel sounds are normal.      Palpations: Abdomen is soft.      Comments: Abdomen soft, nontender, nonpainful.  No hepatomegaly.  No splenomegaly.  No rebound tenderness.  No lateral or shifting dullness.  Bowel sounds present, normal.    Musculoskeletal:         General: Normal range of motion.      Cervical back: Normal range of motion and neck supple.   Skin:     General: Skin is warm and dry.      Capillary Refill: Capillary refill takes less than 2 seconds.   Neurological:      General: No focal deficit present.      Mental Status: She is alert and oriented to person, place, and time. Mental status is at baseline.   Psychiatric:         Mood and Affect: Mood normal.         Behavior: Behavior normal.         Thought Content: Thought content normal.          Judgment: Judgment normal.       Labs: I have reviewed the following labs:  Lab Results   Component Value Date/Time    WBC 6.54 02/08/2025 05:15 AM    RBC 4.56 02/08/2025 05:15 AM    Hemoglobin 11.7 02/08/2025 05:15 AM    Hematocrit 38.7 02/08/2025 05:15 AM    MCV 85 02/08/2025 05:15 AM    MCH 25.7 (L) 02/08/2025 05:15 AM    RDW 14.4 02/08/2025 05:15 AM    Platelets 151 02/08/2025 05:15 AM    Segmented % 70 02/08/2025 05:15 AM    Lymphocytes % 22 02/08/2025 05:15 AM    Monocytes % 7 02/08/2025 05:15 AM    Eosinophils Relative 1 02/08/2025 05:15 AM    Basophils Relative 0 02/08/2025 05:15 AM    Immature Grans % 0 02/08/2025 05:15 AM    Absolute Neutrophils 4.55 02/08/2025 05:15 AM     Lab Results   Component Value Date/Time    Potassium 3.9 02/09/2025 06:04 AM    Potassium 4.3 12/25/2024 07:10 PM    Chloride 99 02/09/2025 06:04 AM    Chloride 101 12/25/2024 07:10 PM    Carbon Dioxide 29 12/25/2024 07:10 PM    CO2 30 02/09/2025 06:04 AM    BUN 9 02/09/2025 06:04 AM    BUN 19 12/25/2024 07:10 PM    Creatinine 1.06 02/09/2025 06:04 AM    Creatinine 1.28 (H) 12/25/2024 07:10 PM    Glucose, Fasting 77 01/29/2025 09:13 AM    Calcium 7.5 (L) 02/09/2025 06:04 AM    Calcium 6.6 (L) 12/25/2024 07:10 PM    AST 27 02/08/2025 05:15 AM    AST 17 12/25/2024 07:10 PM    ALT 24 02/08/2025 05:15 AM    ALT 10 12/25/2024 07:10 PM    Alkaline Phosphatase 46 02/08/2025 05:15 AM    Alkaline Phosphatase 65 12/25/2024 07:10 PM    Total Protein 6.3 (L) 02/08/2025 05:15 AM    Protein, Total 7.2 12/25/2024 07:10 PM    Albumin 3.5 02/08/2025 05:15 AM    ALBUMIN 4.1 12/25/2024 07:10 PM    Total Bilirubin 0.31 02/08/2025 05:15 AM    Total Bilirubin 0.4 12/25/2024 07:10 PM    eGFRcr 54 (L) 12/25/2024 07:10 PM    eGFR 64 02/09/2025 06:04 AM

## 2025-02-28 NOTE — PROGRESS NOTES
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  The patient has high risk for micrometastatic disease and progression to gross metastases.  She referred to medical oncology clinic for consideration of adjuvant imatinib (Gleevec).    Today, I presented in detail, information about the diagnostic workup, staging, management, and prognosis of resected high risk, high-grade GIST of the stomach.  The patient has a substantial risk for residual micrometastatic disease with progression to gross metastases.  In addition, I discussed with the patient clinical benefits and risks of adjuvant imatinib (Gleevec) for completely resected high-grade gastric GIST.  Benefits of adjuvant imatinib include prolongation of overall survival, prolongation of relapse free survival, and treatment of micrometastatic disease.  I described potential imatinib-related adverse events and serious adverse events.  Overall, clinical benefits of adjuvant imatinib outweigh any potential treatment related toxicity.  Duration of adjuvant imatinib is a minimum of 3 years.    Plan:  Request next generation somatic gene sequencing on gastric GIST pathology specimen  Provide written educational information about imatinib  Patient to sign written informed consent for adjuvant imatinib  Patient to initiate adjuvant imatinib 400 mg per mouth daily.  Minimal duration of adjuvant systemic therapy is 3 years.  Based on results of next generation gene  sequencing (mutational analysis of the KIT gene), the patient may require a higher dose of adjuvant imatinib.  Referral to oncology genetics clinic  Periodic contrast enhanced CT scan of the abdomen and pelvis   Return to medical oncology clinic the third week of March 2021    Mrs. Rosenthal understands and agrees with my management recommendations and plan of care. I answered questions to her satisfaction.      The time spent on this initial outpatient consultation was 45 minutes     History of Present Illness   No chief complaint on file.  40-year-old female with prior history of substance abuse, thyroid cancer (status post thyroidectomy) and a new diagnosis of pathologic T3 N0 M0 grade 2 gastrointestinal stromal tumor (GIST) of the stomach, diagnosed at the time of R0 partial gastrectomy on February 5, 2025.  The patient has high risk for micrometastatic disease and progression to gross metastases.  She referred to medical oncology clinic for consideration of adjuvant imatinib (Gleevec).    In summary, in late December 2024, Mrs. Make sure developed progressive, severe, acute epigastric pain.  On December 29, 2024, she underwent diagnostic workup and evaluation of epigastric pain at the emergency department.  On December 29, 2024, contrast-enhanced CT scan of the abdomen and pelvis showed a 6.0 x 4.4 x 4.5 cm lobulated mass centered within the posterior gastric wall of the proximal stomach just beyond the GE junction. The mass partially extended into the lumen of the stomach as well as outside of the stomach wall, thereby   localizing this mass likely within the wall of the stomach. There was no bowel obstruction or focal inflammatory process. On December 29, 2024, pelvic ultrasound showed large bilateral ovarian cysts. On December 30, 2024, EGD showed a of the mass was attempted large mass on the posterior wall of the cardia just below the GEJ. The mass appeared to be submucosal. Biopsy but this resulted in  bleeding and therefore biopsies were not performed.  On February 5, 2025, the patient underwent a partial gastrectomy as well as pelvic examination under anesthesia, laparoscopic total hysterectomy and bilateral salpingo-oophorectomy.  Pathology from partial gastrectomy showed pathologic T3 gastrointestinal stromal tumor (GIST), high-grade /grade 2 measuring 5.8 cm in maximum diameter, with 18 mitosis per 5 mm2, with necrosis present 5%, with negative surgical resection margins.  Pathology from total hysterectomy and bilateral salpingo-oophorectomy did not show malignancy.  In the ovaries there were bilateral cystadenomas.  The patient is recovering well after her partial gastrectomy and laparoscopic total hysterectomy and bilateral salpingo-oophorectomy.    Mrs. Rosenthal is recovering very well from her partial gastrectomy, hysterectomy and bilateral salpingo-oophorectomy. She does not have abdominal pain. She does not have new systemic, gastrointestinal, cardiorespiratory, genitourinary, gynecological, dermatological, musculoskeletal, or neurologic symptoms.  She is eager to initiate adjuvant therapy for her high risk gastric GIST.    Oncology History   Cancer Staging   GIST (gastrointestinal stromal tumor), malignant (HCC)  Staging form: Gastric Stromal Tumor - Gastric GIST, AJCC 8th Edition  - Pathologic: Stage IIIA (pT3, pN0, cM0, Mitotic Rate: High) - Signed by Tomás South MD on 2/26/2025  Histologic grade (G): High grade  Histologic grading system: 2 grade system  Residual tumor (R): R0 - None  Oncology History   GIST (gastrointestinal stromal tumor), malignant (HCC)   1/7/2025 Biopsy    Endoscopic Ultrasound    Stomach, Gastric Submucosal, FNA:  Neoplastic.  Gastrointestinal stromal tumor (GIST), mixed spindle cell and epithelioid type.  > 5 mitoses per 50 HPF     1/13/2025 Initial Diagnosis    GIST (gastrointestinal stromal tumor), malignant (HCC)     2/5/2025 Surgery    Partial gastrectomy:   Gastric  mass, resection:  -   Gastrointestinal stromal tumor of stomach, 5.8 cm in greatest dimension.    TUMOR   Tumor Focality  Unifocal   Tumor Site  Stomach: Just distal to GE junction per op note   Tumor Size  Greatest Dimension (Centimeters): 5.8 cm   Mitotic Rate  18 mitoses per 5 mm2   Histologic Grade  G2, high grade   MARGINS   Margin Status  All margins negative for GIST   pTNM CLASSIFICATION (AJCC 8th Edition)   pT Category  pT3   pN Category  pN not assigned (no nodes submitted or found)           2/26/2025 -  Cancer Staged    Staging form: Gastric Stromal Tumor - Gastric GIST, AJCC 8th Edition  - Pathologic: Stage IIIA (pT3, pN0, cM0, Mitotic Rate: High) - Signed by Tomás South MD on 2/26/2025  Histologic grade (G): High grade  Histologic grading system: 2 grade system  Residual tumor (R): R0 - None          Pertinent Medical History   Past Medical History:   Diagnosis Date    Anxiety     Depression     Heroin abuse (HCC)     clean since 2015    Thyroid cancer (HCC) 1999    with thyroidectomy and RTX     Past Surgical History:   Procedure Laterality Date    EGD      GANGLION CYST EXCISION Left     PARTIAL GASTRECTOMY N/A 2/5/2025    Procedure: PARTIAL GASTRECTOMY;  Surgeon: Tomás South MD;  Location: [facility];  Service: Surgical Oncology    PELVIC LAPAROSCOPY Bilateral 2/5/2025    Procedure: SALPINGO-OOPHORECTOMY, LAPAROSCOPIC;  Surgeon: Varghese Jung MD;  Location: [facility];  Service: Gynecology Oncology    IL LAPAROSCOPY W TOTAL HYSTERECTOMY UTERUS 250 GM/< N/A 2/5/2025    Procedure: LAPAROSCOPIC TOTAL HYSTERECTOMY, EXAM UNDER ANESTHESIA;  Surgeon: Varghese Jung MD;  Location: [facility];  Service: Gynecology Oncology    THYROIDECTOMY        Family History   Problem Relation Age of Onset    Diabetes Mother     Stomach cancer Mother         maltoma lyphoma    Hyperlipidemia Father     Hypertension Father     Lung cancer Maternal Aunt     Breast cancer Maternal Aunt 52    Lymphoma  Paternal Uncle         non hodgkins     Social History     Socioeconomic History    Marital status: Single     Spouse name: Not on file    Number of children: Not on file    Years of education: Not on file    Highest education level: Not on file   Occupational History    Not on file   Tobacco Use    Smoking status: Former     Current packs/day: 0.25     Average packs/day: 0.3 packs/day for 26.2 years (6.5 ttl pk-yrs)     Types: Cigarettes     Start date: 1/1/1999    Smokeless tobacco: Never    Tobacco comments:     Trying quitting  ago   Vaping Use    Vaping status: Every Day    Substances: THC, CBD   Substance and Sexual Activity    Alcohol use: Not Currently    Drug use: Yes     Types: Marijuana     Comment: 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    Sexual activity: Not on file   Other Topics Concern    Not on file   Social History Narrative    Not on file     Social Drivers of Health     Financial Resource Strain: Not on file   Food Insecurity: No Food Insecurity (2/5/2025)    Nursing - Inadequate Food Risk Classification     Worried About Running Out of Food in the Last Year: Not on file     Ran Out of Food in the Last Year: Not on file     Ran Out of Food in the Last Year: Never true   Transportation Needs: No Transportation Needs (2/5/2025)    Nursing - Transportation Risk Classification     Lack of Transportation: Not on file     Lack of Transportation: No   Physical Activity: Not on file   Stress: Not on file   Social Connections: Not on file   Intimate Partner Violence: Unknown (2/5/2025)    Nursing IPS     Feels Physically and Emotionally Safe: Not on file     Physically Hurt by Someone: Not on file     Humiliated or Emotionally Abused by Someone: Not on file     Physically Hurt by Someone: No     Hurt or Threatened by Someone: No   Housing Stability: Unknown (2/5/2025)    Nursing: Inadequate Housing Risk Classification     Has Housing: Not on file     Worried About Losing Housing: Not on file     Unable  to Get Utilities: Not on file     Unable to Pay for Housing in the Last Year: No     Has Housin        Review of Systems   Constitutional:  Negative for activity change, appetite change, chills, diaphoresis, fatigue, fever and unexpected weight change.   HENT:  Negative for congestion, dental problem, ear discharge, ear pain, facial swelling, hearing loss, mouth sores, nosebleeds, postnasal drip, rhinorrhea, sinus pain, sneezing, sore throat, tinnitus, trouble swallowing and voice change.    Eyes:  Negative for photophobia, pain, discharge, redness, itching and visual disturbance.   Respiratory:  Negative for apnea, cough, choking, chest tightness, shortness of breath, wheezing and stridor.    Cardiovascular:  Negative for chest pain, palpitations and leg swelling.   Gastrointestinal:  Negative for abdominal distention, abdominal pain, anal bleeding, blood in stool, constipation, diarrhea, nausea, rectal pain and vomiting.   Endocrine: Negative for cold intolerance and heat intolerance.   Genitourinary:  Negative for decreased urine volume, difficulty urinating, dysuria, enuresis, flank pain, frequency, hematuria and urgency.   Musculoskeletal:  Negative for arthralgias, back pain, gait problem, joint swelling, myalgias, neck pain and neck stiffness.   Skin:  Negative for color change, pallor, rash and wound.   Neurological:  Negative for dizziness, tremors, seizures, syncope, facial asymmetry, speech difficulty, weakness, light-headedness, numbness and headaches.   Hematological:  Negative for adenopathy. Does not bruise/bleed easily.   Psychiatric/Behavioral:  Negative for behavioral problems, confusion, dysphoric mood and sleep disturbance. The patient is not nervous/anxious.          Objective   LMP  (LMP Unknown)     Pain Screening:     ECOG   0  Physical Exam  Constitutional:       Comments: Female patient obese with no respiratory distress   HENT:      Head: Normocephalic and atraumatic.      Nose: Nose  normal.      Mouth/Throat:      Mouth: Mucous membranes are moist.      Pharynx: Oropharynx is clear.   Eyes:      Extraocular Movements: Extraocular movements intact.      Conjunctiva/sclera: Conjunctivae normal.      Pupils: Pupils are equal, round, and reactive to light.      Comments: No scleral icterus    Cardiovascular:      Rate and Rhythm: Normal rate and regular rhythm.      Pulses: Normal pulses.      Heart sounds: Normal heart sounds.   Pulmonary:      Effort: Pulmonary effort is normal.      Breath sounds: Normal breath sounds.   Abdominal:      General: Bowel sounds are normal.      Palpations: Abdomen is soft.      Comments: Abdomen soft, nontender, nonpainful.  No hepatomegaly.  No splenomegaly.  No rebound tenderness.  No lateral or shifting dullness.  Bowel sounds present, normal.    Musculoskeletal:         General: Normal range of motion.      Cervical back: Normal range of motion and neck supple.   Skin:     General: Skin is warm and dry.      Capillary Refill: Capillary refill takes less than 2 seconds.   Neurological:      General: No focal deficit present.      Mental Status: She is alert and oriented to person, place, and time. Mental status is at baseline.   Psychiatric:         Mood and Affect: Mood normal.         Behavior: Behavior normal.         Thought Content: Thought content normal.         Judgment: Judgment normal.       Labs: I have reviewed the following labs:  Lab Results   Component Value Date/Time    WBC 6.54 02/08/2025 05:15 AM    RBC 4.56 02/08/2025 05:15 AM    Hemoglobin 11.7 02/08/2025 05:15 AM    Hematocrit 38.7 02/08/2025 05:15 AM    MCV 85 02/08/2025 05:15 AM    MCH 25.7 (L) 02/08/2025 05:15 AM    RDW 14.4 02/08/2025 05:15 AM    Platelets 151 02/08/2025 05:15 AM    Segmented % 70 02/08/2025 05:15 AM    Lymphocytes % 22 02/08/2025 05:15 AM    Monocytes % 7 02/08/2025 05:15 AM    Eosinophils Relative 1 02/08/2025 05:15 AM    Basophils Relative 0 02/08/2025 05:15 AM     Immature Grans % 0 02/08/2025 05:15 AM    Absolute Neutrophils 4.55 02/08/2025 05:15 AM     Lab Results   Component Value Date/Time    Potassium 3.9 02/09/2025 06:04 AM    Potassium 4.3 12/25/2024 07:10 PM    Chloride 99 02/09/2025 06:04 AM    Chloride 101 12/25/2024 07:10 PM    Carbon Dioxide 29 12/25/2024 07:10 PM    CO2 30 02/09/2025 06:04 AM    BUN 9 02/09/2025 06:04 AM    BUN 19 12/25/2024 07:10 PM    Creatinine 1.06 02/09/2025 06:04 AM    Creatinine 1.28 (H) 12/25/2024 07:10 PM    Glucose, Fasting 77 01/29/2025 09:13 AM    Calcium 7.5 (L) 02/09/2025 06:04 AM    Calcium 6.6 (L) 12/25/2024 07:10 PM    AST 27 02/08/2025 05:15 AM    AST 17 12/25/2024 07:10 PM    ALT 24 02/08/2025 05:15 AM    ALT 10 12/25/2024 07:10 PM    Alkaline Phosphatase 46 02/08/2025 05:15 AM    Alkaline Phosphatase 65 12/25/2024 07:10 PM    Total Protein 6.3 (L) 02/08/2025 05:15 AM    Protein, Total 7.2 12/25/2024 07:10 PM    Albumin 3.5 02/08/2025 05:15 AM    ALBUMIN 4.1 12/25/2024 07:10 PM    Total Bilirubin 0.31 02/08/2025 05:15 AM    Total Bilirubin 0.4 12/25/2024 07:10 PM    eGFRcr 54 (L) 12/25/2024 07:10 PM    eGFR 64 02/09/2025 06:04 AM

## 2025-03-03 ENCOUNTER — PATIENT OUTREACH (OUTPATIENT)
Dept: CASE MANAGEMENT | Facility: OTHER | Age: 40
End: 2025-03-03

## 2025-03-03 ENCOUNTER — APPOINTMENT (OUTPATIENT)
Dept: LAB | Age: 40
End: 2025-03-03
Payer: COMMERCIAL

## 2025-03-03 ENCOUNTER — DOCUMENTATION (OUTPATIENT)
Age: 40
End: 2025-03-03

## 2025-03-03 ENCOUNTER — TELEPHONE (OUTPATIENT)
Age: 40
End: 2025-03-03

## 2025-03-03 DIAGNOSIS — C49.A2 MALIGNANT GASTROINTESTINAL STROMAL TUMOR (GIST) OF STOMACH (HCC): ICD-10-CM

## 2025-03-03 LAB
BASOPHILS # BLD AUTO: 0.04 THOUSANDS/ÂΜL (ref 0–0.1)
BASOPHILS NFR BLD AUTO: 0 % (ref 0–1)
EOSINOPHIL # BLD AUTO: 0.38 THOUSAND/ÂΜL (ref 0–0.61)
EOSINOPHIL NFR BLD AUTO: 4 % (ref 0–6)
ERYTHROCYTE [DISTWIDTH] IN BLOOD BY AUTOMATED COUNT: 14.8 % (ref 11.6–15.1)
HCT VFR BLD AUTO: 39.6 % (ref 34.8–46.1)
HGB BLD-MCNC: 12.1 G/DL (ref 11.5–15.4)
IMM GRANULOCYTES # BLD AUTO: 0.03 THOUSAND/UL (ref 0–0.2)
IMM GRANULOCYTES NFR BLD AUTO: 0 % (ref 0–2)
LYMPHOCYTES # BLD AUTO: 3.95 THOUSANDS/ÂΜL (ref 0.6–4.47)
LYMPHOCYTES NFR BLD AUTO: 41 % (ref 14–44)
MCH RBC QN AUTO: 26.1 PG (ref 26.8–34.3)
MCHC RBC AUTO-ENTMCNC: 30.6 G/DL (ref 31.4–37.4)
MCV RBC AUTO: 85 FL (ref 82–98)
MONOCYTES # BLD AUTO: 0.49 THOUSAND/ÂΜL (ref 0.17–1.22)
MONOCYTES NFR BLD AUTO: 5 % (ref 4–12)
NEUTROPHILS # BLD AUTO: 4.78 THOUSANDS/ÂΜL (ref 1.85–7.62)
NEUTS SEG NFR BLD AUTO: 50 % (ref 43–75)
NRBC BLD AUTO-RTO: 0 /100 WBCS
PLATELET # BLD AUTO: 241 THOUSANDS/UL (ref 149–390)
PMV BLD AUTO: 11.8 FL (ref 8.9–12.7)
RBC # BLD AUTO: 4.64 MILLION/UL (ref 3.81–5.12)
WBC # BLD AUTO: 9.67 THOUSAND/UL (ref 4.31–10.16)

## 2025-03-03 PROCEDURE — 36415 COLL VENOUS BLD VENIPUNCTURE: CPT

## 2025-03-03 PROCEDURE — 85025 COMPLETE CBC W/AUTO DIFF WBC: CPT

## 2025-03-03 PROCEDURE — 80053 COMPREHEN METABOLIC PANEL: CPT

## 2025-03-03 RX ORDER — IMATINIB MESYLATE 400 MG/1
400 TABLET, FILM COATED ORAL DAILY
Qty: 30 TABLET | Refills: 5 | Status: SHIPPED | OUTPATIENT
Start: 2025-03-03

## 2025-03-03 NOTE — PROGRESS NOTES
Received request from clinical for patient to start on Imatinib 400 MG daily. Auth has been submitted via [employer] and this has been approved    March 3, 2025 to March 3, 2026

## 2025-03-03 NOTE — PROGRESS NOTES
OSW received an email from pt this day. She stated that she is feeling depressed regarding her cancer, and was requesting some therapy resources. OSW researched resources and emailed them to her. OSW also suggested Christie Melo LCSW and [facility]. This writer provided contact information for both. OSW informed her that [employer] gift cards were sent out to her as well, as she has not been working.   This writer also suggested that she contact [facility]. She asked if this writer can resend their newsletter. OSW emailed this to her this day as well. She thanked this writer for the resources. OSW encouraged her to reach out anytime she would like to talk. OSW offered to outreach her this week and she was appreciative. OSW will continue to follow.

## 2025-03-03 NOTE — TELEPHONE ENCOUNTER
[facility] calling about approval for medication.    Ordered by Dr Conde      imatinib (GLEEVEC) 400 mg tablet     Calling from [phone]

## 2025-03-04 LAB
ALBUMIN SERPL BCG-MCNC: 4.2 G/DL (ref 3.5–5)
ALP SERPL-CCNC: 73 U/L (ref 34–104)
ALT SERPL W P-5'-P-CCNC: 7 U/L (ref 7–52)
ANION GAP SERPL CALCULATED.3IONS-SCNC: 13 MMOL/L (ref 4–13)
AST SERPL W P-5'-P-CCNC: 14 U/L (ref 13–39)
BILIRUB SERPL-MCNC: 0.34 MG/DL (ref 0.2–1)
BUN SERPL-MCNC: 16 MG/DL (ref 5–25)
CALCIUM SERPL-MCNC: 7.4 MG/DL (ref 8.4–10.2)
CHLORIDE SERPL-SCNC: 98 MMOL/L (ref 96–108)
CO2 SERPL-SCNC: 31 MMOL/L (ref 21–32)
CREAT SERPL-MCNC: 1.34 MG/DL (ref 0.6–1.3)
GFR SERPL CREATININE-BSD FRML MDRD: 48 ML/MIN/1.73SQ M
GLUCOSE SERPL-MCNC: 63 MG/DL (ref 65–140)
POTASSIUM SERPL-SCNC: 4 MMOL/L (ref 3.5–5.3)
PROT SERPL-MCNC: 7.7 G/DL (ref 6.4–8.4)
SODIUM SERPL-SCNC: 142 MMOL/L (ref 135–147)

## 2025-03-10 ENCOUNTER — NURSE TRIAGE (OUTPATIENT)
Age: 40
End: 2025-03-10

## 2025-03-10 DIAGNOSIS — R11.0 NAUSEA: ICD-10-CM

## 2025-03-10 DIAGNOSIS — C49.A2 MALIGNANT GASTROINTESTINAL STROMAL TUMOR (GIST) OF STOMACH (HCC): Primary | ICD-10-CM

## 2025-03-10 RX ORDER — PROCHLORPERAZINE MALEATE 5 MG/1
5 TABLET ORAL EVERY 6 HOURS PRN
Qty: 30 TABLET | Refills: 1 | Status: SHIPPED | OUTPATIENT
Start: 2025-03-10

## 2025-03-10 RX ORDER — ONDANSETRON 8 MG/1
8 TABLET, FILM COATED ORAL EVERY 8 HOURS PRN
Qty: 30 TABLET | Refills: 1 | Status: SHIPPED | OUTPATIENT
Start: 2025-03-10

## 2025-03-10 NOTE — TELEPHONE ENCOUNTER
FOLLOW UP: 3/18 with Dr. Conde    REASON FOR CONVERSATION: Nausea, vomiting, diarrhea    SYMPTOMS: Started Gleevec 3/7, intermittent episodes of nausea vomiting and diarrhea since, had normal BM this morning  Pt recalls eating spicy food last night and experiences 1-2 hours nausea vomiting and diarrhea after  Denies fever, SOB, CP, bleeding    OTHER: Advised patient to increase oral hydration, switch to bland neutral diet like rice bananas chicken toast, avoid spicy greasy or fried foods, eat frequent small meals throughout day instead of 3 large ones, avoid foods that cause gas like broccoli, closely monitor for new or worsening symptoms and will review with Dr. Conde about zofran    DISPOSITION: Home Care, Call back at [phone] regarding zofran    Reason for Disposition   Home Care    Answer Assessment - Initial Assessment Questions  1. What is your cancer diagnosis, and what treatment(s) are you receiving? Review past medical history. When did you receive your last chemotherapy treatment or undergo a transplant? What chemotherapy did the patient receive?   GIST  Started Gleevec 3/7    2. What medications are you currently taking? Are you taking an opioid, or new medications? If yes, what are they? Are you currently using any antiemetic medications? (medications that help with nausea or vomiting) If yes, what are they? Include last dosages of medications.   N/A    3. Obtain a history of the problem. Please describe your symptoms in detail, including severity, precipitating/relieving factors, onset and duration. What color is the emesis? Is there any blood or coffee-ground emesis?  Started Gleevec 3/7  Developed diarrhea, nausea, and vomiting on 3/7 into 3/8  Had 1-2 hours of diarrhea, nausea and vomiting on 3/9  Normal BM this morning  Denies bloody/coffee ground    4. What nonpharmacologic interventions has the patient tried and are they effective?   N/A    5. What is your food and fluid intake over the  past 24 hours? Any associated symptoms such as signs of dehydration (e.g., decreased urine output, fever, thirst, dry mucous membranes, weakness, dizziness, confusion)? When was the last time patient urinated?  Denies    Protocols used: ONC-Nausea and Vomiting-ADULT-OH

## 2025-03-10 NOTE — TELEPHONE ENCOUNTER
Call placed to patient.  Reviewed symptoms,  States Friday she had nausea and diarrhea multiple time.  Saturday she had no symptoms and Sunday she had abdominal pain then proceeded to vomit and have diarrhea twice.  Since then she has been fine.  She does note having spicy food.     Advised to stay away from spicy and greasy foods especially the meal she is taking the Gleevec.  Zofran sent to pharmacy.  Advised to try taking Zofran about an hour prior to taking the Gleevec to see if it helps with any nausea.  Stated understanding.  No questions at this time

## 2025-03-10 NOTE — TELEPHONE ENCOUNTER
Received the following messages from patient...    Hello I am sorry to bother on a Saturday.  So I started the Gleevec pill last night.  I was so nauseous all night & at one point thought I was going to throw up.  I had some stomach pains & diarrhea (which I read was normal).  It lasted about 3-4 hours after I took the pill.   Am I allowed to take Zofran or a different kind of nausea medication?   If so can the doctor prescribe it?          Thanks!      Good morning.   Sunday was rough, I had really bad stomach pains and then diarrhea.   I also threw up once, but it all only lasted about an hour.   Is this normal?    Attempted to contact patient.  Left voicemail message with call back number.

## 2025-03-13 ENCOUNTER — PATIENT OUTREACH (OUTPATIENT)
Dept: CASE MANAGEMENT | Facility: OTHER | Age: 40
End: 2025-03-13

## 2025-03-13 LAB
CARIS GENOMIC LOH - EXOME: NORMAL
CARIS HER2/NEU: NEGATIVE
CARIS HLA-A: NORMAL
CARIS HLA-B: NORMAL
CARIS HLA-C: NORMAL
CARIS MSI - EXOME: NORMAL
CARIS PD-L1 (SP142): NEGATIVE
CARIS TMB - EXOME: NORMAL

## 2025-03-13 NOTE — PROGRESS NOTES
OSW received an email from the pt expressing her gratitude for [employer] funds assisting with her bills, as well as the [employer] gift cards. She is happy to report that she was able to return to work part time.   OSW will continue to follow.

## 2025-03-17 ENCOUNTER — APPOINTMENT (OUTPATIENT)
Dept: LAB | Age: 40
End: 2025-03-17
Payer: COMMERCIAL

## 2025-03-17 DIAGNOSIS — C49.A2 MALIGNANT GASTROINTESTINAL STROMAL TUMOR (GIST) OF STOMACH (HCC): ICD-10-CM

## 2025-03-17 LAB
ALBUMIN SERPL BCG-MCNC: 4.3 G/DL (ref 3.5–5)
ALP SERPL-CCNC: 78 U/L (ref 34–104)
ALT SERPL W P-5'-P-CCNC: 7 U/L (ref 7–52)
ANION GAP SERPL CALCULATED.3IONS-SCNC: 9 MMOL/L (ref 4–13)
AST SERPL W P-5'-P-CCNC: 14 U/L (ref 13–39)
BASOPHILS # BLD AUTO: 0.06 THOUSANDS/ÂΜL (ref 0–0.1)
BASOPHILS NFR BLD AUTO: 1 % (ref 0–1)
BILIRUB SERPL-MCNC: 0.39 MG/DL (ref 0.2–1)
BUN SERPL-MCNC: 18 MG/DL (ref 5–25)
CALCIUM SERPL-MCNC: 6.8 MG/DL (ref 8.4–10.2)
CHLORIDE SERPL-SCNC: 101 MMOL/L (ref 96–108)
CO2 SERPL-SCNC: 30 MMOL/L (ref 21–32)
CREAT SERPL-MCNC: 1.3 MG/DL (ref 0.6–1.3)
EOSINOPHIL # BLD AUTO: 0.21 THOUSAND/ÂΜL (ref 0–0.61)
EOSINOPHIL NFR BLD AUTO: 3 % (ref 0–6)
ERYTHROCYTE [DISTWIDTH] IN BLOOD BY AUTOMATED COUNT: 15.3 % (ref 11.6–15.1)
GFR SERPL CREATININE-BSD FRML MDRD: 50 ML/MIN/1.73SQ M
GLUCOSE SERPL-MCNC: 94 MG/DL (ref 65–140)
HCT VFR BLD AUTO: 42 % (ref 34.8–46.1)
HGB BLD-MCNC: 12.4 G/DL (ref 11.5–15.4)
IMM GRANULOCYTES # BLD AUTO: 0.03 THOUSAND/UL (ref 0–0.2)
IMM GRANULOCYTES NFR BLD AUTO: 0 % (ref 0–2)
LYMPHOCYTES # BLD AUTO: 3.04 THOUSANDS/ÂΜL (ref 0.6–4.47)
LYMPHOCYTES NFR BLD AUTO: 36 % (ref 14–44)
MCH RBC QN AUTO: 25.8 PG (ref 26.8–34.3)
MCHC RBC AUTO-ENTMCNC: 29.5 G/DL (ref 31.4–37.4)
MCV RBC AUTO: 87 FL (ref 82–98)
MONOCYTES # BLD AUTO: 0.36 THOUSAND/ÂΜL (ref 0.17–1.22)
MONOCYTES NFR BLD AUTO: 4 % (ref 4–12)
NEUTROPHILS # BLD AUTO: 4.71 THOUSANDS/ÂΜL (ref 1.85–7.62)
NEUTS SEG NFR BLD AUTO: 56 % (ref 43–75)
NRBC BLD AUTO-RTO: 0 /100 WBCS
PLATELET # BLD AUTO: 235 THOUSANDS/UL (ref 149–390)
PMV BLD AUTO: 12.9 FL (ref 8.9–12.7)
POTASSIUM SERPL-SCNC: 4.3 MMOL/L (ref 3.5–5.3)
PROT SERPL-MCNC: 7.3 G/DL (ref 6.4–8.4)
RBC # BLD AUTO: 4.81 MILLION/UL (ref 3.81–5.12)
SODIUM SERPL-SCNC: 140 MMOL/L (ref 135–147)
WBC # BLD AUTO: 8.41 THOUSAND/UL (ref 4.31–10.16)

## 2025-03-17 PROCEDURE — 36415 COLL VENOUS BLD VENIPUNCTURE: CPT

## 2025-03-17 PROCEDURE — 85025 COMPLETE CBC W/AUTO DIFF WBC: CPT

## 2025-03-17 PROCEDURE — 80053 COMPREHEN METABOLIC PANEL: CPT

## 2025-03-18 ENCOUNTER — OFFICE VISIT (OUTPATIENT)
Dept: HEMATOLOGY ONCOLOGY | Facility: CLINIC | Age: 40
End: 2025-03-18
Payer: COMMERCIAL

## 2025-03-18 VITALS
WEIGHT: 249 LBS | HEIGHT: 65 IN | TEMPERATURE: 97.4 F | DIASTOLIC BLOOD PRESSURE: 88 MMHG | OXYGEN SATURATION: 97 % | RESPIRATION RATE: 18 BRPM | SYSTOLIC BLOOD PRESSURE: 130 MMHG | BODY MASS INDEX: 41.48 KG/M2 | HEART RATE: 71 BPM

## 2025-03-18 DIAGNOSIS — E83.51 HYPOCALCEMIA: ICD-10-CM

## 2025-03-18 DIAGNOSIS — C49.A2 MALIGNANT GASTROINTESTINAL STROMAL TUMOR (GIST) OF STOMACH (HCC): Primary | ICD-10-CM

## 2025-03-18 PROCEDURE — 99213 OFFICE O/P EST LOW 20 MIN: CPT | Performed by: INTERNAL MEDICINE

## 2025-03-18 NOTE — LETTER
2025     Tomás South MD  [address] [facility]  2nd Floor  [facility] [address]    Patient: Tanya Rosenthal   YOB: 1985   Date of Visit: 3/18/2025       Dear Dr. South:    Thank you for referring Tanya Rosenthal to me for evaluation. Below are my notes for this consultation.    If you have questions, please do not hesitate to call me. I look forward to following your patient along with you.         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  The patient has high risk for micrometastatic disease and progression to gross metastases.  Currently she takes adjuvant imatinib (Gleevec) 400 mg per mouth daily, which she started in early 2025.    Interim Assessment: On March 3, 2025, the patient initiated adjuvant imatinib (Gleevec) 400 mg per mouth daily. Overall, she is tolerating adjuvant imatinib well, without treatment-related serious adverse events . Today, she does not have new complaints.     Plan:  Continue adjuvant imatinib 400 mg per mouth daily.  Minimal duration of adjuvant systemic therapy is 3 years.  Based on results of next generation gene sequencing (mutational analysis of the KIT gene), the patient may require a higher dose of adjuvant imatinib.  Referral to oncology genetics clinic  Periodic contrast enhanced CT scan of the  abdomen and pelvis   Return to medical oncology clinic      Hypocalcemia  Mrs. Rosenthal has chronic moderate hypercalcemia, most likely developing in the setting of prior thyroidectomy for thyroid cancer and associated hypoparathyroidism.  I encouraged her to keep her upcoming outpatient endocrinology evaluation scheduled for May 2, 2025 for comprehensive assessment management of hypocalcemia and hypoparathyroidism.    Patient understands and agrees with my management recommendations and plan of care. I answered questions to her satisfaction.           History of Present Illness   No chief complaint on file.  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  The patient has high risk for micrometastatic disease and progression to gross metastases.  Currently she takes adjuvant imatinib (Gleevec) 400 mg per mouth daily.    In summary, in late December 2024, Mrs. Make sure developed progressive, severe, acute epigastric pain.  On December 29, 2024, she underwent diagnostic workup and evaluation of epigastric pain at the emergency department.  On December 29, 2024, contrast-enhanced CT scan of the abdomen and pelvis showed a 6.0 x 4.4 x 4.5 cm lobulated mass centered within the posterior gastric wall of the proximal stomach just beyond the GE junction. The mass partially extended into the lumen of the stomach as well as outside of the stomach wall, thereby localizing this mass likely within the wall of the stomach. There was no bowel obstruction or focal inflammatory process. On December 29, 2024, pelvic ultrasound showed large bilateral ovarian cysts. On December 30, 2024, EGD showed a of the mass was attempted large mass on the posterior wall of the cardia just below the GEJ.  The mass appeared to be submucosal. Biopsy but this resulted in bleeding and therefore biopsies were not performed.  On February 5, 2025, the patient underwent a partial gastrectomy as well as pelvic examination under anesthesia, laparoscopic total hysterectomy and bilateral salpingo-oophorectomy.  Pathology from partial gastrectomy showed pathologic T3 gastrointestinal stromal tumor (GIST), high-grade /grade 2, with KIT pathogenic variant in exon 11, measuring 5.8 cm in maximum diameter, with 18 mitosis per 5 mm2, with necrosis present 5%, with negative surgical resection margins.  Pathology from total hysterectomy and bilateral salpingo-oophorectomy did not show malignancy.  In the ovaries there were bilateral cystadenomas.      Interim History: On March 3, 2025, the patient initiated adjuvant imatinib (Gleevec) 400 mg per mouth daily. Overall, she is tolerating adjuvant imatinib currently well, without treatment-related serious AEs.  She has noticed mild to moderate, intermittent gastrointestinal symptoms such as indigestion, mid abdominal discomfort, occasional loose stools, and occasional mid abdominal pain.  Yesterday she refers having an episode of mid abdominal pain for which she took an oral opiate medication with no relief of pain.  She denies other gastrointestinal symptoms such as odynophagia, dysphagia, nausea, vomiting, hematemesis, melena, hematochezia, abdominal distention, or jaundice.  She does not have new systemic, cardiorespiratory, genitourinary, gynecological, dermatological, musculoskeletal, or neurologic symptoms.  Of note, the patient has chronic moderate hypercalcemia in the setting of prior surgical resection of the parathyroid glands.    Oncology History  Cancer Staging   GIST (gastrointestinal stromal tumor), malignant (HCC)  Staging form: Gastric Stromal Tumor - Gastric GIST, AJCC 8th Edition  - Pathologic: Stage IIIA (pT3, pN0, cM0, Mitotic Rate: High) - Signed by Tomás South MD on  2/26/2025  Histologic grade (G): High grade  Histologic grading system: 2 grade system  Residual tumor (R): R0 - None  Oncology History   GIST (gastrointestinal stromal tumor), malignant (HCC)   1/7/2025 Biopsy    Endoscopic Ultrasound    Stomach, Gastric Submucosal, FNA:  Neoplastic.  Gastrointestinal stromal tumor (GIST), mixed spindle cell and epithelioid type.  > 5 mitoses per 50 HPF     1/13/2025 Initial Diagnosis    GIST (gastrointestinal stromal tumor), malignant (HCC)     2/5/2025 Surgery    Partial gastrectomy:   Gastric mass, resection:  -   Gastrointestinal stromal tumor of stomach, 5.8 cm in greatest dimension.    TUMOR   Tumor Focality  Unifocal   Tumor Site  Stomach: Just distal to GE junction per op note   Tumor Size  Greatest Dimension (Centimeters): 5.8 cm   Mitotic Rate  18 mitoses per 5 mm2   Histologic Grade  G2, high grade   MARGINS   Margin Status  All margins negative for GIST   pTNM CLASSIFICATION (AJCC 8th Edition)   pT Category  pT3   pN Category  pN not assigned (no nodes submitted or found)           2/26/2025 -  Cancer Staged    Staging form: Gastric Stromal Tumor - Gastric GIST, AJCC 8th Edition  - Pathologic: Stage IIIA (pT3, pN0, cM0, Mitotic Rate: High) - Signed by Tomás South MD on 2/26/2025  Histologic grade (G): High grade  Histologic grading system: 2 grade system  Residual tumor (R): R0 - None          Pertinent Medical History     Past Medical History:   Diagnosis Date   • Anxiety    • Depression    • Heroin abuse (HCC)     clean since 2015   • Thyroid cancer (HCC) 1999    with thyroidectomy and RTX     Past Surgical History:   Procedure Laterality Date   • EGD     • GANGLION CYST EXCISION Left    • PARTIAL GASTRECTOMY N/A 2/5/2025    Procedure: PARTIAL GASTRECTOMY;  Surgeon: Tomás South MD;  [facility];  Service: Surgical Oncology   • PELVIC LAPAROSCOPY Bilateral 2/5/2025    Procedure: SALPINGO-OOPHORECTOMY, LAPAROSCOPIC;  Surgeon: Varghese Jung MD;   Location: [facility];  Service: Gynecology Oncology   • MS LAPAROSCOPY W TOTAL HYSTERECTOMY UTERUS 250 GM/< N/A 2/5/2025    Procedure: LAPAROSCOPIC TOTAL HYSTERECTOMY, EXAM UNDER ANESTHESIA;  Surgeon: Varghese Jung MD;  Location: [facility];  Service: Gynecology Oncology   • THYROIDECTOMY        Family History   Problem Relation Age of Onset   • Diabetes Mother    • Stomach cancer Mother         maltoma lyphoma   • Hyperlipidemia Father    • Hypertension Father    • Lung cancer Maternal Aunt    • Breast cancer Maternal Aunt 52   • Lymphoma Paternal Uncle         non hodgkins     Social History     Socioeconomic History   • Marital status: Single     Spouse name: Not on file   • Number of children: Not on file   • Years of education: Not on file   • Highest education level: Not on file   Occupational History   • Not on file   Tobacco Use   • Smoking status: Former     Current packs/day: 0.25     Average packs/day: 0.3 packs/day for 26.2 years (6.5 ttl pk-yrs)     Types: Cigarettes     Start date: 1/1/1999   • Smokeless tobacco: Never   • Tobacco comments:     Trying quitting  ago   Vaping Use   • Vaping status: Every Day   • Substances: THC, CBD   Substance and Sexual Activity   • Alcohol use: Not Currently   • Drug use: Yes     Types: Marijuana     Comment: 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   • Sexual activity: Not on file   Other Topics Concern   • Not on file   Social History Narrative   • Not on file     Social Drivers of Health     Financial Resource Strain: Not on file   Food Insecurity: No Food Insecurity (2/5/2025)    Nursing - Inadequate Food Risk Classification    • Worried About Running Out of Food in the Last Year: Not on file    • Ran Out of Food in the Last Year: Not on file    • Ran Out of Food in the Last Year: Never true   Transportation Needs: No Transportation Needs (2/5/2025)    Nursing - Transportation Risk Classification    • Lack of Transportation: Not on file    • Lack of  Transportation: No   Physical Activity: Not on file   Stress: Not on file   Social Connections: Not on file   Intimate Partner Violence: Unknown (2025)    Nursing IPS    • Feels Physically and Emotionally Safe: Not on file    • Physically Hurt by Someone: Not on file    • Humiliated or Emotionally Abused by Someone: Not on file    • Physically Hurt by Someone: No    • Hurt or Threatened by Someone: No   Housing Stability: Unknown (2025)    Nursing: Inadequate Housing Risk Classification    • Has Housing: Not on file    • Worried About Losing Housing: Not on file    • Unable to Get Utilities: Not on file    • Unable to Pay for Housing in the Last Year: No    • Has Housin          Review of Systems   Constitutional:  Positive for fatigue. Negative for activity change, appetite change, chills, diaphoresis, fever and unexpected weight change.   HENT:  Negative for congestion, dental problem, ear discharge, ear pain, facial swelling, hearing loss, mouth sores, nosebleeds, postnasal drip, rhinorrhea, sinus pain, sneezing, sore throat, tinnitus, trouble swallowing and voice change.    Eyes:  Negative for photophobia, pain, discharge, redness, itching and visual disturbance.   Respiratory:  Negative for apnea, cough, choking, chest tightness, shortness of breath, wheezing and stridor.    Cardiovascular:  Negative for chest pain, palpitations and leg swelling.   Gastrointestinal:  Positive for abdominal pain. Negative for abdominal distention, anal bleeding, blood in stool, constipation, diarrhea, nausea, rectal pain and vomiting.        Loose stools, indigestion, abdominal cramps   Endocrine: Negative for cold intolerance and heat intolerance.   Genitourinary:  Negative for decreased urine volume, difficulty urinating, dysuria, enuresis, flank pain, frequency, hematuria and urgency.   Musculoskeletal:  Negative for arthralgias, back pain, gait problem, joint swelling, myalgias, neck pain and neck stiffness.    Skin:  Negative for color change, pallor, rash and wound.   Neurological:  Negative for dizziness, tremors, seizures, syncope, facial asymmetry, speech difficulty, weakness, light-headedness, numbness and headaches.   Hematological:  Negative for adenopathy. Does not bruise/bleed easily.   Psychiatric/Behavioral:  Negative for behavioral problems, confusion, dysphoric mood and sleep disturbance. The patient is not nervous/anxious.            Objective   LMP  (LMP Unknown)     Pain Screening:     ECOG  1  Physical Exam  Constitutional:       Appearance: She is obese.      Comments: Female patient without acute respiratory distress   HENT:      Head: Normocephalic and atraumatic.      Nose: Nose normal.      Mouth/Throat:      Mouth: Mucous membranes are moist.      Pharynx: Oropharynx is clear.      Comments: No lesions or ulceration in the oral mucosa  Eyes:      Extraocular Movements: Extraocular movements intact.      Conjunctiva/sclera: Conjunctivae normal.      Pupils: Pupils are equal, round, and reactive to light.      Comments: No scleral icterus    Neck:      Comments: No cervical, supraclavicular, axillary, epitrochlear, or inguinal lymphadenopathy.   Cardiovascular:      Rate and Rhythm: Normal rate and regular rhythm.      Pulses: Normal pulses.      Heart sounds: Normal heart sounds.   Pulmonary:      Effort: Pulmonary effort is normal.      Breath sounds: Normal breath sounds.   Abdominal:      General: Bowel sounds are normal.      Palpations: Abdomen is soft.      Comments: Abdomen soft, nontender, nonpainful.  No hepatomegaly.  No splenomegaly.  No rebound tenderness.  No lateral or shifting dullness.  Bowel sounds present, normal.    Musculoskeletal:         General: Normal range of motion.      Cervical back: Normal range of motion and neck supple.   Skin:     General: Skin is warm and dry.      Capillary Refill: Capillary refill takes less than 2 seconds.   Neurological:      General: No focal  deficit present.      Mental Status: She is alert and oriented to person, place, and time. Mental status is at baseline.   Psychiatric:         Mood and Affect: Mood normal.         Behavior: Behavior normal.         Thought Content: Thought content normal.         Judgment: Judgment normal.     Labs: I have reviewed the following labs:  Lab Results   Component Value Date/Time    WBC 8.41 03/17/2025 01:50 PM    RBC 4.81 03/17/2025 01:50 PM    Hemoglobin 12.4 03/17/2025 01:50 PM    Hematocrit 42.0 03/17/2025 01:50 PM    MCV 87 03/17/2025 01:50 PM    MCH 25.8 (L) 03/17/2025 01:50 PM    RDW 15.3 (H) 03/17/2025 01:50 PM    Platelets 235 03/17/2025 01:50 PM    Segmented % 56 03/17/2025 01:50 PM    Lymphocytes % 36 03/17/2025 01:50 PM    Monocytes % 4 03/17/2025 01:50 PM    Eosinophils Relative 3 03/17/2025 01:50 PM    Basophils Relative 1 03/17/2025 01:50 PM    Immature Grans % 0 03/17/2025 01:50 PM    Absolute Neutrophils 4.71 03/17/2025 01:50 PM     Lab Results   Component Value Date/Time    Potassium 4.3 03/17/2025 01:50 PM    Potassium 4.3 12/25/2024 07:10 PM    Chloride 101 03/17/2025 01:50 PM    Chloride 101 12/25/2024 07:10 PM    Carbon Dioxide 29 12/25/2024 07:10 PM    CO2 30 03/17/2025 01:50 PM    BUN 18 03/17/2025 01:50 PM    BUN 19 12/25/2024 07:10 PM    Creatinine 1.30 03/17/2025 01:50 PM    Creatinine 1.28 (H) 12/25/2024 07:10 PM    Glucose, Fasting 77 01/29/2025 09:13 AM    Calcium 6.8 (L) 03/17/2025 01:50 PM    Calcium 6.6 (L) 12/25/2024 07:10 PM    AST 14 03/17/2025 01:50 PM    AST 17 12/25/2024 07:10 PM    ALT 7 03/17/2025 01:50 PM    ALT 10 12/25/2024 07:10 PM    Alkaline Phosphatase 78 03/17/2025 01:50 PM    Alkaline Phosphatase 65 12/25/2024 07:10 PM    Total Protein 7.3 03/17/2025 01:50 PM    Protein, Total 7.2 12/25/2024 07:10 PM    Albumin 4.3 03/17/2025 01:50 PM    ALBUMIN 4.1 12/25/2024 07:10 PM    Total Bilirubin 0.39 03/17/2025 01:50 PM    Total Bilirubin 0.4 12/25/2024 07:10 PM    eGFRcr 54  (L) 2024 07:10 PM    eGFR 50 2025 01:50 PM               Juan Conde MD  3/19/2025  9:10 AM  Sign when Signing Visit  Name: Tanya Rosenthal      : 1985      MRN: [id number]  Encounter Provider: Juan Conde MD  Encounter Date: 3/18/2025   Encounter department: [facility] [place]  :  Assessment & Plan  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  The patient has high risk for micrometastatic disease and progression to gross metastases.  Currently she takes adjuvant imatinib (Gleevec) 400 mg per mouth daily, which she started in early 2025.    Interim Assessment: On March 3, 2025, the patient initiated adjuvant imatinib (Gleevec) 400 mg per mouth daily. Overall, she is tolerating adjuvant imatinib well, without treatment-related serious adverse events . Today, she does not have new complaints.     Plan:  Continue adjuvant imatinib 400 mg per mouth daily.  Minimal duration of adjuvant systemic therapy is 3 years.  Based on results of next generation gene sequencing (mutational analysis of the KIT gene), the patient may require a higher dose of adjuvant imatinib.  Referral to oncology genetics clinic  Periodic contrast enhanced CT scan of the abdomen and pelvis   Return to medical oncology clinic      Mrs. Rosenthal understands and agrees with my management recommendations and plan of care. I answered questions to her satisfaction.       Hypocalcemia  Mrs. Rosenthal has chronic moderate hypercalcemia, most likely developing in the setting of prior thyroidectomy for thyroid cancer and associated hypoparathyroidism.  I encouraged her to keep her  upcoming outpatient endocrinology evaluation scheduled for May 2, 2025 for comprehensive assessment management of hypocalcemia and hypoparathyroidism.    Patient understands and agrees with my management recommendations and plan of care. I answered questions to her satisfaction.           History of Present Illness   No chief complaint on file.  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  The patient has high risk for micrometastatic disease and progression to gross metastases.  Currently she takes adjuvant imatinib (Gleevec) 400 mg per mouth daily.    In summary, in late December 2024, Mrs. Make sure developed progressive, severe, acute epigastric pain.  On December 29, 2024, she underwent diagnostic workup and evaluation of epigastric pain at the emergency department.  On December 29, 2024, contrast-enhanced CT scan of the abdomen and pelvis showed a 6.0 x 4.4 x 4.5 cm lobulated mass centered within the posterior gastric wall of the proximal stomach just beyond the GE junction. The mass partially extended into the lumen of the stomach as well as outside of the stomach wall, thereby localizing this mass likely within the wall of the stomach. There was no bowel obstruction or focal inflammatory process. On December 29, 2024, pelvic ultrasound showed large bilateral ovarian cysts. On December 30, 2024, EGD showed a of the mass was attempted large mass on the posterior wall of the cardia just below the GEJ. The mass appeared to be submucosal. Biopsy but this resulted in bleeding and therefore biopsies were not performed.  On February 5, 2025, the patient underwent a partial gastrectomy as well as pelvic examination under anesthesia, laparoscopic total hysterectomy and  bilateral salpingo-oophorectomy.  Pathology from partial gastrectomy showed pathologic T3 gastrointestinal stromal tumor (GIST), high-grade /grade 2, with KIT pathogenic variant in exon 11, measuring 5.8 cm in maximum diameter, with 18 mitosis per 5 mm2, with necrosis present 5%, with negative surgical resection margins.  Pathology from total hysterectomy and bilateral salpingo-oophorectomy did not show malignancy.  In the ovaries there were bilateral cystadenomas.      Interim History: On March 3, 2025, the patient initiated adjuvant imatinib (Gleevec) 400 mg per mouth daily. Overall, she is tolerating adjuvant imatinib currently well, without treatment-related serious AEs.  She has noticed mild to moderate, intermittent gastrointestinal symptoms such as indigestion, mid abdominal discomfort, occasional loose stools, and occasional mid abdominal pain.  Yesterday she refers having an episode of mid abdominal pain for which she took an oral opiate medication with no relief of pain.  She denies other gastrointestinal symptoms such as odynophagia, dysphagia, nausea, vomiting, hematemesis, melena, hematochezia, abdominal distention, or jaundice.  She does not have new systemic, cardiorespiratory, genitourinary, gynecological, dermatological, musculoskeletal, or neurologic symptoms.  Of note, the patient has chronic moderate hypercalcemia in the setting of prior surgical resection of the parathyroid glands.    Oncology History  Cancer Staging   GIST (gastrointestinal stromal tumor), malignant (HCC)  Staging form: Gastric Stromal Tumor - Gastric GIST, AJCC 8th Edition  - Pathologic: Stage IIIA (pT3, pN0, cM0, Mitotic Rate: High) - Signed by Tomás South MD on 2/26/2025  Histologic grade (G): High grade  Histologic grading system: 2 grade system  Residual tumor (R): R0 - None  Oncology History   GIST (gastrointestinal stromal tumor), malignant (HCC)   1/7/2025 Biopsy    Endoscopic Ultrasound    Stomach, Gastric  Submucosal, FNA:  Neoplastic.  Gastrointestinal stromal tumor (GIST), mixed spindle cell and epithelioid type.  > 5 mitoses per 50 HPF     1/13/2025 Initial Diagnosis    GIST (gastrointestinal stromal tumor), malignant (HCC)     2/5/2025 Surgery    Partial gastrectomy:   Gastric mass, resection:  -   Gastrointestinal stromal tumor of stomach, 5.8 cm in greatest dimension.    TUMOR   Tumor Focality  Unifocal   Tumor Site  Stomach: Just distal to GE junction per op note   Tumor Size  Greatest Dimension (Centimeters): 5.8 cm   Mitotic Rate  18 mitoses per 5 mm2   Histologic Grade  G2, high grade   MARGINS   Margin Status  All margins negative for GIST   pTNM CLASSIFICATION (AJCC 8th Edition)   pT Category  pT3   pN Category  pN not assigned (no nodes submitted or found)           2/26/2025 -  Cancer Staged    Staging form: Gastric Stromal Tumor - Gastric GIST, AJCC 8th Edition  - Pathologic: Stage IIIA (pT3, pN0, cM0, Mitotic Rate: High) - Signed by Tomás South MD on 2/26/2025  Histologic grade (G): High grade  Histologic grading system: 2 grade system  Residual tumor (R): R0 - None          Pertinent Medical History     Past Medical History:   Diagnosis Date   • Anxiety    • Depression    • Heroin abuse (HCC)     clean since 2015   • Thyroid cancer (HCC) 1999    with thyroidectomy and RTX     Past Surgical History:   Procedure Laterality Date   • EGD     • GANGLION CYST EXCISION Left    • PARTIAL GASTRECTOMY N/A 2/5/2025    Procedure: PARTIAL GASTRECTOMY;  Surgeon: Tomás South MD;  [facility];  Service: Surgical Oncology   • PELVIC LAPAROSCOPY Bilateral 2/5/2025    Procedure: SALPINGO-OOPHORECTOMY, LAPAROSCOPIC;  Surgeon: Varghese Jung MD;  [facility];  Service: Gynecology Oncology   • [place] LAPAROSCOPY W TOTAL HYSTERECTOMY UTERUS 250 GM/< N/A 2/5/2025    Procedure: LAPAROSCOPIC TOTAL HYSTERECTOMY, EXAM UNDER ANESTHESIA;  Surgeon: Varghese Jung MD;  Location[facility];   Service: Gynecology Oncology   • THYROIDECTOMY        Family History   Problem Relation Age of Onset   • Diabetes Mother    • Stomach cancer Mother         maltoma lyphoma   • Hyperlipidemia Father    • Hypertension Father    • Lung cancer Maternal Aunt    • Breast cancer Maternal Aunt 52   • Lymphoma Paternal Uncle         non hodgkins     Social History     Socioeconomic History   • Marital status: Single     Spouse name: Not on file   • Number of children: Not on file   • Years of education: Not on file   • Highest education level: Not on file   Occupational History   • Not on file   Tobacco Use   • Smoking status: Former     Current packs/day: 0.25     Average packs/day: 0.3 packs/day for 26.2 years (6.5 ttl pk-yrs)     Types: Cigarettes     Start date: 1/1/1999   • Smokeless tobacco: Never   • Tobacco comments:     Trying quitting  ago   Vaping Use   • Vaping status: Every Day   • Substances: THC, CBD   Substance and Sexual Activity   • Alcohol use: Not Currently   • Drug use: Yes     Types: Marijuana     Comment: 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   • Sexual activity: Not on file   Other Topics Concern   • Not on file   Social History Narrative   • Not on file     Social Drivers of Health     Financial Resource Strain: Not on file   Food Insecurity: No Food Insecurity (2/5/2025)    Nursing - Inadequate Food Risk Classification    • Worried About Running Out of Food in the Last Year: Not on file    • Ran Out of Food in the Last Year: Not on file    • Ran Out of Food in the Last Year: Never true   Transportation Needs: No Transportation Needs (2/5/2025)    Nursing - Transportation Risk Classification    • Lack of Transportation: Not on file    • Lack of Transportation: No   Physical Activity: Not on file   Stress: Not on file   Social Connections: Not on file   Intimate Partner Violence: Unknown (2/5/2025)    Nursing IPS    • Feels Physically and Emotionally Safe: Not on file    • Physically Hurt by  Someone: Not on file    • Humiliated or Emotionally Abused by Someone: Not on file    • Physically Hurt by Someone: No    • Hurt or Threatened by Someone: No   Housing Stability: Unknown (2025)    Nursing: Inadequate Housing Risk Classification    • Has Housing: Not on file    • Worried About Losing Housing: Not on file    • Unable to Get Utilities: Not on file    • Unable to Pay for Housing in the Last Year: No    • Has Housin          Review of Systems   Constitutional:  Positive for fatigue. Negative for activity change, appetite change, chills, diaphoresis, fever and unexpected weight change.   HENT:  Negative for congestion, dental problem, ear discharge, ear pain, facial swelling, hearing loss, mouth sores, nosebleeds, postnasal drip, rhinorrhea, sinus pain, sneezing, sore throat, tinnitus, trouble swallowing and voice change.    Eyes:  Negative for photophobia, pain, discharge, redness, itching and visual disturbance.   Respiratory:  Negative for apnea, cough, choking, chest tightness, shortness of breath, wheezing and stridor.    Cardiovascular:  Negative for chest pain, palpitations and leg swelling.   Gastrointestinal:  Positive for abdominal pain. Negative for abdominal distention, anal bleeding, blood in stool, constipation, diarrhea, nausea, rectal pain and vomiting.        Loose stools, indigestion, abdominal cramps   Endocrine: Negative for cold intolerance and heat intolerance.   Genitourinary:  Negative for decreased urine volume, difficulty urinating, dysuria, enuresis, flank pain, frequency, hematuria and urgency.   Musculoskeletal:  Negative for arthralgias, back pain, gait problem, joint swelling, myalgias, neck pain and neck stiffness.   Skin:  Negative for color change, pallor, rash and wound.   Neurological:  Negative for dizziness, tremors, seizures, syncope, facial asymmetry, speech difficulty, weakness, light-headedness, numbness and headaches.   Hematological:  Negative for  adenopathy. Does not bruise/bleed easily.   Psychiatric/Behavioral:  Negative for behavioral problems, confusion, dysphoric mood and sleep disturbance. The patient is not nervous/anxious.            Objective   LMP  (LMP Unknown)     Pain Screening:     ECOG  1  Physical Exam  Constitutional:       Appearance: She is obese.      Comments: Female patient without acute respiratory distress   HENT:      Head: Normocephalic and atraumatic.      Nose: Nose normal.      Mouth/Throat:      Mouth: Mucous membranes are moist.      Pharynx: Oropharynx is clear.      Comments: No lesions or ulceration in the oral mucosa  Eyes:      Extraocular Movements: Extraocular movements intact.      Conjunctiva/sclera: Conjunctivae normal.      Pupils: Pupils are equal, round, and reactive to light.      Comments: No scleral icterus    Neck:      Comments: No cervical, supraclavicular, axillary, epitrochlear, or inguinal lymphadenopathy.   Cardiovascular:      Rate and Rhythm: Normal rate and regular rhythm.      Pulses: Normal pulses.      Heart sounds: Normal heart sounds.   Pulmonary:      Effort: Pulmonary effort is normal.      Breath sounds: Normal breath sounds.   Abdominal:      General: Bowel sounds are normal.      Palpations: Abdomen is soft.      Comments: Abdomen soft, nontender, nonpainful.  No hepatomegaly.  No splenomegaly.  No rebound tenderness.  No lateral or shifting dullness.  Bowel sounds present, normal.    Musculoskeletal:         General: Normal range of motion.      Cervical back: Normal range of motion and neck supple.   Skin:     General: Skin is warm and dry.      Capillary Refill: Capillary refill takes less than 2 seconds.   Neurological:      General: No focal deficit present.      Mental Status: She is alert and oriented to person, place, and time. Mental status is at baseline.   Psychiatric:         Mood and Affect: Mood normal.         Behavior: Behavior normal.         Thought Content: Thought content  normal.         Judgment: Judgment normal.     Labs: I have reviewed the following labs:  Lab Results   Component Value Date/Time    WBC 8.41 03/17/2025 01:50 PM    RBC 4.81 03/17/2025 01:50 PM    Hemoglobin 12.4 03/17/2025 01:50 PM    Hematocrit 42.0 03/17/2025 01:50 PM    MCV 87 03/17/2025 01:50 PM    MCH 25.8 (L) 03/17/2025 01:50 PM    RDW 15.3 (H) 03/17/2025 01:50 PM    Platelets 235 03/17/2025 01:50 PM    Segmented % 56 03/17/2025 01:50 PM    Lymphocytes % 36 03/17/2025 01:50 PM    Monocytes % 4 03/17/2025 01:50 PM    Eosinophils Relative 3 03/17/2025 01:50 PM    Basophils Relative 1 03/17/2025 01:50 PM    Immature Grans % 0 03/17/2025 01:50 PM    Absolute Neutrophils 4.71 03/17/2025 01:50 PM     Lab Results   Component Value Date/Time    Potassium 4.3 03/17/2025 01:50 PM    Potassium 4.3 12/25/2024 07:10 PM    Chloride 101 03/17/2025 01:50 PM    Chloride 101 12/25/2024 07:10 PM    Carbon Dioxide 29 12/25/2024 07:10 PM    CO2 30 03/17/2025 01:50 PM    BUN 18 03/17/2025 01:50 PM    BUN 19 12/25/2024 07:10 PM    Creatinine 1.30 03/17/2025 01:50 PM    Creatinine 1.28 (H) 12/25/2024 07:10 PM    Glucose, Fasting 77 01/29/2025 09:13 AM    Calcium 6.8 (L) 03/17/2025 01:50 PM    Calcium 6.6 (L) 12/25/2024 07:10 PM    AST 14 03/17/2025 01:50 PM    AST 17 12/25/2024 07:10 PM    ALT 7 03/17/2025 01:50 PM    ALT 10 12/25/2024 07:10 PM    Alkaline Phosphatase 78 03/17/2025 01:50 PM    Alkaline Phosphatase 65 12/25/2024 07:10 PM    Total Protein 7.3 03/17/2025 01:50 PM    Protein, Total 7.2 12/25/2024 07:10 PM    Albumin 4.3 03/17/2025 01:50 PM    ALBUMIN 4.1 12/25/2024 07:10 PM    Total Bilirubin 0.39 03/17/2025 01:50 PM    Total Bilirubin 0.4 12/25/2024 07:10 PM    eGFRcr 54 (L) 12/25/2024 07:10 PM    eGFR 50 03/17/2025 01:50 PM

## 2025-03-18 NOTE — LETTER
2025     Tomás South MD  [address] [facility]  2nd Floor  [facility] [address]    Patient: Tanya Rosenthal   YOB: 1985   Date of Visit: 3/18/2025       Dear Dr. South:    Thank you for referring Tanya Rosenthal to me for evaluation. Below are my notes for this consultation.    If you have questions, please do not hesitate to call me. I look forward to following your patient along with you.         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  The patient has high risk for micrometastatic disease and progression to gross metastases.  Currently she takes adjuvant imatinib (Gleevec) 400 mg per mouth daily, which she started in early 2025.    Interim Assessment: On March 3, 2025, the patient initiated adjuvant imatinib (Gleevec) 400 mg per mouth daily. Overall, she is tolerating adjuvant imatinib well, without treatment-related serious adverse events . Today, she does not have new complaints.     Plan:  Continue adjuvant imatinib 400 mg per mouth daily.  Minimal duration of adjuvant systemic therapy is 3 years.  Based on results of next generation gene sequencing (mutational analysis of the KIT gene), the patient may require a higher dose of adjuvant imatinib.  Referral to oncology genetics clinic  Periodic contrast enhanced CT  scan of the abdomen and pelvis   Return to medical oncology clinic      Hypocalcemia  Mrs. Rosenthal has chronic moderate hypercalcemia, most likely developing in the setting of prior thyroidectomy for thyroid cancer and associated hypoparathyroidism.  I encouraged her to keep her upcoming outpatient endocrinology evaluation scheduled for May 2, 2025 for comprehensive assessment management of hypocalcemia and hypoparathyroidism.    Patient understands and agrees with my management recommendations and plan of care. I answered questions to her satisfaction.           History of Present Illness   No chief complaint on file.  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  The patient has high risk for micrometastatic disease and progression to gross metastases.  Currently she takes adjuvant imatinib (Gleevec) 400 mg per mouth daily.    In summary, in late December 2024, Mrs. Make sure developed progressive, severe, acute epigastric pain.  On December 29, 2024, she underwent diagnostic workup and evaluation of epigastric pain at the emergency department.  On December 29, 2024, contrast-enhanced CT scan of the abdomen and pelvis showed a 6.0 x 4.4 x 4.5 cm lobulated mass centered within the posterior gastric wall of the proximal stomach just beyond the GE junction. The mass partially extended into the lumen of the stomach as well as outside of the stomach wall, thereby localizing this mass likely within the wall of the stomach. There was no bowel obstruction or focal inflammatory process. On December 29, 2024, pelvic ultrasound showed large bilateral ovarian cysts. On December 30, 2024, EGD showed a of the mass was attempted large mass on the posterior wall of the cardia just  below the GEJ. The mass appeared to be submucosal. Biopsy but this resulted in bleeding and therefore biopsies were not performed.  On February 5, 2025, the patient underwent a partial gastrectomy as well as pelvic examination under anesthesia, laparoscopic total hysterectomy and bilateral salpingo-oophorectomy.  Pathology from partial gastrectomy showed pathologic T3 gastrointestinal stromal tumor (GIST), high-grade /grade 2, with KIT pathogenic variant in exon 11, measuring 5.8 cm in maximum diameter, with 18 mitosis per 5 mm2, with necrosis present 5%, with negative surgical resection margins.  Pathology from total hysterectomy and bilateral salpingo-oophorectomy did not show malignancy.  In the ovaries there were bilateral cystadenomas.      Interim History: On March 3, 2025, the patient initiated adjuvant imatinib (Gleevec) 400 mg per mouth daily. Overall, she is tolerating adjuvant imatinib currently well, without treatment-related serious AEs.  She has noticed mild to moderate, intermittent gastrointestinal symptoms such as indigestion, mid abdominal discomfort, occasional loose stools, and occasional mid abdominal pain.  Yesterday she refers having an episode of mid abdominal pain for which she took an oral opiate medication with no relief of pain.  She denies other gastrointestinal symptoms such as odynophagia, dysphagia, nausea, vomiting, hematemesis, melena, hematochezia, abdominal distention, or jaundice.  She does not have new systemic, cardiorespiratory, genitourinary, gynecological, dermatological, musculoskeletal, or neurologic symptoms.  Of note, the patient has chronic moderate hypercalcemia, developing in the setting of prior thyroidectomy for thyroid cancer, most likely caused by associated hypoparathyroidism.    Oncology History  Cancer Staging   GIST (gastrointestinal stromal tumor), malignant (HCC)  Staging form: Gastric Stromal Tumor - Gastric GIST, AJCC 8th Edition  - Pathologic: Stage IIIA  (pT3, pN0, cM0, Mitotic Rate: High) - Signed by Tomás South MD on 2/26/2025  Histologic grade (G): High grade  Histologic grading system: 2 grade system  Residual tumor (R): R0 - None  Oncology History   GIST (gastrointestinal stromal tumor), malignant (HCC)   1/7/2025 Biopsy    Endoscopic Ultrasound    Stomach, Gastric Submucosal, FNA:  Neoplastic.  Gastrointestinal stromal tumor (GIST), mixed spindle cell and epithelioid type.  > 5 mitoses per 50 HPF     1/13/2025 Initial Diagnosis    GIST (gastrointestinal stromal tumor), malignant (HCC)     2/5/2025 Surgery    Partial gastrectomy:   Gastric mass, resection:  -   Gastrointestinal stromal tumor of stomach, 5.8 cm in greatest dimension.    TUMOR   Tumor Focality  Unifocal   Tumor Site  Stomach: Just distal to GE junction per op note   Tumor Size  Greatest Dimension (Centimeters): 5.8 cm   Mitotic Rate  18 mitoses per 5 mm2   Histologic Grade  G2, high grade   MARGINS   Margin Status  All margins negative for GIST   pTNM CLASSIFICATION (AJCC 8th Edition)   pT Category  pT3   pN Category  pN not assigned (no nodes submitted or found)           2/26/2025 -  Cancer Staged    Staging form: Gastric Stromal Tumor - Gastric GIST, AJCC 8th Edition  - Pathologic: Stage IIIA (pT3, pN0, cM0, Mitotic Rate: High) - Signed by Tomás South MD on 2/26/2025  Histologic grade (G): High grade  Histologic grading system: 2 grade system  Residual tumor (R): R0 - None          Pertinent Medical History     Past Medical History:   Diagnosis Date   • Anxiety    • Depression    • Heroin abuse (HCC)     clean since 2015   • Thyroid cancer (HCC) 1999    with thyroidectomy and RTX     Past Surgical History:   Procedure Laterality Date   • EGD     • GANGLION CYST EXCISION Left    • PARTIAL GASTRECTOMY N/A 2/5/2025    Procedure: PARTIAL GASTRECTOMY;  Surgeon: Tomás South MD;  Location: [facility];  Service: Surgical Oncology   • PELVIC LAPAROSCOPY Bilateral 2/5/2025    Procedure:  SALPINGO-OOPHORECTOMY, LAPAROSCOPIC;  Surgeon: Varghese Jung MD;  Location: [facility];  Service: Gynecology Oncology   • ID LAPAROSCOPY W TOTAL HYSTERECTOMY UTERUS 250 GM/< N/A 2/5/2025    Procedure: LAPAROSCOPIC TOTAL HYSTERECTOMY, EXAM UNDER ANESTHESIA;  Surgeon: Varghese Jung MD;  Location: [facility];  Service: Gynecology Oncology   • THYROIDECTOMY        Family History   Problem Relation Age of Onset   • Diabetes Mother    • Stomach cancer Mother         maltoma lyphoma   • Hyperlipidemia Father    • Hypertension Father    • Lung cancer Maternal Aunt    • Breast cancer Maternal Aunt 52   • Lymphoma Paternal Uncle         non hodgkins     Social History     Socioeconomic History   • Marital status: Single     Spouse name: Not on file   • Number of children: Not on file   • Years of education: Not on file   • Highest education level: Not on file   Occupational History   • Not on file   Tobacco Use   • Smoking status: Former     Current packs/day: 0.25     Average packs/day: 0.3 packs/day for 26.2 years (6.5 ttl pk-yrs)     Types: Cigarettes     Start date: 1/1/1999   • Smokeless tobacco: Never   • Tobacco comments:     Trying quitting  ago   Vaping Use   • Vaping status: Every Day   • Substances: THC, CBD   Substance and Sexual Activity   • Alcohol use: Not Currently   • Drug use: Yes     Types: Marijuana     Comment: 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   • Sexual activity: Not on file   Other Topics Concern   • Not on file   Social History Narrative   • Not on file     Social Drivers of Health     Financial Resource Strain: Not on file   Food Insecurity: No Food Insecurity (2/5/2025)    Nursing - Inadequate Food Risk Classification    • Worried About Running Out of Food in the Last Year: Not on file    • Ran Out of Food in the Last Year: Not on file    • Ran Out of Food in the Last Year: Never true   Transportation Needs: No Transportation Needs (2/5/2025)    Nursing - Transportation  Risk Classification    • Lack of Transportation: Not on file    • Lack of Transportation: No   Physical Activity: Not on file   Stress: Not on file   Social Connections: Not on file   Intimate Partner Violence: Unknown (2025)    Nursing IPS    • Feels Physically and Emotionally Safe: Not on file    • Physically Hurt by Someone: Not on file    • Humiliated or Emotionally Abused by Someone: Not on file    • Physically Hurt by Someone: No    • Hurt or Threatened by Someone: No   Housing Stability: Unknown (2025)    Nursing: Inadequate Housing Risk Classification    • Has Housing: Not on file    • Worried About Losing Housing: Not on file    • Unable to Get Utilities: Not on file    • Unable to Pay for Housing in the Last Year: No    • Has Housin          Review of Systems   Constitutional:  Positive for fatigue. Negative for activity change, appetite change, chills, diaphoresis, fever and unexpected weight change.   HENT:  Negative for congestion, dental problem, ear discharge, ear pain, facial swelling, hearing loss, mouth sores, nosebleeds, postnasal drip, rhinorrhea, sinus pain, sneezing, sore throat, tinnitus, trouble swallowing and voice change.    Eyes:  Negative for photophobia, pain, discharge, redness, itching and visual disturbance.   Respiratory:  Negative for apnea, cough, choking, chest tightness, shortness of breath, wheezing and stridor.    Cardiovascular:  Negative for chest pain, palpitations and leg swelling.   Gastrointestinal:  Positive for abdominal pain. Negative for abdominal distention, anal bleeding, blood in stool, constipation, diarrhea, nausea, rectal pain and vomiting.        Loose stools, indigestion, abdominal cramps   Endocrine: Negative for cold intolerance and heat intolerance.   Genitourinary:  Negative for decreased urine volume, difficulty urinating, dysuria, enuresis, flank pain, frequency, hematuria and urgency.   Musculoskeletal:  Negative for arthralgias, back pain,  gait problem, joint swelling, myalgias, neck pain and neck stiffness.   Skin:  Negative for color change, pallor, rash and wound.   Neurological:  Negative for dizziness, tremors, seizures, syncope, facial asymmetry, speech difficulty, weakness, light-headedness, numbness and headaches.   Hematological:  Negative for adenopathy. Does not bruise/bleed easily.   Psychiatric/Behavioral:  Negative for behavioral problems, confusion, dysphoric mood and sleep disturbance. The patient is not nervous/anxious.            Objective   LMP  (LMP Unknown)     Pain Screening:     ECOG  1  Physical Exam  Constitutional:       Appearance: She is obese.      Comments: Female patient without acute respiratory distress   HENT:      Head: Normocephalic and atraumatic.      Nose: Nose normal.      Mouth/Throat:      Mouth: Mucous membranes are moist.      Pharynx: Oropharynx is clear.      Comments: No lesions or ulceration in the oral mucosa  Eyes:      Extraocular Movements: Extraocular movements intact.      Conjunctiva/sclera: Conjunctivae normal.      Pupils: Pupils are equal, round, and reactive to light.      Comments: No scleral icterus    Neck:      Comments: No cervical, supraclavicular, axillary, epitrochlear, or inguinal lymphadenopathy.   Cardiovascular:      Rate and Rhythm: Normal rate and regular rhythm.      Pulses: Normal pulses.      Heart sounds: Normal heart sounds.   Pulmonary:      Effort: Pulmonary effort is normal.      Breath sounds: Normal breath sounds.   Abdominal:      General: Bowel sounds are normal.      Palpations: Abdomen is soft.      Comments: Abdomen soft, nontender, nonpainful.  No hepatomegaly.  No splenomegaly.  No rebound tenderness.  No lateral or shifting dullness.  Bowel sounds present, normal.    Musculoskeletal:         General: Normal range of motion.      Cervical back: Normal range of motion and neck supple.   Skin:     General: Skin is warm and dry.      Capillary Refill: Capillary  refill takes less than 2 seconds.   Neurological:      General: No focal deficit present.      Mental Status: She is alert and oriented to person, place, and time. Mental status is at baseline.   Psychiatric:         Mood and Affect: Mood normal.         Behavior: Behavior normal.         Thought Content: Thought content normal.         Judgment: Judgment normal.     Labs: I have reviewed the following labs:  Lab Results   Component Value Date/Time    WBC 8.41 03/17/2025 01:50 PM    RBC 4.81 03/17/2025 01:50 PM    Hemoglobin 12.4 03/17/2025 01:50 PM    Hematocrit 42.0 03/17/2025 01:50 PM    MCV 87 03/17/2025 01:50 PM    MCH 25.8 (L) 03/17/2025 01:50 PM    RDW 15.3 (H) 03/17/2025 01:50 PM    Platelets 235 03/17/2025 01:50 PM    Segmented % 56 03/17/2025 01:50 PM    Lymphocytes % 36 03/17/2025 01:50 PM    Monocytes % 4 03/17/2025 01:50 PM    Eosinophils Relative 3 03/17/2025 01:50 PM    Basophils Relative 1 03/17/2025 01:50 PM    Immature Grans % 0 03/17/2025 01:50 PM    Absolute Neutrophils 4.71 03/17/2025 01:50 PM     Lab Results   Component Value Date/Time    Potassium 4.3 03/17/2025 01:50 PM    Potassium 4.3 12/25/2024 07:10 PM    Chloride 101 03/17/2025 01:50 PM    Chloride 101 12/25/2024 07:10 PM    Carbon Dioxide 29 12/25/2024 07:10 PM    CO2 30 03/17/2025 01:50 PM    BUN 18 03/17/2025 01:50 PM    BUN 19 12/25/2024 07:10 PM    Creatinine 1.30 03/17/2025 01:50 PM    Creatinine 1.28 (H) 12/25/2024 07:10 PM    Glucose, Fasting 77 01/29/2025 09:13 AM    Calcium 6.8 (L) 03/17/2025 01:50 PM    Calcium 6.6 (L) 12/25/2024 07:10 PM    AST 14 03/17/2025 01:50 PM    AST 17 12/25/2024 07:10 PM    ALT 7 03/17/2025 01:50 PM    ALT 10 12/25/2024 07:10 PM    Alkaline Phosphatase 78 03/17/2025 01:50 PM    Alkaline Phosphatase 65 12/25/2024 07:10 PM    Total Protein 7.3 03/17/2025 01:50 PM    Protein, Total 7.2 12/25/2024 07:10 PM    Albumin 4.3 03/17/2025 01:50 PM    ALBUMIN 4.1 12/25/2024 07:10 PM    Total Bilirubin 0.39  03/17/2025 01:50 PM    Total Bilirubin 0.4 12/25/2024 07:10 PM    eGFRcr 54 (L) 12/25/2024 07:10 PM    eGFR 50 03/17/2025 01:50 PM

## 2025-03-18 NOTE — PROGRESS NOTES
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  The patient has high risk for micrometastatic disease and progression to gross metastases.  Currently she takes adjuvant imatinib (Gleevec) 400 mg per mouth daily, which she started in early 2025.    Interim Assessment: On March 3, 2025, the patient initiated adjuvant imatinib (Gleevec) 400 mg per mouth daily. Overall, she is tolerating adjuvant imatinib well, without treatment-related serious adverse events . Today, she does not have new complaints.     Plan:  Continue adjuvant imatinib 400 mg per mouth daily.  Minimal duration of adjuvant systemic therapy is 3 years.  Based on results of next generation gene sequencing (mutational analysis of the KIT gene), the patient may require a higher dose of adjuvant imatinib.  Referral to oncology genetics clinic  Periodic contrast enhanced CT scan of the abdomen and pelvis   Return to medical oncology clinic      Hypocalcemia  Mrs. Rosenthal has chronic moderate hypercalcemia, most likely developing in the setting of prior thyroidectomy for thyroid cancer and associated hypoparathyroidism.  I encouraged her to keep her upcoming outpatient endocrinology evaluation scheduled for May 2, 2025 for comprehensive assessment management of hypocalcemia and hypoparathyroidism.    Patient understands and agrees with my management recommendations and plan of care. I answered questions to her  satisfaction.           History of Present Illness   No chief complaint on file.  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  The patient has high risk for micrometastatic disease and progression to gross metastases.  Currently she takes adjuvant imatinib (Gleevec) 400 mg per mouth daily.    In summary, in late December 2024, Mrs. Make sure developed progressive, severe, acute epigastric pain.  On December 29, 2024, she underwent diagnostic workup and evaluation of epigastric pain at the emergency department.  On December 29, 2024, contrast-enhanced CT scan of the abdomen and pelvis showed a 6.0 x 4.4 x 4.5 cm lobulated mass centered within the posterior gastric wall of the proximal stomach just beyond the GE junction. The mass partially extended into the lumen of the stomach as well as outside of the stomach wall, thereby localizing this mass likely within the wall of the stomach. There was no bowel obstruction or focal inflammatory process. On December 29, 2024, pelvic ultrasound showed large bilateral ovarian cysts. On December 30, 2024, EGD showed a of the mass was attempted large mass on the posterior wall of the cardia just below the GEJ. The mass appeared to be submucosal. Biopsy but this resulted in bleeding and therefore biopsies were not performed.  On February 5, 2025, the patient underwent a partial gastrectomy as well as pelvic examination under anesthesia, laparoscopic total hysterectomy and bilateral salpingo-oophorectomy.  Pathology from partial gastrectomy showed pathologic T3 gastrointestinal stromal tumor (GIST), high-grade /grade 2, with KIT pathogenic variant in exon 11, measuring 5.8 cm in maximum diameter, with 18 mitosis per 5 mm2, with necrosis  present 5%, with negative surgical resection margins.  Pathology from total hysterectomy and bilateral salpingo-oophorectomy did not show malignancy.  In the ovaries there were bilateral cystadenomas.      Interim History: On March 3, 2025, the patient initiated adjuvant imatinib (Gleevec) 400 mg per mouth daily. Overall, she is tolerating adjuvant imatinib currently well, without treatment-related serious AEs.  She has noticed mild to moderate, intermittent gastrointestinal symptoms such as indigestion, mid abdominal discomfort, occasional loose stools, and occasional mid abdominal pain.  Yesterday she refers having an episode of mid abdominal pain for which she took an oral opiate medication with no relief of pain.  She denies other gastrointestinal symptoms such as odynophagia, dysphagia, nausea, vomiting, hematemesis, melena, hematochezia, abdominal distention, or jaundice.  She does not have new systemic, cardiorespiratory, genitourinary, gynecological, dermatological, musculoskeletal, or neurologic symptoms.  Of note, the patient has chronic moderate hypercalcemia, developing in the setting of prior thyroidectomy for thyroid cancer, most likely caused by associated hypoparathyroidism.    Oncology History   Cancer Staging   GIST (gastrointestinal stromal tumor), malignant (HCC)  Staging form: Gastric Stromal Tumor - Gastric GIST, AJCC 8th Edition  - Pathologic: Stage IIIA (pT3, pN0, cM0, Mitotic Rate: High) - Signed by Tomás South MD on 2/26/2025  Histologic grade (G): High grade  Histologic grading system: 2 grade system  Residual tumor (R): R0 - None  Oncology History   GIST (gastrointestinal stromal tumor), malignant (HCC)   1/7/2025 Biopsy    Endoscopic Ultrasound    Stomach, Gastric Submucosal, FNA:  Neoplastic.  Gastrointestinal stromal tumor (GIST), mixed spindle cell and epithelioid type.  > 5 mitoses per 50 HPF     1/13/2025 Initial Diagnosis    GIST (gastrointestinal stromal tumor), malignant  (HCC)     2/5/2025 Surgery    Partial gastrectomy:   Gastric mass, resection:  -   Gastrointestinal stromal tumor of stomach, 5.8 cm in greatest dimension.    TUMOR   Tumor Focality  Unifocal   Tumor Site  Stomach: Just distal to GE junction per op note   Tumor Size  Greatest Dimension (Centimeters): 5.8 cm   Mitotic Rate  18 mitoses per 5 mm2   Histologic Grade  G2, high grade   MARGINS   Margin Status  All margins negative for GIST   pTNM CLASSIFICATION (AJCC 8th Edition)   pT Category  pT3   pN Category  pN not assigned (no nodes submitted or found)           2/26/2025 -  Cancer Staged    Staging form: Gastric Stromal Tumor - Gastric GIST, AJCC 8th Edition  - Pathologic: Stage IIIA (pT3, pN0, cM0, Mitotic Rate: High) - Signed by Tomás South MD on 2/26/2025  Histologic grade (G): High grade  Histologic grading system: 2 grade system  Residual tumor (R): R0 - None          Pertinent Medical History     Past Medical History:   Diagnosis Date    Anxiety     Depression     Heroin abuse (HCC)     clean since 2015    Thyroid cancer (HCC) 1999    with thyroidectomy and RTX     Past Surgical History:   Procedure Laterality Date    EGD      GANGLION CYST EXCISION Left     PARTIAL GASTRECTOMY N/A 2/5/2025    Procedure: PARTIAL GASTRECTOMY;  Surgeon: Tomás South MD;  Location: [facility];  Service: Surgical Oncology    PELVIC LAPAROSCOPY Bilateral 2/5/2025    Procedure: SALPINGO-OOPHORECTOMY, LAPAROSCOPIC;  Surgeon: Varghese Jung MD;  Location: [facility];  Service: Gynecology Oncology    WY LAPAROSCOPY W TOTAL HYSTERECTOMY UTERUS 250 GM/< N/A 2/5/2025    Procedure: LAPAROSCOPIC TOTAL HYSTERECTOMY, EXAM UNDER ANESTHESIA;  Surgeon: Varghese Jung MD;  Location: [facility];  Service: Gynecology Oncology    THYROIDECTOMY        Family History   Problem Relation Age of Onset    Diabetes Mother     Stomach cancer Mother         maltoma lyphoma    Hyperlipidemia Father     Hypertension Father     Lung  cancer Maternal Aunt     Breast cancer Maternal Aunt 52    Lymphoma Paternal Uncle         non hodgkins     Social History     Socioeconomic History    Marital status: Single     Spouse name: Not on file    Number of children: Not on file    Years of education: Not on file    Highest education level: Not on file   Occupational History    Not on file   Tobacco Use    Smoking status: Former     Current packs/day: 0.25     Average packs/day: 0.3 packs/day for 26.2 years (6.5 ttl pk-yrs)     Types: Cigarettes     Start date: 1/1/1999    Smokeless tobacco: Never    Tobacco comments:     Trying quitting  ago   Vaping Use    Vaping status: Every Day    Substances: THC, CBD   Substance and Sexual Activity    Alcohol use: Not Currently    Drug use: Yes     Types: Marijuana     Comment: 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    Sexual activity: Not on file   Other Topics Concern    Not on file   Social History Narrative    Not on file     Social Drivers of Health     Financial Resource Strain: Not on file   Food Insecurity: No Food Insecurity (2/5/2025)    Nursing - Inadequate Food Risk Classification     Worried About Running Out of Food in the Last Year: Not on file     Ran Out of Food in the Last Year: Not on file     Ran Out of Food in the Last Year: Never true   Transportation Needs: No Transportation Needs (2/5/2025)    Nursing - Transportation Risk Classification     Lack of Transportation: Not on file     Lack of Transportation: No   Physical Activity: Not on file   Stress: Not on file   Social Connections: Not on file   Intimate Partner Violence: Unknown (2/5/2025)    Nursing IPS     Feels Physically and Emotionally Safe: Not on file     Physically Hurt by Someone: Not on file     Humiliated or Emotionally Abused by Someone: Not on file     Physically Hurt by Someone: No     Hurt or Threatened by Someone: No   Housing Stability: Unknown (2/5/2025)    Nursing: Inadequate Housing Risk Classification     Has Housing:  Not on file     Worried About Losing Housing: Not on file     Unable to Get Utilities: Not on file     Unable to Pay for Housing in the Last Year: No     Has Housin          Review of Systems   Constitutional:  Positive for fatigue. Negative for activity change, appetite change, chills, diaphoresis, fever and unexpected weight change.   HENT:  Negative for congestion, dental problem, ear discharge, ear pain, facial swelling, hearing loss, mouth sores, nosebleeds, postnasal drip, rhinorrhea, sinus pain, sneezing, sore throat, tinnitus, trouble swallowing and voice change.    Eyes:  Negative for photophobia, pain, discharge, redness, itching and visual disturbance.   Respiratory:  Negative for apnea, cough, choking, chest tightness, shortness of breath, wheezing and stridor.    Cardiovascular:  Negative for chest pain, palpitations and leg swelling.   Gastrointestinal:  Positive for abdominal pain. Negative for abdominal distention, anal bleeding, blood in stool, constipation, diarrhea, nausea, rectal pain and vomiting.        Loose stools, indigestion, abdominal cramps   Endocrine: Negative for cold intolerance and heat intolerance.   Genitourinary:  Negative for decreased urine volume, difficulty urinating, dysuria, enuresis, flank pain, frequency, hematuria and urgency.   Musculoskeletal:  Negative for arthralgias, back pain, gait problem, joint swelling, myalgias, neck pain and neck stiffness.   Skin:  Negative for color change, pallor, rash and wound.   Neurological:  Negative for dizziness, tremors, seizures, syncope, facial asymmetry, speech difficulty, weakness, light-headedness, numbness and headaches.   Hematological:  Negative for adenopathy. Does not bruise/bleed easily.   Psychiatric/Behavioral:  Negative for behavioral problems, confusion, dysphoric mood and sleep disturbance. The patient is not nervous/anxious.            Objective   LMP  (LMP Unknown)     Pain Screening:     ECOG  1  Physical  Exam  Constitutional:       Appearance: She is obese.      Comments: Female patient without acute respiratory distress   HENT:      Head: Normocephalic and atraumatic.      Nose: Nose normal.      Mouth/Throat:      Mouth: Mucous membranes are moist.      Pharynx: Oropharynx is clear.      Comments: No lesions or ulceration in the oral mucosa  Eyes:      Extraocular Movements: Extraocular movements intact.      Conjunctiva/sclera: Conjunctivae normal.      Pupils: Pupils are equal, round, and reactive to light.      Comments: No scleral icterus    Neck:      Comments: No cervical, supraclavicular, axillary, epitrochlear, or inguinal lymphadenopathy.   Cardiovascular:      Rate and Rhythm: Normal rate and regular rhythm.      Pulses: Normal pulses.      Heart sounds: Normal heart sounds.   Pulmonary:      Effort: Pulmonary effort is normal.      Breath sounds: Normal breath sounds.   Abdominal:      General: Bowel sounds are normal.      Palpations: Abdomen is soft.      Comments: Abdomen soft, nontender, nonpainful.  No hepatomegaly.  No splenomegaly.  No rebound tenderness.  No lateral or shifting dullness.  Bowel sounds present, normal.    Musculoskeletal:         General: Normal range of motion.      Cervical back: Normal range of motion and neck supple.   Skin:     General: Skin is warm and dry.      Capillary Refill: Capillary refill takes less than 2 seconds.   Neurological:      General: No focal deficit present.      Mental Status: She is alert and oriented to person, place, and time. Mental status is at baseline.   Psychiatric:         Mood and Affect: Mood normal.         Behavior: Behavior normal.         Thought Content: Thought content normal.         Judgment: Judgment normal.     Labs: I have reviewed the following labs:  Lab Results   Component Value Date/Time    WBC 8.41 03/17/2025 01:50 PM    RBC 4.81 03/17/2025 01:50 PM    Hemoglobin 12.4 03/17/2025 01:50 PM    Hematocrit 42.0 03/17/2025 01:50 PM     MCV 87 03/17/2025 01:50 PM    MCH 25.8 (L) 03/17/2025 01:50 PM    RDW 15.3 (H) 03/17/2025 01:50 PM    Platelets 235 03/17/2025 01:50 PM    Segmented % 56 03/17/2025 01:50 PM    Lymphocytes % 36 03/17/2025 01:50 PM    Monocytes % 4 03/17/2025 01:50 PM    Eosinophils Relative 3 03/17/2025 01:50 PM    Basophils Relative 1 03/17/2025 01:50 PM    Immature Grans % 0 03/17/2025 01:50 PM    Absolute Neutrophils 4.71 03/17/2025 01:50 PM     Lab Results   Component Value Date/Time    Potassium 4.3 03/17/2025 01:50 PM    Potassium 4.3 12/25/2024 07:10 PM    Chloride 101 03/17/2025 01:50 PM    Chloride 101 12/25/2024 07:10 PM    Carbon Dioxide 29 12/25/2024 07:10 PM    CO2 30 03/17/2025 01:50 PM    BUN 18 03/17/2025 01:50 PM    BUN 19 12/25/2024 07:10 PM    Creatinine 1.30 03/17/2025 01:50 PM    Creatinine 1.28 (H) 12/25/2024 07:10 PM    Glucose, Fasting 77 01/29/2025 09:13 AM    Calcium 6.8 (L) 03/17/2025 01:50 PM    Calcium 6.6 (L) 12/25/2024 07:10 PM    AST 14 03/17/2025 01:50 PM    AST 17 12/25/2024 07:10 PM    ALT 7 03/17/2025 01:50 PM    ALT 10 12/25/2024 07:10 PM    Alkaline Phosphatase 78 03/17/2025 01:50 PM    Alkaline Phosphatase 65 12/25/2024 07:10 PM    Total Protein 7.3 03/17/2025 01:50 PM    Protein, Total 7.2 12/25/2024 07:10 PM    Albumin 4.3 03/17/2025 01:50 PM    ALBUMIN 4.1 12/25/2024 07:10 PM    Total Bilirubin 0.39 03/17/2025 01:50 PM    Total Bilirubin 0.4 12/25/2024 07:10 PM    eGFRcr 54 (L) 12/25/2024 07:10 PM    eGFR 50 03/17/2025 01:50 PM

## 2025-03-18 NOTE — ASSESSMENT & PLAN NOTE
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  The patient has high risk for micrometastatic disease and progression to gross metastases.  Currently she takes adjuvant imatinib (Gleevec) 400 mg per mouth daily, which she started in early March 2025.    Interim Assessment: On March 3, 2025, the patient initiated adjuvant imatinib (Gleevec) 400 mg per mouth daily. Overall, she is tolerating adjuvant imatinib well, without treatment-related serious adverse events . Today, she does not have new complaints.     Plan:  Continue adjuvant imatinib 400 mg per mouth daily.  Minimal duration of adjuvant systemic therapy is 3 years.  Based on results of next generation gene sequencing (mutational analysis of the KIT gene), the patient may require a higher dose of adjuvant imatinib.  Referral to oncology genetics clinic  Periodic contrast enhanced CT scan of the abdomen and pelvis   Return to medical oncology clinic

## 2025-03-18 NOTE — LETTER
2025     Tomás South MD  [address]  2nd Floor  [facility] [address]    Patient: Tanya Rosenthal   YOB: 1985   Date of Visit: 3/18/2025       Dear Dr. South:    Thank you for referring Tanya Rosenthal to me for evaluation. Below are my notes for this consultation.    If you have questions, please do not hesitate to call me. I look forward to following your patient along with you.         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  The patient has high risk for micrometastatic disease and progression to gross metastases.  Currently she takes adjuvant imatinib (Gleevec) 400 mg per mouth daily, which she started in early 2025.    Interim Assessment: On March 3, 2025, the patient initiated adjuvant imatinib (Gleevec) 400 mg per mouth daily. Overall, she is tolerating adjuvant imatinib well, without treatment-related serious adverse events . Today, she does not have new complaints.     Plan:  Continue adjuvant imatinib 400 mg per mouth daily.  Minimal duration of adjuvant systemic therapy is 3 years.  Based on results of next generation gene sequencing (mutational analysis of the KIT gene), the patient may require a higher dose of adjuvant imatinib.  Referral to oncology genetics clinic  Periodic contrast enhanced CT  scan of the abdomen and pelvis   Return to medical oncology clinic      Mrs. Rosenthal understands and agrees with my management recommendations and plan of care. I answered questions to her satisfaction.       Hypocalcemia  Mrs. Rosenthal has chronic moderate hypercalcemia, most likely developing in the setting of prior thyroidectomy for thyroid cancer and associated hypoparathyroidism.  I encouraged her to keep her upcoming outpatient endocrinology evaluation scheduled for May 2, 2025 for comprehensive assessment management of hypocalcemia and hypoparathyroidism.    Patient understands and agrees with my management recommendations and plan of care. I answered questions to her satisfaction.           History of Present Illness {?Quick Links Encounters * My Last Note * Last Note in Specialty * Snapshot * Since Last Visit * History :49221}  No chief complaint on file.  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  The patient has high risk for micrometastatic disease and progression to gross metastases.  Currently she takes adjuvant imatinib (Gleevec) 400 mg per mouth daily.    In summary, in late December 2024, Mrs. Make sure developed progressive, severe, acute epigastric pain.  On December 29, 2024, she underwent diagnostic workup and evaluation of epigastric pain at the emergency department.  On December 29, 2024, contrast-enhanced CT scan of the abdomen and pelvis showed a 6.0 x 4.4 x 4.5 cm lobulated mass centered within the posterior gastric wall of the proximal stomach just beyond the GE junction. The mass partially extended into the lumen of the stomach as well as outside of the stomach wall, thereby localizing this mass likely within the wall of the stomach. There  was no bowel obstruction or focal inflammatory process. On December 29, 2024, pelvic ultrasound showed large bilateral ovarian cysts. On December 30, 2024, EGD showed a of the mass was attempted large mass on the posterior wall of the cardia just below the GEJ. The mass appeared to be submucosal. Biopsy but this resulted in bleeding and therefore biopsies were not performed.  On February 5, 2025, the patient underwent a partial gastrectomy as well as pelvic examination under anesthesia, laparoscopic total hysterectomy and bilateral salpingo-oophorectomy.  Pathology from partial gastrectomy showed pathologic T3 gastrointestinal stromal tumor (GIST), high-grade /grade 2, with KIT pathogenic variant in exon 11, measuring 5.8 cm in maximum diameter, with 18 mitosis per 5 mm2, with necrosis present 5%, with negative surgical resection margins.  Pathology from total hysterectomy and bilateral salpingo-oophorectomy did not show malignancy.  In the ovaries there were bilateral cystadenomas.      Interim History: On March 3, 2025, the patient initiated adjuvant imatinib (Gleevec) 400 mg per mouth daily. Overall, she is tolerating adjuvant imatinib currently well, without treatment-related serious AEs.  She has noticed mild to moderate, intermittent gastrointestinal symptoms such as indigestion, mid abdominal discomfort, occasional loose stools, and occasional mid abdominal pain.  Yesterday she refers having an episode of mid abdominal pain for which she took an oral opiate medication with no relief of pain.  She denies other gastrointestinal symptoms such as odynophagia, dysphagia, nausea, vomiting, hematemesis, melena, hematochezia, abdominal distention, or jaundice.  She does not have new systemic, cardiorespiratory, genitourinary, gynecological, dermatological, musculoskeletal, or neurologic symptoms.  Of note, the patient has chronic moderate hypercalcemia in the setting of prior surgical resection of the parathyroid  glands.    Oncology History  Cancer Staging   GIST (gastrointestinal stromal tumor), malignant (HCC)  Staging form: Gastric Stromal Tumor - Gastric GIST, AJCC 8th Edition  - Pathologic: Stage IIIA (pT3, pN0, cM0, Mitotic Rate: High) - Signed by Tomás South MD on 2/26/2025  Histologic grade (G): High grade  Histologic grading system: 2 grade system  Residual tumor (R): R0 - None  Oncology History   GIST (gastrointestinal stromal tumor), malignant (HCC)   1/7/2025 Biopsy    Endoscopic Ultrasound    Stomach, Gastric Submucosal, FNA:  Neoplastic.  Gastrointestinal stromal tumor (GIST), mixed spindle cell and epithelioid type.  > 5 mitoses per 50 HPF     1/13/2025 Initial Diagnosis    GIST (gastrointestinal stromal tumor), malignant (HCC)     2/5/2025 Surgery    Partial gastrectomy:   Gastric mass, resection:  -   Gastrointestinal stromal tumor of stomach, 5.8 cm in greatest dimension.    TUMOR   Tumor Focality  Unifocal   Tumor Site  Stomach: Just distal to GE junction per op note   Tumor Size  Greatest Dimension (Centimeters): 5.8 cm   Mitotic Rate  18 mitoses per 5 mm2   Histologic Grade  G2, high grade   MARGINS   Margin Status  All margins negative for GIST   pTNM CLASSIFICATION (AJCC 8th Edition)   pT Category  pT3   pN Category  pN not assigned (no nodes submitted or found)           2/26/2025 -  Cancer Staged    Staging form: Gastric Stromal Tumor - Gastric GIST, AJCC 8th Edition  - Pathologic: Stage IIIA (pT3, pN0, cM0, Mitotic Rate: High) - Signed by Tomás South MD on 2/26/2025  Histologic grade (G): High grade  Histologic grading system: 2 grade system  Residual tumor (R): R0 - None          Pertinent Medical History     Past Medical History:   Diagnosis Date    Anxiety     Depression     Heroin abuse (HCC)     clean since 2015    Thyroid cancer (HCC) 1999    with thyroidectomy and RTX     Past Surgical History:   Procedure Laterality Date    EGD      GANGLION CYST EXCISION Left     PARTIAL GASTRECTOMY N/A  2/5/2025    Procedure: PARTIAL GASTRECTOMY;  Surgeon: Tomás South MD;  Location: [facility];  Service: Surgical Oncology    PELVIC LAPAROSCOPY Bilateral 2/5/2025    Procedure: SALPINGO-OOPHORECTOMY, LAPAROSCOPIC;  Surgeon: Varghese Jung MD;  Location: [facility];  Service: Gynecology Oncology    FL LAPAROSCOPY W TOTAL HYSTERECTOMY UTERUS 250 GM/< N/A 2/5/2025    Procedure: LAPAROSCOPIC TOTAL HYSTERECTOMY, EXAM UNDER ANESTHESIA;  Surgeon: Varghese Jung MD;  Location: [facility];  Service: Gynecology Oncology    THYROIDECTOMY        Family History   Problem Relation Age of Onset    Diabetes Mother     Stomach cancer Mother         maltoma lyphoma    Hyperlipidemia Father     Hypertension Father     Lung cancer Maternal Aunt     Breast cancer Maternal Aunt 52    Lymphoma Paternal Uncle         non hodgkins     Social History     Socioeconomic History    Marital status: Single     Spouse name: Not on file    Number of children: Not on file    Years of education: Not on file    Highest education level: Not on file   Occupational History    Not on file   Tobacco Use    Smoking status: Former     Current packs/day: 0.25     Average packs/day: 0.3 packs/day for 26.2 years (6.5 ttl pk-yrs)     Types: Cigarettes     Start date: 1/1/1999    Smokeless tobacco: Never    Tobacco comments:     Trying quitting  ago   Vaping Use    Vaping status: Every Day    Substances: THC, CBD   Substance and Sexual Activity    Alcohol use: Not Currently    Drug use: Yes     Types: Marijuana     Comment: 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    Sexual activity: Not on file   Other Topics Concern    Not on file   Social History Narrative    Not on file     Social Drivers of Health     Financial Resource Strain: Not on file   Food Insecurity: No Food Insecurity (2/5/2025)    Nursing - Inadequate Food Risk Classification     Worried About Running Out of Food in the Last Year: Not on file     Ran Out of Food in the Last  Year: Not on file     Ran Out of Food in the Last Year: Never true   Transportation Needs: No Transportation Needs (2025)    Nursing - Transportation Risk Classification     Lack of Transportation: Not on file     Lack of Transportation: No   Physical Activity: Not on file   Stress: Not on file   Social Connections: Not on file   Intimate Partner Violence: Unknown (2025)    Nursing IPS     Feels Physically and Emotionally Safe: Not on file     Physically Hurt by Someone: Not on file     Humiliated or Emotionally Abused by Someone: Not on file     Physically Hurt by Someone: No     Hurt or Threatened by Someone: No   Housing Stability: Unknown (2025)    Nursing: Inadequate Housing Risk Classification     Has Housing: Not on file     Worried About Losing Housing: Not on file     Unable to Get Utilities: Not on file     Unable to Pay for Housing in the Last Year: No     Has Housin          Review of Systems   Constitutional:  Positive for fatigue. Negative for activity change, appetite change, chills, diaphoresis, fever and unexpected weight change.   HENT:  Negative for congestion, dental problem, ear discharge, ear pain, facial swelling, hearing loss, mouth sores, nosebleeds, postnasal drip, rhinorrhea, sinus pain, sneezing, sore throat, tinnitus, trouble swallowing and voice change.    Eyes:  Negative for photophobia, pain, discharge, redness, itching and visual disturbance.   Respiratory:  Negative for apnea, cough, choking, chest tightness, shortness of breath, wheezing and stridor.    Cardiovascular:  Negative for chest pain, palpitations and leg swelling.   Gastrointestinal:  Positive for abdominal pain. Negative for abdominal distention, anal bleeding, blood in stool, constipation, diarrhea, nausea, rectal pain and vomiting.        Loose stools, indigestion, abdominal cramps   Endocrine: Negative for cold intolerance and heat intolerance.   Genitourinary:  Negative for decreased urine volume,  difficulty urinating, dysuria, enuresis, flank pain, frequency, hematuria and urgency.   Musculoskeletal:  Negative for arthralgias, back pain, gait problem, joint swelling, myalgias, neck pain and neck stiffness.   Skin:  Negative for color change, pallor, rash and wound.   Neurological:  Negative for dizziness, tremors, seizures, syncope, facial asymmetry, speech difficulty, weakness, light-headedness, numbness and headaches.   Hematological:  Negative for adenopathy. Does not bruise/bleed easily.   Psychiatric/Behavioral:  Negative for behavioral problems, confusion, dysphoric mood and sleep disturbance. The patient is not nervous/anxious.            Objective {?Quick Links Trend Vitals * Enter New Vitals * Results Review * Timeline (Adult) * Labs * Imaging * Cardiology * Procedures * Lung Cancer Screening * Surgical eConsent :94161}  LMP  (LMP Unknown)     Pain Screening:     ECOG  1  Physical Exam  Constitutional:       Appearance: She is obese.      Comments: Female patient without acute respiratory distress   HENT:      Head: Normocephalic and atraumatic.      Nose: Nose normal.      Mouth/Throat:      Mouth: Mucous membranes are moist.      Pharynx: Oropharynx is clear.      Comments: No lesions or ulceration in the oral mucosa  Eyes:      Extraocular Movements: Extraocular movements intact.      Conjunctiva/sclera: Conjunctivae normal.      Pupils: Pupils are equal, round, and reactive to light.      Comments: No scleral icterus    Neck:      Comments: No cervical, supraclavicular, axillary, epitrochlear, or inguinal lymphadenopathy.   Cardiovascular:      Rate and Rhythm: Normal rate and regular rhythm.      Pulses: Normal pulses.      Heart sounds: Normal heart sounds.   Pulmonary:      Effort: Pulmonary effort is normal.      Breath sounds: Normal breath sounds.   Abdominal:      General: Bowel sounds are normal.      Palpations: Abdomen is soft.      Comments: Abdomen soft, nontender, nonpainful.  No  hepatomegaly.  No splenomegaly.  No rebound tenderness.  No lateral or shifting dullness.  Bowel sounds present, normal.    Musculoskeletal:         General: Normal range of motion.      Cervical back: Normal range of motion and neck supple.   Skin:     General: Skin is warm and dry.      Capillary Refill: Capillary refill takes less than 2 seconds.   Neurological:      General: No focal deficit present.      Mental Status: She is alert and oriented to person, place, and time. Mental status is at baseline.   Psychiatric:         Mood and Affect: Mood normal.         Behavior: Behavior normal.         Thought Content: Thought content normal.         Judgment: Judgment normal.     Labs: I have reviewed the following labs:  Lab Results   Component Value Date/Time    WBC 8.41 03/17/2025 01:50 PM    RBC 4.81 03/17/2025 01:50 PM    Hemoglobin 12.4 03/17/2025 01:50 PM    Hematocrit 42.0 03/17/2025 01:50 PM    MCV 87 03/17/2025 01:50 PM    MCH 25.8 (L) 03/17/2025 01:50 PM    RDW 15.3 (H) 03/17/2025 01:50 PM    Platelets 235 03/17/2025 01:50 PM    Segmented % 56 03/17/2025 01:50 PM    Lymphocytes % 36 03/17/2025 01:50 PM    Monocytes % 4 03/17/2025 01:50 PM    Eosinophils Relative 3 03/17/2025 01:50 PM    Basophils Relative 1 03/17/2025 01:50 PM    Immature Grans % 0 03/17/2025 01:50 PM    Absolute Neutrophils 4.71 03/17/2025 01:50 PM     Lab Results   Component Value Date/Time    Potassium 4.3 03/17/2025 01:50 PM    Potassium 4.3 12/25/2024 07:10 PM    Chloride 101 03/17/2025 01:50 PM    Chloride 101 12/25/2024 07:10 PM    Carbon Dioxide 29 12/25/2024 07:10 PM    CO2 30 03/17/2025 01:50 PM    BUN 18 03/17/2025 01:50 PM    BUN 19 12/25/2024 07:10 PM    Creatinine 1.30 03/17/2025 01:50 PM    Creatinine 1.28 (H) 12/25/2024 07:10 PM    Glucose, Fasting 77 01/29/2025 09:13 AM    Calcium 6.8 (L) 03/17/2025 01:50 PM    Calcium 6.6 (L) 12/25/2024 07:10 PM    AST 14 03/17/2025 01:50 PM    AST 17 12/25/2024 07:10 PM    ALT 7  03/17/2025 01:50 PM    ALT 10 12/25/2024 07:10 PM    Alkaline Phosphatase 78 03/17/2025 01:50 PM    Alkaline Phosphatase 65 12/25/2024 07:10 PM    Total Protein 7.3 03/17/2025 01:50 PM    Protein, Total 7.2 12/25/2024 07:10 PM    Albumin 4.3 03/17/2025 01:50 PM    ALBUMIN 4.1 12/25/2024 07:10 PM    Total Bilirubin 0.39 03/17/2025 01:50 PM    Total Bilirubin 0.4 12/25/2024 07:10 PM    eGFRcr 54 (L) 12/25/2024 07:10 PM    eGFR 50 03/17/2025 01:50 PM

## 2025-03-19 NOTE — ASSESSMENT & PLAN NOTE
Mrs. Rosenthal has chronic moderate hypercalcemia, most likely developing in the setting of prior thyroidectomy for thyroid cancer and associated hypoparathyroidism.  I encouraged her to keep her upcoming outpatient endocrinology evaluation scheduled for May 2, 2025 for comprehensive assessment management of hypocalcemia and hypoparathyroidism.    Patient understands and agrees with my management recommendations and plan of care. I answered questions to her satisfaction.

## 2025-03-21 DIAGNOSIS — K31.89 GASTRIC MASS: ICD-10-CM

## 2025-03-21 DIAGNOSIS — R10.9 ABDOMINAL PAIN: ICD-10-CM

## 2025-03-21 RX ORDER — SUCRALFATE 1 G/1
1 TABLET ORAL EVERY 6 HOURS
Qty: 120 TABLET | Refills: 0 | Status: SHIPPED | OUTPATIENT
Start: 2025-03-21

## 2025-03-27 DIAGNOSIS — R10.9 ABDOMINAL PAIN: ICD-10-CM

## 2025-03-27 RX ORDER — PANTOPRAZOLE SODIUM 40 MG/1
40 TABLET, DELAYED RELEASE ORAL DAILY
Qty: 90 TABLET | Refills: 0 | Status: SHIPPED | OUTPATIENT
Start: 2025-03-27 | End: 2025-06-25

## 2025-03-31 ENCOUNTER — DOCUMENTATION (OUTPATIENT)
Dept: GENETICS | Facility: CLINIC | Age: 40
End: 2025-03-31

## 2025-04-01 ENCOUNTER — TELEPHONE (OUTPATIENT)
Age: 40
End: 2025-04-01

## 2025-04-01 NOTE — TELEPHONE ENCOUNTER
Called and spoke with Patient that per Leslye patient can see her.  Offered earlier appointment but patient declined due to work.  Patient is scheduled on 4/28 at 11 am in [place] with Leslye. Patient confirmed.

## 2025-04-01 NOTE — TELEPHONE ENCOUNTER
Pt called she had an apt 4/8/2 and wasn't able to keep it. I offered her his next available and wasn't till middle of may. She stated he wanted to see her after surgery. Please call pt if something opens sooner. Thank you

## 2025-04-08 ENCOUNTER — PATIENT MESSAGE (OUTPATIENT)
Dept: HEMATOLOGY ONCOLOGY | Facility: CLINIC | Age: 40
End: 2025-04-08

## 2025-04-08 DIAGNOSIS — C49.A2 MALIGNANT GASTROINTESTINAL STROMAL TUMOR (GIST) OF STOMACH (HCC): Primary | ICD-10-CM

## 2025-04-09 ENCOUNTER — NURSE TRIAGE (OUTPATIENT)
Age: 40
End: 2025-04-09

## 2025-04-09 ENCOUNTER — OFFICE VISIT (OUTPATIENT)
Dept: PALLIATIVE MEDICINE | Facility: CLINIC | Age: 40
End: 2025-04-09
Payer: COMMERCIAL

## 2025-04-09 ENCOUNTER — SOCIAL WORK (OUTPATIENT)
Dept: PALLIATIVE MEDICINE | Facility: CLINIC | Age: 40
End: 2025-04-09
Payer: COMMERCIAL

## 2025-04-09 ENCOUNTER — TELEPHONE (OUTPATIENT)
Dept: NUTRITION | Facility: CLINIC | Age: 40
End: 2025-04-09

## 2025-04-09 VITALS
HEIGHT: 65 IN | WEIGHT: 259 LBS | HEART RATE: 80 BPM | DIASTOLIC BLOOD PRESSURE: 70 MMHG | RESPIRATION RATE: 18 BRPM | OXYGEN SATURATION: 97 % | TEMPERATURE: 98 F | BODY MASS INDEX: 43.15 KG/M2 | SYSTOLIC BLOOD PRESSURE: 122 MMHG

## 2025-04-09 DIAGNOSIS — R63.0 POOR APPETITE: ICD-10-CM

## 2025-04-09 DIAGNOSIS — R68.2 DRY MOUTH: ICD-10-CM

## 2025-04-09 DIAGNOSIS — F41.9 ANXIETY: ICD-10-CM

## 2025-04-09 DIAGNOSIS — G89.3 CANCER RELATED PAIN: ICD-10-CM

## 2025-04-09 DIAGNOSIS — R43.2 DYSGEUSIA: ICD-10-CM

## 2025-04-09 DIAGNOSIS — Z71.89 COUNSELING AND COORDINATION OF CARE: Primary | ICD-10-CM

## 2025-04-09 DIAGNOSIS — C49.A2 MALIGNANT GASTROINTESTINAL STROMAL TUMOR (GIST) OF STOMACH (HCC): Primary | ICD-10-CM

## 2025-04-09 DIAGNOSIS — Z51.5 PALLIATIVE CARE ENCOUNTER: ICD-10-CM

## 2025-04-09 DIAGNOSIS — M79.10 MYALGIA: ICD-10-CM

## 2025-04-09 DIAGNOSIS — R53.0 NEOPLASTIC MALIGNANT RELATED FATIGUE: ICD-10-CM

## 2025-04-09 PROCEDURE — 99214 OFFICE O/P EST MOD 30 MIN: CPT | Performed by: STUDENT IN AN ORGANIZED HEALTH CARE EDUCATION/TRAINING PROGRAM

## 2025-04-09 PROCEDURE — NC001 PR NO CHARGE

## 2025-04-09 RX ORDER — METHYLPREDNISOLONE 4 MG/1
TABLET ORAL
Qty: 21 TABLET | Refills: 0 | Status: SHIPPED | OUTPATIENT
Start: 2025-04-09

## 2025-04-09 NOTE — PATIENT INSTRUCTIONS
It was a pleasure speaking with you today.    As discussed:  -Continue your medications as prescribed.  -Continue with a bowel regimen to avoid constipation.    Follow-up in: 2-3 weeks for close monitoring    Please do not hesitate to call me sooner should you have any questions/concerns or needs.    Medication safety issues - Do not drive under the influence of narcotics (including opioids), watch for adverse effects including confusion / altered mental status / respiratory depression (slowed breathing), keep medications stored in a safe/locked environment, do not use alcohol while opioids or other narcotics are in your system. Do not travel with more than the minimum number of tablets or capsules required for the trip.    ER Precautions  Watch for red flag symptoms including, but not limited to fevers, chills, chest pain, shortness of breath, intractable nausea/vomiting/diarrhea, or acute intractable pain (especially if pain is new or has changed).

## 2025-04-09 NOTE — ASSESSMENT & PLAN NOTE
Follows Dr. Velasquez of Medical Oncology  Currently on Gleevac.  Poor appetite, low energy, ongoing muscular and joint aches.  No respiratory symptoms, no chest pain. No nausea or vomiting. She does have dry mouth.

## 2025-04-09 NOTE — TELEPHONE ENCOUNTER
"Phone call placed to the patient in response to [employer] message this morning reporting symptoms.  I spoke with Tanya, she reports gradually feeling like she has the flu with no energy and no appetite, she also reports body aches and denies fever.  She reports anxiety and has started Prozac by PCP.  We discussed prioritizing rest periods and take naps or breaks as needed. We discussed gentle exercise such as walking and trying to keep good nutrition with small frequent meals.     Patient reports she recently told members of her family that she did not want to live anymore.  She denies SI or a plan at present, emotional support provided, we discussed speaking to a  and she agreed to speaking with one.      She would like to ask Dr. Conde if there is another replacement option to Gleevec.      She reports entire body joint pain that is a 9/10 for the past week and a half and does not want to take the oxycodone prescribed to her as she reports has tried it twice and feels it does not help her.  She is meeting with Palliative care today to discussed pain medication     FOLLOW UP: Call back with discussion and recommendation for Gleevec options.      REASON FOR CONVERSATION: No chief complaint on file.    SYMPTOMS: Fatigue    OTHER: .    DISPOSITION: No disposition on file.      Reason for Disposition   MILD weakness or fatigue with acute minor illness (e.g., colds)    Answer Assessment - Initial Assessment Questions  1. DESCRIPTION: \"Describe how you are feeling.\"      Exhausted, \"like I have the flu\" denies fever    2. SEVERITY: \"How bad is it?\"  \"Can you stand and walk?\"      Yes    3. ONSET: \"When did these symptoms begin?\" (e.g., hours, days, weeks, months)      About a week and a half    4. CAUSE: \"What do you think is causing the weakness or fatigue?\" (e.g., not drinking enough fluids, medical problem, trouble sleeping)      Gleevec    5. NEW MEDICINES:  \"Have you started on any new medicines " "recently?\" (e.g., opioid pain medicines, benzodiazepines, muscle relaxants, antidepressants, antihistamines, neuroleptics, beta blockers)      Gleevec    6. OTHER SYMPTOMS: \"Do you have any other symptoms?\" (e.g., chest pain, fever, cough, SOB, vomiting, diarrhea, bleeding, other areas of pain)      Fatigue, body aches, reduced appetite.    Protocols used: Weakness (Generalized) and Fatigue-Adult-OH    "

## 2025-04-09 NOTE — ASSESSMENT & PLAN NOTE
Likely 2/2 to Gleevac  Arthralgias and myalgias to the shoulders and knees and down to feet.  No skin changes or signs of infection or wounds.  Patient states she tried one tablet of Oxycodone but this did not help with the discomfort she was having in her joints or muscular aches. She is not taking oxycodone at this time.    Will trial Medrol Dose pack to see if this will help with some of her aches and discomfort as well as helping with her appetite and provide some improvement in her energy levels.    I spoke with patient about this plan after discussing with Dr. Conde's partner in Medical Oncology in which suggest to hold the Gleevac until Dr. Conde is back in the office in the next 2 days. Safe to use steroids to help with her aforementioned symptoms to hopefully provide relief from her symptoms at this time.    Patient is on PPI with Protonix already.      Orders:    methylPREDNISolone 4 MG tablet therapy pack; Use as directed on package

## 2025-04-09 NOTE — PROGRESS NOTES
"Palliative Supportive Care  met with patient/family to continue to provide emotional support and guidance.      Updated biopsychosocial information relevant to support: PSC team met with pt in office for continued support. Pt reports having a very difficult time especially over the past weekend. Pt has been having significant aches all over (10/10 pain), lack of appetite, dry mouth, and lack of sleep. Pt also has been missing some work shifts. Time spent by Dr. Potter reviewing current medication regimen and providing further recommendations/guidance. Dr. Potter to f/u with oncology team to discuss possible introduction of steroids.     Inquired about pt's current emotional status. Pt reports saying \"crazy\" things to her family over the weekend due to how much she was struggling because of her medical status. She discussed feeling as though she wanted to die and questioning pursuing further CA tx. At present time, pt denies any thoughts of SI/self-harm and denies plan. She feels that those thoughts were mostly out of frustration and all of the symptoms she has been experiencing. Pt plans to have further discussion with oncology about possible adjustments to CA tx plan. Time spent allowing pt to process her feelings and providing emotional support. Pt reports her PCP had started her on Prozac 4 days ago, but she is understanding that it can take weeks to see full benefits. Pt reports feeling worthless and has been isolating herself. She reports a lot of things going on with her family including her mom being diagnosed with early Alzheimer's, her uncle moving into the home and her father being the caregiver for both his wife and pt's uncle, and pt's dtr being pregnant and in process of moving out of their home. Reviewed pt's hobbies/interests/things that bring her happiness--she finds softball to be a passion of hers and she typically was able to  in prior years but has since stopped due to her medical " status. Reviewed with pt possibility of still attending practices/games as she is feeling up to it, to still participate in the community even if it in a different role. Pt open to this as she found a lot of tuan being around the kids. ED precautions reviewed should pt experience in the future thoughts of SI/self-harm, and contact information for [place] 24hr crisis line provided. Encouraged pt to reach out for additional support as needed prior to next scheduled appt.      Identified areas of need include: Emotional Support    Resources provided: [place] Crisis contact information    Areas that need future follow-up include: Emotional Support    I have spent 40 minutes with Patient  today in which greater than 50% of this time was spent in counseling/coordination of care     Palliative  will follow-up as requested by patient, family, and primary team.  Please contact with any specific requests

## 2025-04-09 NOTE — PROGRESS NOTES
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 Velasquez of Medical Oncology  Currently on Gleevac.  Poor appetite, low energy, ongoing muscular and joint aches.  No respiratory symptoms, no chest pain. No nausea or vomiting. She does have dry mouth.       Myalgia  Likely 2/2 to Gleevac  Arthralgias and myalgias to the shoulders and knees and down to feet.  No skin changes or signs of infection or wounds.  Patient states she tried one tablet of Oxycodone but this did not help with the discomfort she was having in her joints or muscular aches. She is not taking oxycodone at this time.    Will trial Medrol Dose pack to see if this will help with some of her aches and discomfort as well as helping with her appetite and provide some improvement in her energy levels.    I spoke with patient about this plan after discussing with Dr. Conde's partner in Medical Oncology in which suggest to hold the Gleevac until Dr. Conde is back in the office in the next 2 days. Safe to use steroids to help with her aforementioned symptoms to hopefully provide relief from her symptoms at this time.    Patient is on PPI with Protonix already.      Orders:    methylPREDNISolone 4 MG tablet therapy pack; Use as directed on package    Neoplastic malignant related fatigue    Orders:    methylPREDNISolone 4 MG tablet therapy pack; Use as directed on package    Poor appetite    Orders:    methylPREDNISolone 4 MG tablet therapy pack; Use as directed on package    Palliative care encounter  Psychosocial   Supportive listening provided  Normalized experience of patient/family  Provided anxiety containment     Referrals Placed / Medical Equipment Ordered  -None    Follow-Up Recommendations  -Follow-up with PCP and  current medical specialists  -Follow-up with palliative care: 2-3 weeks    Orders:    methylPREDNISolone 4 MG tablet therapy pack; Use as directed on package    Anxiety  Due to her symptoms being difficult to control this past weekend and for the past week.       Cancer related pain  On Suboxone and managed by her specialist.       Dry mouth  We talked about options to help with dry mouth, discussed trial of home remedies as below:  1) can trial mints, lozenges, gums to help maintain salivary production  2) discussed the options of nonalcohol containing mouthwash/rinse, including Biotene spray/mouthwash         Dysgeusia  Metallic taste in mouth.  Advised to avoid metal utensils, containers or aluminum foil to store food.         PDMP Review: I have reviewed the patient's controlled substance dispensing history in the Prescription Drug Monitoring Program in compliance with the [employer] regulations before prescribing any controlled substances.    History of Present Illness   Tanya Rosenthal is a 40 y.o. female who presents for follow-up for cancer related symptoms.    Patient is seen today in office. Alert, oriented, pleasantly conversant.  Patient is seen in NAD.    Appetite has been poor/decreased. Likely contributing factors, aches and discomfort, metallic/unfavorable taste in mouth, dry mouth.    Pain - squeezing aches to the bilateral upper and lower areas of her muscles. Bilateral joint pains to the shoulders (left > right) as well as to from the hips down to her feet. Noticed her symptoms have been ongoing and worsening since starting the Gleevac, particularly over the past week.  Her discomfort was severe over the weekend to the point where she made comments to her family where she state she would not want to live like this and if the medication is doing this to her, she would rather not take it any more. She states this was out of frustration and not out of intent to purposefully harm herself. She adamantly  denies any thoughts or plans of suicide, harm to self or others. She did start Lexapro 4 days ago but does not feel this has attributed to her thoughts or feelings (reviewed black box warnings with anti-depressants but pt does not feel she felt any change with start of this medication). She states her muscular and joint aches have been ongoing but noticeably more significant in the past week and this weekend was an exceptionally difficult time.    She also voices stressors at home for which her daughter is getting to move out as she is preparing to start her own family with her own child. Patient feels alone sometimes and with the symptoms she was dealing with over the weekend, this was adding more stress to her. She does live with her parents at this time so they do help patient as well.    She feels she does have a support system and that again her symptoms were overwhelming. She states she stopped taking the Gleevac last evening (4/8/2025) due to hopes that some of her symptoms would improve. She has not noticed a difference yet. She does state if she sits for long periods of time, she notices her aches and joint pains along with her aches radiating down both legs which can be very uncomfortable. This is why she has to also sleep in a recliner as lying flat causes abdominal discomfort.    We discussed options today and I did reach out to our Medical Oncology team.  Plan is to hold the Gleevac until patient has a chance to speak with Dr. Conde later this week. We will also     Patient is well supported by family/parents/children.      Medical History Reviewed by provider this encounter:  Tobacco  Allergies  Meds  Problems  Med Hx  Surg Hx  Fam Hx     .  Past Medical History   Past Medical History:   Diagnosis Date    Anxiety     Depression     Disease of thyroid gland 12/13/2000    Heroin abuse (HCC)     clean since 2015    Stomach cancer (HCC) 1/7/2025    Thyroid cancer (HCC) 1999    with thyroidectomy  and RTX     Past Surgical History:   Procedure Laterality Date    EGD      GANGLION CYST EXCISION Left     PARTIAL GASTRECTOMY N/A 2/5/2025    Procedure: PARTIAL GASTRECTOMY;  Surgeon: Tomás South MD;  Location: [facility];  Service: Surgical Oncology    PELVIC LAPAROSCOPY Bilateral 2/5/2025    Procedure: SALPINGO-OOPHORECTOMY, LAPAROSCOPIC;  Surgeon: Varghese Jung MD;  Location: [facility];  Service: Gynecology Oncology    SC LAPAROSCOPY W TOTAL HYSTERECTOMY UTERUS 250 GM/< N/A 2/5/2025    Procedure: LAPAROSCOPIC TOTAL HYSTERECTOMY, EXAM UNDER ANESTHESIA;  Surgeon: Varghese Jung MD;  Location: [facility];  Service: Gynecology Oncology    THYROIDECTOMY       Family History   Problem Relation Age of Onset    Diabetes Mother     Stomach cancer Mother         maltoma lyphoma    Cancer Mother         Maloma lymphoma and leukemia    Hyperlipidemia Father     Hypertension Father     Drug abuse Brother     Asthma Daughter     Depression Daughter     Lung cancer Maternal Aunt     Breast cancer Maternal Aunt 52    Lymphoma Paternal Uncle         non hodgkins      reports that she has been smoking cigarettes. She started smoking about 26 years ago. She has a 6.6 pack-year smoking history. She has never used smokeless tobacco. She reports current drug use. Drug: Marijuana. She reports that she does not drink alcohol.  Current Outpatient Medications   Medication Instructions    acetaminophen (TYLENOL) 975 mg, Oral, Every 8 hours scheduled    buprenorphine-naloxone (SUBOXONE) 8-2 mg per SL tablet 1 tablet, Daily    buprenorphine-naloxone (Suboxone) 8-2 mg ONE AND ONE HALF FILM SUBLINGUALLY DAILY. MAX DAILY DOSE  1.5    busPIRone (BUSPAR) 7.5 mg, 2 times daily    calcium carbonate (OS-ALL) 600 mg, Oral, Daily    enoxaparin (LOVENOX) 40 mg, Subcutaneous, Daily    estradiol (ESTRACE) 0.5 mg, Oral, Daily    FLUoxetine (PROzac) 20 mg capsule 1 capsule, Daily    hydrOXYzine HCL (ATARAX) 10 mg, Oral, Every 6  hours PRN    imatinib (GLEEVEC) 400 mg, Oral, Daily    levothyroxine 175 mcg tablet 1 tablet, Daily    levothyroxine 112 mcg, Daily    methylPREDNISolone 4 MG tablet therapy pack Use as directed on package    ondansetron (ZOFRAN) 8 mg, Oral, Every 8 hours PRN    ondansetron (ZOFRAN-ODT) 4 mg, Every 6 hours PRN    oxyCODONE (ROXICODONE) 10 mg, Oral, Every 4 hours PRN    pantoprazole (PROTONIX) 40 mg, Oral, Daily    prochlorperazine (COMPAZINE) 5 mg, Oral, Every 6 hours PRN    sucralfate (CARAFATE) 1 g, Oral, Every 6 hours     Allergies   Allergen Reactions    Diphenhydramine Shortness Of Breath     Other reaction(s): DIPHENHYDRAMINE CITRATE (BENADRYL)    Erythromycin Shortness Of Breath and Hives     Category: Allergy;     Iodine-131 - Food Allergy Other (See Comments)    Iv Dye [Iodinated Contrast Media]       Current Outpatient Medications on File Prior to Visit   Medication Sig Dispense Refill    buprenorphine-naloxone (SUBOXONE) 8-2 mg per SL tablet Place 1 tablet under the tongue daily      buprenorphine-naloxone (Suboxone) 8-2 mg ONE AND ONE HALF FILM SUBLINGUALLY DAILY. MAX DAILY DOSE  1.5      calcium carbonate (OS-ALL) 600 MG tablet Take 1 tablet (600 mg total) by mouth daily 30 tablet 0    estradiol (Estrace) 0.5 MG tablet Take 1 tablet (0.5 mg total) by mouth daily 30 tablet 2    FLUoxetine (PROzac) 20 mg capsule Take 1 capsule by mouth in the morning      imatinib (GLEEVEC) 400 mg tablet Take 1 tablet (400 mg total) by mouth daily 30 tablet 5    levothyroxine 175 mcg tablet Take 1 tablet by mouth in the morning      ondansetron (ZOFRAN) 8 mg tablet Take 1 tablet (8 mg total) by mouth every 8 (eight) hours as needed for nausea or vomiting 30 tablet 1    pantoprazole (PROTONIX) 40 mg tablet Take 1 tablet (40 mg total) by mouth daily 90 tablet 0    prochlorperazine (COMPAZINE) 5 mg tablet Take 1 tablet (5 mg total) by mouth every 6 (six) hours as needed for nausea or vomiting 30 tablet 1    sucralfate  (CARAFATE) 1 g tablet Take 1 tablet (1 g total) by mouth every 6 (six) hours 120 tablet 0    acetaminophen (TYLENOL) 325 mg tablet Take 3 tablets (975 mg total) by mouth every 8 (eight) hours (Patient not taking: Reported on 2/28/2025)      busPIRone (BUSPAR) 7.5 mg tablet Take 7.5 mg by mouth 2 (two) times a day (Patient not taking: Reported on 2/26/2025)      enoxaparin (Lovenox) 40 mg/0.4 mL Inject 0.4 mL (40 mg total) under the skin in the morning for 24 days 9.6 mL 0    hydrOXYzine HCL (ATARAX) 10 mg tablet Take 1 tablet (10 mg total) by mouth every 6 (six) hours as needed for anxiety (Patient not taking: Reported on 2/26/2025) 30 tablet 0    levothyroxine 112 mcg tablet Take 112 mcg by mouth daily (Patient not taking: Reported on 2/28/2025)      ondansetron (ZOFRAN-ODT) 4 mg disintegrating tablet Take 4 mg by mouth every 6 (six) hours as needed for nausea or vomiting (Patient not taking: Reported on 2/28/2025)      oxyCODONE (ROXICODONE) 10 MG TABS Take 1 tablet (10 mg total) by mouth every 4 (four) hours as needed for severe pain Max Daily Amount: 60 mg (Patient not taking: Reported on 2/26/2025) 60 tablet 0     No current facility-administered medications on file prior to visit.      Social History     Tobacco Use    Smoking status: Some Days     Current packs/day: 0.25     Average packs/day: 0.3 packs/day for 26.3 years (6.6 ttl pk-yrs)     Types: Cigarettes     Start date: 1/1/1999    Smokeless tobacco: Never    Tobacco comments:     Trying quitting  ago   Vaping Use    Vaping status: Former    Substances: THC, CBD   Substance and Sexual Activity    Alcohol use: Never    Drug use: Yes     Types: Marijuana     Comment: 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    Sexual activity: Not Currently     Partners: Male     Birth control/protection: None        Objective   /70 (BP Location: Left arm, Patient Position: Sitting, Cuff Size: Large)   Pulse 80   Temp 98 °F (36.7 °C) (Temporal)   Resp 18   Ht 5'  "5\" (1.651 m)   Wt 117 kg (259 lb)   LMP  (LMP Unknown)   SpO2 97%   BMI 43.10 kg/m²     Physical Exam  Vitals reviewed.   Constitutional:       General: She is not in acute distress.     Appearance: She is not ill-appearing, toxic-appearing or diaphoretic.   HENT:      Head: Normocephalic and atraumatic.      Nose: Nose normal.      Mouth/Throat:      Mouth: Mucous membranes are dry.   Eyes:      General:         Right eye: No discharge.         Left eye: No discharge.   Cardiovascular:      Rate and Rhythm: Normal rate.   Pulmonary:      Effort: Pulmonary effort is normal. No respiratory distress.   Abdominal:      General: There is no distension.   Musculoskeletal:         General: No swelling.      Right lower leg: No edema.      Left lower leg: No edema.   Skin:     General: Skin is warm and dry.      Coloration: Skin is not jaundiced or pale.      Findings: No bruising or rash.   Neurological:      General: No focal deficit present.      Mental Status: She is alert and oriented to person, place, and time. Mental status is at baseline.   Psychiatric:         Mood and Affect: Mood normal.         Behavior: Behavior normal.         Thought Content: Thought content normal.         Judgment: Judgment normal.      Comments: Teary during encounter, however, calm, redirectable easily with appropriate mood, behavior and affect. Thought content normal without any thoughts of harm to self or SI or plan. Patient frustrated with her symptoms and adamantly denies any intent to hurt her self or SI.         Recent labs:  Lab Results   Component Value Date/Time    SODIUM 140 03/17/2025 01:50 PM    SODIUM 142 12/25/2024 07:10 PM    K 4.3 03/17/2025 01:50 PM    K 4.3 12/25/2024 07:10 PM    BUN 18 03/17/2025 01:50 PM    BUN 19 12/25/2024 07:10 PM    CREATININE 1.30 03/17/2025 01:50 PM    CREATININE 1.28 (H) 12/25/2024 07:10 PM    GLUC 94 03/17/2025 01:50 PM    GLUC 84 12/25/2024 07:10 PM    CALCIUM 6.8 (L) 03/17/2025 01:50 PM "    CALCIUM 6.6 (L) 12/25/2024 07:10 PM    AST 14 03/17/2025 01:50 PM    AST 17 12/25/2024 07:10 PM    ALT 7 03/17/2025 01:50 PM    ALT 10 12/25/2024 07:10 PM    ALB 4.3 03/17/2025 01:50 PM    ALB 4.1 12/25/2024 07:10 PM    TP 7.3 03/17/2025 01:50 PM    TP 7.2 12/25/2024 07:10 PM    EGFR 50 03/17/2025 01:50 PM    EGFR 54 (L) 12/25/2024 07:10 PM    EGFR 68 08/25/2020 02:00 AM     Lab Results   Component Value Date/Time    HGB 12.4 03/17/2025 01:50 PM    HGB 11.7 04/02/2015 05:46 AM    WBC 8.41 03/17/2025 01:50 PM    WBC 8.97 04/02/2015 05:46 AM     03/17/2025 01:50 PM     04/02/2015 05:46 AM    INR 0.96 01/29/2025 09:13 AM    INR 1.08 04/01/2015 12:51 PM    PTT 38 (H) 01/29/2025 09:13 AM    PTT 37 (H) 04/01/2015 12:51 PM     Lab Results   Component Value Date/Time    [id number] 124.137 (H) 06/01/2016 07:59 PM    [id number] 99.220 (H) 04/02/2015 05:46 AM       Recent Imaging:  Procedure: XR chest pa & lateral  Result Date: 2/7/2025  Impression: Stable interval exam. Cardiomegaly, mild pulmonary vascular congestion, and small left pleural effusion. Workstation performed: [id number]     Procedure: XR chest portable  Result Date: 2/7/2025  Impression: Mild central congestion and trace left basilar effusion. Workstation performed: [id number]     Procedure: FL upper GI UGI  Result Date: 2/6/2025  Impression: No extravasation of contrast seen No obstruction to flow of contrast Workstation performed: [id number]     Procedure: CT chest wo contrast  Result Date: 1/13/2025  Impression: Scattered tiny 1 to 2 mm solid nodules throughout both lungs, unlikely to represent metastatic disease. However, follow-up chest CT in 3 months is recommended to ensure stability. Mildly enlarged lymph node anterior to the left main pulmonary artery, slightly increased in size since 2010, likely reactive. Attention on above recommended CT. The study was marked in EPIC for significant notification. Workstation performed:  [id number]     Procedure: Endoscopic ultrasonography, GI (Upper) Linear with FNA  Result Date: 1/7/2025  Impression: EGD The esophagus appeared normal. Submucosal mass in the gastric cardia. Overlying mucosa appeared normal. The duodenal bulb and 2nd part of the duodenum appeared normal. EUS Homogeneous, hypoechoic, lobulated and oval mass measuring 45 mm x 47 mm with well-defined and smooth margins was visualized in the cardia and fundus of the stomach, contained within the muscularis propria; 3 fine needle biopsy passes were taken. An adequate sample was obtained RECOMMENDATION: Follow up biopsy results      Procedure: EGD  Result Date: 12/30/2024  Impression: The esophagus, duodenal bulb and 2nd part of the duodenum appeared normal. Single large subepithelial lesion in the cardia. Probing with forceps resulted in penetration into the lesion and bleeding without further interrogation to avoid further hemorrhage  RECOMMENDATION: Recommend EUS with FNA either inpatient or outpatient for tissue sampling    Katerina Garcia MD     Procedure: US pelvis complete w transvaginal  Result Date: 12/29/2024  Impression: A 7.4 cm left ovarian bilocular cystic lesion with solid papillary projections without internal vascularity. O-RADS 4 = Intermediate risk.  Imaging options include evaluation by ultrsaound specialist, MRI, or per GYN-oncologist protocol. Clinical management by gynecologist with GYN-oncologist consultation or soley by GYN-oncologist. A 6.4 cm right ovarian unilocular cystic lesion with irregular septations without internal vascularity. O-RADS 3 = Low risk  If not surgically excised, consider follow-up US within 6 months. The study was marked in EPIC for immediate notification. Workstation performed: [id number]     Procedure: CT abdomen pelvis wo contrast  Result Date: 12/29/2024  Impression: Lobulated mass centered within the posterior gastric wall of the proximal stomach. A tumor such as GIST must be excluded.  Pelvic masses suspicious for ovarian masses or cysts as described above. These can be further evaluated with pelvic ultrasound. Workstation performed: [id number]     Procedure: XR chest 1 view portable  Result Date: 12/29/2024  Impression: No acute cardiopulmonary disease. Workstation performed: [id number]     Procedure: CT renal stone study abdomen pelvis wo contrast  Result Date: 12/25/2024  Impression: IMPRESSION: Possible 4.4 x 3.5 cm exophytic mass involving the posterior gastric cardia/fundus wall. Lack of IV and oral contrast limits evaluation. This could be related to a gastric diverticulum, however, gastric wall mass such as gist tumor cannot be excluded. Consider repeat evaluation with oral and IV contrast for further characterization. No GI obstruction. No hydronephrosis or nephrolithiasis. Lobulated heterogeneous uterus likely secondary to underlying fibroids. Nonemergent outpatient pelvic ultrasound can be performed for better evaluation. Workstation:[id number]    Procedure: XR chest portable  Result Date: 12/25/2024  Impression: Impression: No acute radiographic cardiopulmonary process. Workstation:[id number]      Administrative Statements   I have spent a total time of 37 minutes in caring for this patient on the day of the visit/encounter including Risks and benefits of tx options, Instructions for management, Patient and family education, Importance of tx compliance, Risk factor reductions, Impressions, Counseling / Coordination of care, Documenting in the medical record, Reviewing/placing orders in the medical record (including tests, medications, and/or procedures), Obtaining or reviewing history  , and Communicating with other healthcare professionals .   Topics discussed with the patient / family include symptom assessment and management, medication review, medication adjustment, psychosocial support, supportive listening, and anticipatory guidance.

## 2025-04-09 NOTE — TELEPHONE ENCOUNTER
Received notification by Dr. Conde on 4/9/25 that pt has triggered for oncology nutrition care (reason for referral: Ambulatory referral for Malignant gastrointestinal stromal tumor (GIST) of stomach ).    Contacted [facility] today to establish care.  No answer.  Left voice message with the reason for today's call and this RD’s contact information asking that [facility] call back as able/desired.

## 2025-04-09 NOTE — ASSESSMENT & PLAN NOTE
Psychosocial   Supportive listening provided  Normalized experience of patient/family  Provided anxiety containment     Referrals Placed / Medical Equipment Ordered  -None    Follow-Up Recommendations  -Follow-up with PCP and current medical specialists  -Follow-up with palliative care: 2-3 weeks    Orders:    methylPREDNISolone 4 MG tablet therapy pack; Use as directed on package

## 2025-04-10 PROBLEM — R43.2 DYSGEUSIA: Status: ACTIVE | Noted: 2025-04-10

## 2025-04-10 PROBLEM — R68.2 DRY MOUTH: Status: ACTIVE | Noted: 2025-04-10

## 2025-04-10 NOTE — ASSESSMENT & PLAN NOTE
Metallic taste in mouth.  Advised to avoid metal utensils, containers or aluminum foil to store food.

## 2025-04-10 NOTE — ASSESSMENT & PLAN NOTE
We talked about options to help with dry mouth, discussed trial of home remedies as below:  1) can trial mints, lozenges, gums to help maintain salivary production  2) discussed the options of nonalcohol containing mouthwash/rinse, including Biotene spray/mouthwash

## 2025-04-11 NOTE — TELEPHONE ENCOUNTER
Second attempt made today to reach Tanya to establish care and discuss her nutrition.  No answer.  Left a voice message requesting a call back at [facility]'s convenience.

## 2025-04-15 ENCOUNTER — PATIENT OUTREACH (OUTPATIENT)
Dept: CASE MANAGEMENT | Facility: OTHER | Age: 40
End: 2025-04-15

## 2025-04-25 DIAGNOSIS — E89.40 SURGICAL MENOPAUSE: ICD-10-CM

## 2025-04-25 DIAGNOSIS — R10.9 ABDOMINAL PAIN: ICD-10-CM

## 2025-04-25 RX ORDER — PANTOPRAZOLE SODIUM 40 MG/1
40 TABLET, DELAYED RELEASE ORAL DAILY
Qty: 90 TABLET | Refills: 1 | Status: SHIPPED | OUTPATIENT
Start: 2025-04-25 | End: 2025-07-24

## 2025-04-25 RX ORDER — ESTRADIOL 0.5 MG/1
0.5 TABLET ORAL DAILY
Qty: 30 TABLET | Refills: 5 | Status: SHIPPED | OUTPATIENT
Start: 2025-04-25

## 2025-04-28 ENCOUNTER — APPOINTMENT (OUTPATIENT)
Dept: LAB | Facility: CLINIC | Age: 40
End: 2025-04-28
Payer: COMMERCIAL

## 2025-04-28 ENCOUNTER — OFFICE VISIT (OUTPATIENT)
Dept: GYNECOLOGIC ONCOLOGY | Facility: CLINIC | Age: 40
End: 2025-04-28

## 2025-04-28 ENCOUNTER — HOSPITAL ENCOUNTER (OUTPATIENT)
Facility: HOSPITAL | Age: 40
Setting detail: OBSERVATION
Discharge: HOME | End: 2025-04-29
Attending: EMERGENCY MEDICINE | Admitting: STUDENT IN AN ORGANIZED HEALTH CARE EDUCATION/TRAINING PROGRAM
Payer: COMMERCIAL

## 2025-04-28 ENCOUNTER — TELEPHONE (OUTPATIENT)
Dept: OTHER | Facility: HOSPITAL | Age: 40
End: 2025-04-28

## 2025-04-28 ENCOUNTER — TELEPHONE (OUTPATIENT)
Dept: OTHER | Facility: OTHER | Age: 40
End: 2025-04-28

## 2025-04-28 VITALS
WEIGHT: 263 LBS | SYSTOLIC BLOOD PRESSURE: 140 MMHG | DIASTOLIC BLOOD PRESSURE: 80 MMHG | BODY MASS INDEX: 43.77 KG/M2 | HEART RATE: 78 BPM | TEMPERATURE: 97.7 F | OXYGEN SATURATION: 98 %

## 2025-04-28 DIAGNOSIS — E83.51 HYPOCALCEMIA: Primary | ICD-10-CM

## 2025-04-28 DIAGNOSIS — E55.9 VITAMIN D DEFICIENCY: ICD-10-CM

## 2025-04-28 DIAGNOSIS — R94.31 PROLONGED Q-T INTERVAL ON ECG: ICD-10-CM

## 2025-04-28 DIAGNOSIS — N95.1 MENOPAUSAL SYMPTOMS: ICD-10-CM

## 2025-04-28 DIAGNOSIS — Z91.198 PATIENT'S NONCOMPLIANCE WITH OTHER MEDICAL TREATMENT AND REGIMEN FOR OTHER REASON: ICD-10-CM

## 2025-04-28 DIAGNOSIS — Z65.8 PSYCHOSOCIAL STRESSORS: ICD-10-CM

## 2025-04-28 DIAGNOSIS — Z98.890 POSTOPERATIVE STATE: Primary | ICD-10-CM

## 2025-04-28 DIAGNOSIS — C49.A2 MALIGNANT GASTROINTESTINAL STROMAL TUMOR (GIST) OF STOMACH (HCC): ICD-10-CM

## 2025-04-28 PROBLEM — E20.9 HYPOPARATHYROIDISM (HCC): Status: ACTIVE | Noted: 2025-04-28

## 2025-04-28 PROBLEM — E87.6 HYPOKALEMIA: Status: ACTIVE | Noted: 2025-04-28

## 2025-04-28 LAB
ALBUMIN SERPL BCG-MCNC: 3.7 G/DL (ref 3.5–5)
ALP SERPL-CCNC: 67 U/L (ref 34–104)
ALT SERPL W P-5'-P-CCNC: 8 U/L (ref 7–52)
ANION GAP SERPL CALCULATED.3IONS-SCNC: 11 MMOL/L (ref 4–13)
AST SERPL W P-5'-P-CCNC: 12 U/L (ref 13–39)
BASOPHILS # BLD AUTO: 0.05 THOUSANDS/ÂΜL (ref 0–0.1)
BASOPHILS NFR BLD AUTO: 1 % (ref 0–1)
BILIRUB SERPL-MCNC: 0.42 MG/DL (ref 0.2–1)
BUN SERPL-MCNC: 12 MG/DL (ref 5–25)
CALCIUM SERPL-MCNC: 5.3 MG/DL (ref 8.4–10.2)
CHLORIDE SERPL-SCNC: 103 MMOL/L (ref 96–108)
CO2 SERPL-SCNC: 29 MMOL/L (ref 21–32)
CREAT SERPL-MCNC: 1.14 MG/DL (ref 0.6–1.3)
EOSINOPHIL # BLD AUTO: 0.3 THOUSAND/ÂΜL (ref 0–0.61)
EOSINOPHIL NFR BLD AUTO: 3 % (ref 0–6)
ERYTHROCYTE [DISTWIDTH] IN BLOOD BY AUTOMATED COUNT: 17.1 % (ref 11.6–15.1)
GFR SERPL CREATININE-BSD FRML MDRD: 59 ML/MIN/1.73SQ M
GLUCOSE P FAST SERPL-MCNC: 105 MG/DL (ref 65–99)
HCT VFR BLD AUTO: 36.6 % (ref 34.8–46.1)
HGB BLD-MCNC: 11.4 G/DL (ref 11.5–15.4)
IMM GRANULOCYTES # BLD AUTO: 0.04 THOUSAND/UL (ref 0–0.2)
IMM GRANULOCYTES NFR BLD AUTO: 0 % (ref 0–2)
LYMPHOCYTES # BLD AUTO: 2.9 THOUSANDS/ÂΜL (ref 0.6–4.47)
LYMPHOCYTES NFR BLD AUTO: 29 % (ref 14–44)
MCH RBC QN AUTO: 26.6 PG (ref 26.8–34.3)
MCHC RBC AUTO-ENTMCNC: 31.1 G/DL (ref 31.4–37.4)
MCV RBC AUTO: 86 FL (ref 82–98)
MONOCYTES # BLD AUTO: 0.38 THOUSAND/ÂΜL (ref 0.17–1.22)
MONOCYTES NFR BLD AUTO: 4 % (ref 4–12)
NEUTROPHILS # BLD AUTO: 6.49 THOUSANDS/ÂΜL (ref 1.85–7.62)
NEUTS SEG NFR BLD AUTO: 63 % (ref 43–75)
NRBC BLD AUTO-RTO: 0 /100 WBCS
PLATELET # BLD AUTO: 238 THOUSANDS/UL (ref 149–390)
PMV BLD AUTO: 11.6 FL (ref 8.9–12.7)
POTASSIUM SERPL-SCNC: 3.2 MMOL/L (ref 3.5–5.3)
PROT SERPL-MCNC: 6.6 G/DL (ref 6.4–8.4)
RBC # BLD AUTO: 4.28 MILLION/UL (ref 3.81–5.12)
SODIUM SERPL-SCNC: 143 MMOL/L (ref 135–147)
WBC # BLD AUTO: 10.16 THOUSAND/UL (ref 4.31–10.16)

## 2025-04-28 PROCEDURE — 99024 POSTOP FOLLOW-UP VISIT: CPT | Performed by: PHYSICIAN ASSISTANT

## 2025-04-28 PROCEDURE — 93005 ELECTROCARDIOGRAM TRACING: CPT

## 2025-04-28 PROCEDURE — 96365 THER/PROPH/DIAG IV INF INIT: CPT

## 2025-04-28 PROCEDURE — 85025 COMPLETE CBC W/AUTO DIFF WBC: CPT

## 2025-04-28 PROCEDURE — 99283 EMERGENCY DEPT VISIT LOW MDM: CPT

## 2025-04-28 PROCEDURE — 36415 COLL VENOUS BLD VENIPUNCTURE: CPT

## 2025-04-28 PROCEDURE — 80053 COMPREHEN METABOLIC PANEL: CPT

## 2025-04-28 RX ORDER — LEVOTHYROXINE SODIUM 175 UG/1
175 TABLET ORAL
Status: DISCONTINUED | OUTPATIENT
Start: 2025-04-29 | End: 2025-04-29 | Stop reason: HOSPADM

## 2025-04-28 RX ORDER — IMATINIB MESYLATE 400 MG/1
400 TABLET, FILM COATED ORAL EVERY OTHER DAY
Status: DISCONTINUED | OUTPATIENT
Start: 2025-04-28 | End: 2025-04-29 | Stop reason: HOSPADM

## 2025-04-28 RX ORDER — SUCRALFATE 1 G/1
1 TABLET ORAL EVERY 6 HOURS
Status: DISCONTINUED | OUTPATIENT
Start: 2025-04-29 | End: 2025-04-29 | Stop reason: HOSPADM

## 2025-04-28 RX ORDER — BUPRENORPHINE AND NALOXONE 8; 2 MG/1; MG/1
8 FILM, SOLUBLE BUCCAL; SUBLINGUAL ONCE
Status: DISCONTINUED | OUTPATIENT
Start: 2025-04-28 | End: 2025-04-29

## 2025-04-28 RX ORDER — POTASSIUM CHLORIDE 1500 MG/1
20 TABLET, EXTENDED RELEASE ORAL ONCE
Status: COMPLETED | OUTPATIENT
Start: 2025-04-28 | End: 2025-04-28

## 2025-04-28 RX ORDER — ENOXAPARIN SODIUM 100 MG/ML
40 INJECTION SUBCUTANEOUS DAILY
Status: DISCONTINUED | OUTPATIENT
Start: 2025-04-29 | End: 2025-04-29

## 2025-04-28 RX ORDER — LANOLIN ALCOHOL/MO/W.PET/CERES
400 CREAM (GRAM) TOPICAL ONCE
Status: COMPLETED | OUTPATIENT
Start: 2025-04-28 | End: 2025-04-28

## 2025-04-28 RX ORDER — CALCIUM GLUCONATE 20 MG/ML
2 INJECTION, SOLUTION INTRAVENOUS ONCE
Status: COMPLETED | OUTPATIENT
Start: 2025-04-28 | End: 2025-04-28

## 2025-04-28 RX ORDER — ACETAMINOPHEN 325 MG/1
975 TABLET ORAL EVERY 8 HOURS SCHEDULED
Status: DISCONTINUED | OUTPATIENT
Start: 2025-04-28 | End: 2025-04-29 | Stop reason: HOSPADM

## 2025-04-28 RX ORDER — ESTRADIOL 1 MG/1
0.5 TABLET ORAL DAILY
Status: DISCONTINUED | OUTPATIENT
Start: 2025-04-29 | End: 2025-04-29 | Stop reason: HOSPADM

## 2025-04-28 RX ORDER — PANTOPRAZOLE SODIUM 40 MG/1
40 TABLET, DELAYED RELEASE ORAL
Status: DISCONTINUED | OUTPATIENT
Start: 2025-04-29 | End: 2025-04-29 | Stop reason: HOSPADM

## 2025-04-28 RX ORDER — ONDANSETRON 4 MG/1
4 TABLET, ORALLY DISINTEGRATING ORAL EVERY 6 HOURS PRN
Status: DISCONTINUED | OUTPATIENT
Start: 2025-04-28 | End: 2025-04-29 | Stop reason: HOSPADM

## 2025-04-28 RX ORDER — BUPRENORPHINE AND NALOXONE 2; .5 MG/1; MG/1
4 FILM, SOLUBLE BUCCAL; SUBLINGUAL
Status: DISCONTINUED | OUTPATIENT
Start: 2025-04-29 | End: 2025-04-29 | Stop reason: HOSPADM

## 2025-04-28 RX ADMIN — Medication 400 MG: at 21:31

## 2025-04-28 RX ADMIN — CALCIUM GLUCONATE 2 G: 20 INJECTION, SOLUTION INTRAVENOUS at 21:32

## 2025-04-28 RX ADMIN — POTASSIUM CHLORIDE 20 MEQ: 1500 TABLET, EXTENDED RELEASE ORAL at 21:31

## 2025-04-28 NOTE — PROGRESS NOTES
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       Postoperative state  40-year-old with gastric GIST and b/l ovarian masses now s/p EUA, TLH, BSO, lararoscopic partial gastrectomy (surg-onc) on 25. Ovarian serous cystadenomas identified. She has recovered well from surgery. Her vaginal cuff is well healed.     Discussed continued pelvic rest for an additional 2 weeks.     Return to the office prn.   Continued annual f/u with routine gynecology.        Menopausal symptoms  Continue daily HRT, estradiol 0.5mg.   Future management per routine gyn.   Plan to continue until age 50-51.               History of Present Illness     Reason for Visit / CC:  Post-op Visit    Tanya Rosenthal is a 40 y.o. female   who presents to the office for continued post-operative evaluation. She is without acute complaints. She has been afebrile. Denies n/v/abdominal pain. Appetite is appropriate. She notes diarrhea which is chronic. No bladder complaints. Patient denies vaginal bleeding or discharge.        Pertinent Medical History   Bilateral adnexal masses, gastric GIST    EUA, TLH, BSO, lararoscopic partial gastrectomy (surg-onc) on 25.  A. Ovarian serous cystadenomas, GIST tumor       Oncology History   Cancer Staging   GIST (gastrointestinal stromal tumor), malignant (HCC)  Staging form: Gastric Stromal Tumor - Gastric GIST, AJCC 8th Edition  - Pathologic: Stage IIIA (pT3, pN0, cM0, Mitotic Rate: High) - Signed by Tomás South MD on 2025  Histologic grade (G): High grade  Histologic grading system: 2 grade system  Residual tumor (R): R0 - None  Oncology History   GIST (gastrointestinal stromal tumor), malignant (HCC)    1/7/2025 Biopsy    Endoscopic Ultrasound    Stomach, Gastric Submucosal, FNA:  Neoplastic.  Gastrointestinal stromal tumor (GIST), mixed spindle cell and epithelioid type.  > 5 mitoses per 50 HPF     1/13/2025 Initial Diagnosis    GIST (gastrointestinal stromal tumor), malignant (HCC)     2/5/2025 Surgery    Partial gastrectomy:   Gastric mass, resection:  -   Gastrointestinal stromal tumor of stomach, 5.8 cm in greatest dimension.    TUMOR   Tumor Focality  Unifocal   Tumor Site  Stomach: Just distal to GE junction per op note   Tumor Size  Greatest Dimension (Centimeters): 5.8 cm   Mitotic Rate  18 mitoses per 5 mm2   Histologic Grade  G2, high grade   MARGINS   Margin Status  All margins negative for GIST   pTNM CLASSIFICATION (AJCC 8th Edition)   pT Category  pT3   pN Category  pN not assigned (no nodes submitted or found)           2/26/2025 -  Cancer Staged    Staging form: Gastric Stromal Tumor - Gastric GIST, AJCC 8th Edition  - Pathologic: Stage IIIA (pT3, pN0, cM0, Mitotic Rate: High) - Signed by Tomás South MD on 2/26/2025  Histologic grade (G): High grade  Histologic grading system: 2 grade system  Residual tumor (R): R0 - None          Review of Systems   Constitutional: Negative.    Respiratory: Negative.     Cardiovascular: Negative.    Gastrointestinal: Negative.    Genitourinary: Negative.    Skin: Negative.     A complete review of systems is negative other than that noted above in the HPI.  Medical History Reviewed by provider this encounter:     .  No current facility-administered medications on file prior to visit.     Current Outpatient Medications on File Prior to Visit   Medication Sig Dispense Refill   • buprenorphine-naloxone (SUBOXONE) 8-2 mg per SL tablet Place 1 tablet under the tongue daily     • buprenorphine-naloxone (Suboxone) 8-2 mg ONE AND ONE HALF FILM SUBLINGUALLY DAILY. MAX DAILY DOSE  1.5     • calcium carbonate (OS-ALL) 600 MG tablet Take 1 tablet (600 mg total) by mouth  daily 30 tablet 0   • estradiol (ESTRACE) 0.5 MG tablet TAKE 1 TABLET BY MOUTH EVERY DAY 30 tablet 5   • FLUoxetine (PROzac) 20 mg capsule Take 1 capsule by mouth in the morning     • imatinib (GLEEVEC) 400 mg tablet Take 1 tablet (400 mg total) by mouth daily (Patient taking differently: Take 400 mg by mouth every other day) 30 tablet 5   • levothyroxine 175 mcg tablet Take 1 tablet by mouth in the morning     • methylPREDNISolone 4 MG tablet therapy pack Use as directed on package (Patient not taking: Reported on 4/28/2025) 21 tablet 0   • pantoprazole (PROTONIX) 40 mg tablet Take 1 tablet (40 mg total) by mouth daily 90 tablet 1   • prochlorperazine (COMPAZINE) 5 mg tablet Take 1 tablet (5 mg total) by mouth every 6 (six) hours as needed for nausea or vomiting (Patient not taking: Reported on 4/28/2025) 30 tablet 1   • sucralfate (CARAFATE) 1 g tablet Take 1 tablet (1 g total) by mouth every 6 (six) hours 120 tablet 0   • acetaminophen (TYLENOL) 325 mg tablet Take 3 tablets (975 mg total) by mouth every 8 (eight) hours (Patient not taking: Reported on 2/28/2025)     • enoxaparin (Lovenox) 40 mg/0.4 mL Inject 0.4 mL (40 mg total) under the skin in the morning for 24 days 9.6 mL 0   • ondansetron (ZOFRAN-ODT) 4 mg disintegrating tablet Take 4 mg by mouth every 6 (six) hours as needed for nausea or vomiting     • oxyCODONE (ROXICODONE) 10 MG TABS Take 1 tablet (10 mg total) by mouth every 4 (four) hours as needed for severe pain Max Daily Amount: 60 mg (Patient not taking: Reported on 2/26/2025) 60 tablet 0         Objective   /80 (BP Location: Left arm, Patient Position: Sitting, Cuff Size: Large)   Pulse 78   Temp 97.7 °F (36.5 °C) (Temporal)   Wt 119 kg (263 lb)   LMP  (LMP Unknown)   SpO2 98%   BMI 43.77 kg/m²     Body mass index is 43.77 kg/m².  Pain Screening:  Pain Score: 0-No pain    Physical Exam  Vitals reviewed. Exam conducted with a chaperone present.   Constitutional:       General: She is  not in acute distress.     Appearance: Normal appearance.   HENT:      Head: Normocephalic and atraumatic.      Mouth/Throat:      Mouth: Mucous membranes are moist.   Abdominal:      Palpations: Abdomen is soft. There is no mass.      Tenderness: There is no abdominal tenderness.   Genitourinary:     Comments: The external female genitalia is normal. The bartholin's, uretheral and skenes glands are normal. The urethral meatus is normal (midline with no lesions). Anus without fissure or lesion. Speculum exam reveals a grossly normal vagina. Vagina is intact, without dehiscense. No masses, lesions, discharge or bleeding. No significant cystocele or rectocele noted. Bimanual exam notes a surgical absent cervix, uterus and adnexal structures. No masses or fullness. Bladder is without fullness, mass or tenderness.  Skin:     General: Skin is warm and dry.      Comments: Surgical incisions are well healed.   Neurological:      Mental Status: She is alert and oriented to person, place, and time.   Psychiatric:         Mood and Affect: Mood normal.         Behavior: Behavior normal.         Thought Content: Thought content normal.         Judgment: Judgment normal.

## 2025-04-28 NOTE — TELEPHONE ENCOUNTER
Lab Result: Calcium 5.3    Date/Time Drawn: 4/28/25 at 11:18 AM    Ordering Provider: BERTHA Xiao   Lab Personnel's Name: Tequila        Read back to the lab as documented above     [x]     Secure Chat message sent to on-call provider  [x]     Provider confirmed receipt of message     [x]

## 2025-04-29 VITALS
RESPIRATION RATE: 15 BRPM | HEART RATE: 54 BPM | DIASTOLIC BLOOD PRESSURE: 78 MMHG | TEMPERATURE: 98.1 F | SYSTOLIC BLOOD PRESSURE: 128 MMHG | OXYGEN SATURATION: 95 %

## 2025-04-29 PROBLEM — Z98.890 POSTOPERATIVE STATE: Status: ACTIVE | Noted: 2025-04-29

## 2025-04-29 LAB
25(OH)D3 SERPL-MCNC: 14.1 NG/ML (ref 30–100)
ALBUMIN SERPL BCG-MCNC: 3.7 G/DL (ref 3.5–5)
ALP SERPL-CCNC: 67 U/L (ref 34–104)
ALT SERPL W P-5'-P-CCNC: 8 U/L (ref 7–52)
ANION GAP SERPL CALCULATED.3IONS-SCNC: 10 MMOL/L (ref 4–13)
ANION GAP SERPL CALCULATED.3IONS-SCNC: 10 MMOL/L (ref 4–13)
ANION GAP SERPL CALCULATED.3IONS-SCNC: 8 MMOL/L (ref 4–13)
AST SERPL W P-5'-P-CCNC: 12 U/L (ref 13–39)
ATRIAL RATE: 48 BPM
ATRIAL RATE: 49 BPM
ATRIAL RATE: 66 BPM
ATRIAL RATE: 67 BPM
ATRIAL RATE: 71 BPM
BASOPHILS # BLD AUTO: 0.03 THOUSANDS/ÂΜL (ref 0–0.1)
BASOPHILS NFR BLD AUTO: 0 % (ref 0–1)
BILIRUB SERPL-MCNC: 0.29 MG/DL (ref 0.2–1)
BUN SERPL-MCNC: 10 MG/DL (ref 5–25)
BUN SERPL-MCNC: 10 MG/DL (ref 5–25)
BUN SERPL-MCNC: 11 MG/DL (ref 5–25)
CA-I BLD-SCNC: 0.6 MMOL/L (ref 1.12–1.32)
CA-I BLD-SCNC: 0.84 MMOL/L (ref 1.12–1.32)
CA-I BLD-SCNC: 0.9 MMOL/L (ref 1.12–1.32)
CA-I BLD-SCNC: 0.93 MMOL/L (ref 1.12–1.32)
CALCIUM SERPL-MCNC: 5.6 MG/DL (ref 8.4–10.2)
CALCIUM SERPL-MCNC: 6.1 MG/DL (ref 8.4–10.2)
CALCIUM SERPL-MCNC: 7.2 MG/DL (ref 8.4–10.2)
CHLORIDE SERPL-SCNC: 103 MMOL/L (ref 96–108)
CHLORIDE SERPL-SCNC: 104 MMOL/L (ref 96–108)
CHLORIDE SERPL-SCNC: 104 MMOL/L (ref 96–108)
CO2 SERPL-SCNC: 29 MMOL/L (ref 21–32)
CO2 SERPL-SCNC: 30 MMOL/L (ref 21–32)
CO2 SERPL-SCNC: 30 MMOL/L (ref 21–32)
CREAT SERPL-MCNC: 1.08 MG/DL (ref 0.6–1.3)
CREAT SERPL-MCNC: 1.13 MG/DL (ref 0.6–1.3)
CREAT SERPL-MCNC: 1.21 MG/DL (ref 0.6–1.3)
EOSINOPHIL # BLD AUTO: 0.28 THOUSAND/ÂΜL (ref 0–0.61)
EOSINOPHIL NFR BLD AUTO: 3 % (ref 0–6)
ERYTHROCYTE [DISTWIDTH] IN BLOOD BY AUTOMATED COUNT: 17.2 % (ref 11.6–15.1)
GFR SERPL CREATININE-BSD FRML MDRD: 55 ML/MIN/1.73SQ M
GFR SERPL CREATININE-BSD FRML MDRD: 60 ML/MIN/1.73SQ M
GFR SERPL CREATININE-BSD FRML MDRD: 63 ML/MIN/1.73SQ M
GLUCOSE P FAST SERPL-MCNC: 102 MG/DL (ref 65–99)
GLUCOSE SERPL-MCNC: 102 MG/DL (ref 65–140)
GLUCOSE SERPL-MCNC: 104 MG/DL (ref 65–140)
GLUCOSE SERPL-MCNC: 113 MG/DL (ref 65–140)
HCT VFR BLD AUTO: 41.7 % (ref 34.8–46.1)
HGB BLD-MCNC: 12.9 G/DL (ref 11.5–15.4)
IMM GRANULOCYTES # BLD AUTO: 0.02 THOUSAND/UL (ref 0–0.2)
IMM GRANULOCYTES NFR BLD AUTO: 0 % (ref 0–2)
LYMPHOCYTES # BLD AUTO: 3.35 THOUSANDS/ÂΜL (ref 0.6–4.47)
LYMPHOCYTES NFR BLD AUTO: 34 % (ref 14–44)
MAGNESIUM SERPL-MCNC: 1.6 MG/DL (ref 1.9–2.7)
MAGNESIUM SERPL-MCNC: 2.2 MG/DL (ref 1.9–2.7)
MCH RBC QN AUTO: 26.2 PG (ref 26.8–34.3)
MCHC RBC AUTO-ENTMCNC: 30.9 G/DL (ref 31.4–37.4)
MCV RBC AUTO: 85 FL (ref 82–98)
MONOCYTES # BLD AUTO: 0.33 THOUSAND/ÂΜL (ref 0.17–1.22)
MONOCYTES NFR BLD AUTO: 3 % (ref 4–12)
NEUTROPHILS # BLD AUTO: 5.78 THOUSANDS/ÂΜL (ref 1.85–7.62)
NEUTS SEG NFR BLD AUTO: 60 % (ref 43–75)
NRBC BLD AUTO-RTO: 0 /100 WBCS
P AXIS: 42 DEGREES
P AXIS: 44 DEGREES
P AXIS: 50 DEGREES
P AXIS: 53 DEGREES
P AXIS: 58 DEGREES
PHOSPHATE SERPL-MCNC: 4.9 MG/DL (ref 2.7–4.5)
PLATELET # BLD AUTO: 286 THOUSANDS/UL (ref 149–390)
PMV BLD AUTO: 11.6 FL (ref 8.9–12.7)
POTASSIUM SERPL-SCNC: 3.1 MMOL/L (ref 3.5–5.3)
POTASSIUM SERPL-SCNC: 3.8 MMOL/L (ref 3.5–5.3)
POTASSIUM SERPL-SCNC: 4.1 MMOL/L (ref 3.5–5.3)
PR INTERVAL: 154 MS
PR INTERVAL: 158 MS
PR INTERVAL: 162 MS
PR INTERVAL: 166 MS
PR INTERVAL: 166 MS
PROT SERPL-MCNC: 6.5 G/DL (ref 6.4–8.4)
PTH-INTACT SERPL-MCNC: 16.3 PG/ML (ref 12–88)
QRS AXIS: -12 DEGREES
QRS AXIS: -6 DEGREES
QRS AXIS: -9 DEGREES
QRS AXIS: 43 DEGREES
QRS AXIS: 47 DEGREES
QRSD INTERVAL: 88 MS
QRSD INTERVAL: 90 MS
QRSD INTERVAL: 90 MS
QRSD INTERVAL: 92 MS
QRSD INTERVAL: 92 MS
QT INTERVAL: 384 MS
QT INTERVAL: 386 MS
QT INTERVAL: 440 MS
QT INTERVAL: 572 MS
QT INTERVAL: 578 MS
QTC INTERVAL: 405 MS
QTC INTERVAL: 406 MS
QTC INTERVAL: 478 MS
QTC INTERVAL: 517 MS
QTC INTERVAL: 517 MS
RBC # BLD AUTO: 4.92 MILLION/UL (ref 3.81–5.12)
SODIUM SERPL-SCNC: 142 MMOL/L (ref 135–147)
SODIUM SERPL-SCNC: 142 MMOL/L (ref 135–147)
SODIUM SERPL-SCNC: 144 MMOL/L (ref 135–147)
T WAVE AXIS: 147 DEGREES
T WAVE AXIS: 154 DEGREES
T WAVE AXIS: 163 DEGREES
T WAVE AXIS: 37 DEGREES
T WAVE AXIS: 64 DEGREES
T4 FREE SERPL-MCNC: 0.39 NG/DL (ref 0.61–1.12)
TSH SERPL DL<=0.05 MIU/L-ACNC: 45 UIU/ML (ref 0.45–4.5)
VENTRICULAR RATE: 48 BPM
VENTRICULAR RATE: 49 BPM
VENTRICULAR RATE: 66 BPM
VENTRICULAR RATE: 67 BPM
VENTRICULAR RATE: 71 BPM
WBC # BLD AUTO: 9.79 THOUSAND/UL (ref 4.31–10.16)

## 2025-04-29 PROCEDURE — 84100 ASSAY OF PHOSPHORUS: CPT

## 2025-04-29 PROCEDURE — 80048 BASIC METABOLIC PNL TOTAL CA: CPT

## 2025-04-29 PROCEDURE — 83735 ASSAY OF MAGNESIUM: CPT

## 2025-04-29 PROCEDURE — 84443 ASSAY THYROID STIM HORMONE: CPT

## 2025-04-29 PROCEDURE — 85025 COMPLETE CBC W/AUTO DIFF WBC: CPT

## 2025-04-29 PROCEDURE — 93010 ELECTROCARDIOGRAM REPORT: CPT | Performed by: INTERNAL MEDICINE

## 2025-04-29 PROCEDURE — 84439 ASSAY OF FREE THYROXINE: CPT

## 2025-04-29 PROCEDURE — 82330 ASSAY OF CALCIUM: CPT

## 2025-04-29 PROCEDURE — 93005 ELECTROCARDIOGRAM TRACING: CPT

## 2025-04-29 PROCEDURE — 99223 1ST HOSP IP/OBS HIGH 75: CPT | Performed by: HOSPITALIST

## 2025-04-29 PROCEDURE — 83970 ASSAY OF PARATHORMONE: CPT

## 2025-04-29 PROCEDURE — 80053 COMPREHEN METABOLIC PANEL: CPT

## 2025-04-29 PROCEDURE — 82306 VITAMIN D 25 HYDROXY: CPT

## 2025-04-29 RX ORDER — CALCIUM GLUCONATE 20 MG/ML
2 INJECTION, SOLUTION INTRAVENOUS ONCE
Status: COMPLETED | OUTPATIENT
Start: 2025-04-29 | End: 2025-04-29

## 2025-04-29 RX ORDER — POTASSIUM CHLORIDE 14.9 MG/ML
20 INJECTION INTRAVENOUS
Status: DISPENSED | OUTPATIENT
Start: 2025-04-29 | End: 2025-04-29

## 2025-04-29 RX ORDER — MAGNESIUM SULFATE HEPTAHYDRATE 40 MG/ML
2 INJECTION, SOLUTION INTRAVENOUS ONCE
Status: COMPLETED | OUTPATIENT
Start: 2025-04-29 | End: 2025-04-29

## 2025-04-29 RX ORDER — POTASSIUM CHLORIDE 1500 MG/1
20 TABLET, EXTENDED RELEASE ORAL
Status: COMPLETED | OUTPATIENT
Start: 2025-04-29 | End: 2025-04-29

## 2025-04-29 RX ORDER — ENOXAPARIN SODIUM 100 MG/ML
40 INJECTION SUBCUTANEOUS EVERY 12 HOURS SCHEDULED
Status: DISCONTINUED | OUTPATIENT
Start: 2025-04-29 | End: 2025-04-29 | Stop reason: HOSPADM

## 2025-04-29 RX ORDER — ERGOCALCIFEROL 1.25 MG/1
1 CAPSULE ORAL WEEKLY
Qty: 8 CAPSULE | Refills: 0 | Status: SHIPPED | OUTPATIENT
Start: 2025-04-29 | End: 2025-05-02 | Stop reason: SDUPTHER

## 2025-04-29 RX ORDER — CALCIUM CARBONATE 500(1250)
1 TABLET ORAL DAILY
Status: DISCONTINUED | OUTPATIENT
Start: 2025-04-29 | End: 2025-04-29 | Stop reason: HOSPADM

## 2025-04-29 RX ORDER — POTASSIUM CHLORIDE 29.8 MG/ML
40 INJECTION INTRAVENOUS ONCE
Status: DISCONTINUED | OUTPATIENT
Start: 2025-04-29 | End: 2025-04-29

## 2025-04-29 RX ORDER — BUPRENORPHINE AND NALOXONE 8; 2 MG/1; MG/1
8 FILM, SOLUBLE BUCCAL; SUBLINGUAL DAILY
Status: DISCONTINUED | OUTPATIENT
Start: 2025-04-29 | End: 2025-04-29 | Stop reason: HOSPADM

## 2025-04-29 RX ADMIN — LEVOTHYROXINE SODIUM 175 MCG: 175 TABLET ORAL at 04:56

## 2025-04-29 RX ADMIN — PANTOPRAZOLE SODIUM 40 MG: 40 TABLET, DELAYED RELEASE ORAL at 04:57

## 2025-04-29 RX ADMIN — BUPRENORPHINE AND NALOXONE 8 MG: 8; 2 FILM BUCCAL; SUBLINGUAL at 08:51

## 2025-04-29 RX ADMIN — POTASSIUM CHLORIDE 20 MEQ: 1500 TABLET, EXTENDED RELEASE ORAL at 02:48

## 2025-04-29 RX ADMIN — CALCIUM GLUCONATE 2 G: 20 INJECTION, SOLUTION INTRAVENOUS at 06:15

## 2025-04-29 RX ADMIN — ESTRADIOL 0.5 MG: 1 TABLET ORAL at 08:51

## 2025-04-29 RX ADMIN — SUCRALFATE 1 G: 1 TABLET ORAL at 11:30

## 2025-04-29 RX ADMIN — POTASSIUM CHLORIDE 20 MEQ: 1500 TABLET, EXTENDED RELEASE ORAL at 08:51

## 2025-04-29 RX ADMIN — MAGNESIUM SULFATE HEPTAHYDRATE 2 G: 40 INJECTION, SOLUTION INTRAVENOUS at 02:06

## 2025-04-29 RX ADMIN — POTASSIUM CHLORIDE 20 MEQ: 1500 TABLET, EXTENDED RELEASE ORAL at 02:06

## 2025-04-29 RX ADMIN — IMATINIB MESYLATE 400 MG: 400 TABLET ORAL at 00:44

## 2025-04-29 RX ADMIN — SUCRALFATE 1 G: 1 TABLET ORAL at 00:44

## 2025-04-29 RX ADMIN — ACETAMINOPHEN 975 MG: 325 TABLET, FILM COATED ORAL at 13:32

## 2025-04-29 RX ADMIN — CALCIUM GLUCONATE 2 G: 20 INJECTION, SOLUTION INTRAVENOUS at 01:12

## 2025-04-29 RX ADMIN — CALCIUM GLUCONATE 2 G: 20 INJECTION, SOLUTION INTRAVENOUS at 11:30

## 2025-04-29 RX ADMIN — CALCIUM GLUCONATE 2 G: 20 INJECTION, SOLUTION INTRAVENOUS at 04:11

## 2025-04-29 RX ADMIN — SUCRALFATE 1 G: 1 TABLET ORAL at 04:56

## 2025-04-29 RX ADMIN — POTASSIUM CHLORIDE 20 MEQ: 1500 TABLET, EXTENDED RELEASE ORAL at 04:58

## 2025-04-29 RX ADMIN — FLUOXETINE 20 MG: 20 CAPSULE ORAL at 08:51

## 2025-04-29 RX ADMIN — CALCIUM 1 TABLET: 500 TABLET ORAL at 11:30

## 2025-04-29 RX ADMIN — ACETAMINOPHEN 975 MG: 325 TABLET, FILM COATED ORAL at 04:56

## 2025-04-29 NOTE — DISCHARGE SUMMARY
Discharge Summary - Hospitalist   Name: Tanya Rosenthal 40 y.o. female I MRN: [id number]  Unit/Bed#: S -01 I Date of Admission: 4/28/2025   Date of Service: 4/29/2025 I Hospital Day: 0     Assessment & Plan  Hypocalcemia  Chronically low due to hypoparathyroidism as sequelae of thyroidectomy (done in 2000 for papillary thyroid cancer)  Earlier today as outpatient labs had calicum level of 5.3 (ionized was not drawn)  Supposed to take 600mg Os-tariq daily but reports poor compliance   ED gave 2grams IV on arrival  Reported left and right numbness in tingling of hands  Per ED Had Trousseau sign + [place]ost[place] sign (patient also checked this herself at home which was positive)               These symptoms and sings resolved at time of my evaluation  EKG- low voltage QRS complexes and mild qtc prolongation to 478 (this is not new)        Plan  Resume home calcium.  Discussed with patient to take calcium at least 4 hours apart from thyroid hormone medication.  Patient also with vitamin D deficiency, which may explain chronically low calcium.  Will start vitamin D 50,000 units once a week for 8 weeks  Outpatient follow-up with PCP for repeat BMP  Follow-up with endocrinology at appointment on 5/2    Hypokalemia  @ 1100 on 4/28 value of 3.2  EKG findings as above  Given oral 20meq in ed prior to admission     Possible poor absorption in setting of gastrectomy.  Resolved     Plan  Follow-up with PCP and endocrinology  Hypothyroidism    Hypoparathyroidism (HCC)  Complication of thyroidectomy for papillary thyroid cancer in 5994-1430   PTH checked from prior admission in 2024 with low/normal results + hypocalcemia               This is consistent with hypoparathyroidism.  PTH normal.     Plan  Has endocrinology establishment visit friday per patient    GIST (gastrointestinal stromal tumor), malignant (HCC)  T3 N0 M0 grade 2 gastrointestinal stromal tumor (GIST) of the stomach with KIT pathogenic variant in exon 11,  diagnosed at the time of R0 partial gastrectomy on February 5, 2025.     On February 5, 2025, the patient underwent a partial gastrectomy as well as pelvic examination under anesthesia, laparoscopic total hysterectomy and bilateral salpingo-oophorectomy.     She follows with DR. Conde ([facility])  Possible establishment with GIST specialized in near future at [facility]    Plan   Continue  imatinib 400 mg per mouth every other day (patient states it was changed from daily)  [facility] outpatient   Anxiety  Resume prior to admission medications  History of heroin abuse (HCC)  No currently using  On suboxone therapy 8mg am and 4mg PM  PDMP reviewed  Continue home regimen      Medical Problems       Resolved Problems  Date Reviewed: 4/9/2025   None       Discharging Physician / Practitioner: Luisa Knox MD  PCP: Feliciano Ledezma MD  Admission Date:   Admission Orders (From admission, onward)       Ordered        04/28/25 2220  Place in Observation  Once                          Discharge Date: 04/29/25    Consultations During Hospital Stay:  None    Procedures Performed:   None    Significant Findings / Test Results:   No orders to display     Recent Labs     04/29/25  0009 04/29/25  0531 04/29/25  1406   K 3.1* 3.8 4.1   MG 1.6* 2.2  --      Recent Labs     04/28/25  1118 04/29/25  0009 04/29/25  0531 04/29/25  1406   CALCIUM 5.3* 5.6* 6.1* 7.2*   ALB 3.7 3.7  --   --      Vitamin D 25-hydroxy: 14.1        Incidental Findings:   None     Test Results Pending at Discharge (will require follow up):   None     Outpatient Tests Requested:  Repeat BMP in 1 week    Complications:  None    Reason for Admission: Hypocalcemia    Hospital Course:   Tanya Rosenthal is a 40 y.o. female patient with a PMH of GIST, hypothyroidism post thyroidectomy, hypoparathyroidism, anxiety, prior heroin use on Suboxone therapy who presents with severe hypocalcemia (level 5.2) and hypokalemic (3.2) found on routine labs checked by her  oncology team. Patient has low K, Mg, and Ca during the admission, for which electrolytes were repleted.  Patient also had low vitamin D less than 20 for which she was given outpatient prescription for vitamin D supplementation.  Patient's electrolytes including Ca improved and patient was discharged home with instructions to follow-up with PCP and endocrinology for BMP in 1 week.     Please see above list of diagnoses and related plan for additional information.     Condition at Discharge: stable    Discharge Day Visit / Exam:   * Please refer to separate progress note for these details *    Discussion with Family: Patient declined call to .     Discharge instructions/Information to patient and family:   See after visit summary for information provided to patient and family.      Provisions for Follow-Up Care:  See after visit summary for information related to follow-up care and any pertinent home health orders.      Mobility at time of Discharge:   Basic Mobility Inpatient Raw Score: 24  JH-HLM Goal: 8: Walk 250 feet or more  JH-HLM Achieved: 7: Walk 25 feet or more  HLM Goal NOT achieved. Continue to encourage mobility in post discharge setting.     Disposition:   Home    Planned Readmission: None    Discharge Medications:  See after visit summary for reconciled discharge medications provided to patient and/or family.      Administrative Statements   Discharge Statement:  I have spent a total time of 60 minutes in caring for this patient on the day of the visit/encounter. .    **Please Note: This note may have been constructed using a voice recognition system**

## 2025-04-29 NOTE — ASSESSMENT & PLAN NOTE
@ 1100 on 4/28 value of 3.2  EKG findings as above  Given oral 20meq in ed prior to admission     Possible poor absorption in setting of gastrectomy.  Resolved     Plan  Follow-up with PCP and endocrinology

## 2025-04-29 NOTE — ASSESSMENT & PLAN NOTE
Chronic hx of post thyroidectomy for papillary thyroid cancer in 2396-4220   Home regimen: levothyroxine 175mcg daily    Plan  Check tsh  Continue home dosing for now  Adjustments as needed

## 2025-04-29 NOTE — PLAN OF CARE
Problem: PAIN - ADULT  Goal: Verbalizes/displays adequate comfort level or baseline comfort level  Description: Interventions:- Encourage patient to monitor pain and request assistance- Assess pain using appropriate pain scale- Administer analgesics based on type and severity of pain and evaluate response- Implement non-pharmacological measures as appropriate and evaluate response- Consider cultural and social influences on pain and pain management- Notify physician/advanced practitioner if interventions unsuccessful or patient reports new pain  Outcome: Progressing     Problem: INFECTION - ADULT  Goal: Absence or prevention of progression during hospitalization  Description: INTERVENTIONS:- Assess and monitor for signs and symptoms of infection- Monitor lab/diagnostic results- Monitor all insertion sites, i.e. indwelling lines, tubes, and drains- Monitor endotracheal if appropriate and nasal secretions for changes in amount and color- [place] appropriate cooling/warming therapies per order- Administer medications as ordered- Instruct and encourage patient and family to use good hand hygiene technique- Identify and instruct in appropriate isolation precautions for identified infection/condition  Outcome: Progressing  Goal: Absence of fever/infection during neutropenic period  Description: INTERVENTIONS:- Monitor WBC  Outcome: Progressing     Problem: SAFETY ADULT  Goal: Patient will remain free of falls  Description: INTERVENTIONS:- Educate patient/family on patient safety including physical limitations- Instruct patient to call for assistance with activity - Consult OT/PT to assist with strengthening/mobility - Keep Call bell within reach- Keep bed low and locked with side rails adjusted as appropriate- Keep care items and personal belongings within reach- Initiate and maintain comfort rounds- Make Fall Risk Sign visible to staff- Apply yellow socks and bracelet for high fall risk patients- Consider moving  patient to room near nurses station  Outcome: Progressing  Goal: Maintain or return to baseline ADL function  Description: INTERVENTIONS:-  Assess patient's ability to carry out ADLs; assess patient's baseline for ADL function and identify physical deficits which impact ability to perform ADLs (bathing, care of mouth/teeth, toileting, grooming, dressing, etc.)- Assess/evaluate cause of self-care deficits - Assess range of motion- Assess patient's mobility; develop plan if impaired- Assess patient's need for assistive devices and provide as appropriate- Encourage maximum independence but intervene and supervise when necessary- Involve family in performance of ADLs- Assess for home care needs following discharge - Consider OT consult to assist with ADL evaluation and planning for discharge- Provide patient education as appropriate  Outcome: Progressing  Goal: Maintains/Returns to pre admission functional level  Description: INTERVENTIONS:- Perform AM-PAC 6 Click Basic Mobility/ Daily Activity assessment daily.- Set and communicate daily mobility goal to care team and patient/family/caregiver. - Collaborate with rehabilitation services on mobility goals if consulted- Out of bed for toileting- Record patient progress and toleration of activity level   Outcome: Progressing     Problem: DISCHARGE PLANNING  Goal: Discharge to home or other facility with appropriate resources  Description: INTERVENTIONS:- Identify barriers to discharge w/patient and caregiver- Arrange for needed discharge resources and transportation as appropriate- Identify discharge learning needs (meds, wound care, etc.)- Arrange for interpretive services to assist at discharge as needed- Refer to Case Management Department for coordinating discharge planning if the patient needs post-hospital services based on physician/advanced practitioner order or complex needs related to functional status, cognitive ability, or social support system  Outcome:  Progressing     Problem: Knowledge Deficit  Goal: Patient/family/caregiver demonstrates understanding of disease process, treatment plan, medications, and discharge instructions  Description: Complete learning assessment and assess knowledge base.Interventions:- Provide teaching at level of understanding- Provide teaching via preferred learning methods  Outcome: Progressing

## 2025-04-29 NOTE — QUICK NOTE
Patient evaluated at bedside.  She is resting well at this time with no symptom complaints.  Patient endorses that she has not been taking her thyroid patient, Protonix, or calcium because the pharmacist made it seem like it was not safe to take them altogether.     Patient had a gram IV calcium repletion to 0.90 ionized calcium.  Will replete another 2 g IV and give oral 500 unit calcium carbonate.  Will repeat labs later today.  If patient's calcium continues to improve we will consider discharge today versus tomorrow

## 2025-04-29 NOTE — ED PROVIDER NOTES
Time reflects when diagnosis was documented in both MDM as applicable and the Disposition within this note       Time User Action Codes Description Comment    4/28/2025 10:06 PM Jorge Enciso [E83.51] Hypocalcemia     4/28/2025 10:08 PM Jorge Enciso [R94.31] Prolonged Q-T interval on ECG     4/28/2025 10:08 PM Jorge Enciso [Z65.8] Psychosocial stressors     4/28/2025 10:12 PM Jorge Enciso [Z91.198] Patient's noncompliance with other medical treatment and regimen for other reason           ED Disposition       ED Disposition   Admit    Condition   Stable    Date/Time   Mon Apr 28, 2025 10:20 PM    Comment   Case was discussed with BRENT and the patient's admission status was agreed to be Admission Status: observation status to the service of Dr. Silver .               Assessment & Plan       Medical Decision Making  Patient seen and examined. Physical exam is notable for positive stress anxiety and positive Trousseau sign. Remainder of exam is within normal limits.    Differential: Hypocalcemia, hypomagnesemia, hypokalemia, prolonged QTc    Labs from earlier today show a calcium of 5.3 and a potassium of 3.2.    Patient is agreeable to admission. Case discussed with Brent who accepted the patient for observation. All questions answered.      Risk  OTC drugs.  Prescription drug management.  Decision regarding hospitalization.             Medications   calcium gluconate 2 g in sodium chloride 0.9% 100 mL (premix) (0 g Intravenous Stopped 4/28/25 2232)   potassium chloride (Klor-Con M20) CR tablet 20 mEq (20 mEq Oral Given 4/28/25 2131)   magnesium Oxide (MAG-OX) tablet 400 mg (400 mg Oral Given 4/28/25 2131)       ED Risk Strat Scores                    No data recorded                            History of Present Illness       Chief Complaint   Patient presents with    Abnormal Lab     Pt reports calcium is 5.3. Hx of thyroidectomy and persistent hypocalcemia. Endorses tingling in hands and feet. Takes  600mg calcium once a day.       Past Medical History:   Diagnosis Date    Anxiety     Depression     Disease of thyroid gland 12/13/2000    Heroin abuse (HCC)     clean since 2015    Stomach cancer (HCC) 1/7/2025    Thyroid cancer (HCC) 1999    with thyroidectomy and RTX      Past Surgical History:   Procedure Laterality Date    EGD      GANGLION CYST EXCISION Left     PARTIAL GASTRECTOMY N/A 2/5/2025    Procedure: PARTIAL GASTRECTOMY;  Surgeon: Tomás South MD;  Location: [facility];  Service: Surgical Oncology    PELVIC LAPAROSCOPY Bilateral 2/5/2025    Procedure: SALPINGO-OOPHORECTOMY, LAPAROSCOPIC;  Surgeon: Varghese Jung MD;  Location: [facility];  Service: Gynecology Oncology    NE LAPAROSCOPY W TOTAL HYSTERECTOMY UTERUS 250 GM/< N/A 2/5/2025    Procedure: LAPAROSCOPIC TOTAL HYSTERECTOMY, EXAM UNDER ANESTHESIA;  Surgeon: Varghese Jung MD;  Location: [facility];  Service: Gynecology Oncology    THYROIDECTOMY        Family History   Problem Relation Age of Onset    Diabetes Mother     Stomach cancer Mother         maltoma lyphoma    Cancer Mother         Maloma lymphoma and leukemia    Hyperlipidemia Father     Hypertension Father     Drug abuse Brother     Asthma Daughter     Depression Daughter     Lung cancer Maternal Aunt     Breast cancer Maternal Aunt 52    Lymphoma Paternal Uncle         non hodgkins      Social History     Tobacco Use    Smoking status: Some Days     Current packs/day: 0.25     Average packs/day: 0.3 packs/day for 26.3 years (6.6 ttl pk-yrs)     Types: Cigarettes     Start date: 1/1/1999    Smokeless tobacco: Never    Tobacco comments:     Trying quitting  ago   Vaping Use    Vaping status: Former    Substances: THC, CBD   Substance Use Topics    Alcohol use: Never    Drug use: Not Currently     Types: Marijuana     Comment: 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      E-Cigarette/Vaping    E-Cigarette Use Former User       E-Cigarette/Vaping Substances    THC Yes      CBD Yes       I have reviewed and agree with the history as documented.     Tanya Rosenthal is a 40 y.o. female     They presented to the emergency department on April 28, 2025. Patient presents with:  Abnormal Lab: Pt reports calcium is 5.3. Hx of thyroidectomy and persistent hypocalcemia. Endorses tingling in hands and feet. Takes 600mg calcium once a day.  .    The patient states that she had her thyroid removed in 2000 and was diagnosed with GIST 2 and recently had abdominal surgery.  Since the surgery she has not been compliant with her levothyroxine or calcium pills.  She has not been hospitalized for calcium in multiple years.  She has lab work every month and got blood work today which showed a calcium of 5.3 so she was instructed to go to the ER.  She endorses tingling in her hands and feet for the last 2 to 3 days.  Patient denies chest pains, palpitations, shortness of breath, nausea, vomiting, diarrhea, hematuria, dysuria, headache, lightheadedness, dizziness, fevers, chills, or any other complaint at this time.            History provided by:  Patient      Review of Systems   Constitutional:  Negative for chills, diaphoresis and fever.   HENT:  Negative for congestion, ear pain, postnasal drip, rhinorrhea and sore throat.    Eyes:  Negative for pain and visual disturbance.   Respiratory:  Negative for cough, chest tightness and shortness of breath.    Cardiovascular:  Negative for chest pain and palpitations.   Gastrointestinal:  Negative for abdominal pain, constipation, diarrhea, nausea and vomiting.   Genitourinary:  Negative for dysuria and hematuria.   Musculoskeletal:  Negative for arthralgias and back pain.   Skin:  Negative for color change and rash.   Neurological:  Positive for numbness. Negative for dizziness, seizures, syncope, weakness, light-headedness and headaches.   All other systems reviewed and are negative.          Objective       ED Triage Vitals [04/28/25 2054]   Temperature  Pulse Blood Pressure Respirations SpO2 Patient Position - Orthostatic VS   98.4 °F (36.9 °C) 77 161/79 16 99 % Sitting      Temp Source Heart Rate Source BP Location FiO2 (%) Pain Score    Oral Monitor Right arm -- No Pain      Vitals      Date and Time Temp Pulse SpO2 Resp BP Pain Score FACES Pain Rating User   04/28/25 2054 98.4 °F (36.9 °C) 77 99 % 16 161/79 No Pain -- RL            Physical Exam  Constitutional:       General: She is not in acute distress.     Appearance: She is not diaphoretic.   HENT:      Head: Normocephalic and atraumatic.      Nose: No congestion or rhinorrhea.      Mouth/Throat:      Mouth: Mucous membranes are moist.      Pharynx: No oropharyngeal exudate.   Eyes:      General: No scleral icterus.  Cardiovascular:      Rate and Rhythm: Normal rate and regular rhythm.      Heart sounds: Normal heart sounds. No murmur heard.     No friction rub. No gallop.   Pulmonary:      Effort: No respiratory distress.      Breath sounds: Normal breath sounds. No wheezing, rhonchi or rales.   Abdominal:      General: Abdomen is flat. There is no distension.      Palpations: Abdomen is soft.      Tenderness: There is no abdominal tenderness. There is no guarding.   Lymphadenopathy:      Cervical: No cervical adenopathy.   Skin:     General: Skin is warm and dry.      Capillary Refill: Capillary refill takes less than 2 seconds.      Findings: No rash.   Neurological:      General: No focal deficit present.      Mental Status: She is alert and oriented to person, place, and time.      GCS: GCS eye subscore is 4. GCS verbal subscore is 5. GCS motor subscore is 6.      Cranial Nerves: Cranial nerves 2-12 are intact.      Sensory: Sensation is intact.      Motor: Motor function is intact.      Coordination: Coordination is intact.      Gait: Gait is intact.      Comments: Positive to Chvostek sign and Trousseau sign         Results Reviewed       None            No orders to display       Procedures    ED  Medication and Procedure Management   Prior to Admission Medications   Prescriptions Last Dose Informant Patient Reported? Taking?   FLUoxetine (PROzac) 20 mg capsule  Self Yes No   Sig: Take 1 capsule by mouth in the morning   acetaminophen (TYLENOL) 325 mg tablet  Self No No   Sig: Take 3 tablets (975 mg total) by mouth every 8 (eight) hours   Patient not taking: Reported on 2/28/2025   buprenorphine-naloxone (SUBOXONE) 8-2 mg per SL tablet  Self Yes No   Sig: Place 1 tablet under the tongue daily   buprenorphine-naloxone (Suboxone) 8-2 mg  Self Yes No   Sig: ONE AND ONE HALF FILM SUBLINGUALLY DAILY. MAX DAILY DOSE  1.5   busPIRone (BUSPAR) 7.5 mg tablet  Self Yes No   Sig: Take 7.5 mg by mouth 2 (two) times a day   Patient not taking: Reported on 2/26/2025   calcium carbonate (OS-ALL) 600 MG tablet  Self No No   Sig: Take 1 tablet (600 mg total) by mouth daily   enoxaparin (Lovenox) 40 mg/0.4 mL  Self No No   Sig: Inject 0.4 mL (40 mg total) under the skin in the morning for 24 days   estradiol (ESTRACE) 0.5 MG tablet  Self No No   Sig: TAKE 1 TABLET BY MOUTH EVERY DAY   hydrOXYzine HCL (ATARAX) 10 mg tablet  Self No No   Sig: Take 1 tablet (10 mg total) by mouth every 6 (six) hours as needed for anxiety   Patient not taking: Reported on 2/26/2025   imatinib (GLEEVEC) 400 mg tablet  Self No No   Sig: Take 1 tablet (400 mg total) by mouth daily   Patient taking differently: Take 400 mg by mouth every other day   levothyroxine 112 mcg tablet  Self Yes No   Sig: Take 112 mcg by mouth daily   Patient not taking: Reported on 2/28/2025   levothyroxine 175 mcg tablet  Self Yes No   Sig: Take 1 tablet by mouth in the morning   methylPREDNISolone 4 MG tablet therapy pack  Self No No   Sig: Use as directed on package   ondansetron (ZOFRAN) 8 mg tablet  Self No No   Sig: Take 1 tablet (8 mg total) by mouth every 8 (eight) hours as needed for nausea or vomiting   ondansetron (ZOFRAN-ODT) 4 mg disintegrating tablet  Self Yes  No   Sig: Take 4 mg by mouth every 6 (six) hours as needed for nausea or vomiting   Patient not taking: Reported on 2/28/2025   oxyCODONE (ROXICODONE) 10 MG TABS  Self No No   Sig: Take 1 tablet (10 mg total) by mouth every 4 (four) hours as needed for severe pain Max Daily Amount: 60 mg   Patient not taking: Reported on 2/26/2025   pantoprazole (PROTONIX) 40 mg tablet  Self No No   Sig: Take 1 tablet (40 mg total) by mouth daily   prochlorperazine (COMPAZINE) 5 mg tablet  Self No No   Sig: Take 1 tablet (5 mg total) by mouth every 6 (six) hours as needed for nausea or vomiting   sucralfate (CARAFATE) 1 g tablet  Self No No   Sig: Take 1 tablet (1 g total) by mouth every 6 (six) hours      Facility-Administered Medications: None     Patient's Medications   Discharge Prescriptions    No medications on file     No discharge procedures on file.  ED SEPSIS DOCUMENTATION   Time reflects when diagnosis was documented in both MDM as applicable and the Disposition within this note       Time User Action Codes Description Comment    4/28/2025 10:06 PM Jorge Enciso [E83.51] Hypocalcemia     4/28/2025 10:08 PM Jorge Enciso [R94.31] Prolonged Q-T interval on ECG     4/28/2025 10:08 PM Jorge Enciso [Z65.8] Psychosocial stressors     4/28/2025 10:12 PM Jorge Enciso [Z91.198] Patient's noncompliance with other medical treatment and regimen for other reason                  Cameron Sandoval MD  04/28/25 2531

## 2025-04-29 NOTE — ASSESSMENT & PLAN NOTE
Chronically low due to hypoparathyroidism as sequelae of thyroidectomy (done in 2000 for papillary thyroid cancer)  Earlier today as outpatient labs had calicum level of 5.3 (ionized was not drawn)  Supposed to take 600mg Os-tariq daily but reports poor compliance   ED gave 2grams IV on arrival  Reported left and right numbness in tingling of hands  Per ED Had Trousseau sign + Chovostek sign (patient also checked this herself at home which was positive)               These symptoms and sings resolved at time of my evaluation  EKG- low voltage QRS complexes and mild qtc prolongation to 478 (this is not new)        Plan  Recheck Ca 2 hours post infusion  Recheck ionized ca+  Recheck phos   Plan to replenish IV  Check Mag  telemetry

## 2025-04-29 NOTE — ASSESSMENT & PLAN NOTE
Complication of thyroidectomy for papillary thyroid cancer in 8666-2175   PTH checked from prior admission in 2024 with low/normal results + hypocalcemia               This is consistent with hypoparathyroidism     Plan  Recheck PTH  Check phos   Ca supplementation and rechecks as above   Has endocrinology establishment visit friday per patient

## 2025-04-29 NOTE — ASSESSMENT & PLAN NOTE
Chronically low due to hypoparathyroidism as sequelae of thyroidectomy (done in 2000 for papillary thyroid cancer)  Earlier today as outpatient labs had calicum level of 5.3 (ionized was not drawn)  Supposed to take 600mg Os-tariq daily but reports poor compliance   ED gave 2grams IV on arrival  Reported left and right numbness in tingling of hands  Per ED Had Trousseau sign + Chovostek sign (patient also checked this herself at home which was positive)               These symptoms and sings resolved at time of my evaluation  EKG- low voltage QRS complexes and mild qtc prolongation to 478 (this is not new)        Plan  Resume home calcium.  Discussed with patient to take calcium at least 4 hours apart from thyroid hormone medication.  Patient also with vitamin D deficiency, which may explain chronically low calcium.  Will start vitamin D 50,000 units once a week for 8 weeks  Outpatient follow-up with PCP for repeat BMP  Follow-up with endocrinology at appointment on 5/2

## 2025-04-29 NOTE — ASSESSMENT & PLAN NOTE
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     Plan   Continue  imatinib 400 mg per mouth every other day (patient states it was changed from daily)  [facility] outpatient

## 2025-04-29 NOTE — PROGRESS NOTES
Progress Note - Hospitalist   Name: Tanya Rosenthal 40 y.o. female I MRN: [id number]  Unit/Bed#: S -01 I Date of Admission: 4/28/2025   Date of Service: 4/28/2025 I Hospital Day: 0  { ?Quick Links I Problem List I PORCH I Billing Tip:98995}  Assessment & Plan  Hypocalcemia  Chronically low due to hypoparathyroidism as sequelae of thyroidectomy   Earlier today as outpatient labs had calicum level of 5.3 (ionized was not drawn)  Supposed to take 600mg Os-tariq daily but reports poor compliance   ED gave 2grams IV on arrival  Reported left and right numbness in tingling of hands  Per ED Had Trousseau sign + Chovostek sign (patient also checked this herself at home which was positive    These symptoms and sings resolved at time of my evaluation  EKG- low voltage QRS complexes and mild qtc prolongation to 478 (this is not new)      Plan  Recheck Ca 2 hours post infusion  Recheck ionized ca+  Recheck phos   Plan to replenish IV  Check Mag  telemetry    Hypokalemia  @ 1100 on 4/28 value of 3.2  EKG findings as above  Given oral 20meq in ed prior to admission    Possible poor absorption in setting of gastrectomy    Plan  Recheck CMP  Replete as needed  Hypoparathyroidism (HCC)  Complication of thyroidectomy for papillary thyroid cancer in 8706-7217   PTH checked from prior admission in 2024 with low/normal results + hypocalcemia   This is consistent with hypoparathyroidism    Plan  Recheck PTH  Check phos   Ca supplementation and rechecks as above   Has endocrinology establishment visit friday per patient    Hypothyroidism  Chronic hx of post thyroidectomy for papillary thyroid cancer in 5632-2948   Home regimen: levothyroxine 175mcg daily    Plan  Check tsh  Continue home dosing for now  Adjustments as needed     GIST (gastrointestinal stromal tumor), malignant (HCC)  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     Plan   Continue  imatinib 400 mg per mouth every other day (patient states it was changed from daily)  [facility] outpatient   Anxiety  Mood and anxiety stable  Continue home prozac 20mg    VTE Pharmacologic Prophylaxis: VTE Score: 6 High Risk (Score >/= 5) - Pharmacological DVT Prophylaxis Ordered: enoxaparin (Lovenox). Sequential Compression Devices Ordered.    Mobility:      JH-HLM Goal achieved. Continue to encourage appropriate mobility.    Patient Centered Rounds: {Pt Centered Care Rounds:53438}   Discussions with Specialists or Other Care Team Provider: ***    Education and Discussions with Family / Patient: {Family Communication:91631}    Current Length of Stay: 0 day(s)  Current Patient Status: Observation   Certification Statement: {Certification Statement:97758}  Discharge Plan: {Discharge Plan:05931}    Code Status: Level 1 - Full Code    Subjective   ***    Objective :{?Quick Links I ICU Summary I Vitals I I/Os I LDAs I Mobility (PT/OT) I Code Status / ACP   ?Quick Links I Active Meds I Pain Meds I Antibiotics I Anticoagulants:15744}  Temp:  [97.7 °F (36.5 °C)-98.7 °F (37.1 °C)] 98.7 °F (37.1 °C)  HR:  [64-78] 64  BP: (140-161)/(79-80) 160/79  Resp:  [16-18] 18  SpO2:  [95 %-99 %] 95 %  O2 Device: None (Room air)    There is no height or weight on file to calculate BMI.     Input and Output Summary (last 24 hours):     Intake/Output Summary (Last 24 hours) at 4/28/2025 2348  Last data filed at 4/28/2025 2232  Gross per 24 hour   Intake 100 ml   Output --   Net 100 ml       Physical Exam  ***    Lines/Drains:        Telemetry:  Telemetry Orders (From admission, onward)               24 Hour Telemetry Monitoring  Continuous x 24 Hours (Telem)        Question:  Reason for 24 Hour Telemetry  Answer:  Metabolic/electrolyte disturbance with high probability of dysrhythmia. K level <3 or >6 OR KCL infusion >10mEq/hr                     Telemetry Reviewed: {URSULA Tele  "Review:48856}  Indication for Continued Telemetry Use: {Tele Indications:18454}             {?Quick Links I Lab Review I Micro Results I Radiology I Cardiology:51766}  Lab Results: I have reviewed the following results:   Results from last 7 days   Lab Units 04/28/25  1118   WBC Thousand/uL 10.16   HEMOGLOBIN g/dL 11.4*   HEMATOCRIT % 36.6   PLATELETS Thousands/uL 238   SEGS PCT % 63   LYMPHO PCT % 29   MONO PCT % 4   EOS PCT % 3     Results from last 7 days   Lab Units 04/28/25  1118   SODIUM mmol/L 143   POTASSIUM mmol/L 3.2*   CHLORIDE mmol/L 103   CO2 mmol/L 29   BUN mg/dL 12   CREATININE mg/dL 1.14   ANION GAP mmol/L 11   CALCIUM mg/dL 5.3*   ALBUMIN g/dL 3.7   TOTAL BILIRUBIN mg/dL 0.42   ALK PHOS U/L 67   ALT U/L 8   AST U/L 12*                       Recent Cultures (last 7 days):         {Imaging Results Review:47243::\"No pertinent imaging studies reviewed.\"}  {Other Study Results Review:99052::\"No additional pertinent studies reviewed.\"}    Last 24 Hours Medication List:     Current Facility-Administered Medications:     acetaminophen (TYLENOL) tablet 975 mg, Q8H CRUZITO    [START ON 4/29/2025] buprenorphine-naloxone (Suboxone) film 4 mg, HS    buprenorphine-naloxone (Suboxone) film 8 mg, Once    [START ON 4/29/2025] enoxaparin (LOVENOX) subcutaneous injection 40 mg, Daily    [START ON 4/29/2025] estradiol (ESTRACE) tablet 0.5 mg, Daily    [START ON 4/29/2025] FLUoxetine (PROzac) capsule 20 mg, Daily    imatinib (GLEEVEC) tablet 400 mg, Every Other Day    [START ON 4/29/2025] levothyroxine tablet 175 mcg, Early Morning    ondansetron (ZOFRAN-ODT) dispersible tablet 4 mg, Q6H PRN    [START ON 4/29/2025] pantoprazole (PROTONIX) EC tablet 40 mg, Early Morning    [START ON 4/29/2025] sucralfate (CARAFATE) tablet 1 g, Q6H    Administrative Statements   Today, Patient Was Seen By: Varghese Lomeli DO  {Time Spent (Optional):59321}    **Please Note: This note may have been constructed using a voice recognition " system.**

## 2025-04-29 NOTE — ASSESSMENT & PLAN NOTE
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     On February 5, 2025, the patient underwent a partial gastrectomy as well as pelvic examination under anesthesia, laparoscopic total hysterectomy and bilateral salpingo-oophorectomy.     She follows with DR. Conde ([facility])  Possible establishment with GIST specialized in near future at [facility]    Plan   Continue  imatinib 400 mg per mouth every other day (patient states it was changed from daily)  [facility] outpatient

## 2025-04-29 NOTE — ASSESSMENT & PLAN NOTE
@ 1100 on 4/28 value of 3.2  EKG findings as above  Given oral 20meq in ed prior to admission    Possible poor absorption in setting of gastrectomy    Plan  Recheck CMP  Replete as needed

## 2025-04-29 NOTE — ASSESSMENT & PLAN NOTE
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     On February 5, 2025, the patient underwent a partial gastrectomy as well as pelvic examination under anesthesia, laparoscopic total hysterectomy and bilateral salpingo-oophorectomy.     She follows with DR. Conde ([facility])  Possible establishment with GIST specialized in near future at [facility]    Plan   Continue  imatinib 400 mg per mouth every other day (patient states it was changed from daily)  [facility] outpatient

## 2025-04-29 NOTE — ASSESSMENT & PLAN NOTE
40-year-old with gastric GIST and b/l ovarian masses now s/p EUA, TLH, BSO, lararoscopic partial gastrectomy (surg-onc) on 2/5/25. Ovarian serous cystadenomas identified. She has recovered well from surgery. Her vaginal cuff is well healed.     Discussed continued pelvic rest for an additional 2 weeks.     Return to the office prn.   Continued annual f/u with routine gynecology.

## 2025-04-29 NOTE — ASSESSMENT & PLAN NOTE
Chronically low due to hypoparathyroidism as sequelae of thyroidectomy   Earlier today as outpatient labs had calicum level of 5.3 (ionized was not drawn)  Supposed to take 600mg Os-tariq daily but reports poor compliance   ED gave 2grams IV on arrival  Reported left and right numbness in tingling of hands  Per ED Had Trousseau sign + Chovostek sign (patient also checked this herself at home which was positive    These symptoms and sings resolved at time of my evaluation  EKG- low voltage QRS complexes and mild qtc prolongation to 478 (this is not new)      Plan  Recheck Ca 2 hours post infusion  Recheck ionized ca+  Recheck phos   Plan to replenish IV  Check Mag  telemetry

## 2025-04-29 NOTE — DISCHARGE INSTR - AVS FIRST PAGE
Dear Tanya Rosenthal,     It was our pleasure to care for you here at [facility].  It is our hope that we were always able to exceed the expected standards for your care during your stay.  You were hospitalized due to electrolyte abnormality.  You were cared for on the 2nd floor by Luisa Knox MD under the service of Cortez Ross MD with the [facility] who covers for your primary care physician (PCP), Feliciano Ledezma MD, while you were hospitalized.  If you have any questions or concerns related to this hospitalization, you may contact us at .  For follow up as well as any medication refills, we recommend that you follow up with your primary care physician.  A registered nurse will reach out to you by phone within a few days after your discharge to answer any additional questions that you may have after going home.  However, at this time we provide for you here, the most important instructions / recommendations at discharge:     Notable Medication Adjustments -   START taking Vitamin D 50,000 units once a week for 8 weeks  TAKE Calcium carbonate 600 daily in the morning. Please take a least 4 hours apart from your thyroid medication  TAKE levothyroxine 175 mcg daily in the morning  TAKE Protonix 40 mg daily at least hour apart from your medications  Testing Required after Discharge -   Recheck calcium/electrolytes in 1 week. Ask Primary care physician or endocrinologist to arrange  Important follow up information -   Please follow-up with palliative care physician on 5/1  Please follow-up with endocrinology on 5/2  Please follow-up with hematology on 5/6  Please follow-up with your primary car physician in 1 week  Other Instructions -   Please contact your physician if you are having muscle twitching as this may indicate that you have low calcium  Please review this entire after visit summary as additional general instructions including  medication list, appointments, activity, diet, any pertinent wound care, and other additional recommendations from your care team that may be provided for you.      Sincerely,     Luisa Knox MD

## 2025-04-29 NOTE — TELEPHONE ENCOUNTER
Telephone Encounter: Medical Oncology:  Tanya Rosenthal 40 y.o. 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      I did contact the patient she denies any symptoms at this time however understands the seriousness of this lab value and will report to the ED for IV replenishment and cardiac monitoring.    Recommendations:  ED evaluation for calcium replenishment and cardiac monitoring    Jp Collins D.O.  Hematology-Oncology Fellow (PGY-4)

## 2025-04-29 NOTE — ASSESSMENT & PLAN NOTE
Complication of thyroidectomy for papillary thyroid cancer in 1693-9542   PTH checked from prior admission in 2024 with low/normal results + hypocalcemia   This is consistent with hypoparathyroidism    Plan  Recheck PTH  Check phos   Ca supplementation and rechecks as above   Has endocrinology establishment visit friday per patient

## 2025-04-29 NOTE — H&P
H&P - Hospitalist   Name: Tanya Rosenthal 40 y.o. female I MRN: [id number]  Unit/Bed#: S -01 I Date of Admission: 4/28/2025   Date of Service: 4/29/2025 I Hospital Day: 0     Assessment & Plan  Hypocalcemia  Chronically low due to hypoparathyroidism as sequelae of thyroidectomy (done in 2000 for papillary thyroid cancer)  Earlier today as outpatient labs had calicum level of 5.3 (ionized was not drawn)  Supposed to take 600mg Os-tariq daily but reports poor compliance   ED gave 2grams IV on arrival  Reported left and right numbness in tingling of hands  Per ED Had Trousseau sign + Chovostek sign (patient also checked this herself at home which was positive)               These symptoms and sings resolved at time of my evaluation  EKG- low voltage QRS complexes and mild qtc prolongation to 478 (this is not new)        Plan  Recheck Ca 2 hours post infusion  Recheck ionized ca+  Recheck phos   Plan to replenish IV  Check Mag  telemetry    Hypokalemia  @ 1100 on 4/28 value of 3.2  EKG findings as above  Given oral 20meq in ed prior to admission     Possible poor absorption in setting of gastrectomy     Plan  Recheck CMP  Replete as needed  Hypothyroidism  Chronic hx of post thyroidectomy for papillary thyroid cancer in 0400-3682   Home regimen: levothyroxine 175mcg daily    Plan  Check tsh  Continue home dosing for now  Adjustments as needed     Hypoparathyroidism (HCC)  Complication of thyroidectomy for papillary thyroid cancer in 6080-0774   PTH checked from prior admission in 2024 with low/normal results + hypocalcemia               This is consistent with hypoparathyroidism     Plan  Recheck PTH  Check phos   Ca supplementation and rechecks as above   Has endocrinology establishment visit friday per patient    GIST (gastrointestinal stromal tumor), malignant (HCC)  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     On February 5, 2025, the patient underwent a partial gastrectomy as well as pelvic examination under anesthesia, laparoscopic total hysterectomy and bilateral salpingo-oophorectomy.     She follows with DR. Conde ([facility])  Possible establishment with GIST specialized in near future at [facility]    Plan   Continue  imatinib 400 mg per mouth every other day (patient states it was changed from daily)  [facility] Oncology outpatient   Anxiety  Mood and anxiety stable  Continue home prozac 20mg  History of heroin abuse (HCC)  No currently using  On suboxone therapy 8mg am and 4mg PM  PDMP reviewed  Continue home regimen       VTE Pharmacologic Prophylaxis: VTE Score: 6 High Risk (Score >/= 5) - Pharmacological DVT Prophylaxis Ordered: enoxaparin (Lovenox). Sequential Compression Devices Ordered.  Code Status: Level 1 - Full Code   Discussion with family: Patient declined call to .     Anticipated Length of Stay: Patient will be admitted on an observation basis with an anticipated length of stay of less than 2 midnights secondary to severe hypocalcemia.    History of Present Illness   Chief Complaint: abnormal lab value outpatient     Tanya Rosenthal is a 40 y.o. female with a PMH of GIST, hypothyroidism post thyroidectomy, hypoparathyroidism, anxiety, prior heroin use on Suboxone therapy who presents with severe hypocalcemia (level 5.2) and hypokalemic (3.2) found on routine labs checked by her oncology team. She notes she has not been taking her 600mg ca supplement daily has she is supposed to. During the day, she felt numbness and tingling in her hands and face as well as muscle twitching. Oncology team encouraged her to go to the ED for replenishment.     In ED, patient was found to have have love voltage qrs as well as qtc prolongation (not new). As well as Trousseau sign & Chovostek sign. She was still endorsing above symptoms. She was given 20 meq K and 2grams of ca  gluconate and admitted under observation    @ time of my evaluation patient denies any symptoms. States that she is feeling very well.     Review of Systems   Constitutional:  Negative for chills, fatigue and fever.   Respiratory:  Negative for chest tightness, shortness of breath and wheezing.    Cardiovascular:  Negative for chest pain.   Gastrointestinal:  Negative for abdominal pain, blood in stool, diarrhea and nausea.   Genitourinary:  Negative for difficulty urinating and dysuria.   Neurological:  Positive for numbness (in hands). Negative for dizziness, weakness and light-headedness.   Psychiatric/Behavioral:  Negative for behavioral problems, confusion and suicidal ideas. The patient is not nervous/anxious.        Historical Information   Past Medical History:   Diagnosis Date    Anxiety     Depression     Disease of thyroid gland 12/13/2000    Heroin abuse (HCC)     clean since 2015    Stomach cancer (HCC) 1/7/2025    Thyroid cancer (HCC) 1999    with thyroidectomy and RTX     Past Surgical History:   Procedure Laterality Date    EGD      GANGLION CYST EXCISION Left     PARTIAL GASTRECTOMY N/A 2/5/2025    Procedure: PARTIAL GASTRECTOMY;  Surgeon: Tomás South MD;  Location: [facility];  Service: Surgical Oncology    PELVIC LAPAROSCOPY Bilateral 2/5/2025    Procedure: SALPINGO-OOPHORECTOMY, LAPAROSCOPIC;  Surgeon: Varghese Jung MD;  Location: [facility];  Service: Gynecology Oncology    SC LAPAROSCOPY W TOTAL HYSTERECTOMY UTERUS 250 GM/< N/A 2/5/2025    Procedure: LAPAROSCOPIC TOTAL HYSTERECTOMY, EXAM UNDER ANESTHESIA;  Surgeon: Varghese Jung MD;  Location: [facility];  Service: Gynecology Oncology    THYROIDECTOMY       Social History     Tobacco Use    Smoking status: Some Days     Current packs/day: 0.25     Average packs/day: 0.3 packs/day for 26.3 years (6.6 ttl pk-yrs)     Types: Cigarettes     Start date: 1/1/1999    Smokeless tobacco: Never    Tobacco comments:     Trying  quitting  ago   Vaping Use    Vaping status: Former    Substances: THC, CBD   Substance and Sexual Activity    Alcohol use: Never    Drug use: Not Currently     Types: Marijuana     Comment: 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    Sexual activity: Not Currently     Partners: Male     Birth control/protection: None     E-Cigarette/Vaping    E-Cigarette Use Former User      E-Cigarette/Vaping Substances    THC Yes     CBD Yes      Family history non-contributory  Social History:  Marital Status: Single   Occupation: works as insurance auth for eye doctor   Patient Pre-hospital Living Situation: Home  Patient Pre-hospital Level of Mobility: walks  Patient Pre-hospital Diet Restrictions: none    Meds/Allergies   I have reviewed home medications with patient personally.  Prior to Admission medications    Medication Sig Start Date End Date Taking? Authorizing Provider   buprenorphine-naloxone (SUBOXONE) 8-2 mg per SL tablet Place 1 tablet under the tongue daily   Yes Historical Provider, MD   buprenorphine-naloxone (Suboxone) 8-2 mg ONE AND ONE HALF FILM SUBLINGUALLY DAILY. MAX DAILY DOSE  1.5 2/4/25  Yes Historical Provider, MD   calcium carbonate (OS-ALL) 600 MG tablet Take 1 tablet (600 mg total) by mouth daily 12/31/24  Yes Savannah Nieto MD   estradiol (ESTRACE) 0.5 MG tablet TAKE 1 TABLET BY MOUTH EVERY DAY 4/25/25  Yes Leslye Stanley PA-C   FLUoxetine (PROzac) 20 mg capsule Take 1 capsule by mouth in the morning 3/28/25  Yes Historical Provider, MD   imatinib (GLEEVEC) 400 mg tablet Take 1 tablet (400 mg total) by mouth daily  Patient taking differently: Take 400 mg by mouth every other day 3/3/25  Yes Juan Conde MD   levothyroxine 175 mcg tablet Take 1 tablet by mouth in the morning 2/10/25  Yes Historical Provider, MD   ondansetron (ZOFRAN-ODT) 4 mg disintegrating tablet Take 4 mg by mouth every 6 (six) hours as needed for nausea or vomiting   Yes Historical Provider, MD   pantoprazole (PROTONIX)  40 mg tablet Take 1 tablet (40 mg total) by mouth daily 4/25/25 7/24/25 Yes Juan Conde MD   sucralfate (CARAFATE) 1 g tablet Take 1 tablet (1 g total) by mouth every 6 (six) hours 3/21/25  Yes Juan Conde MD   ondansetron (ZOFRAN) 8 mg tablet Take 1 tablet (8 mg total) by mouth every 8 (eight) hours as needed for nausea or vomiting 3/10/25 4/28/25 Yes Juan Conde MD   acetaminophen (TYLENOL) 325 mg tablet Take 3 tablets (975 mg total) by mouth every 8 (eight) hours  Patient not taking: Reported on 2/28/2025 2/8/25   Jenise Soto MD   enoxaparin (Lovenox) 40 mg/0.4 mL Inject 0.4 mL (40 mg total) under the skin in the morning for 24 days 2/9/25 3/5/25  Martha De León MD   methylPREDNISolone 4 MG tablet therapy pack Use as directed on package  Patient not taking: Reported on 4/28/2025 4/9/25   Osvaldo Potter DO   oxyCODONE (ROXICODONE) 10 MG TABS Take 1 tablet (10 mg total) by mouth every 4 (four) hours as needed for severe pain Max Daily Amount: 60 mg  Patient not taking: Reported on 2/26/2025 2/8/25   Jenise Soto MD   prochlorperazine (COMPAZINE) 5 mg tablet Take 1 tablet (5 mg total) by mouth every 6 (six) hours as needed for nausea or vomiting  Patient not taking: Reported on 4/28/2025 3/10/25   Juan Conde MD   busPIRone (BUSPAR) 7.5 mg tablet Take 7.5 mg by mouth 2 (two) times a day  Patient not taking: Reported on 2/26/2025 1/17/25 4/28/25  Historical Provider, MD   hydrOXYzine HCL (ATARAX) 10 mg tablet Take 1 tablet (10 mg total) by mouth every 6 (six) hours as needed for anxiety  Patient not taking: Reported on 2/26/2025 12/31/24 4/28/25  Savannah Nieto MD   levothyroxine 112 mcg tablet Take 112 mcg by mouth daily  Patient not taking: Reported on 2/28/2025 8/1/18 4/28/25  Historical Provider, MD     Allergies   Allergen Reactions    Diphenhydramine Shortness Of Breath     Other reaction(s): DIPHENHYDRAMINE CITRATE (BENADRYL)    Erythromycin Shortness Of Breath and Hives     Category: Allergy;      Iodine-131 - Food Allergy Other (See Comments)    Iv Dye [Iodinated Contrast Media]        Objective :  Temp:  [97.7 °F (36.5 °C)-98.7 °F (37.1 °C)] 98.2 °F (36.8 °C)  HR:  [47-78] 47  BP: (105-161)/(67-80) 105/67  Resp:  [16-18] 18  SpO2:  [95 %-99 %] 96 %  O2 Device: None (Room air)    Physical Exam  Constitutional:       General: She is not in acute distress.     Appearance: She is not ill-appearing or toxic-appearing.   HENT:      Head: Normocephalic and atraumatic.      Right Ear: External ear normal.      Left Ear: External ear normal.      Nose: No congestion or rhinorrhea.      Mouth/Throat:      Pharynx: No oropharyngeal exudate or posterior oropharyngeal erythema.   Eyes:      General: No visual field deficit.     Conjunctiva/sclera: Conjunctivae normal.   Cardiovascular:      Rate and Rhythm: Normal rate and regular rhythm.      Pulses: Normal pulses.      Heart sounds: Normal heart sounds. No murmur heard.  Pulmonary:      Effort: Pulmonary effort is normal. No respiratory distress.      Breath sounds: Normal breath sounds. No wheezing or rales.   Abdominal:      General: Bowel sounds are normal. There is no distension.      Palpations: Abdomen is soft.      Tenderness: There is no abdominal tenderness. There is no guarding.   Musculoskeletal:      Right lower leg: No edema.      Left lower leg: No edema.      Comments: Neg Trousseau sign   neg Chovostek sign    Skin:     General: Skin is warm.      Capillary Refill: Capillary refill takes less than 2 seconds.   Neurological:      General: No focal deficit present.      Mental Status: She is alert and oriented to person, place, and time. Mental status is at baseline.      GCS: GCS eye subscore is 4. GCS verbal subscore is 5. GCS motor subscore is 6.      Cranial Nerves: Cranial nerves 2-12 are intact. No cranial nerve deficit, dysarthria or facial asymmetry.      Sensory: Sensation is intact. No sensory deficit.      Motor: Motor function is intact.  No weakness, tremor, abnormal muscle tone or pronator drift.      Coordination: Coordination is intact.      Gait: Gait is intact.      Deep Tendon Reflexes:      Reflex Scores:       Patellar reflexes are 2+ on the right side and 2+ on the left side.       Achilles reflexes are 2+ on the right side and 2+ on the left side.  Psychiatric:         Mood and Affect: Mood normal.         Behavior: Behavior normal.          Lines/Drains:            Lab Results: I have reviewed the following results:  Results from last 7 days   Lab Units 04/28/25  1118   WBC Thousand/uL 10.16   HEMOGLOBIN g/dL 11.4*   HEMATOCRIT % 36.6   PLATELETS Thousands/uL 238   SEGS PCT % 63   LYMPHO PCT % 29   MONO PCT % 4   EOS PCT % 3     Results from last 7 days   Lab Units 04/29/25  0009   SODIUM mmol/L 144   POTASSIUM mmol/L 3.1*   CHLORIDE mmol/L 104   CO2 mmol/L 30   BUN mg/dL 10   CREATININE mg/dL 1.21   ANION GAP mmol/L 10   CALCIUM mg/dL 5.6*   ALBUMIN g/dL 3.7   TOTAL BILIRUBIN mg/dL 0.29   ALK PHOS U/L 67   ALT U/L 8   AST U/L 12*   GLUCOSE RANDOM mg/dL 113             Lab Results   Component Value Date    HGBA1C 6.2 (H) 01/29/2025    HGBA1C 6.2 (H) 10/21/2019           Imaging Results Review: No pertinent imaging studies reviewed.  Other Study Results Review: EKG was reviewed.     Administrative Statements   I have spent a total time of 60 minutes in caring for this patient on the day of the visit/encounter including Diagnostic results, Prognosis, Risks and benefits of tx options, and Instructions for management.    Varghese Lomeli,   PGY-2 Family Medicine     ** Please Note: This note has been constructed using a voice recognition system. **

## 2025-04-29 NOTE — QUICK NOTE
Patient refusing IV potassium in setting of hypocalcemia. Despite my concern for malabsorption in setting of gastrectomy.     Ordered 20meq every 2 hours for 4 doses (80meq) of potassium.

## 2025-04-29 NOTE — ASSESSMENT & PLAN NOTE
Continue daily HRT, estradiol 0.5mg.   Future management per routine gyn.   Plan to continue until age 50-51.

## 2025-04-29 NOTE — ED ATTENDING ATTESTATION
4/28/2025  IJorge DO, saw and evaluated the patient. I have discussed the patient with the resident/non-physician practitioner and agree with the resident's/non-physician practitioner's findings, Plan of Care, and MDM as documented in the resident's/non-physician practitioner's note, except where noted. All available labs and Radiology studies were reviewed.  I was present for key portions of any procedure(s) performed by the resident/non-physician practitioner and I was immediately available to provide assistance.       At this point I agree with the current assessment done in the Emergency Department.  I have conducted an independent evaluation of this patient a history and physical is as follows: 40-year-old female, past medical history per chart, presenting to the emergency department for severely low calcium as outpatient.  Patient states that she has started to experience intermittent muscle spasms.  She notes that she has been taking roughly 20% of prescribed medications over the last few weeks secondary to psychosocial stressors and depression as a relates to her recent diagnoses and surgical interventions.  Denies SI, HI, drug or alcohol use.  Notes that she is intermittent be speaking with palliative to help her with the stressors.  Denies chest pain, shortness of breath, or any other systemic sign or symptom.    Vital sign stable.  Patient resting comfortably.  Positive chvostek and Trousseau signs.  Alert and oriented.  Lungs clear to auscultation.  No increased work of breathing.  Heart regular rhythm.  Abdomen soft nontender.  Lower extremities without edema.    MDM: 40-year-old female presenting to the emergency department with severely low calcium and symptomatic secondary to same on exam.  Slightly prolonged QTc similar to prior however when not hypocalcemic.  CBC, CMP reviewed.  Mild hypokalemia which was repleted here via magnesium and potassium.  IV calcium gluconate provided.  Given  degree of hypocalcemia and symptomatic, call placed to hospital medicine who accepted their care for further repletion.    Additionally, I spoke with patient as a pertains to consultation of psychiatric services and she welcomes the same as ultimately.  Care appears to be depression secondary to recent diagnoses.    ED Course         Critical Care Time  Procedures

## 2025-04-29 NOTE — ASSESSMENT & PLAN NOTE
Complication of thyroidectomy for papillary thyroid cancer in 5407-8642   PTH checked from prior admission in 2024 with low/normal results + hypocalcemia               This is consistent with hypoparathyroidism.  PTH normal.     Plan  Has endocrinology establishment visit friday per patient

## 2025-05-02 ENCOUNTER — OFFICE VISIT (OUTPATIENT)
Dept: ENDOCRINOLOGY | Facility: CLINIC | Age: 40
End: 2025-05-02
Payer: COMMERCIAL

## 2025-05-02 VITALS
OXYGEN SATURATION: 98 % | WEIGHT: 264 LBS | BODY MASS INDEX: 43.99 KG/M2 | TEMPERATURE: 98.2 F | HEIGHT: 65 IN | DIASTOLIC BLOOD PRESSURE: 96 MMHG | SYSTOLIC BLOOD PRESSURE: 134 MMHG | HEART RATE: 74 BPM

## 2025-05-02 DIAGNOSIS — E55.9 VITAMIN D DEFICIENCY: ICD-10-CM

## 2025-05-02 DIAGNOSIS — E83.51 HYPOCALCEMIA: Primary | ICD-10-CM

## 2025-05-02 DIAGNOSIS — E89.0 POSTOPERATIVE HYPOTHYROIDISM: ICD-10-CM

## 2025-05-02 DIAGNOSIS — E20.89 OTHER HYPOPARATHYROIDISM (HCC): ICD-10-CM

## 2025-05-02 DIAGNOSIS — E66.01 MORBID OBESITY (HCC): ICD-10-CM

## 2025-05-02 DIAGNOSIS — Z85.850 HX OF PAPILLARY THYROID CARCINOMA: ICD-10-CM

## 2025-05-02 DIAGNOSIS — E06.3 HYPOTHYROIDISM DUE TO HASHIMOTO THYROIDITIS: ICD-10-CM

## 2025-05-02 PROCEDURE — 99204 OFFICE O/P NEW MOD 45 MIN: CPT | Performed by: INTERNAL MEDICINE

## 2025-05-02 RX ORDER — PHENOL 1.4 %
600 AEROSOL, SPRAY (ML) MUCOUS MEMBRANE 2 TIMES DAILY WITH MEALS
Qty: 60 TABLET | Refills: 0 | Status: SHIPPED | OUTPATIENT
Start: 2025-05-02 | End: 2025-05-02

## 2025-05-02 RX ORDER — LEVOTHYROXINE SODIUM 200 UG/1
200 TABLET ORAL DAILY
Qty: 90 TABLET | Refills: 1 | Status: SHIPPED | OUTPATIENT
Start: 2025-05-02

## 2025-05-02 RX ORDER — PHENOL 1.4 %
600 AEROSOL, SPRAY (ML) MUCOUS MEMBRANE 2 TIMES DAILY WITH MEALS
Qty: 180 TABLET | Refills: 1 | Status: SHIPPED | OUTPATIENT
Start: 2025-05-02

## 2025-05-02 RX ORDER — ERGOCALCIFEROL 1.25 MG/1
1 CAPSULE ORAL WEEKLY
Qty: 12 CAPSULE | Refills: 1 | Status: SHIPPED | OUTPATIENT
Start: 2025-05-02

## 2025-05-02 NOTE — ASSESSMENT & PLAN NOTE
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    We agreed to repeat Tg with Tg Meseret testing.  Will need to consider a dedifferentiated disease in case we see any evidence of recurrence which should be very unlikely.

## 2025-05-02 NOTE — ASSESSMENT & PLAN NOTE
She has fairly significant hypocalcemia that was exacerbated by partial gastrectomy for GIST tumor on top of compensated hypoparathyroidism post thyroidectomy in 2000.  Unclear wether there is GIST related 1- alpha hydroxylase activity that was masking hypocalcemia.    We reviewed the normal calcium physiology, probable pathophysiology in her case. Her Hx was reviewed.    For now we agreed to increase oral calcium intake to 1.2gm - we may further increase upto 6 gm daily.  Increase vit D replacement  - start vit D2 50K weekly.    In future, We may consider calcitriol.   We may also consider Yorvipath after review of all labs.  Follow up in 6 weeks.

## 2025-05-02 NOTE — ASSESSMENT & PLAN NOTE
She seems clinically and biochemically under treated based on symptoms and elevated TSH.  I suspect a component of malabsorption since partial gastrectomy.    We agreed to increase levothyroxine 200mcg qdaily.  Maintain a 4 hr window between levothyroxine and multivitamin/calcium.    We may need further dose increase in future.  Follow up in 6 weeks.

## 2025-05-02 NOTE — PROGRESS NOTES
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    She had mild hypocalcemia dating back to 8/25/2020 corrected 7.2mg/dL. PTH not documented.  12/25/24  calcium was 6.6cm with abdominal pain which eventually was diagnosed as GIST    Thyroid issues:  Levothyroxine 175mcg qdaily      Physical Exam:  Body mass index is 43.93 kg/m².  /96 (BP Location: Left arm, Patient Position: Sitting, Cuff Size: Standard)   Pulse 74   Temp 98.2 °F (36.8 °C) (Temporal)   Ht 5' 5\" (1.651 m)   Wt 120 kg (264 lb)   LMP  (LMP Unknown)   SpO2 98%   BMI 43.93 kg/m²    Vitals:    05/02/25 1350   Weight: 120 kg (264 lb)        Physical Exam  Constitutional:       General: She is not in acute distress.     Appearance: She is well-developed.   HENT:      Head: Normocephalic and atraumatic.      Nose: Nose normal.   Eyes:      Conjunctiva/sclera: Conjunctivae normal.   Pulmonary:      Effort: Pulmonary effort is normal.   Abdominal:      General: There is no distension.   Musculoskeletal:      Cervical back: Normal range of motion and neck supple.   Skin:     Findings: No rash.      Comments: No icterus   Neurological:      Mental Status: She is alert and oriented to person, place, and time.       Labs:   Lab Results   Component Value Date    HGBA1C 6.2 (H) 01/29/2025       Lab Results   Component Value Date    [id number] 45.001 (H) 04/29/2025    TSH <0.01 (L) 08/06/2020       Lab Results   Component Value Date    CREATININE 1.08 04/29/2025    CREATININE 1.13 04/29/2025    CREATININE 1.21 04/29/2025    BUN 11 04/29/2025     04/04/2015    K " "4.1 04/29/2025     04/29/2025    CO2 30 04/29/2025     GFR, Calculated   Date Value Ref Range Status   08/25/2020 68 >60 mL/min/1.73m2 Final     Comment:     mL/min per 1.73 square meters                                            Normal Function or Mild Renal    Disease (if clinically at risk):  >or=60  Moderately Decreased:                30-59  Severely Decreased:                  15-29  Renal Failure:                         <15                                            -American GFR: multiply reported GFR by 1.16    Please note that the eGFR is based on the CKD-EPI calculation, and is not intended to be used for drug dosing.     eGFRcr   Date Value Ref Range Status   12/25/2024 54 (L) >59 Final     eGFR   Date Value Ref Range Status   04/29/2025 63 ml/min/1.73sq m Final       Lab Results   Component Value Date    ALT 8 04/29/2025    AST 12 (L) 04/29/2025    ALKPHOS 67 04/29/2025    BILITOT 0.13 (L) 06/29/2015       No results found for: \"CHOLESTEROL\"  No results found for: \"HDL\"  No results found for: \"TRIG\"  No results found for: \"NONHDLC\"      Assessment/Plan:    1. Hypocalcemia  Assessment & Plan:  She has fairly significant hypocalcemia that was exacerbated by partial gastrectomy for GIST tumor on top of compensated hypoparathyroidism post thyroidectomy in 2000.  Unclear wether there is GIST related 1- alpha hydroxylase activity that was masking hypocalcemia.    We reviewed the normal calcium physiology, probable pathophysiology in her case. Her Hx was reviewed.    For now we agreed to increase oral calcium intake to 1.2gm - we may further increase upto 6 gm daily.  Increase vit D replacement  - start vit D2 50K weekly.    In future, We may consider calcitriol.   We may also consider Yorvipath after review of all labs.  Follow up in 6 weeks.  Orders:  -     Ambulatory Referral to Endocrinology  -     calcium carbonate (OS-ALL) 600 MG tablet; Take 1 tablet (600 mg total) by mouth 2 (two) times " a day with meals  -     Calcitonin; Future  2. Other hypoparathyroidism (HCC)  Assessment & Plan:  Suspect post thyroidectomy related.    Unclear wether there is GIST related 1- alpha hydroxylase activity that was masking hypocalcemia.    We may consider calcitriol in future.  Orders:  -     Phosphorus; Future  -     Comprehensive metabolic panel; Future  -     Calcium, ionized; Future  -     Vitamin D 1,25 Dihydroxy; Future  -     PTH, intact; Future  -     Alkaline phosphatase, bone specific; Future  -     NTX Panel, Urine; Future  -     PTH-related peptide; Future  3. Hypothyroidism due to Hashimoto thyroiditis  Assessment & Plan:  She seems clinically and biochemically under treated based on symptoms and elevated TSH.  I suspect a component of malabsorption since partial gastrectomy.    We agreed to increase levothyroxine 200mcg qdaily.  Maintain a 4 hr window between levothyroxine and multivitamin/calcium.    We may need further dose increase in future.  Follow up in 6 weeks.  Orders:  -     levothyroxine 200 mcg tablet; Take 1 tablet (200 mcg total) by mouth in the morning  -     T4, free; Future  -     TSH, 3rd generation; Future  -     T3; Future  -     Thyroglobulin, Quant w/ AB ([place]); Future; Expected date: 05/09/2025  4. Morbid obesity (HCC)  5. Vitamin D deficiency  -     Vitamin D, Ergocalciferol, 82655 units CAPS; Take 1 capsule by mouth once a week  -     Vitamin D 25 hydroxy; Future  6. Postoperative hypothyroidism  Assessment & Plan:  She seems clinically and biochemically under treated based on symptoms and elevated TSH.  I suspect a component of malabsorption since partial gastrectomy.    We agreed to increase levothyroxine 200mcg qdaily.  Maintain a 4 hr window between levothyroxine and multivitamin/calcium.    We may need further dose increase in future.  Follow up in 6 weeks.  7. Hx of papillary thyroid carcinoma  Assessment & Plan:  She had AJCC stage 1 PTC ([id number]) s/p total thyroidectomy  4/13/2000(wt 200 lbs), 1st MIRELES 100mCi 5/2000 followed by repeat MIRELES 99mCi 3/2007 for elevated Tg52ng/mL .    We agreed to repeat Tg with Tg M[place] testing.  Will need to consider a dedifferentiated disease in case we see any evidence of recurrence which should be very unlikely.        I have spent a total time of 60 minutes on 05/02/25 in caring for this patient including greater than 50% of this time was spent in counseling/coordination of care as listed above.       Discussed with the patient and all questioned fully answered. She will contact me with concerns.    Syl Dunn

## 2025-05-02 NOTE — ASSESSMENT & PLAN NOTE
Suspect post thyroidectomy related.    Unclear wether there is GIST related 1- alpha hydroxylase activity that was masking hypocalcemia.    We may consider calcitriol in future.

## 2025-05-06 ENCOUNTER — OFFICE VISIT (OUTPATIENT)
Dept: HEMATOLOGY ONCOLOGY | Facility: CLINIC | Age: 40
End: 2025-05-06
Payer: COMMERCIAL

## 2025-05-06 VITALS
OXYGEN SATURATION: 94 % | SYSTOLIC BLOOD PRESSURE: 130 MMHG | RESPIRATION RATE: 18 BRPM | TEMPERATURE: 97.8 F | HEIGHT: 65 IN | WEIGHT: 264.2 LBS | DIASTOLIC BLOOD PRESSURE: 88 MMHG | BODY MASS INDEX: 44.02 KG/M2 | HEART RATE: 82 BPM

## 2025-05-06 DIAGNOSIS — C49.A2 MALIGNANT GASTROINTESTINAL STROMAL TUMOR (GIST) OF STOMACH (HCC): ICD-10-CM

## 2025-05-06 DIAGNOSIS — C49.A2 MALIGNANT GASTROINTESTINAL STROMAL TUMOR (GIST) OF STOMACH (HCC): Primary | ICD-10-CM

## 2025-05-06 PROCEDURE — 99213 OFFICE O/P EST LOW 20 MIN: CPT | Performed by: INTERNAL MEDICINE

## 2025-05-06 RX ORDER — IMATINIB MESYLATE 100 MG/1
200 TABLET, FILM COATED ORAL DAILY
Qty: 60 TABLET | Refills: 10 | OUTPATIENT
Start: 2025-05-06

## 2025-05-06 NOTE — PROGRESS NOTES
Name: Tanya Rosenthal      : 1985      MRN: [id number]  Encounter Provider: Juan Conde MD  Encounter Date: 2025   Encounter department: 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  The patient has high risk for micrometastatic disease and progression to gross metastases.  Currently she takes adjuvant imatinib (Gleevec) 400 mg per mouth daily, which she started in early 2025. On March 3, 2025, the patient initiated adjuvant imatinib (Gleevec) 400 mg per mouth daily.     INTERIM ASSESSMENT: The patient tolerated imatinib (Gleevec) 400 mg per mouth daily poorly.  Specifically, she developed severe fatigue, weakness, and bilateral lower extremity edema.  Around the last week of 2025, I advised the patient to decrease the dose of imatinib from 400 mg per mouth daily to 400 mg per mouth every other day.  She has much better tolerability of lower dose imatinib.  In addition, from 2025 to 2025, she required inpatient management of hypocalcemia.  Of note, the patient has hypoparathyroidism after neck surgery for thyroid cancer.  Otherwise, today Mrs. Rosenthal does not have new complaints.     Plan:  The patient will continue adjuvant imatinib (Gleevec) with dose reduction, specifically 400 mg per mouth every other day for 4 weeks.  After 4 weeks, she will increase the dose of imatinib to 400 mg per mouth daily (full/standard dose for patients with gastric GIST- KIT exon 11 mutated)  Minimal duration of adjuvant imatinib is 3 years.    Referral to oncology genetics clinic  Periodic contrast  enhanced CT scan of the abdomen and pelvis   Return to medical oncology clinic on June 5, 2025       Mrs. Rosenthal understands and agrees with my management recommendations and plan of care. I answered questions to her satisfaction.      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  The patient has high risk for micrometastatic disease and progression to gross metastases.  Currently she takes adjuvant imatinib (Gleevec) 400 mg per mouth daily, which she started in early March 2025. On March 3, 2025, the patient initiated adjuvant imatinib (Gleevec) 400 mg per mouth daily.     INTERIM HISTORY: The patient tolerated imatinib (Gleevec) 400 mg per mouth daily poorly.  Specifically, she developed severe fatigue, weakness, and bilateral lower extremity edema.  Around the last week of April 2025, I advised the patient to decrease the dose of imatinib from 400 mg per mouth daily to 400 mg per mouth every other day.  She has much better tolerability of lower dose imatinib.  In addition, from April 20, 2025 to April 29, 2025, she required inpatient management of hypocalcemia.  Of note, the patient has hypoparathyroidism after neck surgery for thyroid cancer.  Otherwise, today Mrs. Rosenthal does not have new systemic, cardiorespiratory, gastrointestinal, genitourinary, musculoskeletal, or neurologic symptoms.    Oncology History   Cancer Staging   GIST (gastrointestinal stromal tumor), malignant (HCC)  Staging form: Gastric Stromal Tumor - Gastric GIST, AJCC 8th Edition  - Pathologic: Stage IIIA (pT3, pN0, cM0, Mitotic Rate: High) - Signed by Tomás South MD on 2/26/2025  Histologic grade (G): High  grade  Histologic grading system: 2 grade system  Residual tumor (R): R0 - None  Oncology History   GIST (gastrointestinal stromal tumor), malignant (HCC)   1/7/2025 Biopsy    Endoscopic Ultrasound    Stomach, Gastric Submucosal, FNA:  Neoplastic.  Gastrointestinal stromal tumor (GIST), mixed spindle cell and epithelioid type.  > 5 mitoses per 50 HPF     1/13/2025 Initial Diagnosis    GIST (gastrointestinal stromal tumor), malignant (HCC)     2/5/2025 Surgery    Partial gastrectomy:   Gastric mass, resection:  -   Gastrointestinal stromal tumor of stomach, 5.8 cm in greatest dimension.    TUMOR   Tumor Focality  Unifocal   Tumor Site  Stomach: Just distal to GE junction per op note   Tumor Size  Greatest Dimension (Centimeters): 5.8 cm   Mitotic Rate  18 mitoses per 5 mm2   Histologic Grade  G2, high grade   MARGINS   Margin Status  All margins negative for GIST   pTNM CLASSIFICATION (AJCC 8th Edition)   pT Category  pT3   pN Category  pN not assigned (no nodes submitted or found)           2/26/2025 -  Cancer Staged    Staging form: Gastric Stromal Tumor - Gastric GIST, AJCC 8th Edition  - Pathologic: Stage IIIA (pT3, pN0, cM0, Mitotic Rate: High) - Signed by Tomás South MD on 2/26/2025  Histologic grade (G): High grade  Histologic grading system: 2 grade system  Residual tumor (R): R0 - None          Pertinent Medical History     Past Medical History:   Diagnosis Date    Anxiety     Depression     Heroin abuse (HCC)     clean since 2015    Thyroid cancer (HCC) 1999    with thyroidectomy and RTX     Past Surgical History:   Procedure Laterality Date    EGD      GANGLION CYST EXCISION Left     PARTIAL GASTRECTOMY N/A 2/5/2025    Procedure: PARTIAL GASTRECTOMY;  Surgeon: Tomás South MD;  Location: [facility];  Service: Surgical Oncology    PELVIC LAPAROSCOPY Bilateral 2/5/2025    Procedure: SALPINGO-OOPHORECTOMY, LAPAROSCOPIC;  Surgeon: Varghese Jung MD;  Location: [facility];  Service: Gynecology  Oncology    [place] LAPAROSCOPY W TOTAL HYSTERECTOMY UTERUS 250 GM/< N/A 2/5/2025    Procedure: LAPAROSCOPIC TOTAL HYSTERECTOMY, EXAM UNDER ANESTHESIA;  Surgeon: Varghese Jung MD;  Location: [facility];  Service: Gynecology Oncology    THYROIDECTOMY        Family History   Problem Relation Age of Onset    Diabetes Mother     Stomach cancer Mother         maltoma lyphoma    Hyperlipidemia Father     Hypertension Father     Lung cancer Maternal Aunt     Breast cancer Maternal Aunt 52    Lymphoma Paternal Uncle         non hodgkins     Social History     Socioeconomic History    Marital status: Single     Spouse name: Not on file    Number of children: Not on file    Years of education: Not on file    Highest education level: Not on file   Occupational History    Not on file   Tobacco Use    Smoking status: Former     Current packs/day: 0.25     Average packs/day: 0.3 packs/day for 26.2 years (6.5 ttl pk-yrs)     Types: Cigarettes     Start date: 1/1/1999    Smokeless tobacco: Never    Tobacco comments:     Trying quitting  ago   Vaping Use    Vaping status: Every Day    Substances: THC, CBD   Substance and Sexual Activity    Alcohol use: Not Currently    Drug use: Yes     Types: Marijuana     Comment: 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    Sexual activity: Not on file   Other Topics Concern    Not on file   Social History Narrative    Not on file     Social Drivers of Health     Financial Resource Strain: Not on file   Food Insecurity: No Food Insecurity (2/5/2025)    Nursing - Inadequate Food Risk Classification     Worried About Running Out of Food in the Last Year: Not on file     Ran Out of Food in the Last Year: Not on file     Ran Out of Food in the Last Year: Never true   Transportation Needs: No Transportation Needs (2/5/2025)    Nursing - Transportation Risk Classification     Lack of Transportation: Not on file     Lack of Transportation: No   Physical Activity: Not on file   Stress: Not on file    Social Connections: Not on file   Intimate Partner Violence: Unknown (2025)    Nursing IPS     Feels Physically and Emotionally Safe: Not on file     Physically Hurt by Someone: Not on file     Humiliated or Emotionally Abused by Someone: Not on file     Physically Hurt by Someone: No     Hurt or Threatened by Someone: No   Housing Stability: Unknown (2025)    Nursing: Inadequate Housing Risk Classification     Has Housing: Not on file     Worried About Losing Housing: Not on file     Unable to Get Utilities: Not on file     Unable to Pay for Housing in the Last Year: No     Has Housin              Review of Systems   Constitutional:  Positive for activity change and fatigue. Negative for appetite change, chills, diaphoresis, fever and unexpected weight change.   HENT:  Negative for congestion, dental problem, ear discharge, ear pain, facial swelling, hearing loss, mouth sores, nosebleeds, postnasal drip, rhinorrhea, sinus pain, sneezing, sore throat, tinnitus, trouble swallowing and voice change.    Eyes:  Negative for photophobia, pain, discharge, redness, itching and visual disturbance.   Respiratory:  Negative for apnea, cough, choking, chest tightness, shortness of breath, wheezing and stridor.    Cardiovascular:  Negative for chest pain, palpitations and leg swelling.   Gastrointestinal:  Negative for abdominal distention, abdominal pain, anal bleeding, blood in stool, constipation, diarrhea, nausea, rectal pain and vomiting.   Endocrine: Negative for cold intolerance and heat intolerance.   Genitourinary:  Negative for decreased urine volume, difficulty urinating, dysuria, enuresis, flank pain, frequency, hematuria and urgency.   Musculoskeletal:  Negative for arthralgias, back pain, gait problem, joint swelling, myalgias, neck pain and neck stiffness.   Skin:  Negative for color change, pallor, rash and wound.   Neurological:  Negative for dizziness, tremors, seizures, syncope, facial  asymmetry, speech difficulty, weakness, light-headedness, numbness and headaches.   Hematological:  Negative for adenopathy. Does not bruise/bleed easily.   Psychiatric/Behavioral:  Negative for behavioral problems, confusion, dysphoric mood and sleep disturbance. The patient is not nervous/anxious.          Objective   LMP  (LMP Unknown)     Pain Screening:    ECOG   1    Physical Exam  Constitutional:       Comments: Obese female patient without acute respiratory distress   HENT:      Head: Normocephalic and atraumatic.      Nose: Nose normal.      Mouth/Throat:      Mouth: Mucous membranes are moist.      Pharynx: Oropharynx is clear.      Comments: No lesions in the oral mucosa  Eyes:      Extraocular Movements: Extraocular movements intact.      Conjunctiva/sclera: Conjunctivae normal.      Pupils: Pupils are equal, round, and reactive to light.      Comments: No scleral icterus   Neck:      Comments: No cervical, supraclavicular, axillary, epitrochlear, or inguinal lymphadenopathy.   Cardiovascular:      Rate and Rhythm: Normal rate and regular rhythm.      Pulses: Normal pulses.      Heart sounds: Normal heart sounds.   Pulmonary:      Effort: Pulmonary effort is normal.      Breath sounds: Normal breath sounds.   Abdominal:      General: Bowel sounds are normal.      Palpations: Abdomen is soft.      Comments: Abdomen soft, nontender, nonpainful.  No hepatomegaly.  No splenomegaly.  No rebound tenderness.  No lateral or shifting dullness.  Bowel sounds present, normal.    Musculoskeletal:         General: Normal range of motion.      Cervical back: Normal range of motion and neck supple.   Skin:     General: Skin is warm and dry.      Capillary Refill: Capillary refill takes less than 2 seconds.   Neurological:      General: No focal deficit present.      Mental Status: She is alert and oriented to person, place, and time. Mental status is at baseline.   Psychiatric:         Mood and Affect: Mood normal.          Behavior: Behavior normal.         Thought Content: Thought content normal.         Judgment: Judgment normal.     Labs: I have reviewed the following labs:  Lab Results   Component Value Date/Time    WBC 9.79 04/29/2025 09:03 AM    RBC 4.92 04/29/2025 09:03 AM    Hemoglobin 12.9 04/29/2025 09:03 AM    Hematocrit 41.7 04/29/2025 09:03 AM    MCV 85 04/29/2025 09:03 AM    MCH 26.2 (L) 04/29/2025 09:03 AM    RDW 17.2 (H) 04/29/2025 09:03 AM    Platelets 286 04/29/2025 09:03 AM    Segmented % 60 04/29/2025 09:03 AM    Lymphocytes % 34 04/29/2025 09:03 AM    Monocytes % 3 (L) 04/29/2025 09:03 AM    Eosinophils Relative 3 04/29/2025 09:03 AM    Basophils Relative 0 04/29/2025 09:03 AM    Immature Grans % 0 04/29/2025 09:03 AM    Absolute Neutrophils 5.78 04/29/2025 09:03 AM     Lab Results   Component Value Date/Time    Potassium 4.1 04/29/2025 02:06 PM    Potassium 4.3 12/25/2024 07:10 PM    Chloride 104 04/29/2025 02:06 PM    Chloride 101 12/25/2024 07:10 PM    Carbon Dioxide 29 12/25/2024 07:10 PM    CO2 30 04/29/2025 02:06 PM    BUN 11 04/29/2025 02:06 PM    BUN 19 12/25/2024 07:10 PM    Creatinine 1.08 04/29/2025 02:06 PM    Creatinine 1.28 (H) 12/25/2024 07:10 PM    Glucose, Fasting 102 (H) 04/29/2025 05:31 AM    Calcium 7.2 (L) 04/29/2025 02:06 PM    Calcium 6.6 (L) 12/25/2024 07:10 PM    AST 12 (L) 04/29/2025 12:09 AM    AST 17 12/25/2024 07:10 PM    ALT 8 04/29/2025 12:09 AM    ALT 10 12/25/2024 07:10 PM    Alkaline Phosphatase 67 04/29/2025 12:09 AM    Alkaline Phosphatase 65 12/25/2024 07:10 PM    Total Protein 6.5 04/29/2025 12:09 AM    Protein, Total 7.2 12/25/2024 07:10 PM    Albumin 3.7 04/29/2025 12:09 AM    ALBUMIN 4.1 12/25/2024 07:10 PM    Total Bilirubin 0.29 04/29/2025 12:09 AM    Total Bilirubin 0.4 12/25/2024 07:10 PM    eGFRcr 54 (L) 12/25/2024 07:10 PM    eGFR 63 04/29/2025 02:06 PM

## 2025-05-06 NOTE — ASSESSMENT & PLAN NOTE
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  The patient has high risk for micrometastatic disease and progression to gross metastases.  Currently she takes adjuvant imatinib (Gleevec) 400 mg per mouth daily, which she started in early March 2025. On March 3, 2025, the patient initiated adjuvant imatinib (Gleevec) 400 mg per mouth daily.     INTERIM ASSESSMENT: The patient tolerated imatinib (Gleevec) 400 mg per mouth daily poorly.  Specifically, she developed severe fatigue, weakness, and bilateral lower extremity edema.  Around the last week of April 2025, I advised the patient to decrease the dose of imatinib from 400 mg per mouth daily to 400 mg per mouth every other day.  She has much better tolerability of lower dose imatinib.  In addition, from April 20, 2025 to April 29, 2025, she required inpatient management of hypocalcemia.  Of note, the patient has hypoparathyroidism after neck surgery for thyroid cancer.  Otherwise, today Mrs. Rosenthal does not have new complaints.     Plan:  The patient will continue adjuvant imatinib (Gleevec) with dose reduction, specifically 400 mg per mouth every other day for 4 weeks.  After 4 weeks, she will increase the dose of imatinib to 400 mg per mouth daily (full/standard dose for patients with gastric GIST- KIT exon 11 mutated)  Minimal duration of adjuvant imatinib is 3 years.    Referral to oncology genetics clinic  Periodic contrast enhanced CT scan of the abdomen and pelvis   Return to medical oncology clinic on June 5, 2025       Mrs. Rosenthal understands and agrees with my management recommendations and plan of care. I answered questions to her satisfaction.

## 2025-05-13 ENCOUNTER — TELEMEDICINE (OUTPATIENT)
Dept: PALLIATIVE MEDICINE | Facility: CLINIC | Age: 40
End: 2025-05-13
Payer: COMMERCIAL

## 2025-05-13 DIAGNOSIS — G89.3 CANCER RELATED PAIN: ICD-10-CM

## 2025-05-13 DIAGNOSIS — C49.A2 MALIGNANT GASTROINTESTINAL STROMAL TUMOR (GIST) OF STOMACH (HCC): Primary | ICD-10-CM

## 2025-05-13 DIAGNOSIS — Z51.5 PALLIATIVE CARE ENCOUNTER: ICD-10-CM

## 2025-05-13 DIAGNOSIS — E83.51 HYPOCALCEMIA: ICD-10-CM

## 2025-05-13 DIAGNOSIS — F41.9 ANXIETY: ICD-10-CM

## 2025-05-13 DIAGNOSIS — M79.10 MYALGIA: ICD-10-CM

## 2025-05-13 PROCEDURE — 99213 OFFICE O/P EST LOW 20 MIN: CPT | Performed by: STUDENT IN AN ORGANIZED HEALTH CARE EDUCATION/TRAINING PROGRAM

## 2025-05-13 NOTE — PROGRESS NOTES
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 Conde of Medical Oncology  -On Imatinib/Gleevec  -patient on adjusted dosing at this time.  -doing well overall with lower dose and plans to potentially increase later.  -no intractable nausea or vomiting.  Tolerating PO intake.   No new concerns or complaints at this time.  Patient would like to follow-up with [facility] for ongoing monitoring of symptoms and management should things change.       Cancer related pain  Currently managed with Suboxone with specialist.  -no intractable pain issues at this time.         Hypocalcemia  Recent hospitalization reviewed from 2025 to 2025 for hypocalcemia.         Myalgia  Resolved.  Found Medrol dose pack to be helpful.       Anxiety  Doing well overall at this time.  Feels she is physically and mentally much better.  Her myalgias have resolved and felt the Medrol/steroid pack was helpful for this along with the dose adjustment of her Imatinib.   She voices no concerns from this standpoint at this time as she feels she is coping well.       Palliative care encounter  Psychosocial   Supportive listening provided  Normalized experience of patient/family  Provided anxiety containment     Referrals Placed / Medical Equipment Ordered  -None    Follow-Up Recommendations  -Follow-up with PCP and current medical specialists  -Follow-up with palliative care: 3-4 weeks             History of Present Illness     Palliative Diagnosis - GIST  PMHx: Thyroid cancer s/p thyroidectomy, substance use now maintained  and doing well with Suboxone under specialist    Patient is seen today via telehealth. Alert, oriented, pleasantly conversant.  Patient is seen in NAD.    Appetite has  been stable.    Pain - controlled with Suboxone. Has not had any other issues from pain or discomfort at this time. Doing well overall at this time. She feels she is doing well physically and mentally. She is recovering since her recent hospitalization and is doing well at this time.           Review of Systems   Constitutional:  Negative for activity change and appetite change.   Respiratory:  Negative for shortness of breath.    Cardiovascular:  Negative for chest pain.   Gastrointestinal:  Negative for abdominal pain, constipation, diarrhea, nausea and vomiting.   Genitourinary:  Negative for difficulty urinating.   Skin:  Negative for color change.   All other systems reviewed and are negative.      Objective   LMP  (LMP Unknown)     Physical Exam  Constitutional:       General: She is not in acute distress.     Appearance: She is not ill-appearing, toxic-appearing or diaphoretic.   HENT:      Head: Normocephalic.      Nose: Nose normal.   Pulmonary:      Effort: Pulmonary effort is normal. No respiratory distress.     Skin:     Coloration: Skin is not jaundiced or pale.     Neurological:      General: No focal deficit present.      Mental Status: She is alert. Mental status is at baseline.     Psychiatric:         Mood and Affect: Mood normal.         Behavior: Behavior normal.         Thought Content: Thought content normal.         Judgment: Judgment normal.         Administrative Statements   Encounter provider Osvaldo Potter, DO    The Patient is located at Home and in the following state in which I hold an active license [place].    The patient was identified by name and date of birth. Tanya Rosenthal was informed that this is a telemedicine visit and that the visit is being conducted through the Epic Embedded platform. She agrees to proceed..  My office door was closed. No one else was in the room.  She acknowledged consent and understanding of privacy and security of the video platform. The patient has agreed to  participate and understands they can discontinue the visit at any time.    I have spent a total time of 15 minutes in caring for this patient on the day of the visit/encounter including Risks and benefits of tx options, Instructions for management, Patient and family education, Importance of tx compliance, Risk factor reductions, Impressions, Counseling / Coordination of care, Documenting in the medical record, Reviewing/placing orders in the medical record (including tests, medications, and/or procedures), and Obtaining or reviewing history   , not including the time spent for establishing the audio/video connection.

## 2025-05-14 NOTE — ASSESSMENT & PLAN NOTE
Currently managed with Suboxone with specialist.  -no intractable pain issues at this time.

## 2025-05-14 NOTE — ASSESSMENT & PLAN NOTE
Doing well overall at this time.  Feels she is physically and mentally much better.  Her myalgias have resolved and felt the Medrol/steroid pack was helpful for this along with the dose adjustment of her Imatinib.   She voices no concerns from this standpoint at this time as she feels she is coping well.

## 2025-05-14 NOTE — PATIENT INSTRUCTIONS
It was a pleasure speaking with you today.    As discussed:  -Continue your medications as prescribed.  -Continue with a bowel regimen to avoid constipation.    Follow-up in: 3-4 weeks    Please do not hesitate to call me sooner should you have any questions/concerns or needs.    Medication safety issues - Do not drive under the influence of narcotics (including opioids), watch for adverse effects including confusion / altered mental status / respiratory depression (slowed breathing), keep medications stored in a safe/locked environment, do not use alcohol while opioids or other narcotics are in your system. Do not travel with more than the minimum number of tablets or capsules required for the trip.    ER Precautions  Watch for red flag symptoms including, but not limited to fevers, chills, chest pain, shortness of breath, intractable nausea/vomiting/diarrhea, or acute intractable pain (especially if pain is new or has changed).

## 2025-05-14 NOTE — ASSESSMENT & PLAN NOTE
Following Dr. Conde of Medical Oncology  -On Imatinib/Gleevec  -patient on adjusted dosing at this time.  -doing well overall with lower dose and plans to potentially increase later.  -no intractable nausea or vomiting.  Tolerating PO intake.   No new concerns or complaints at this time.  Patient would like to follow-up with [facility] for ongoing monitoring of symptoms and management should things change.

## 2025-05-14 NOTE — ASSESSMENT & PLAN NOTE
Psychosocial   Supportive listening provided  Normalized experience of patient/family  Provided anxiety containment     Referrals Placed / Medical Equipment Ordered  -None    Follow-Up Recommendations  -Follow-up with PCP and current medical specialists  -Follow-up with palliative care: 3-4 weeks

## 2025-05-23 ENCOUNTER — PATIENT OUTREACH (OUTPATIENT)
Dept: CASE MANAGEMENT | Facility: OTHER | Age: 40
End: 2025-05-23

## 2025-05-23 NOTE — PROGRESS NOTES
OSW placed supportive outreach to pt this day. OSW received her VM. Detailed VM was provided encouraging her to contact this writer with any needs.    ADDENDUM:  OSW received an email from sophia curiel thanking this writer for the email. She stated that she is doing well at this time. She has been very busy working and has been helping her daughter move into a new home. She also shared that her daughter is pregnant and she will be welcoming a granddaughter in October. She shared that she is nervous about August when she has to go for her scans. She was also recently hospitalized due to her calcium and potassium being low. Overall she is working a great deal and staying busy. She thanked this writer for checking in with her and caring about her.   This writer responded and encouraged her to contact this writer with any future needs. OSW also expressed I am very happy to hear that she is doing well!   OSW will close the referral. This writer will be available if any needs present in the future.

## 2025-05-25 DIAGNOSIS — R10.9 ABDOMINAL PAIN: ICD-10-CM

## 2025-05-25 DIAGNOSIS — K31.89 GASTRIC MASS: ICD-10-CM

## 2025-05-27 RX ORDER — SUCRALFATE 1 G/1
1 TABLET ORAL EVERY 6 HOURS
Qty: 120 TABLET | Refills: 0 | Status: SHIPPED | OUTPATIENT
Start: 2025-05-27

## 2025-06-02 ENCOUNTER — SOCIAL WORK (OUTPATIENT)
Dept: PALLIATIVE MEDICINE | Facility: CLINIC | Age: 40
End: 2025-06-02

## 2025-06-02 DIAGNOSIS — Z71.89 COUNSELING AND COORDINATION OF CARE: Primary | ICD-10-CM

## 2025-06-02 PROCEDURE — NC001 PR NO CHARGE

## 2025-06-03 ENCOUNTER — OFFICE VISIT (OUTPATIENT)
Dept: PALLIATIVE MEDICINE | Facility: CLINIC | Age: 40
End: 2025-06-03
Payer: COMMERCIAL

## 2025-06-03 ENCOUNTER — SOCIAL WORK (OUTPATIENT)
Dept: PALLIATIVE MEDICINE | Facility: CLINIC | Age: 40
End: 2025-06-03
Payer: COMMERCIAL

## 2025-06-03 ENCOUNTER — TELEPHONE (OUTPATIENT)
Dept: HEMATOLOGY ONCOLOGY | Facility: CLINIC | Age: 40
End: 2025-06-03

## 2025-06-03 ENCOUNTER — APPOINTMENT (OUTPATIENT)
Dept: LAB | Facility: CLINIC | Age: 40
End: 2025-06-03
Payer: COMMERCIAL

## 2025-06-03 VITALS
HEART RATE: 73 BPM | OXYGEN SATURATION: 99 % | SYSTOLIC BLOOD PRESSURE: 120 MMHG | TEMPERATURE: 97.6 F | WEIGHT: 246 LBS | HEIGHT: 65 IN | DIASTOLIC BLOOD PRESSURE: 64 MMHG | BODY MASS INDEX: 40.98 KG/M2

## 2025-06-03 DIAGNOSIS — R68.2 DRY MOUTH: ICD-10-CM

## 2025-06-03 DIAGNOSIS — Z71.89 GOALS OF CARE, COUNSELING/DISCUSSION: ICD-10-CM

## 2025-06-03 DIAGNOSIS — E06.3 HYPOTHYROIDISM DUE TO HASHIMOTO THYROIDITIS: ICD-10-CM

## 2025-06-03 DIAGNOSIS — C49.A2 MALIGNANT GASTROINTESTINAL STROMAL TUMOR (GIST) OF STOMACH (HCC): ICD-10-CM

## 2025-06-03 DIAGNOSIS — Z51.5 PALLIATIVE CARE ENCOUNTER: ICD-10-CM

## 2025-06-03 DIAGNOSIS — R63.0 POOR APPETITE: ICD-10-CM

## 2025-06-03 DIAGNOSIS — R53.0 NEOPLASTIC MALIGNANT RELATED FATIGUE: ICD-10-CM

## 2025-06-03 DIAGNOSIS — G89.3 CANCER RELATED PAIN: ICD-10-CM

## 2025-06-03 DIAGNOSIS — E20.89 OTHER HYPOPARATHYROIDISM (HCC): ICD-10-CM

## 2025-06-03 DIAGNOSIS — M79.10 MYALGIA: ICD-10-CM

## 2025-06-03 DIAGNOSIS — E55.9 VITAMIN D DEFICIENCY: ICD-10-CM

## 2025-06-03 DIAGNOSIS — E83.51 HYPOCALCEMIA: ICD-10-CM

## 2025-06-03 DIAGNOSIS — R43.2 DYSGEUSIA: Primary | ICD-10-CM

## 2025-06-03 DIAGNOSIS — Z71.89 COUNSELING AND COORDINATION OF CARE: Primary | ICD-10-CM

## 2025-06-03 LAB
1,25(OH)2D SERPL-MCNC: 43.5 PG/ML (ref 5–200)
25(OH)D3 SERPL-MCNC: 27.1 NG/ML (ref 30–100)
ALBUMIN SERPL BCG-MCNC: 4.3 G/DL (ref 3.5–5)
ALP SERPL-CCNC: 83 U/L (ref 34–104)
ALT SERPL W P-5'-P-CCNC: 6 U/L (ref 7–52)
ANION GAP SERPL CALCULATED.3IONS-SCNC: 12 MMOL/L (ref 4–13)
AST SERPL W P-5'-P-CCNC: 13 U/L (ref 13–39)
BASOPHILS # BLD AUTO: 0.04 THOUSANDS/ÂΜL (ref 0–0.1)
BASOPHILS NFR BLD AUTO: 1 % (ref 0–1)
BILIRUB SERPL-MCNC: 0.38 MG/DL (ref 0.2–1)
BUN SERPL-MCNC: 17 MG/DL (ref 5–25)
CA-I BLD-SCNC: 0.75 MMOL/L (ref 1.12–1.32)
CALCIUM SERPL-MCNC: 6.2 MG/DL (ref 8.4–10.2)
CHLORIDE SERPL-SCNC: 100 MMOL/L (ref 96–108)
CO2 SERPL-SCNC: 28 MMOL/L (ref 21–32)
CREAT SERPL-MCNC: 0.92 MG/DL (ref 0.6–1.3)
EOSINOPHIL # BLD AUTO: 0.19 THOUSAND/ÂΜL (ref 0–0.61)
EOSINOPHIL NFR BLD AUTO: 2 % (ref 0–6)
ERYTHROCYTE [DISTWIDTH] IN BLOOD BY AUTOMATED COUNT: 15.6 % (ref 11.6–15.1)
GFR SERPL CREATININE-BSD FRML MDRD: 77 ML/MIN/1.73SQ M
GLUCOSE P FAST SERPL-MCNC: 88 MG/DL (ref 65–99)
HCT VFR BLD AUTO: 40.2 % (ref 34.8–46.1)
HGB BLD-MCNC: 12.5 G/DL (ref 11.5–15.4)
IMM GRANULOCYTES # BLD AUTO: 0.03 THOUSAND/UL (ref 0–0.2)
IMM GRANULOCYTES NFR BLD AUTO: 0 % (ref 0–2)
LYMPHOCYTES # BLD AUTO: 2.63 THOUSANDS/ÂΜL (ref 0.6–4.47)
LYMPHOCYTES NFR BLD AUTO: 32 % (ref 14–44)
MCH RBC QN AUTO: 26.3 PG (ref 26.8–34.3)
MCHC RBC AUTO-ENTMCNC: 31.1 G/DL (ref 31.4–37.4)
MCV RBC AUTO: 85 FL (ref 82–98)
MONOCYTES # BLD AUTO: 0.41 THOUSAND/ÂΜL (ref 0.17–1.22)
MONOCYTES NFR BLD AUTO: 5 % (ref 4–12)
NEUTROPHILS # BLD AUTO: 4.81 THOUSANDS/ÂΜL (ref 1.85–7.62)
NEUTS SEG NFR BLD AUTO: 60 % (ref 43–75)
NRBC BLD AUTO-RTO: 0 /100 WBCS
PHOSPHATE SERPL-MCNC: 4.8 MG/DL (ref 2.7–4.5)
PLATELET # BLD AUTO: 233 THOUSANDS/UL (ref 149–390)
PMV BLD AUTO: 12.1 FL (ref 8.9–12.7)
POTASSIUM SERPL-SCNC: 3.9 MMOL/L (ref 3.5–5.3)
PROT SERPL-MCNC: 7.4 G/DL (ref 6.4–8.4)
PTH-INTACT SERPL-MCNC: 19.9 PG/ML (ref 12–88)
RBC # BLD AUTO: 4.75 MILLION/UL (ref 3.81–5.12)
SODIUM SERPL-SCNC: 140 MMOL/L (ref 135–147)
T3 SERPL-MCNC: 1 NG/ML (ref 0.9–1.8)
T4 FREE SERPL-MCNC: 2.08 NG/DL (ref 0.61–1.12)
TSH SERPL DL<=0.05 MIU/L-ACNC: 0.34 UIU/ML (ref 0.45–4.5)
WBC # BLD AUTO: 8.11 THOUSAND/UL (ref 4.31–10.16)

## 2025-06-03 PROCEDURE — NC001 PR NO CHARGE

## 2025-06-03 PROCEDURE — 84080 ASSAY ALKALINE PHOSPHATASES: CPT

## 2025-06-03 PROCEDURE — 36415 COLL VENOUS BLD VENIPUNCTURE: CPT

## 2025-06-03 PROCEDURE — 84100 ASSAY OF PHOSPHORUS: CPT

## 2025-06-03 PROCEDURE — 85025 COMPLETE CBC W/AUTO DIFF WBC: CPT

## 2025-06-03 PROCEDURE — 86800 THYROGLOBULIN ANTIBODY: CPT

## 2025-06-03 PROCEDURE — 84480 ASSAY TRIIODOTHYRONINE (T3): CPT

## 2025-06-03 PROCEDURE — 84432 ASSAY OF THYROGLOBULIN: CPT

## 2025-06-03 PROCEDURE — 84439 ASSAY OF FREE THYROXINE: CPT

## 2025-06-03 PROCEDURE — 82308 ASSAY OF CALCITONIN: CPT

## 2025-06-03 PROCEDURE — 82397 CHEMILUMINESCENT ASSAY: CPT

## 2025-06-03 PROCEDURE — 84443 ASSAY THYROID STIM HORMONE: CPT

## 2025-06-03 PROCEDURE — 82652 VIT D 1 25-DIHYDROXY: CPT

## 2025-06-03 PROCEDURE — 99214 OFFICE O/P EST MOD 30 MIN: CPT | Performed by: STUDENT IN AN ORGANIZED HEALTH CARE EDUCATION/TRAINING PROGRAM

## 2025-06-03 PROCEDURE — 82306 VITAMIN D 25 HYDROXY: CPT

## 2025-06-03 PROCEDURE — 80053 COMPREHEN METABOLIC PANEL: CPT

## 2025-06-03 PROCEDURE — 82330 ASSAY OF CALCIUM: CPT

## 2025-06-03 PROCEDURE — 83970 ASSAY OF PARATHORMONE: CPT

## 2025-06-03 RX ORDER — PREDNISONE 10 MG/1
TABLET ORAL
Qty: 4 TABLET | Refills: 0 | Status: SHIPPED | OUTPATIENT
Start: 2025-06-03

## 2025-06-03 NOTE — PROGRESS NOTES
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  -treatment with Imatinib/Gleevec)       Dysgeusia  resolved       Dry mouth  resolved       Cancer related pain  Remains on Suboxone which has kept her chronic issues stable.    Patient with recurrence of myalgias, fatigue, and decreased appetite. She did well when we last trialed a Medrol Dose pack. However, notable interaction with Gleevac which can increase the concentration levels of methylprednisolone.    Therefore, we discussed trying Prednisone instead. I did speak with Med/Onc in which there are no concerns with prednisone and Gleevec at this time. Collaborative decision with patient to trial Prednisone instead to see if this helps with energy, myalgias and appetite stimulation. Patient knows to call my office with any issues or concerns. Patient does not have known diabetes.    Plan:  1) Start Prednisone for a burst with 10mg for 3 days, 5 mg for 2 days  2) patient to continue taking Protonix 40mg daily for GI prophylaxis    All questions answered. Patient is agreeable with this plan.    Orders:    predniSONE 10 mg tablet; Take 1 tablet (10 mg) for 3 days, then decrease to half tablet (5 mg) for 2 days then stop.    Myalgia    Orders:    predniSONE 10 mg tablet; Take 1 tablet (10 mg) for 3 days, then decrease to half tablet (5 mg) for 2 days then stop.    Palliative care encounter  Psychosocial   Supportive listening provided  Normalized experience of patient/family  Provided anxiety containment     Referrals Placed / Medical Equipment Ordered  -None    Follow-Up Recommendations  -Follow-up with PCP and current medical specialists  -Follow-up with palliative care: 4-5 weeks    Orders:     predniSONE 10 mg tablet; Take 1 tablet (10 mg) for 3 days, then decrease to half tablet (5 mg) for 2 days then stop.    Poor appetite    Orders:    predniSONE 10 mg tablet; Take 1 tablet (10 mg) for 3 days, then decrease to half tablet (5 mg) for 2 days then stop.    Neoplastic malignant related fatigue    Orders:    predniSONE 10 mg tablet; Take 1 tablet (10 mg) for 3 days, then decrease to half tablet (5 mg) for 2 days then stop.    Goals of care, counseling/discussion  Focused on disease directed cares            PDMP Review: I have reviewed the patient's controlled substance dispensing history in the Prescription Drug Monitoring Program in compliance with the [facility] regulations before prescribing any controlled substances.    History of Present Illness   Tanya Rosenthal is a 40 y.o. female who presents for follow-up.    Patient is seen today in office. Alert, oriented, pleasantly conversant.  Patient is seen with some anxiety due to seeing her lab work. We did review her most recent labs from 6/3/2025 which helped ease her anxiety.    Appetite has been poor lately. Was doing well in the weeks prior, however, notable decrease in appetite, increased fatigue, and myalgias over the past week.    Pain - chronic issues managed with Suboxone by her specialist. However, having myalgias to knees and upper extremities. Has had similar reaction in the past with the Gleevac.    Patient is well supported by family.      Medical History Reviewed by provider this encounter:  Tobacco  Allergies  Meds  Problems  Med Hx  Surg Hx  Fam Hx     .  Past Medical History   Past Medical History[1]  Past Surgical History[2]  Family History[3]   reports that she has quit smoking. Her smoking use included cigarettes. She started smoking about 26 years ago. She has a 6.6 pack-year smoking history. She has never used smokeless tobacco. She reports that she does not currently use drugs after having used the following drugs: Marijuana. She  "reports that she does not drink alcohol.  Current Outpatient Medications   Medication Instructions    acetaminophen (TYLENOL) 975 mg, Oral, Every 8 hours scheduled    buprenorphine-naloxone (SUBOXONE) 8-2 mg per SL tablet 1 tablet, Daily    buprenorphine-naloxone (Suboxone) 8-2 mg     calcium carbonate (OS-ALL) 600 mg, Oral, 2 times daily with meals    Calcium Carbonate 1500 (600 Ca) MG TABS     estradiol (ESTRACE) 0.5 mg, Oral, Daily    FLUoxetine (PROzac) 20 mg capsule 1 capsule, Daily    imatinib (GLEEVEC) 400 mg, Oral, Daily    levothyroxine 200 mcg, Oral, Daily    ondansetron (ZOFRAN-ODT) 4 mg, Every 6 hours PRN    pantoprazole (PROTONIX) 40 mg, Oral, Daily    predniSONE 10 mg tablet Take 1 tablet (10 mg) for 3 days, then decrease to half tablet (5 mg) for 2 days then stop.    sucralfate (CARAFATE) 1 g, Oral, Every 6 hours    Vitamin D, Ergocalciferol, 62443 units CAPS 1 capsule, Oral, Weekly   Allergies[4]   Medications Ordered Prior to Encounter[5]   Social History[6]     Objective   /64 (BP Location: Left arm, Patient Position: Sitting, Cuff Size: Large)   Pulse 73   Temp 97.6 °F (36.4 °C) (Temporal)   Ht 5' 5\" (1.651 m)   Wt 112 kg (246 lb)   LMP  (LMP Unknown)   SpO2 99%   BMI 40.94 kg/m²     Physical Exam  Vitals reviewed.   Constitutional:       General: She is not in acute distress.     Appearance: She is not ill-appearing, toxic-appearing or diaphoretic.   HENT:      Head: Normocephalic and atraumatic.      Nose: Nose normal.     Eyes:      General:         Right eye: No discharge.         Left eye: No discharge.       Cardiovascular:      Rate and Rhythm: Normal rate.   Pulmonary:      Effort: Pulmonary effort is normal. No respiratory distress.   Abdominal:      General: There is no distension.     Musculoskeletal:         General: No swelling, deformity or signs of injury.      Right lower leg: No edema.      Left lower leg: No edema.     Skin:     General: Skin is warm and dry.      " Coloration: Skin is not jaundiced or pale.     Neurological:      General: No focal deficit present.      Mental Status: She is alert. Mental status is at baseline.     Psychiatric:         Mood and Affect: Mood normal.         Behavior: Behavior normal.         Thought Content: Thought content normal.         Judgment: Judgment normal.         Recent labs:  Lab Results   Component Value Date/Time    SODIUM 142 04/29/2025 02:06 PM    SODIUM 142 12/25/2024 07:10 PM    K 4.1 04/29/2025 02:06 PM    K 4.3 12/25/2024 07:10 PM    BUN 11 04/29/2025 02:06 PM    BUN 19 12/25/2024 07:10 PM    CREATININE 1.08 04/29/2025 02:06 PM    CREATININE 1.28 (H) 12/25/2024 07:10 PM    GLUC 104 04/29/2025 02:06 PM    GLUC 84 12/25/2024 07:10 PM    CALCIUM 7.2 (L) 04/29/2025 02:06 PM    CALCIUM 6.6 (L) 12/25/2024 07:10 PM    AST 12 (L) 04/29/2025 12:09 AM    AST 17 12/25/2024 07:10 PM    ALT 8 04/29/2025 12:09 AM    ALT 10 12/25/2024 07:10 PM    ALB 3.7 04/29/2025 12:09 AM    ALB 4.1 12/25/2024 07:10 PM    TP 6.5 04/29/2025 12:09 AM    TP 7.2 12/25/2024 07:10 PM    EGFR 63 04/29/2025 02:06 PM    EGFR 54 (L) 12/25/2024 07:10 PM    EGFR 68 08/25/2020 02:00 AM     Lab Results   Component Value Date/Time    HGB 12.5 06/03/2025 09:27 AM    HGB 11.7 04/02/2015 05:46 AM    WBC 8.11 06/03/2025 09:27 AM    WBC 8.97 04/02/2015 05:46 AM     06/03/2025 09:27 AM     04/02/2015 05:46 AM    INR 0.96 01/29/2025 09:13 AM    INR 1.08 04/01/2015 12:51 PM    PTT 38 (H) 01/29/2025 09:13 AM    PTT 37 (H) 04/01/2015 12:51 PM     Lab Results   Component Value Date/Time    YCU6SIMSXITG 0.335 (L) 06/03/2025 09:27 AM    HYN3WHAUWDWI 99.220 (H) 04/02/2015 05:46 AM       Recent Imaging:  Procedure: XR chest pa & lateral  Result Date: 2/7/2025  Impression: Stable interval exam. Cardiomegaly, mild pulmonary vascular congestion, and small left pleural effusion. Workstation performed: [id number]     Procedure: XR chest portable  Result Date:  2/7/2025  Impression: Mild central congestion and trace left basilar effusion. Workstation performed: [id number]     Procedure: FL upper GI UGI  Result Date: 2/6/2025  Impression: No extravasation of contrast seen No obstruction to flow of contrast Workstation performed: [id number]       Administrative Statements   I have spent a total time of 32 minutes in caring for this patient on the day of the visit/encounter including Risks and benefits of tx options, Instructions for management, Patient and family education, Importance of tx compliance, Risk factor reductions, Impressions, Counseling / Coordination of care, Documenting in the medical record, Reviewing/placing orders in the medical record (including tests, medications, and/or procedures), and Obtaining or reviewing history  .   Topics discussed with the patient / family include symptom assessment and management, medication review, medication adjustment, psychosocial support, supportive listening, and anticipatory guidance.       [1]   Past Medical History:  Diagnosis Date    Anxiety     Cancer (HCC) 2/13/2000    Thyroid Cancer    Depression     Disease of thyroid gland 12/13/2000    Heroin abuse (HCC)     clean since 2015    Stomach cancer (HCC) 1/7/2025    Substance abuse (HCC) 12/26/2013    Octavia been clean since May 27, 2015    Thyroid cancer (HCC) 1999    with thyroidectomy and RTX   [2]   Past Surgical History:  Procedure Laterality Date    EGD      GANGLION CYST EXCISION Left     PARTIAL GASTRECTOMY N/A 2/5/2025    Procedure: PARTIAL GASTRECTOMY;  Surgeon: Tomás South MD;  Location: [facility];  Service: Surgical Oncology    PELVIC LAPAROSCOPY Bilateral 2/5/2025    Procedure: SALPINGO-OOPHORECTOMY, LAPAROSCOPIC;  Surgeon: Varghese Jung MD;  Location: [facility];  Service: Gynecology Oncology    MN LAPAROSCOPY W TOTAL HYSTERECTOMY UTERUS 250 GM/< N/A 2/5/2025    Procedure: LAPAROSCOPIC TOTAL HYSTERECTOMY, EXAM UNDER ANESTHESIA;  Surgeon:  Varghese Jung MD;  Location: [facility];  Service: Gynecology Oncology    THYROIDECTOMY     [3]   Family History  Problem Relation Name Age of Onset    Diabetes Mother Adeola Rosenthal     Stomach cancer Mother Adeola Rosenthal         maltoma lyphoma    Cancer Mother Adeola Rosenthal         Maloma lymphoma and leukemia    Diabetes type I Mother Adeola Rosenthal     Hyperlipidemia Father      Hypertension Father      Drug abuse Brother Emir Rosentahl     Asthma Daughter Theresa Colon     Depression Daughter Theresa Colon     Dementia Maternal Grandmother Yaneth Rosenthal     Dementia Maternal Grandfather Timbo Rosenthal     Lung cancer Maternal Aunt      Breast cancer Maternal Aunt Marly Atkins 52    Lymphoma Paternal Uncle          non hodgkins   [4]   Allergies  Allergen Reactions    Diphenhydramine Shortness Of Breath     Other reaction(s): DIPHENHYDRAMINE CITRATE (BENADRYL)    Erythromycin Shortness Of Breath and Hives     Category: Allergy;     Iodine-131 - Food Allergy Other (See Comments)    Iv Dye [Iodinated Contrast Media]    [5]   Current Outpatient Medications on File Prior to Visit   Medication Sig Dispense Refill    acetaminophen (TYLENOL) 325 mg tablet Take 3 tablets (975 mg total) by mouth every 8 (eight) hours      buprenorphine-naloxone (SUBOXONE) 8-2 mg per SL tablet Place 1 tablet under the tongue in the morning.      buprenorphine-naloxone (Suboxone) 8-2 mg       calcium carbonate (OS-ALL) 600 MG tablet Take 1 tablet (600 mg total) by mouth 2 (two) times a day with meals 180 tablet 1    Calcium Carbonate 1500 (600 Ca) MG TABS       estradiol (ESTRACE) 0.5 MG tablet TAKE 1 TABLET BY MOUTH EVERY DAY 30 tablet 5    FLUoxetine (PROzac) 20 mg capsule Take 1 capsule by mouth in the morning      imatinib (GLEEVEC) 400 mg tablet Take 1 tablet (400 mg total) by mouth daily (Patient taking differently: Take 400 mg by mouth in the morning. Every other day.) 30 tablet 5    levothyroxine 200 mcg tablet  Take 1 tablet (200 mcg total) by mouth in the morning 90 tablet 1    ondansetron (ZOFRAN-ODT) 4 mg disintegrating tablet Take 4 mg by mouth every 6 (six) hours as needed for nausea or vomiting      pantoprazole (PROTONIX) 40 mg tablet Take 1 tablet (40 mg total) by mouth daily 90 tablet 1    sucralfate (CARAFATE) 1 g tablet TAKE 1 TABLET BY MOUTH EVERY 6 HOURS 120 tablet 0    Vitamin D, Ergocalciferol, 73947 units CAPS Take 1 capsule by mouth once a week 12 capsule 1     No current facility-administered medications on file prior to visit.   [6]   Social History  Tobacco Use    Smoking status: Former     Current packs/day: 0.25     Average packs/day: 0.3 packs/day for 26.4 years (6.6 ttl pk-yrs)     Types: Cigarettes     Start date: 1/1/1999    Smokeless tobacco: Never    Tobacco comments:     Trying quitting  ago   Vaping Use    Vaping status: Former    Substances: THC, CBD   Substance and Sexual Activity    Alcohol use: Never    Drug use: Not Currently     Types: Marijuana     Comment: 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    Sexual activity: Not Currently     Partners: Male     Birth control/protection: Post-menopausal, None

## 2025-06-03 NOTE — ASSESSMENT & PLAN NOTE
Psychosocial   Supportive listening provided  Normalized experience of patient/family  Provided anxiety containment     Referrals Placed / Medical Equipment Ordered  -None    Follow-Up Recommendations  -Follow-up with PCP and current medical specialists  -Follow-up with palliative care: 4-5 weeks    Orders:    predniSONE 10 mg tablet; Take 1 tablet (10 mg) for 3 days, then decrease to half tablet (5 mg) for 2 days then stop.

## 2025-06-03 NOTE — PATIENT INSTRUCTIONS
It was a pleasure speaking with you today.    As discussed:  -Continue your medications as prescribed.  -Continue with a bowel regimen to avoid constipation.    Follow-up in: 4-5 weeks    Please do not hesitate to call me sooner should you have any questions/concerns or needs.    Medication safety issues - Do not drive under the influence of narcotics (including opioids), watch for adverse effects including confusion / altered mental status / respiratory depression (slowed breathing), keep medications stored in a safe/locked environment, do not use alcohol while opioids or other narcotics are in your system. Do not travel with more than the minimum number of tablets or capsules required for the trip.    ER Precautions  Watch for red flag symptoms including, but not limited to fevers, chills, chest pain, shortness of breath, intractable nausea/vomiting/diarrhea, or acute intractable pain (especially if pain is new or has changed).

## 2025-06-03 NOTE — ASSESSMENT & PLAN NOTE
Remains on Suboxone which has kept her chronic issues stable.    Patient with recurrence of myalgias, fatigue, and decreased appetite. She did well when we last trialed a Medrol Dose pack. However, notable interaction with Gleevac which can increase the concentration levels of methylprednisolone.    Therefore, we discussed trying Prednisone instead. I did speak with Med/Onc in which there are no concerns with prednisone and Gleevec at this time. Collaborative decision with patient to trial Prednisone instead to see if this helps with energy, myalgias and appetite stimulation. Patient knows to call my office with any issues or concerns. Patient does not have known diabetes.    Plan:  1) Start Prednisone for a burst with 10mg for 3 days, 5 mg for 2 days  2) patient to continue taking Protonix 40mg daily for GI prophylaxis    All questions answered. Patient is agreeable with this plan.    Orders:    predniSONE 10 mg tablet; Take 1 tablet (10 mg) for 3 days, then decrease to half tablet (5 mg) for 2 days then stop.

## 2025-06-03 NOTE — TELEPHONE ENCOUNTER
Informed Patient of Upcoming appt on Thurs 6/5 with , Patient has labs that needs done prior to Visit.    [place] # was given for any question, comment or concerns.

## 2025-06-03 NOTE — ASSESSMENT & PLAN NOTE
Orders:    predniSONE 10 mg tablet; Take 1 tablet (10 mg) for 3 days, then decrease to half tablet (5 mg) for 2 days then stop.

## 2025-06-04 NOTE — PROGRESS NOTES
Updated biopsychosocial information relevant to support:     Identified areas of need include:     Palliative Supportive Care  met with patient to continue to provide emotional support and guidance.      Pt follows with palliative care for psychosocial support, goals of care and symptom management. PSC team met with pt in office for continued support. Time spent allowing pt to process her feelings and providing emotional support. Last visit Pt reports having a very difficult time with appetite, lack of sleep, dry mouth, and pain.      Pt continues to take steroids to help improve appetite. Pt reports losing 13 pounds. Pt continue to have body pain from chemo pills. Pt is requesting to continue with steroids to help relieve symptoms. Pt' states improvement with dry mouth.Pt's current mental status is stable denies SI/SH/HI behaviors. Pt reports feeling better emotionally.      Pt continues to utilize her support system to help with coping. SW encouraged pt to reach out for additional support as needed prior to next scheduled appt.     ACP: Pt completed ACP documents in office today. Documents are scanned into chart.        Resources provided:  Supportive Listening   Emotional Support and validation of feelings and concerns      Areas that need future follow-up include:  Continue with Emotional Support   Continue to follow up PSC for pain med on upcoming appointments for symptom management.  Continue follow up with Oncology and other medical providers.      I have spent 30 minutes with patient today in which greater than 50% of this time was spent in counseling/coordination of care.      Palliative  will follow-up as requested by patient, family, and primary team. Please contact with any specific requests.

## 2025-06-04 NOTE — ASSESSMENT & PLAN NOTE
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  The patient has high risk for micrometastatic disease and progression to gross metastases.  Currently she takes adjuvant imatinib (Gleevec) 400 mg per mouth daily, which she started in early March 2025. On March 3, 2025, the patient initiated adjuvant imatinib (Gleevec) 400 mg per mouth daily.      The patient tolerated imatinib (Gleevec) 400 mg per mouth daily poorly.  Specifically, she developed severe fatigue, weakness, and bilateral lower extremity edema.  Around the last week of April 2025, I advised the patient to decrease the dose of imatinib from 400 mg per mouth daily to 400 mg per mouth every other day.  She has much better tolerability of lower dose imatinib.  In addition, from April 20, 2025 to April 29, 2025, she required inpatient management of hypocalcemia.  Of note, the patient has hypoparathyroidism after neck surgery for thyroid cancer.      Mrs. Galeas takes imatinib 400 mg per mouth every other day.  She is tolerating this dose of imatinib well, without treatment related adverse events.      Interval Assessment-  On May 21, 2025 pt re initiated daily imatinib 400mg per mouth, this was complicated by generalized body aches. She was placed on a 5 day prednisone taper by palliative care. As of 6/5/2025 she is on day 2 of prednisone. The steroid has alleviated her generalized muscle aches.  She is committed to continuing daily imatinib 400mg per mouth.       Plan:  Continue imatinib to 400 mg per mouth daily (full/standard dose for patients with gastric GIST- KIT exon 11 mutated)  Minimal duration of adjuvant imatinib is 3 years.    Monthly CBC diff, Cmp  Check creatinine kinase with next labs (muscle  pain)  Periodic contrast enhanced CT scan of the abdomen and pelvis   Return to medical oncology clinic 8/7/2025     Mrs. Rosenthal understands and agrees with my management recommendations and plan of care. I answered questions to her satisfaction.

## 2025-06-04 NOTE — PROGRESS NOTES
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  The patient has high risk for micrometastatic disease and progression to gross metastases.  Currently she takes adjuvant imatinib (Gleevec) 400 mg per mouth daily, which she started in early 2025. On March 3, 2025, the patient initiated adjuvant imatinib (Gleevec) 400 mg per mouth daily.      The patient tolerated imatinib (Gleevec) 400 mg per mouth daily poorly.  Specifically, she developed severe fatigue, weakness, and bilateral lower extremity edema.  Around the last week of 2025, I advised the patient to decrease the dose of imatinib from 400 mg per mouth daily to 400 mg per mouth every other day.  She has much better tolerability of lower dose imatinib.  In addition, from 2025 to 2025, she required inpatient management of hypocalcemia.  Of note, the patient has hypoparathyroidism after neck surgery for thyroid cancer.      Mrs. Galeas takes imatinib 400 mg per mouth every other day.  She is tolerating this dose of imatinib well, without treatment related adverse events.      Interval Assessment-  On May 21, 2025 pt re initiated daily imatinib 400mg per mouth, this was complicated by generalized body aches. She was placed on a 5 day prednisone taper by palliative care. As of 2025 she is on day 2 of prednisone. The steroid has alleviated her generalized muscle aches.  She is committed to continuing  daily imatinib 400mg per mouth.       Plan:  Continue imatinib to 400 mg per mouth daily (full/standard dose for patients with gastric GIST- KIT exon 11 mutated)  Minimal duration of adjuvant imatinib is 3 years.    Monthly CBC diff, Cmp  Check creatinine kinase with next labs (muscle pain)  Periodic contrast enhanced CT scan of the abdomen and pelvis   Return to medical oncology clinic 8/7/2025     Mrs. Rosenthal understands and agrees with my management recommendations and plan of care. I answered questions to her satisfaction.           History of Present Illness   No chief complaint on file.  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  The patient has high risk for micrometastatic disease and progression to gross metastases.  Currently she takes adjuvant imatinib (Gleevec) 400 mg per mouth daily, which she started in early March 2025. On March 3, 2025, the patient initiated adjuvant imatinib (Gleevec) 400 mg per mouth daily.      The patient tolerated imatinib (Gleevec) 400 mg per mouth daily poorly.  Specifically, she developed severe fatigue, weakness, and bilateral lower extremity edema.  Around the last week of April 2025, I advised the patient to decrease the dose of imatinib from 400 mg per mouth daily to 400 mg per mouth every other day.  She has much better tolerability of lower dose imatinib.  In addition, from April 20, 2025 to April 29, 2025, she required inpatient management of hypocalcemia.  Of note, the patient has hypoparathyroidism after neck surgery for thyroid cancer.      INTERIM HISTORY:  On May 21, 2025 pt re initiated daily imatinib 400mg per mouth, this was complicated by generalized body aches. She was placed on a 5 day prednisone taper by  palliative care. As of 6/5/2025 she is on day 2 of prednisone. The steroid has alleviated her generalized muscle aches.  She is committed to continuing daily imatinib 400mg per mouth. She does no complain of GI symptoms, She has lost weight but has no change in appetite, she feels she has been more active.        Oncology History   Cancer Staging   GIST (gastrointestinal stromal tumor), malignant (HCC)  Staging form: Gastric Stromal Tumor - Gastric GIST, AJCC 8th Edition  - Pathologic: Stage IIIA (pT3, pN0, cM0, Mitotic Rate: High) - Signed by Tomás South MD on 2/26/2025  Histologic grade (G): High grade  Histologic grading system: 2 grade system  Residual tumor (R): R0  Oncology History   GIST (gastrointestinal stromal tumor), malignant (HCC)   1/7/2025 Biopsy    Endoscopic Ultrasound    Stomach, Gastric Submucosal, FNA:  Neoplastic.  Gastrointestinal stromal tumor (GIST), mixed spindle cell and epithelioid type.  > 5 mitoses per 50 HPF     1/13/2025 Initial Diagnosis    GIST (gastrointestinal stromal tumor), malignant (HCC)     2/5/2025 Surgery    Partial gastrectomy:   Gastric mass, resection:  -   Gastrointestinal stromal tumor of stomach, 5.8 cm in greatest dimension.    TUMOR   Tumor Focality  Unifocal   Tumor Site  Stomach: Just distal to GE junction per op note   Tumor Size  Greatest Dimension (Centimeters): 5.8 cm   Mitotic Rate  18 mitoses per 5 mm2   Histologic Grade  G2, high grade   MARGINS   Margin Status  All margins negative for GIST   pTNM CLASSIFICATION (AJCC 8th Edition)   pT Category  pT3   pN Category  pN not assigned (no nodes submitted or found)           2/26/2025 -  Cancer Staged    Staging form: Gastric Stromal Tumor - Gastric GIST, AJCC 8th Edition  - Pathologic: Stage IIIA (pT3, pN0, cM0, Mitotic Rate: High) - Signed by Tomás South MD on 2/26/2025  Histologic grade (G): High grade  Histologic grading system: 2 grade system  Residual tumor (R): R0 - None          Pertinent Medical  History     Past Medical History:   Diagnosis Date    Anxiety     Depression     Heroin abuse (HCC)     clean since 2015    Thyroid cancer (HCC) 1999    with thyroidectomy and RTX     Past Surgical History:   Procedure Laterality Date    EGD      GANGLION CYST EXCISION Left     PARTIAL GASTRECTOMY N/A 2/5/2025    Procedure: PARTIAL GASTRECTOMY;  Surgeon: Tomás South MD;  Location: [facility];  Service: Surgical Oncology    PELVIC LAPAROSCOPY Bilateral 2/5/2025    Procedure: SALPINGO-OOPHORECTOMY, LAPAROSCOPIC;  Surgeon: Varghese Jung MD;  Location: [facility];  Service: Gynecology Oncology    NH LAPAROSCOPY W TOTAL HYSTERECTOMY UTERUS 250 GM/< N/A 2/5/2025    Procedure: LAPAROSCOPIC TOTAL HYSTERECTOMY, EXAM UNDER ANESTHESIA;  Surgeon: Varghese Jung MD;  Location: [facility];  Service: Gynecology Oncology    THYROIDECTOMY        Family History   Problem Relation Age of Onset    Diabetes Mother     Stomach cancer Mother         maltoma lyphoma    Hyperlipidemia Father     Hypertension Father     Lung cancer Maternal Aunt     Breast cancer Maternal Aunt 52    Lymphoma Paternal Uncle         non hodgkins     Social History     Socioeconomic History    Marital status: Single     Spouse name: Not on file    Number of children: Not on file    Years of education: Not on file    Highest education level: Not on file   Occupational History    Not on file   Tobacco Use    Smoking status: Former     Current packs/day: 0.25     Average packs/day: 0.3 packs/day for 26.2 years (6.5 ttl pk-yrs)     Types: Cigarettes     Start date: 1/1/1999    Smokeless tobacco: Never    Tobacco comments:     Trying quitting  ago   Vaping Use    Vaping status: Every Day    Substances: THC, CBD   Substance and Sexual Activity    Alcohol use: Not Currently    Drug use: Yes     Types: Marijuana     Comment: 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    Sexual activity: Not on file   Other Topics Concern    Not on file   Social  History Narrative    Not on file     Social Drivers of Health     Financial Resource Strain: Not on file   Food Insecurity: No Food Insecurity (2025)    Nursing - Inadequate Food Risk Classification     Worried About Running Out of Food in the Last Year: Not on file     Ran Out of Food in the Last Year: Not on file     Ran Out of Food in the Last Year: Never true   Transportation Needs: No Transportation Needs (2025)    Nursing - Transportation Risk Classification     Lack of Transportation: Not on file     Lack of Transportation: No   Physical Activity: Not on file   Stress: Not on file   Social Connections: Not on file   Intimate Partner Violence: Unknown (2025)    Nursing IPS     Feels Physically and Emotionally Safe: Not on file     Physically Hurt by Someone: Not on file     Humiliated or Emotionally Abused by Someone: Not on file     Physically Hurt by Someone: No     Hurt or Threatened by Someone: No   Housing Stability: Unknown (2025)    Nursing: Inadequate Housing Risk Classification     Has Housing: Not on file     Worried About Losing Housing: Not on file     Unable to Get Utilities: Not on file     Unable to Pay for Housing in the Last Year: No     Has Housin                Review of Systems   Constitutional:  Negative for activity change, appetite change, chills, diaphoresis, fatigue, fever and unexpected weight change.   HENT:  Negative for congestion, dental problem, ear discharge, ear pain, facial swelling, hearing loss, mouth sores, nosebleeds, postnasal drip, rhinorrhea, sinus pain, sneezing, sore throat, tinnitus, trouble swallowing and voice change.    Eyes: Negative.  Negative for photophobia, pain, discharge, redness, itching and visual disturbance.   Respiratory:  Negative for apnea, cough, choking, chest tightness, shortness of breath, wheezing and stridor.    Cardiovascular: Negative.  Negative for chest pain, palpitations and leg swelling.   Gastrointestinal:  Negative  for abdominal distention, abdominal pain, anal bleeding, blood in stool, constipation, diarrhea, nausea, rectal pain and vomiting.   Endocrine: Negative for cold intolerance and heat intolerance.   Genitourinary:  Negative for decreased urine volume, difficulty urinating, dysuria, enuresis, flank pain, frequency, hematuria and urgency.   Musculoskeletal:  Positive for myalgias. Negative for arthralgias, back pain, gait problem, joint swelling, neck pain and neck stiffness.   Skin:  Negative for color change, pallor, rash and wound.   Neurological:  Negative for dizziness, tremors, seizures, syncope, facial asymmetry, speech difficulty, weakness, light-headedness, numbness and headaches.   Hematological:  Negative for adenopathy. Does not bruise/bleed easily.   Psychiatric/Behavioral: Negative.  Negative for behavioral problems, confusion, dysphoric mood and sleep disturbance. The patient is not nervous/anxious.            Objective   LMP  (LMP Unknown)     Pain Screening:       ECOG   1    Physical Exam  Constitutional:       General: She is not in acute distress.     Appearance: Normal appearance. She is obese. She is not toxic-appearing.      Comments: Female patient without acute respiratory distress   HENT:      Head: Normocephalic and atraumatic.      Nose: Nose normal.      Mouth/Throat:      Mouth: Mucous membranes are moist.      Pharynx: Oropharynx is clear.      Comments: No scleral icterus    Eyes:      Extraocular Movements: Extraocular movements intact.      Conjunctiva/sclera: Conjunctivae normal.      Pupils: Pupils are equal, round, and reactive to light.     Neck:      Comments: No cervical, supraclavicular, axillary, epitrochlear, or inguinal lymphadenopathy.   Cardiovascular:      Rate and Rhythm: Normal rate and regular rhythm.      Pulses: Normal pulses.      Heart sounds: Normal heart sounds.   Pulmonary:      Effort: Pulmonary effort is normal.      Breath sounds: Normal breath sounds.    Abdominal:      General: Bowel sounds are normal.      Palpations: Abdomen is soft. There is no mass.      Tenderness: There is no abdominal tenderness. There is no guarding.      Comments: Abdomen soft, nontender, nonpainful.  No hepatomegaly.  No splenomegaly.  No rebound tenderness.  No lateral or shifting dullness.  Bowel sounds present, normal.      Musculoskeletal:         General: Normal range of motion.      Cervical back: Normal range of motion and neck supple.     Skin:     General: Skin is warm and dry.      Capillary Refill: Capillary refill takes less than 2 seconds.      Coloration: Skin is not jaundiced or pale.     Neurological:      General: No focal deficit present.      Mental Status: She is alert and oriented to person, place, and time. Mental status is at baseline.     Psychiatric:         Mood and Affect: Mood normal.         Behavior: Behavior normal.         Thought Content: Thought content normal.         Judgment: Judgment normal.       Labs: I have reviewed the following labs:  Lab Results   Component Value Date/Time    WBC 8.11 06/03/2025 09:27 AM    RBC 4.75 06/03/2025 09:27 AM    Hemoglobin 12.5 06/03/2025 09:27 AM    Hematocrit 40.2 06/03/2025 09:27 AM    MCV 85 06/03/2025 09:27 AM    MCH 26.3 (L) 06/03/2025 09:27 AM    RDW 15.6 (H) 06/03/2025 09:27 AM    Platelets 233 06/03/2025 09:27 AM    Segmented % 60 06/03/2025 09:27 AM    Lymphocytes % 32 06/03/2025 09:27 AM    Monocytes % 5 06/03/2025 09:27 AM    Eosinophils Relative 2 06/03/2025 09:27 AM    Basophils Relative 1 06/03/2025 09:27 AM    Immature Grans % 0 06/03/2025 09:27 AM    Absolute Neutrophils 4.81 06/03/2025 09:27 AM     Lab Results   Component Value Date/Time    Potassium 3.9 06/03/2025 09:27 AM    Potassium 4.3 12/25/2024 07:10 PM    Chloride 100 06/03/2025 09:27 AM    Chloride 101 12/25/2024 07:10 PM    Carbon Dioxide 29 12/25/2024 07:10 PM    CO2 28 06/03/2025 09:27 AM    BUN 17 06/03/2025 09:27 AM    BUN 19  12/25/2024 07:10 PM    Creatinine 0.92 06/03/2025 09:27 AM    Creatinine 1.28 (H) 12/25/2024 07:10 PM    Glucose, Fasting 88 06/03/2025 09:27 AM    Calcium 6.2 (L) 06/03/2025 09:27 AM    Calcium 6.6 (L) 12/25/2024 07:10 PM    AST 13 06/03/2025 09:27 AM    AST 17 12/25/2024 07:10 PM    ALT 6 (L) 06/03/2025 09:27 AM    ALT 10 12/25/2024 07:10 PM    Alkaline Phosphatase 83 06/03/2025 09:27 AM    Alkaline Phosphatase 65 12/25/2024 07:10 PM    Total Protein 7.4 06/03/2025 09:27 AM    Protein, Total 7.2 12/25/2024 07:10 PM    Albumin 4.3 06/03/2025 09:27 AM    ALBUMIN 4.1 12/25/2024 07:10 PM    Total Bilirubin 0.38 06/03/2025 09:27 AM    Total Bilirubin 0.4 12/25/2024 07:10 PM    eGFRcr 54 (L) 12/25/2024 07:10 PM    eGFR 77 06/03/2025 09:27 AM

## 2025-06-05 ENCOUNTER — OFFICE VISIT (OUTPATIENT)
Dept: HEMATOLOGY ONCOLOGY | Facility: CLINIC | Age: 40
End: 2025-06-05
Payer: COMMERCIAL

## 2025-06-05 VITALS
RESPIRATION RATE: 18 BRPM | TEMPERATURE: 97.6 F | OXYGEN SATURATION: 100 % | WEIGHT: 244 LBS | HEART RATE: 70 BPM | SYSTOLIC BLOOD PRESSURE: 118 MMHG | HEIGHT: 65 IN | BODY MASS INDEX: 40.65 KG/M2 | DIASTOLIC BLOOD PRESSURE: 76 MMHG

## 2025-06-05 DIAGNOSIS — C49.A2 MALIGNANT GASTROINTESTINAL STROMAL TUMOR (GIST) OF STOMACH (HCC): Primary | ICD-10-CM

## 2025-06-05 DIAGNOSIS — M79.10 MUSCLE ACHE: ICD-10-CM

## 2025-06-05 LAB
ALP BONE SERPL-MCNC: 13.8 UG/L
CALCIT SERPL-MCNC: <2 PG/ML (ref 0–5)

## 2025-06-05 PROCEDURE — 99214 OFFICE O/P EST MOD 30 MIN: CPT | Performed by: INTERNAL MEDICINE

## 2025-06-06 DIAGNOSIS — E83.51 HYPOCALCEMIA: ICD-10-CM

## 2025-06-06 LAB — SCAN RESULT: NORMAL

## 2025-06-06 RX ORDER — IBUPROFEN 200 MG
1 CAPSULE ORAL DAILY
Qty: 90 TABLET | Refills: 1 | Status: SHIPPED | OUTPATIENT
Start: 2025-06-06

## 2025-06-06 NOTE — PROGRESS NOTES
Diagnoses and all orders for this visit:    Hypocalcemia  -     Calcium Carbonate 1500 (600 Ca) MG TABS; Take 1,500 mg by mouth 2 (two) times a day with meals  -     magnesium gluconate (MAGONATE) 500 mg tablet; Take 1 tablet (500 mg total) by mouth in the morning  -     calcium citrate (CALCITRATE) 950 (200 Ca) MG tablet; Take 1 tablet (950 mg total) by mouth daily

## 2025-06-08 LAB — PTH RELATED PROT SERPL-SCNC: <2 PMOL/L

## 2025-07-03 DIAGNOSIS — R10.9 ABDOMINAL PAIN: ICD-10-CM

## 2025-07-03 RX ORDER — PANTOPRAZOLE SODIUM 40 MG/1
40 TABLET, DELAYED RELEASE ORAL DAILY
Qty: 90 TABLET | Refills: 0 | Status: SHIPPED | OUTPATIENT
Start: 2025-07-03 | End: 2025-10-01

## 2025-07-07 ENCOUNTER — TELEPHONE (OUTPATIENT)
Dept: HEMATOLOGY ONCOLOGY | Facility: CLINIC | Age: 40
End: 2025-07-07

## 2025-07-07 NOTE — TELEPHONE ENCOUNTER
Call placed to pharmacy, currently not open.  Left voicemail with prescription information for patient's Protonix, asking status as patient states she was told refill was denied.

## 2025-07-08 ENCOUNTER — TELEPHONE (OUTPATIENT)
Dept: PALLIATIVE MEDICINE | Facility: CLINIC | Age: 40
End: 2025-07-08

## 2025-07-08 NOTE — TELEPHONE ENCOUNTER
Left a message for patient to call office back to re-schedule no show appointment with Palliative Care. Can't send out a no show letter she doesn't have a signed form attached in documents.

## 2025-08-01 ENCOUNTER — APPOINTMENT (OUTPATIENT)
Dept: LAB | Facility: CLINIC | Age: 40
End: 2025-08-01
Payer: COMMERCIAL

## 2025-08-01 DIAGNOSIS — C49.A2 MALIGNANT GASTROINTESTINAL STROMAL TUMOR (GIST) OF STOMACH (HCC): ICD-10-CM

## 2025-08-01 DIAGNOSIS — M79.10 MUSCLE ACHE: ICD-10-CM

## 2025-08-01 LAB
ALBUMIN SERPL BCG-MCNC: 4.2 G/DL (ref 3.5–5)
ALP SERPL-CCNC: 76 U/L (ref 34–104)
ALT SERPL W P-5'-P-CCNC: 6 U/L (ref 7–52)
ANION GAP SERPL CALCULATED.3IONS-SCNC: 6 MMOL/L (ref 4–13)
AST SERPL W P-5'-P-CCNC: 10 U/L (ref 13–39)
BASOPHILS # BLD AUTO: 0.05 THOUSANDS/ÂΜL (ref 0–0.1)
BASOPHILS NFR BLD AUTO: 1 % (ref 0–1)
BILIRUB SERPL-MCNC: 0.32 MG/DL (ref 0.2–1)
BUN SERPL-MCNC: 21 MG/DL (ref 5–25)
CALCIUM SERPL-MCNC: 7.1 MG/DL (ref 8.4–10.2)
CHLORIDE SERPL-SCNC: 105 MMOL/L (ref 96–108)
CK SERPL-CCNC: 148 U/L (ref 26–192)
CO2 SERPL-SCNC: 29 MMOL/L (ref 21–32)
CREAT SERPL-MCNC: 1.01 MG/DL (ref 0.6–1.3)
EOSINOPHIL # BLD AUTO: 0.22 THOUSAND/ÂΜL (ref 0–0.61)
EOSINOPHIL NFR BLD AUTO: 3 % (ref 0–6)
ERYTHROCYTE [DISTWIDTH] IN BLOOD BY AUTOMATED COUNT: 15.2 % (ref 11.6–15.1)
GFR SERPL CREATININE-BSD FRML MDRD: 68 ML/MIN/1.73SQ M
GLUCOSE SERPL-MCNC: 101 MG/DL (ref 65–140)
HCT VFR BLD AUTO: 40.7 % (ref 34.8–46.1)
HGB BLD-MCNC: 12.6 G/DL (ref 11.5–15.4)
IMM GRANULOCYTES # BLD AUTO: 0.02 THOUSAND/UL (ref 0–0.2)
IMM GRANULOCYTES NFR BLD AUTO: 0 % (ref 0–2)
LYMPHOCYTES # BLD AUTO: 2.99 THOUSANDS/ÂΜL (ref 0.6–4.47)
LYMPHOCYTES NFR BLD AUTO: 37 % (ref 14–44)
MCH RBC QN AUTO: 26.1 PG (ref 26.8–34.3)
MCHC RBC AUTO-ENTMCNC: 31 G/DL (ref 31.4–37.4)
MCV RBC AUTO: 84 FL (ref 82–98)
MONOCYTES # BLD AUTO: 0.32 THOUSAND/ÂΜL (ref 0.17–1.22)
MONOCYTES NFR BLD AUTO: 4 % (ref 4–12)
NEUTROPHILS # BLD AUTO: 4.59 THOUSANDS/ÂΜL (ref 1.85–7.62)
NEUTS SEG NFR BLD AUTO: 55 % (ref 43–75)
NRBC BLD AUTO-RTO: 0 /100 WBCS
PLATELET # BLD AUTO: 239 THOUSANDS/UL (ref 149–390)
PMV BLD AUTO: 11.7 FL (ref 8.9–12.7)
POTASSIUM SERPL-SCNC: 4.4 MMOL/L (ref 3.5–5.3)
PROT SERPL-MCNC: 7.3 G/DL (ref 6.4–8.4)
RBC # BLD AUTO: 4.82 MILLION/UL (ref 3.81–5.12)
SODIUM SERPL-SCNC: 140 MMOL/L (ref 135–147)
WBC # BLD AUTO: 8.19 THOUSAND/UL (ref 4.31–10.16)

## 2025-08-01 PROCEDURE — 80053 COMPREHEN METABOLIC PANEL: CPT

## 2025-08-01 PROCEDURE — 36415 COLL VENOUS BLD VENIPUNCTURE: CPT

## 2025-08-01 PROCEDURE — 82550 ASSAY OF CK (CPK): CPT

## 2025-08-01 PROCEDURE — 85025 COMPLETE CBC W/AUTO DIFF WBC: CPT

## 2025-08-04 DIAGNOSIS — F40.240 CLAUSTROPHOBIA: ICD-10-CM

## 2025-08-04 DIAGNOSIS — C49.A2 MALIGNANT GASTROINTESTINAL STROMAL TUMOR (GIST) OF STOMACH (HCC): Primary | ICD-10-CM

## 2025-08-04 DIAGNOSIS — Z91.041 ALLERGY TO INTRAVENOUS CONTRAST: ICD-10-CM

## 2025-08-06 RX ORDER — LORAZEPAM 1 MG/1
1 TABLET ORAL SEE ADMIN INSTRUCTIONS
Qty: 2 TABLET | Refills: 0 | Status: SHIPPED | OUTPATIENT
Start: 2025-08-06

## 2025-08-06 RX ORDER — PREDNISONE 50 MG/1
50 TABLET ORAL SEE ADMIN INSTRUCTIONS
Qty: 3 TABLET | Refills: 0 | Status: SHIPPED | OUTPATIENT
Start: 2025-08-06

## 2025-08-07 ENCOUNTER — OFFICE VISIT (OUTPATIENT)
Dept: HEMATOLOGY ONCOLOGY | Facility: CLINIC | Age: 40
End: 2025-08-07
Payer: COMMERCIAL

## 2025-08-07 VITALS
DIASTOLIC BLOOD PRESSURE: 78 MMHG | BODY MASS INDEX: 41.15 KG/M2 | SYSTOLIC BLOOD PRESSURE: 120 MMHG | WEIGHT: 247 LBS | RESPIRATION RATE: 18 BRPM | TEMPERATURE: 97.8 F | HEIGHT: 65 IN

## 2025-08-07 DIAGNOSIS — C49.A2 MALIGNANT GASTROINTESTINAL STROMAL TUMOR (GIST) OF STOMACH (HCC): Primary | ICD-10-CM

## 2025-08-07 PROCEDURE — 99214 OFFICE O/P EST MOD 30 MIN: CPT

## 2025-08-14 ENCOUNTER — TELEPHONE (OUTPATIENT)
Age: 40
End: 2025-08-14

## 2025-08-18 DIAGNOSIS — R10.9 ABDOMINAL PAIN: ICD-10-CM

## 2025-08-19 ENCOUNTER — TELEPHONE (OUTPATIENT)
Age: 40
End: 2025-08-19

## 2025-08-19 ENCOUNTER — OFFICE VISIT (OUTPATIENT)
Dept: PALLIATIVE MEDICINE | Facility: CLINIC | Age: 40
End: 2025-08-19
Payer: COMMERCIAL

## 2025-08-19 VITALS
RESPIRATION RATE: 16 BRPM | DIASTOLIC BLOOD PRESSURE: 78 MMHG | BODY MASS INDEX: 41.57 KG/M2 | HEIGHT: 65 IN | TEMPERATURE: 97.3 F | OXYGEN SATURATION: 96 % | HEART RATE: 67 BPM | SYSTOLIC BLOOD PRESSURE: 118 MMHG | WEIGHT: 249.5 LBS

## 2025-08-19 DIAGNOSIS — R10.9 ABDOMINAL PAIN: ICD-10-CM

## 2025-08-19 DIAGNOSIS — G89.3 CANCER RELATED PAIN: ICD-10-CM

## 2025-08-19 DIAGNOSIS — C49.A2 MALIGNANT GASTROINTESTINAL STROMAL TUMOR (GIST) OF STOMACH (HCC): Primary | ICD-10-CM

## 2025-08-19 DIAGNOSIS — Z51.5 PALLIATIVE CARE ENCOUNTER: ICD-10-CM

## 2025-08-19 PROCEDURE — 99213 OFFICE O/P EST LOW 20 MIN: CPT | Performed by: STUDENT IN AN ORGANIZED HEALTH CARE EDUCATION/TRAINING PROGRAM

## 2025-08-19 RX ORDER — PANTOPRAZOLE SODIUM 40 MG/1
40 TABLET, DELAYED RELEASE ORAL DAILY
Qty: 90 TABLET | Refills: 0 | Status: SHIPPED | OUTPATIENT
Start: 2025-08-19 | End: 2025-11-17

## 2025-08-19 RX ORDER — ONDANSETRON 4 MG/1
4 TABLET, ORALLY DISINTEGRATING ORAL EVERY 6 HOURS PRN
Qty: 30 TABLET | Refills: 1 | Status: SHIPPED | OUTPATIENT
Start: 2025-08-19

## (undated) DEVICE — 1820 FOAM BLOCK NEEDLE COUNTER: Brand: DEVON

## (undated) DEVICE — INTENDED FOR TISSUE SEPARATION, AND OTHER PROCEDURES THAT REQUIRE A SHARP SURGICAL BLADE TO PUNCTURE OR CUT.: Brand: BARD-PARKER SAFETY BLADES SIZE 10, STERILE

## (undated) DEVICE — ABDOMINAL PAD: Brand: DERMACEA

## (undated) DEVICE — 2, DISPOSABLE SUCTION/IRRIGATOR WITHOUT DISPOSABLE TIP: Brand: STRYKEFLOW

## (undated) DEVICE — TISSUE RETRIEVAL SYSTEM: Brand: INZII RETRIEVAL SYSTEM

## (undated) DEVICE — HEMOSTATIC MATRIX SURGIFLO 8ML W/THROMBIN

## (undated) DEVICE — ANTIBACTERIAL UNDYED BRAIDED (POLYGLACTIN 910), SYNTHETIC ABSORBABLE SUTURE: Brand: COATED VICRYL

## (undated) DEVICE — SUT SILK 2-0 SH CR/8 18 IN C012D

## (undated) DEVICE — TRAY FOLEY 16FR URIMETER SURESTEP

## (undated) DEVICE — SUT VICRYL 2-0 D-SPECIAL 27 IN D8114

## (undated) DEVICE — PACK PBDS STERILE LAP LITHOTOMY RF

## (undated) DEVICE — PENCIL ELECTROSURG E-Z CLEAN -0035H

## (undated) DEVICE — VESSEL LOOPS X-RAY DETECTABLE: Brand: DEROYAL

## (undated) DEVICE — ELECTRODE LAP BLADE CRV E-Z CLEAN 33CM -0019

## (undated) DEVICE — BLUNT TIP LAPAROSCOPIC SEALER/DIVIDER NANO-COATED: Brand: LIGASURE

## (undated) DEVICE — POWER SHELL: Brand: SIGNIA

## (undated) DEVICE — PLUMEPEN PRO 10FT

## (undated) DEVICE — GLOVE SRG BIOGEL ECLIPSE 8

## (undated) DEVICE — PREMIUM DRY TRAY LF: Brand: MEDLINE INDUSTRIES, INC.

## (undated) DEVICE — SURGICEL 4 X 8IN

## (undated) DEVICE — SUT SILK 2-0 18 IN A185H

## (undated) DEVICE — HYDROPHILIC WOUND DRESSING WITH ZINC PLUS VITAMINS A AND B6.: Brand: DERMAGRAN®-B

## (undated) DEVICE — SYRINGE 50ML LL

## (undated) DEVICE — UTERINE MANIPULATOR RUMI 6.7 X 8 CM

## (undated) DEVICE — TELFA NON-ADHERENT ABSORBENT DRESSING: Brand: TELFA

## (undated) DEVICE — 40595 XL TRENDELENBURG POSITIONING KIT: Brand: 40595 XL TRENDELENBURG POSITIONING KIT

## (undated) DEVICE — 3M™ TEGADERM™ TRANSPARENT FILM DRESSING FRAME STYLE, 1628, 6 IN X 8 IN (15 CM X 20 CM), 10/CT 8CT/CASE: Brand: 3M™ TEGADERM™

## (undated) DEVICE — ELECTRODE BLADE MOD  E-Z CLEAN 6.5IN -0014M

## (undated) DEVICE — UNIVERSAL STAPLER: Brand: ENDO GIA ULTRA

## (undated) DEVICE — 40601 PROLONGED POSITIONING SYSTEM: Brand: 40601 PROLONGED POSITIONING SYSTEM

## (undated) DEVICE — SUT VICRYL 0 CT-1 27 IN J260H

## (undated) DEVICE — SUT SILK 3-0 SH CR/8 18 IN C013D

## (undated) DEVICE — SUT ETHILON 3-0 FSLX 30 IN 1673H

## (undated) DEVICE — BETHLEHEM MAJOR GENERAL PACK: Brand: CARDINAL HEALTH

## (undated) DEVICE — OCCLUDER COLPO-PNEUMO

## (undated) DEVICE — TROCAR: Brand: KII® SLEEVE

## (undated) DEVICE — EXOFIN PRECISION PEN HIGH VISCOSITY TOPICAL SKIN ADHESIVE: Brand: EXOFIN PRECISION PEN, 1G

## (undated) DEVICE — SUT PDS II 1 XLH 96 IN LOOPED Z881G

## (undated) DEVICE — MEDI-VAC YANK SUCT HNDL W/TPRD BULBOUS TIP: Brand: CARDINAL HEALTH

## (undated) DEVICE — ELECTRODE LAP SPATULA CRV E-Z CLEAN 33CM -0018C

## (undated) DEVICE — INTENDED FOR TISSUE SEPARATION, AND OTHER PROCEDURES THAT REQUIRE A SHARP SURGICAL BLADE TO PUNCTURE OR CUT.: Brand: BARD-PARKER SAFETY BLADES SIZE 11, STERILE

## (undated) DEVICE — INTENDED FOR TISSUE SEPARATION, AND OTHER PROCEDURES THAT REQUIRE A SHARP SURGICAL BLADE TO PUNCTURE OR CUT.: Brand: BARD-PARKER SAFETY BLADES SIZE 15, STERILE

## (undated) DEVICE — ARTICULATION RELOAD WITH TRI-STAPLE TECHNOLOGY: Brand: ENDO GIA

## (undated) DEVICE — MAYO STAND COVER: Brand: CONVERTORS

## (undated) DEVICE — CHLORAPREP HI-LITE 26ML ORANGE

## (undated) DEVICE — NEEDLE 25G X 1 1/2

## (undated) DEVICE — CHLORHEXIDINE 4PCT 4 OZ

## (undated) DEVICE — ANTI-FOG SOLUTION WITH FOAM PAD: Brand: DEVON

## (undated) DEVICE — GAUZE SPONGES,16 PLY: Brand: CURITY

## (undated) DEVICE — ADHESIVE SKIN HIGH VISCOSITY EXOFIN 1ML

## (undated) DEVICE — SUT MONOCRYL 4-0 PS-2 27 IN Y426H

## (undated) DEVICE — SUT ETHILON 3-0 FS-1 18 IN 663G

## (undated) DEVICE — GLOVE INDICATOR PI UNDERGLOVE SZ 8 BLUE

## (undated) DEVICE — GLOVE PI ULTRA TOUCH SZ.7.5

## (undated) DEVICE — STRL COTTON TIP APPLCTR 6IN PK: Brand: CARDINAL HEALTH

## (undated) DEVICE — PAD GROUNDING DUAL ADULT

## (undated) DEVICE — SURGIFLO ENDOSCOPIC APPICATOR: Brand: ETHICON

## (undated) DEVICE — SYRINGE 10ML LL

## (undated) DEVICE — SUT STRATAFIX SPIRAL 2-0 CT-1 30 CM SXPP1B410

## (undated) DEVICE — TRAY FOLEY 16FR URIMETER SILICONE SURESTEP

## (undated) DEVICE — LAPAROSCOPIC TROCAR SLEEVE/SINGLE USE: Brand: KII® OPTICAL ACCESS SYSTEM

## (undated) DEVICE — SUT PROLENE 2-0 SH 36 IN 8523H